# Patient Record
Sex: MALE | Race: WHITE | NOT HISPANIC OR LATINO | Employment: OTHER | URBAN - METROPOLITAN AREA
[De-identification: names, ages, dates, MRNs, and addresses within clinical notes are randomized per-mention and may not be internally consistent; named-entity substitution may affect disease eponyms.]

---

## 2018-11-29 ENCOUNTER — APPOINTMENT (EMERGENCY)
Dept: CT IMAGING | Facility: HOSPITAL | Age: 63
End: 2018-11-29
Payer: MEDICARE

## 2018-11-29 ENCOUNTER — HOSPITAL ENCOUNTER (OUTPATIENT)
Facility: HOSPITAL | Age: 63
Setting detail: OBSERVATION
Discharge: HOME | End: 2018-11-30
Attending: EMERGENCY MEDICINE | Admitting: INTERNAL MEDICINE
Payer: MEDICARE

## 2018-11-29 ENCOUNTER — APPOINTMENT (EMERGENCY)
Dept: RADIOLOGY | Facility: HOSPITAL | Age: 63
End: 2018-11-29
Payer: MEDICARE

## 2018-11-29 DIAGNOSIS — R11.15 NON-INTRACTABLE CYCLICAL VOMITING WITH NAUSEA: Chronic | ICD-10-CM

## 2018-11-29 DIAGNOSIS — R42 DIZZY: Chronic | ICD-10-CM

## 2018-11-29 DIAGNOSIS — R60.0 BILATERAL EDEMA OF LOWER EXTREMITY: Chronic | ICD-10-CM

## 2018-11-29 DIAGNOSIS — I48.20 CHRONIC ATRIAL FIBRILLATION (HCC): ICD-10-CM

## 2018-11-29 DIAGNOSIS — R42 VERTIGO: Primary | ICD-10-CM

## 2018-11-29 DIAGNOSIS — I10 ACCELERATED HYPERTENSION: Chronic | ICD-10-CM

## 2018-11-29 LAB
ALBUMIN SERPL BCP-MCNC: 4.2 G/DL (ref 3.5–5.7)
ALP SERPL-CCNC: 52 U/L (ref 55–165)
ALT SERPL W P-5'-P-CCNC: 12 U/L (ref 7–52)
ANION GAP SERPL CALCULATED.3IONS-SCNC: 8 MMOL/L (ref 4–13)
APTT PPP: 32 SECONDS (ref 26–38)
AST SERPL W P-5'-P-CCNC: 15 U/L (ref 13–39)
BASOPHILS # BLD AUTO: 0.1 THOUSANDS/ΜL (ref 0–0.1)
BASOPHILS NFR BLD AUTO: 1 % (ref 0–2)
BILIRUB SERPL-MCNC: 0.5 MG/DL (ref 0.2–1)
BNP SERPL-MCNC: 176 PG/ML (ref 1–100)
BUN SERPL-MCNC: 17 MG/DL (ref 7–25)
CALCIUM SERPL-MCNC: 9.6 MG/DL (ref 8.6–10.5)
CHLORIDE SERPL-SCNC: 101 MMOL/L (ref 98–107)
CO2 SERPL-SCNC: 26 MMOL/L (ref 21–31)
CREAT SERPL-MCNC: 0.93 MG/DL (ref 0.7–1.3)
EOSINOPHIL # BLD AUTO: 0.6 THOUSAND/ΜL (ref 0–0.61)
EOSINOPHIL NFR BLD AUTO: 6 % (ref 0–5)
ERYTHROCYTE [DISTWIDTH] IN BLOOD BY AUTOMATED COUNT: 13.4 % (ref 11.5–14.5)
GFR SERPL CREATININE-BSD FRML MDRD: 86 ML/MIN/1.73SQ M
GLUCOSE SERPL-MCNC: 117 MG/DL (ref 65–99)
HCT VFR BLD AUTO: 40.5 % (ref 36.5–49.3)
HGB BLD-MCNC: 13.9 G/DL (ref 14–18)
INR PPP: 1.17 (ref 0.9–1.5)
LYMPHOCYTES # BLD AUTO: 3.5 THOUSANDS/ΜL (ref 0.6–4.47)
LYMPHOCYTES NFR BLD AUTO: 35 % (ref 21–51)
MCH RBC QN AUTO: 32 PG (ref 26–34)
MCHC RBC AUTO-ENTMCNC: 34.3 G/DL (ref 31–37)
MCV RBC AUTO: 93 FL (ref 81–99)
MONOCYTES # BLD AUTO: 1.3 THOUSAND/ΜL (ref 0.17–1.22)
MONOCYTES NFR BLD AUTO: 13 % (ref 2–12)
NEUTROPHILS # BLD AUTO: 4.6 THOUSANDS/ΜL (ref 1.4–6.5)
NEUTS SEG NFR BLD AUTO: 46 % (ref 42–75)
NRBC BLD AUTO-RTO: 0 /100 WBCS
PLATELET # BLD AUTO: 225 THOUSANDS/UL (ref 149–390)
PMV BLD AUTO: 8.5 FL (ref 8.6–11.7)
POTASSIUM SERPL-SCNC: 4 MMOL/L (ref 3.5–5.5)
PROT SERPL-MCNC: 8.8 G/DL (ref 6.4–8.9)
PROTHROMBIN TIME: 13.6 SECONDS (ref 10.2–13)
RBC # BLD AUTO: 4.35 MILLION/UL (ref 4.3–5.9)
SODIUM SERPL-SCNC: 135 MMOL/L (ref 134–143)
TROPONIN I SERPL-MCNC: <0.03 NG/ML
WBC # BLD AUTO: 10 THOUSAND/UL (ref 4.8–10.8)

## 2018-11-29 PROCEDURE — 84484 ASSAY OF TROPONIN QUANT: CPT | Performed by: EMERGENCY MEDICINE

## 2018-11-29 PROCEDURE — 80053 COMPREHEN METABOLIC PANEL: CPT | Performed by: EMERGENCY MEDICINE

## 2018-11-29 PROCEDURE — 36415 COLL VENOUS BLD VENIPUNCTURE: CPT | Performed by: EMERGENCY MEDICINE

## 2018-11-29 PROCEDURE — 85025 COMPLETE CBC W/AUTO DIFF WBC: CPT | Performed by: EMERGENCY MEDICINE

## 2018-11-29 PROCEDURE — 96375 TX/PRO/DX INJ NEW DRUG ADDON: CPT

## 2018-11-29 PROCEDURE — 85610 PROTHROMBIN TIME: CPT | Performed by: EMERGENCY MEDICINE

## 2018-11-29 PROCEDURE — 85730 THROMBOPLASTIN TIME PARTIAL: CPT | Performed by: EMERGENCY MEDICINE

## 2018-11-29 PROCEDURE — 83880 ASSAY OF NATRIURETIC PEPTIDE: CPT | Performed by: EMERGENCY MEDICINE

## 2018-11-29 PROCEDURE — 93005 ELECTROCARDIOGRAM TRACING: CPT

## 2018-11-29 PROCEDURE — 70450 CT HEAD/BRAIN W/O DYE: CPT

## 2018-11-29 PROCEDURE — 96374 THER/PROPH/DIAG INJ IV PUSH: CPT

## 2018-11-29 PROCEDURE — 71046 X-RAY EXAM CHEST 2 VIEWS: CPT

## 2018-11-29 PROCEDURE — 99285 EMERGENCY DEPT VISIT HI MDM: CPT

## 2018-11-29 RX ORDER — METOCLOPRAMIDE HYDROCHLORIDE 5 MG/ML
10 INJECTION INTRAMUSCULAR; INTRAVENOUS ONCE
Status: COMPLETED | OUTPATIENT
Start: 2018-11-29 | End: 2018-11-29

## 2018-11-29 RX ORDER — ONDANSETRON 2 MG/ML
4 INJECTION INTRAMUSCULAR; INTRAVENOUS ONCE
Status: COMPLETED | OUTPATIENT
Start: 2018-11-29 | End: 2018-11-29

## 2018-11-29 RX ADMIN — ONDANSETRON 4 MG: 2 INJECTION INTRAMUSCULAR; INTRAVENOUS at 22:43

## 2018-11-29 RX ADMIN — METOCLOPRAMIDE 10 MG: 5 INJECTION, SOLUTION INTRAMUSCULAR; INTRAVENOUS at 22:52

## 2018-11-30 VITALS
BODY MASS INDEX: 40.43 KG/M2 | HEART RATE: 93 BPM | SYSTOLIC BLOOD PRESSURE: 140 MMHG | HEIGHT: 74 IN | RESPIRATION RATE: 18 BRPM | TEMPERATURE: 98 F | WEIGHT: 315 LBS | DIASTOLIC BLOOD PRESSURE: 88 MMHG | OXYGEN SATURATION: 100 %

## 2018-11-30 PROBLEM — R42 DIZZY: Status: ACTIVE | Noted: 2018-11-30

## 2018-11-30 PROBLEM — L03.90 CELLULITIS: Chronic | Status: ACTIVE | Noted: 2018-11-30

## 2018-11-30 PROBLEM — I10 HYPERTENSION: Chronic | Status: ACTIVE | Noted: 2018-11-30

## 2018-11-30 PROBLEM — E11.9 DIABETES MELLITUS (HCC): Chronic | Status: ACTIVE | Noted: 2018-11-30

## 2018-11-30 PROBLEM — E66.01 MORBID OBESITY WITH BMI OF 40.0-44.9, ADULT (HCC): Chronic | Status: ACTIVE | Noted: 2018-11-30

## 2018-11-30 PROBLEM — I48.20 CHRONIC ATRIAL FIBRILLATION (HCC): Status: ACTIVE | Noted: 2018-11-30

## 2018-11-30 PROBLEM — R11.10 VOMITING: Status: ACTIVE | Noted: 2018-11-30

## 2018-11-30 PROBLEM — R11.10 VOMITING: Chronic | Status: ACTIVE | Noted: 2018-11-30

## 2018-11-30 PROBLEM — H93.11 TINNITUS OF RIGHT EAR: Chronic | Status: ACTIVE | Noted: 2018-11-30

## 2018-11-30 PROBLEM — G62.9 PERIPHERAL NEUROPATHY: Status: ACTIVE | Noted: 2018-11-30

## 2018-11-30 PROBLEM — L03.90 CELLULITIS: Status: ACTIVE | Noted: 2018-11-30

## 2018-11-30 PROBLEM — R42 DIZZY: Chronic | Status: ACTIVE | Noted: 2018-11-30

## 2018-11-30 PROBLEM — R60.0 BILATERAL EDEMA OF LOWER EXTREMITY: Chronic | Status: ACTIVE | Noted: 2018-11-30

## 2018-11-30 PROBLEM — H93.11 TINNITUS OF RIGHT EAR: Status: ACTIVE | Noted: 2018-11-30

## 2018-11-30 PROBLEM — G89.29 CHRONIC PAIN: Chronic | Status: ACTIVE | Noted: 2018-11-30

## 2018-11-30 LAB
ALBUMIN SERPL BCP-MCNC: 4 G/DL (ref 3.5–5.7)
ALP SERPL-CCNC: 53 U/L (ref 55–165)
ALT SERPL W P-5'-P-CCNC: 14 U/L (ref 7–52)
ANION GAP SERPL CALCULATED.3IONS-SCNC: 9 MMOL/L (ref 4–13)
AST SERPL W P-5'-P-CCNC: 18 U/L (ref 13–39)
ATRIAL RATE: 78 BPM
BASOPHILS # BLD AUTO: 0 THOUSANDS/ΜL (ref 0–0.1)
BASOPHILS NFR BLD AUTO: 0 % (ref 0–2)
BILIRUB SERPL-MCNC: 0.6 MG/DL (ref 0.2–1)
BUN SERPL-MCNC: 19 MG/DL (ref 7–25)
CALCIUM SERPL-MCNC: 9.7 MG/DL (ref 8.6–10.5)
CHLORIDE SERPL-SCNC: 98 MMOL/L (ref 98–107)
CO2 SERPL-SCNC: 26 MMOL/L (ref 21–31)
CREAT SERPL-MCNC: 0.84 MG/DL (ref 0.7–1.3)
EOSINOPHIL # BLD AUTO: 0.1 THOUSAND/ΜL (ref 0–0.61)
EOSINOPHIL NFR BLD AUTO: 1 % (ref 0–5)
ERYTHROCYTE [DISTWIDTH] IN BLOOD BY AUTOMATED COUNT: 13.6 % (ref 11.5–14.5)
GFR SERPL CREATININE-BSD FRML MDRD: 92 ML/MIN/1.73SQ M
GLUCOSE P FAST SERPL-MCNC: 191 MG/DL (ref 65–99)
GLUCOSE SERPL-MCNC: 125 MG/DL (ref 65–140)
GLUCOSE SERPL-MCNC: 191 MG/DL (ref 65–99)
GLUCOSE SERPL-MCNC: 202 MG/DL (ref 65–140)
HCT VFR BLD AUTO: 41 % (ref 36.5–49.3)
HGB BLD-MCNC: 14.1 G/DL (ref 14–18)
LYMPHOCYTES # BLD AUTO: 1.5 THOUSANDS/ΜL (ref 0.6–4.47)
LYMPHOCYTES NFR BLD AUTO: 13 % (ref 21–51)
MAGNESIUM SERPL-MCNC: 1.9 MG/DL (ref 1.9–2.7)
MCH RBC QN AUTO: 32.5 PG (ref 26–34)
MCHC RBC AUTO-ENTMCNC: 34.4 G/DL (ref 31–37)
MCV RBC AUTO: 94 FL (ref 81–99)
MONOCYTES # BLD AUTO: 0.6 THOUSAND/ΜL (ref 0.17–1.22)
MONOCYTES NFR BLD AUTO: 5 % (ref 2–12)
NEUTROPHILS # BLD AUTO: 9.5 THOUSANDS/ΜL (ref 1.4–6.5)
NEUTS SEG NFR BLD AUTO: 82 % (ref 42–75)
NRBC BLD AUTO-RTO: 0 /100 WBCS
PLATELET # BLD AUTO: 193 THOUSANDS/UL (ref 149–390)
PMV BLD AUTO: 8.7 FL (ref 8.6–11.7)
POTASSIUM SERPL-SCNC: 3.9 MMOL/L (ref 3.5–5.5)
PROT SERPL-MCNC: 8.6 G/DL (ref 6.4–8.9)
QRS AXIS: -11 DEGREES
QRSD INTERVAL: 86 MS
QT INTERVAL: 458 MS
QTC INTERVAL: 494 MS
RBC # BLD AUTO: 4.34 MILLION/UL (ref 4.3–5.9)
SODIUM SERPL-SCNC: 133 MMOL/L (ref 134–143)
T WAVE AXIS: 11 DEGREES
VENTRICULAR RATE: 70 BPM
WBC # BLD AUTO: 11.7 THOUSAND/UL (ref 4.8–10.8)

## 2018-11-30 PROCEDURE — 80053 COMPREHEN METABOLIC PANEL: CPT | Performed by: NURSE PRACTITIONER

## 2018-11-30 PROCEDURE — 82948 REAGENT STRIP/BLOOD GLUCOSE: CPT

## 2018-11-30 PROCEDURE — 99203 OFFICE O/P NEW LOW 30 MIN: CPT | Performed by: INTERNAL MEDICINE

## 2018-11-30 PROCEDURE — G8987 SELF CARE CURRENT STATUS: HCPCS

## 2018-11-30 PROCEDURE — 99236 HOSP IP/OBS SAME DATE HI 85: CPT | Performed by: INTERNAL MEDICINE

## 2018-11-30 PROCEDURE — G8978 MOBILITY CURRENT STATUS: HCPCS

## 2018-11-30 PROCEDURE — 97116 GAIT TRAINING THERAPY: CPT

## 2018-11-30 PROCEDURE — G8979 MOBILITY GOAL STATUS: HCPCS

## 2018-11-30 PROCEDURE — 97167 OT EVAL HIGH COMPLEX 60 MIN: CPT

## 2018-11-30 PROCEDURE — G8988 SELF CARE GOAL STATUS: HCPCS

## 2018-11-30 PROCEDURE — 97163 PT EVAL HIGH COMPLEX 45 MIN: CPT

## 2018-11-30 PROCEDURE — G8989 SELF CARE D/C STATUS: HCPCS

## 2018-11-30 PROCEDURE — 83735 ASSAY OF MAGNESIUM: CPT | Performed by: NURSE PRACTITIONER

## 2018-11-30 PROCEDURE — 85025 COMPLETE CBC W/AUTO DIFF WBC: CPT | Performed by: NURSE PRACTITIONER

## 2018-11-30 PROCEDURE — 93010 ELECTROCARDIOGRAM REPORT: CPT | Performed by: INTERNAL MEDICINE

## 2018-11-30 RX ORDER — CEFAZOLIN SODIUM 2 G/50ML
2000 SOLUTION INTRAVENOUS EVERY 8 HOURS
Status: DISCONTINUED | OUTPATIENT
Start: 2018-11-30 | End: 2018-11-30 | Stop reason: HOSPADM

## 2018-11-30 RX ORDER — CLONIDINE HYDROCHLORIDE 0.1 MG/1
0.2 TABLET ORAL ONCE
Status: COMPLETED | OUTPATIENT
Start: 2018-11-30 | End: 2018-11-30

## 2018-11-30 RX ORDER — HYDRALAZINE HYDROCHLORIDE 20 MG/ML
10 INJECTION INTRAMUSCULAR; INTRAVENOUS EVERY 6 HOURS PRN
Status: DISCONTINUED | OUTPATIENT
Start: 2018-11-30 | End: 2018-11-30 | Stop reason: HOSPADM

## 2018-11-30 RX ORDER — LOVASTATIN 20 MG/1
20 TABLET ORAL
COMMUNITY
End: 2019-02-26 | Stop reason: ALTCHOICE

## 2018-11-30 RX ORDER — GLIPIZIDE 5 MG/1
5 TABLET ORAL DAILY
COMMUNITY
End: 2019-08-14 | Stop reason: SDUPTHER

## 2018-11-30 RX ORDER — NADOLOL 80 MG/1
80 TABLET ORAL DAILY
COMMUNITY
End: 2019-02-26 | Stop reason: ALTCHOICE

## 2018-11-30 RX ORDER — HEPARIN SODIUM 5000 [USP'U]/ML
5000 INJECTION, SOLUTION INTRAVENOUS; SUBCUTANEOUS EVERY 8 HOURS SCHEDULED
Status: DISCONTINUED | OUTPATIENT
Start: 2018-11-30 | End: 2018-11-30

## 2018-11-30 RX ORDER — FUROSEMIDE 40 MG/1
40 TABLET ORAL DAILY
Refills: 0
Start: 2018-11-30 | End: 2019-07-04

## 2018-11-30 RX ORDER — ACETAMINOPHEN 325 MG/1
650 TABLET ORAL EVERY 6 HOURS PRN
Status: DISCONTINUED | OUTPATIENT
Start: 2018-11-30 | End: 2018-11-30 | Stop reason: HOSPADM

## 2018-11-30 RX ORDER — ONDANSETRON 2 MG/ML
4 INJECTION INTRAMUSCULAR; INTRAVENOUS EVERY 6 HOURS PRN
Status: DISCONTINUED | OUTPATIENT
Start: 2018-11-30 | End: 2018-11-30 | Stop reason: HOSPADM

## 2018-11-30 RX ORDER — FUROSEMIDE 80 MG
80 TABLET ORAL DAILY
COMMUNITY
End: 2019-12-23 | Stop reason: SDUPTHER

## 2018-11-30 RX ADMIN — HYDRALAZINE HYDROCHLORIDE 10 MG: 20 INJECTION INTRAMUSCULAR; INTRAVENOUS at 04:55

## 2018-11-30 RX ADMIN — HEPARIN SODIUM 5000 UNITS: 5000 INJECTION INTRAVENOUS; SUBCUTANEOUS at 05:03

## 2018-11-30 RX ADMIN — APIXABAN 5 MG: 2.5 TABLET, FILM COATED ORAL at 08:16

## 2018-11-30 RX ADMIN — CEFAZOLIN SODIUM 2000 MG: 2 SOLUTION INTRAVENOUS at 13:52

## 2018-11-30 RX ADMIN — CEFAZOLIN SODIUM 2000 MG: 2 SOLUTION INTRAVENOUS at 06:39

## 2018-11-30 RX ADMIN — INSULIN LISPRO 1 UNITS: 100 INJECTION, SOLUTION INTRAVENOUS; SUBCUTANEOUS at 08:15

## 2018-11-30 RX ADMIN — CLONIDINE HYDROCHLORIDE 0.2 MG: 0.1 TABLET ORAL at 01:45

## 2018-11-30 NOTE — ASSESSMENT & PLAN NOTE
No results found for: HGBA1C    Recent Labs      11/30/18   0813  11/30/18   1159   POCGLU  202*  125       Blood Sugar Average: Last 72 hrs:  (P) 163 5  · Continue home meds

## 2018-11-30 NOTE — NURSING NOTE
Pt ready for discharge, NSL dc'd with abbocath intact  Thorough review of discharge instructions with pt and family   Encouraged to use walker and consolidate activities    Elevate legs for edema    Pt verbalizes understanding of instructions

## 2018-11-30 NOTE — ASSESSMENT & PLAN NOTE
· BP stable  · Continue home medications  · Cardiology input appreciated  · Follow up with PCP and cardio as outpatient

## 2018-11-30 NOTE — OCCUPATIONAL THERAPY NOTE
Occupational Therapy Evaluation      Fidencio Galan    11/30/2018    Patient Active Problem List   Diagnosis    Diabetes mellitus ([facility] )    Vomiting    Accelerated hypertension    Chronic pain    Morbid obesity with BMI of 40 0-44 9, adult ([facility] )    Dizzy    Bilateral edema of lower extremity    Cellulitis    Tinnitus of right ear       Past Medical History:   Diagnosis Date    Chronic pain     Diabetes mellitus ([facility] )     Hypertension        Past Surgical History:   Procedure Laterality Date    HERNIA REPAIR          11/30/18 7105   Note Type   Note type Eval only   Restrictions/Precautions   Weight Bearing Precautions Per Order No   Other Precautions Fall Risk   Pain Assessment   Pain Assessment No/denies pain   Pain Score No Pain   Home Living   Type of Home House   Home Layout Multi-level   Bathroom Shower/Tub Tub/shower unit   Bathroom Toilet Standard   Bathroom Equipment Other (Comment)  (none)   [address]   Additional Comments (first floor set up)   Prior Function   Level of [place] Independent with ADLs and functional mobility   Lives With Son   ADL Assistance Independent   IADLs Independent   Subjective   Subjective (" I feel good now    No pain no dizziness")   ADL   Eating Assistance 7  Independent   Grooming Assistance 7  Independent   UB Bathing Assistance 6  Modified Independent   LB Bathing Assistance 4  Minimal Assistance   UB Dressing Assistance 6  Modified independent   LB Dressing Assistance 4  Minimal [address]  4  Minimal Assistance   Bed Mobility   Rolling R 7  Independent   Rolling L 7  Independent   Supine to Sit 7  Independent   Sit to Supine 7  Independent   Transfers   Sit to Stand 6  Modified independent   Stand to Sit 6  Modified independent   Balance   Static Sitting Normal   Dynamic Sitting Normal   Static Standing Fair +   Dynamic Standing Fair   RUE Assessment   RUE Assessment WNL   LUE Assessment   LUE Assessment WNL   Cognition   Overall Cognitive Status WFL   Assessment   Assessment Pt is a 70 y o  male seen for OT evaluation s/p admit to [facility] on 11/29/2018 w/ Tinnitus of right ear  Comorbidities affecting pt's functional performance at time of assessment include: DM, obesity and bilateral LE pitting edema  Personal factors affecting pt at time of IE include:difficulty performing ADLS and difficulty performing IADLS   Prior to admission, pt was  Independent with ADL's and IADL's with first floor set up  Upon evaluation: Pt requires assistance with ADL's and mobility 2* the following deficits impacting occupational performance: decreased tolerance and impaired sensation of BLE's and significant increase in BLE edema  Pt would not  benefit from skilled OT tx while in hosp   Recommend PT for mobility needs  From OT standpoint, recommendation at time of d/c would be to return home when medically stable      Goals   Patient Goals "to get home and be able to walk without pain"   Barthel Index   Feeding 10   Bathing 0   Grooming Score 5   Dressing Score 5   Bladder Score 10   Bowels Score 10   Toilet Use Score 5   Transfers (Bed/Chair) Score 10   Mobility (Level Surface) Score 10   Stairs Score 0   Barthel Index Score 65   Marylen Batten, OT

## 2018-11-30 NOTE — ED NOTES
Patient states "feeling woozy, dizzy " Reviewed Catapres mechanism of action ans expected onset       Artemio Nicolas RN  11/30/18 0141

## 2018-11-30 NOTE — H&P
H&P- Jamal Mac 1955, 70 y o  male MRN: [id number]    Unit/Bed#: -02 Encounter: [id number]    Primary Care Provider: Lily Avendaño DO   Date and time admitted to hospital: 11/29/2018  9:51 PM        * Tinnitus of right ear   Assessment & Plan    · Patient recently had Lasix doubled to 80 mg daily  · He states the tinnitus is gone     Accelerated hypertension   Assessment & Plan    · Consult Cardiology  · Trend troponins  · Check a TSH  · P r n  Hydralazine  · Hypertension is chronic  · Patient is unsure of his medications     Dizzy   Assessment & Plan    · Patient states that the he did have right ear ringing  · And now has increased to dizziness with nausea and vomiting  · Consult Neurology  · Fall precautions     Cellulitis   Assessment & Plan    · Previous cellulitis of right lower extremity  · Positive for cellulitis of right lower extremity at this time  · Will start IV Ancef      Vomiting   Assessment & Plan    · Secondary to dizziness  · Order p r n  Zofran and Reglan     Morbid obesity with BMI of 40 0-44 9, adult ([facility])   Assessment & Plan    · This is a chronic condition  · Will offer supportive care     Chronic pain   Assessment & Plan    · This is a chronic condition     Diabetes mellitus ([facility] )   Assessment & Plan    No results found for: HGBA1C    No results for input(s): POCGLU in the last 72 hours  Blood Sugar Average: Last 72 hrs:    · Accu-Cheks Q a c   And HS with sliding scale insulin and check a hemoglobin A1c     Bilateral edema of lower extremity   Assessment & Plan    ·   · This is a chronic condition  · Patient states actually has worsened since increasing his Lasix           VTE Prophylaxis: Apixaban (Eliquis)  Code Status:  Full  POLST: POLST is not applicable to this patient  Discussion with family:  Appearance of right lower extremity cellulitis    Anticipated Length of Stay:  Patient will be admitted on an Observation basis with an anticipated length of stay of  < 2 midnights  Justification for Hospital Stay:  Cellulitis requiring IV antibiotics    Total Time for Visit, including Counseling / Coordination of Care: 70   Greater than 50% of this total time spent on direct patient counseling and coordination of care  Chief Complaint:   Dizziness    History of Present Illness:    Piero Dunbar is a 70 y o  male who presents with dizziness, unsteady on his feet, room was spinning  Patient states that the dizziness was causing him to be nauseated and actually causing vomiting  He also states that earlier in the day he had very severe right ear ringing  When I questioned him about his bilateral lower extremity edema, he tells me that he recently had his Lasix dose increased from 40 mg daily to 80 mg daily  This could be the cause of his tendinitis  He also states that he has actually been unable to walk for the last day or so secondary to the lower extremity edema, and with the ringing in his right ear he had dizziness and has been unable to walk  [facility] he states that the tendinitis is gone, there is no longer any dizziness, however his right lower extremity is reddened and appears to have a cellulitic look to it  At this point we will start him on IV antibiotics  In the emergency department he was given Zofran, Reglan, and clonidine  With an accelerated hypertension of 196/96 patient did receive hydralazine p r n  And clonidine p r n     Continue to monitor  Review of Systems:  Review of Systems   Constitutional: Positive for activity change  HENT: Negative  Eyes: Negative  Respiratory: Negative  Cardiovascular: Negative  Gastrointestinal: Negative  Endocrine: Negative  Genitourinary: Negative  Musculoskeletal: Positive for gait problem and joint swelling  Skin: Positive for rash  Allergic/Immunologic: Negative  Neurological: Positive for dizziness, weakness and light-headedness  Hematological: Negative  Psychiatric/Behavioral: Negative  Past Medical and Surgical History:   Past Medical History:   Diagnosis Date    Chronic pain     Diabetes mellitus ([address] )     Hypertension        Past Surgical History:   Procedure Laterality Date    HERNIA REPAIR         Meds/Allergies:  Prior to Admission medications    Not on File     I have reviewed home medications with patient personally  Allergies: No Known Allergies    Social History:  Marital Status:    Occupation:  Retired  Patient Pre-hospital Living Situation:  At home  Patient Pre-hospital Level of Mobility:  Full  Patient Pre-hospital Diet Restrictions:  Diabetic  Substance Use History:   History   Alcohol Use    Yes     Comment: social     History   Smoking Status    Never Smoker   Smokeless Tobacco    Never Used     History   Drug Use No       Family History:  I have reviewed the patients family history    Physical Exam:   Vitals:   Blood Pressure: (!) 178/93 (11/30/18 0427)  Pulse: 90 (11/30/18 0427)  Temperature: (!) 97 2 °F (36 2 °C) (11/30/18 0427)  Temp Source: Tympanic (11/30/18 0427)  Respirations: 18 (11/30/18 0427)  Height: 6' 2" (188 cm) (11/30/18 0427)  Weight - Scale: (!) 159 kg (351 lb 4 8 oz) (11/30/18 0427)  SpO2: 99 % (11/30/18 0427)    Physical Exam   Constitutional: He is oriented to person, place, and time  He appears well-developed and well-nourished  He is cooperative  HENT:   Head: Normocephalic and atraumatic  Nose: Nose normal    Mouth/Throat: Mucous membranes are normal    Eyes: Pupils are equal, round, and reactive to light  Conjunctivae and EOM are normal    Neck: Normal range of motion and full passive range of motion without pain  Neck supple  Cardiovascular: Normal rate, regular rhythm and normal heart sounds  Pulses:       Dorsalis pedis pulses are 1+ on the right side, and 1+ on the left side  Bilateral lower extremity +2 to 3 pitting edema    Patient does have chronic lower extremity enlargement of his legs  Pulmonary/Chest: Effort normal and breath sounds normal    Abdominal: Normal appearance and bowel sounds are normal    Musculoskeletal:        Right knee: He exhibits decreased range of motion and swelling  Left knee: He exhibits decreased range of motion and swelling  Right ankle: He exhibits decreased range of motion and swelling  Left ankle: He exhibits decreased range of motion and swelling  Tenderness  Neurological: He is alert and oriented to person, place, and time  Skin: Skin is warm and dry  Psychiatric: He has a normal mood and affect  His speech is normal and behavior is normal        Additional Data:   Lab Results: I have personally reviewed pertinent reports  Results from last 7 days  Lab Units 11/29/18  2210   WBC Thousand/uL 10 00   HEMOGLOBIN g/dL 13 9*   HEMATOCRIT % 40 5   PLATELETS Thousands/uL 225   NEUTROS PCT % 46   LYMPHS PCT % 35   MONOS PCT % 13*   EOS PCT % 6*       Results from last 7 days  Lab Units 11/29/18  2210   POTASSIUM mmol/L 4 0   CHLORIDE mmol/L 101   CO2 mmol/L 26   BUN mg/dL 17   CREATININE mg/dL 0 93   CALCIUM mg/dL 9 6   ALK PHOS U/L 52*   ALT U/L 12   AST U/L 15       Results from last 7 days  Lab Units 11/29/18  2210   INR  1 17               Imaging: I have personally reviewed pertinent reports  X-ray chest 2 views   Final Result by Jam Iraheta (11/29 2344)   Bibasilar patchy atelectasis is appreciated  Signed by Allie Baptiste MD      CT head without contrast   Final Result by Jam Iraheta (11/29 0861)   No current evidence of acute intracranial process            Signed by Allie Baptiste MD          EKG, Pathology, and Other Studies Reviewed on Admission:   · EKG:     [employer] / [facility] Records Reviewed: No     ** Please Note: This note has been constructed using a voice recognition system   **

## 2018-11-30 NOTE — PHYSICAL THERAPY NOTE
Physical Therapy Evaluation     Patient's Name: Kera Chance    Admitting Diagnosis  Vertigo [R42]  Vomiting [R11 10]  Accelerated hypertension [I10]  Dizzy [R42]  Non-intractable cyclical vomiting with nausea [G43  A0]    Problem List  Patient Active Problem List   Diagnosis    [facility] mellitus ([facility] )    Vomiting    Accelerated hypertension    Chronic pain    Morbid obesity with BMI of 40 0-44 9, adult ([facility] )    Dizzy    Bilateral edema of lower extremity    Cellulitis    Tinnitus of right ear       Past Medical History  Past Medical History:   Diagnosis Date    Chronic pain     Diabetes mellitus ([facility] )     Hypertension        Past Surgical History  Past Surgical History:   Procedure Laterality Date    HERNIA REPAIR        11/30/18 0841   Note Type   Note type Eval/Treat   Pain Assessment   Pain Assessment 0-10  (Simultaneous filing  User may not have seen previous data )   Pain Score No Pain   Home Living   Type of [address] [place] [address] Two level; Able to live on main level with bedroom/bathroom;Stairs to enter with rails  (2 MONA, typically no issue)   Bathroom Shower/Tub Tub/shower unit   Bathroom Toilet Standard   Bathroom Accessibility [place]  (used x 1 day, secondary to swelling L foot)   Additional Comments pt  stays on first floor, sleeps in recliner   Prior Function   Level of [place] Independent with ADLs and functional mobility   Lives With Son   Receives Help From Family   ADL Assistance Independent   IADLs Independent   Falls in the last 6 months 0   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[employer])   Restrictions/Precautions   [place] Bearing Precautions Per Order No   Other Precautions Fall Risk;Pain   General   Family/Caregiver Present No   Cognition   Overall Cognitive Status WFL   Arousal/Participation Alert   Orientation Level Oriented X4   Memory Within functional limits   Following Commands Follows one step commands with increased time or repetition   RUE Assessment   RUE Assessment (see OT assessment)   LUE Assessment   LUE Assessment (see OT assessment)   RLE Assessment   RLE Assessment X   Strength RLE   R Hip Flexion 3+/5   R Knee Extension 3+/5   R Ankle Dorsiflexion 3+/5   LLE Assessment   LLE Assessment X   Strength LLE   L Hip Flexion 3+/5   L Knee Extension 3+/5   L Ankle Dorsiflexion 3+/5   Coordination   Movements are Fluid and Coordinated 1   Sensation (B pedal due to diabetic neuropathy)   Bed Mobility   Supine to Sit 6  Modified independent   Additional items HOB elevated; Bedrails   Sit to Supine 6  Modified independent   Additional items Bedrails;HOB elevated   Transfers   Sit to Stand 4  Minimal assistance   Additional items Assist x 1;HOB elevated; Bedrails;Verbal cues   Stand to Sit 5  Supervision   Additional items Assist x 1;Bedrails;Verbal cues   Additional Comments Patient stood and urinated at toilet w/ close supervision of s w/o LOB   Ambulation/Elevation   Gait pattern Improper Weight shift;Decreased foot clearance;Decreased L stance; Short stride; Step to; Antalgic   Gait Assistance 5  Supervision   Additional items Assist x 1;Verbal cues; Tactile cues  (occasional TC's for safety, VC's for proper RW usage)   Assistive Device Rolling walker   Distance 120 feet   Stair Management Assistance Not tested   Balance   Static Sitting Normal   Dynamic Sitting Good   Static Standing Fair +   Dynamic Standing Fair   Ambulatory Fair   Endurance Deficit   Endurance Deficit Yes   Endurance Deficit Description increased fatigue, L foot pain upon return to bedside, resolved w/ rest x one minute at EOB  Activity Tolerance   Activity Tolerance Patient tolerated treatment well   Nurse Made Aware yes, CNA   Assessment   Prognosis Excellent   Problem List Decreased strength;Decreased endurance; Impaired balance;Decreased mobility; Decreased coordination; Impaired sensation;Obesity;Pain   Assessment Pt is 70 y o  male seen for PT evaluation s/p admit to [address] [facility] on 11/29/2018 w/ Tinnitus of right ear  PT consulted to assess pt's functional mobility and d/c needs  Order placed for PT eval and tx, w/ activity as tolerated order  Comorbidities affecting pt's physical performance at time of assessment include: weakness, dizziness, pain, BLE edema, cellulitis, DM w/ neuropathy, obesity, pain  PTA, pt was independent w/ all functional mobility w/ o AD and lives w/ son in one level house  Personal factors affecting pt at time of IE include: ambulating w/ assistive device, inability to navigate community distances, inability to navigate level surfaces w/o external assistance, unable to perform dynamic tasks in community, inability to perform IADLs and inability to perform ADLs  Please find objective findings from PT assessment regarding body systems outlined above with impairments and limitations including weakness, impaired balance, decreased endurance, impaired coordination, gait deviations, altered sensation, decreased safety awareness, fall risk and decreased skin integrity  The following objective measures performed on IE also reveal limitations: Barthel Index: 65/100  Pt's clinical presentation is currently unstable/unpredictable  Pt to benefit from continued PT tx to address deficits as defined above and maximize level of functional independent mobility and consistency  From PT/mobility standpoint, recommendation at time of d/c would be Home PT and RW pending progress in order to facilitate return to PLOF  Goals   Patient Goals feel stronger, get back to making funnel cakes   STG Expiration Date 12/07/18   Short Term Goal #1 1 )Patient will complete bed mobility independently for decrease need for caregiver assistance, decrease burden of care  2 ) Patient will complete transfers independently to decrease risk of falls, facilitate upright standing posture   3 ) BLE strength to greater than/equal to 4/5 gross musculature to increase ability to safely transfer, control descent to chair  4 ) Patient will exhibit increase static standing balance to Good for 3-5 minutes without LOB, Mod I to reduce risk of falls 5 ) Patient will exhibit increase dynamic ambulatory balance to Good+ for 350 feet, Mod I  to improve ability to mobilize to toilet, chair and decrease risk for additional medical complications  6 ) Patient will exhibit good self monitoring and ability to follow 2 step commands to increase complexity of tasks and resume ADL's without LOB  Treatment Day 1   Plan   Treatment/Interventions Functional transfer training;LE strengthening/ROM; Therapeutic exercise; Endurance training;Patient/family training;Equipment eval/education;Gait training;Spoke to nursing   PT Frequency 5x/wk   Recommendation   Recommendation Home PT   Equipment Recommended Walker   PT - OK to Discharge Yes  (if medically stable)   Additional Comments Upon conclusion, patient was resting comfortably w/ all need met, pleasant throguhout session  Barthel Index   Feeding 10   Bathing 0   Grooming Score 5   Dressing Score 5   Bladder Score 10   Bowels Score 10   Toilet Use Score 5   Transfers (Bed/Chair) Score 10   Mobility (Level Surface) Score 10   Stairs Score 0   Barthel Index Score 65     Additional skilled interventions: gait training x 15 minutes including a provided RW w/ adjustment, education on proper RW usage, utilization w/ directional changes, and safety awareness w/ AD in bathroom  Patient ambulated 120 feet w/ supervision and RW w/o LOB  I discussed providing a RW for home use, patient agreeable and case management aware, getting script        Mirlande Rinaldi, PT

## 2018-11-30 NOTE — PLAN OF CARE
Problem: DISCHARGE PLANNING - CARE MANAGEMENT  Goal: Discharge to post-acute care or home with appropriate resources  INTERVENTIONS:  - Conduct assessment to determine patient/family and health care team treatment goals, and need for post-acute services based on payer coverage, community resources, and patient preferences, and barriers to discharge  - Address psychosocial, clinical, and financial barriers to discharge as identified in assessment in conjunction with the patient/family and health care team  - Arrange appropriate level of post-acute services according to patient's   needs and preference and payer coverage in collaboration with the physician and health care team  - Communicate with and update the patient/family, physician, and health care team regarding progress on the discharge plan  - Arrange appropriate transportation to post-acute venues    D/c home  Outcome: Completed Date Met: 11/30/18

## 2018-11-30 NOTE — PLAN OF CARE
Problem: PHYSICAL THERAPY ADULT  Goal: Performs mobility at highest level of function for planned discharge setting  See evaluation for individualized goals  Treatment/Interventions: Functional transfer training, LE strengthening/ROM, Therapeutic exercise, Endurance training, Patient/family training, Equipment eval/education, Gait training, Spoke to nursing  Equipment Recommended: Kiley Florence, PT      See flowsheet documentation for full assessment, interventions and recommendations  Prognosis: Excellent  Problem List: Decreased strength, Decreased endurance, Impaired balance, Decreased mobility, Decreased coordination, Impaired sensation, Obesity, Pain  Assessment: Pt is 70 y o  male seen for PT evaluation s/p admit [facility] 11/29/2018 w/ Tinnitus of right ear  PT consulted to assess pt's functional mobility and d/c needs  Order placed for PT eval and tx, w/ activity as tolerated order  Comorbidities affecting pt's physical performance at time of assessment include: weakness, dizziness, pain, BLE edema, cellulitis, DM w/ neuropathy, obesity, pain  PTA, pt was independent w/ all functional mobility w/ o AD and lives w/ son in one level house  Personal factors affecting pt at time of IE include: ambulating w/ assistive device, inability to navigate community distances, inability to navigate level surfaces w/o external assistance, unable to perform dynamic tasks in community, inability to perform IADLs and inability to perform ADLs  Please find objective findings from PT assessment regarding body systems outlined above with impairments and limitations including weakness, impaired balance, decreased endurance, impaired coordination, gait deviations, altered sensation, decreased safety awareness, fall risk and decreased skin integrity  The following objective measures performed on IE also reveal limitations: Barthel Index: 65/100   Pt's clinical presentation is currently unstable/unpredictable  Pt to benefit from continued PT tx to address deficits as defined above and maximize level of functional independent mobility and consistency  From PT/mobility standpoint, recommendation at time of d/c would be Home PT and RW pending progress in order to facilitate return to PLOF  Recommendation: Home PT     PT - OK to Discharge: Yes (if medically stable)    See flowsheet documentation for full assessment     Harihs Manasa, PT

## 2018-11-30 NOTE — ED NOTES
Transfer via stretcher and cardiac/ SpO2/  NIBP monitoring to room 122-b  Ambulated with two assist to bed  Bedside report provided to Johnny D[place]  No questions at this time   PIVL Left hand intact     Christina Milner RN  11/30/18 1467

## 2018-11-30 NOTE — ASSESSMENT & PLAN NOTE
· Consult Cardiology  · Trend troponins  · Check a TSH  · P r n   Hydralazine  · Hypertension is chronic  · Patient is unsure of his medications

## 2018-11-30 NOTE — MALNUTRITION/BMI
This medical record reflects one or more clinical indicators suggestive of malnutrition and/or morbid obesity  Malnutrition Findings:   Malnutrition type: Chronic illness          BMI Findings:  BMI Classifications: Morbid Obesity 45-49 9     Body mass index is 45 1 kg/m²  See Nutrition note dated 11-30-18 for additional details  Completed nutrition assessment is viewable in the nutrition documentation

## 2018-11-30 NOTE — DISCHARGE SUMMARY
Discharge- Kera Chance 1955, 70 y o  male MRN: [id number]    Unit/Bed#: -02 Encounter: [id number]    Primary Care Provider: Pedro Braxton DO   Date and time admitted to hospital: 11/29/2018  9:51 PM        * Tinnitus of right ear   Assessment & Plan    · Likely due to recent increase in Lasix dose  · Recommended patient go back to 40 mg daily as opposed to 80 mg daily  · Follow up with PCP  · Symptoms have resolved     Chronic atrial fibrillation (HCC)   Assessment & Plan    · Rate controlled  · Continue home meds     Accelerated hypertension   Assessment & Plan    · BP stable  · Continue home medications  · Cardiology input appreciated  · Follow up with PCP and cardio as outpatient     Diabetes mellitus ([place] [address] )   Assessment & Plan    No results found for: HGBA1C    Recent Labs      11/30/18   0813  11/30/18   1159   POCGLU  202*  125       Blood Sugar Average: Last 72 hrs:  (P) 163 5  · Continue home meds       Discharging Physician / Practitioner: Gissell Rosario MD  PCP: Pedro Braxton DO  Admission Date:   Admission Orders     Ordered        11/30/18 0259  Place in Observation  Once             Discharge Date: 11/30/18    Resolved Problems  Date Reviewed: 11/30/2018    None          Consultations During Hospital Stay:  · Cardiology    Procedures Performed:   · None    Significant Findings / Test Results:   · Tinnitus right ear    Incidental Findings:   · None     Test Results Pending at Discharge (will require follow up): · None     Outpatient Tests Requested:  · None    Complications:  None    Reason for Admission:  Tinnitus    Hospital Course:     Kera Chance is a 70 y o  male patient who originally presented to the hospital on 11/29/2018 due to dizziness/vomiting  Patient was admitted with right ear tinnitus which has currently resolved    Patient recently increased his Lasix dose from 40 mg daily to 80 mg daily at the recommended of his PCP    Please see above list of diagnoses and related plan for additional information  Condition at Discharge: stable     Discharge Day Visit / Exam:     Subjective:  No complaints at this time    Vitals: Blood Pressure: 140/88 (11/30/18 0706)  Pulse: 93 (11/30/18 0706)  Temperature: 98 °F (36 7 °C) (11/30/18 0706)  Temp Source: Tympanic (11/30/18 0706)  Respirations: 18 (11/30/18 0706)  Height: 6' 2" (188 cm) (11/30/18 0427)  Weight - Scale: (!) 159 kg (351 lb 4 8 oz) (11/30/18 0427)  SpO2: 100 % (11/30/18 0706)     Exam:   Physical Exam   Constitutional: He appears well-developed  No distress  HENT:   Head: Normocephalic and atraumatic  Eyes: Conjunctivae and EOM are normal    Neck: Normal range of motion  Neck supple  Cardiovascular: Normal rate and regular rhythm  Pulmonary/Chest: Effort normal  No respiratory distress  Abdominal: Soft  He exhibits no distension  There is no tenderness  Musculoskeletal: Normal range of motion  He exhibits edema  Neurological: He is alert  No cranial nerve deficit  Skin: Skin is warm and dry  Chronic venous stasis changes of bilateral lower extremities between knee and ankle   Psychiatric: He has a normal mood and affect  Discharge instructions/Information to patient and family:   See after visit summary for information provided to patient and family  Provisions for Follow-Up Care:  See after visit summary for information related to follow-up care and any pertinent home health orders  Disposition:     Home    For Discharges to [facility] SNF:   · Not Applicable to this Patient - Not Applicable to this Patient    Planned Readmission: no     Discharge Statement:  I spent 35 minutes discharging the patient  This time was spent on the day of discharge  I had direct contact with the patient on the day of discharge   Greater than 50% of the total time was spent examining patient, answering all patient questions, arranging and discussing plan of care with patient as well as directly providing post-discharge instructions  Additional time then spent on discharge activities  Discharge Medications:  See after visit summary for reconciled discharge medications provided to patient and family        ** Please Note: This note has been constructed using a voice recognition system **

## 2018-11-30 NOTE — ASSESSMENT & PLAN NOTE
No results found for: HGBA1C    No results for input(s): POCGLU in the last 72 hours  Blood Sugar Average: Last 72 hrs:    · Accu-Cheks Q a c   And HS with sliding scale insulin and check a hemoglobin A1c

## 2018-11-30 NOTE — ASSESSMENT & PLAN NOTE
· Likely due to recent increase in Lasix dose  · Recommended patient go back to 40 mg daily as opposed to 80 mg daily  · Follow up with PCP  · Symptoms have resolved

## 2018-11-30 NOTE — ASSESSMENT & PLAN NOTE
Much better likely because his vertigo has resolved  He is unfortunately not sure of any of his medications apart from diuretic and Eliquis

## 2018-11-30 NOTE — ASSESSMENT & PLAN NOTE
· Patient states that the he did have right ear ringing  · And now has increased to dizziness with nausea and vomiting  · Consult Neurology  · Fall precautions

## 2018-11-30 NOTE — UTILIZATION REVIEW
Initial Clinical Review    Admission: Date/Time/Statement: 11/30/18 @ 0259 OBSERVATION    Orders Placed This Encounter   Procedures    Place in Observation     Standing Status:   Standing     Number of Occurrences:   1     Order Specific Question:   Admitting Physician     Answer:   Melba Jerry     Order Specific Question:   Level of Care     Answer:   Med Surg [16]     ED: Date/Time/Mode of Arrival:   ED Arrival Information     Expected Arrival Acuity Means of Arrival Escorted By Service Admission Type    - 11/29/2018 21:43 Urgent Walk-In Family Member General Medicine Urgent    Arrival Complaint    dizzyness, sob, heart racing        Chief Complaint:   Chief Complaint   Patient presents with    Dizziness     Diabetic,        History of Illness:      A 70YEAR-OLD MALE PATIENT WITH A HISTORY OF DIABETES PRESENTS TO THE EMERGENCY DEPARTMENT WITH DIZZINESS DESCRIBED AS A COMBINATION OF BEING UNSTEADY ON HIS FEET AS WELL AS THE ROOM SPINNING  PATIENT HAS HAD MULTIPLE EPISODES OF VOMITING BOTH AT HOME AND IN THE EMERGENCY DEPARTMENT  HE DENIES ANY PAIN  HE REPORTS NO CHANGE IN BOWEL OR BLADDER HABITS  HE REPORTS NO SHORTNESS OF BREATH DYSPNEA ON EXERTION OR ORTHOPNEA  SHE STATES THAT THE WITH DIZZINESS IS WORSE WITH MOVEMENT  HE REPORTS NO RECENT UPPER RESPIRATORY SYMPTOMS  HE HAS NOT HAD ANY PREVIOUS EXPERIENCES OF DIZZINESS  ED Vital Signs:   ED Triage Vitals [11/29/18 2152]   Temperature Pulse Respirations Blood Pressure SpO2   97 6 °F (36 4 °C) 80 18 (!) 190/112 98 %      Temp Source Heart Rate Source Patient Position - Orthostatic VS BP Location FiO2 (%)   Oral Monitor Standing - Orthostatic VS Left arm --      Pain Score       No Pain        Wt Readings from Last 1 Encounters:   11/30/18 (!) 159 kg (351 lb 4 8 oz)       Vital Signs (abnormal): /96    Abnormal Labs/Diagnostic Test Results:     BNP = 176    CT head: No current evidence of acute intracranial process      Chest x-ray: Bibasilar patchy atelectasis is appreciated  ED Treatment:   Medication Administration from 11/29/2018 2143 to 11/30/2018 0400       Date/Time Order Dose Route Action Action by Comments     11/29/2018 2243 ondansetron (ZOFRAN) injection 4 mg 4 mg Intravenous Given Dontrell Joyce RN      11/29/2018 2252 metoclopramide (REGLAN) injection 10 mg 10 mg Intravenous Given Dontrell Joyce RN      11/30/2018 0145 cloNIDine (CATAPRES) tablet 0 2 mg 0 2 mg Oral Given Dontrell Vincent RN           Past Medical/Surgical History: Active Ambulatory Problems     Diagnosis Date Noted    No Active Ambulatory Problems     Resolved Ambulatory Problems     Diagnosis Date Noted    No Resolved Ambulatory Problems     Past Medical History:   Diagnosis Date    Chronic pain     Diabetes mellitus ([facility] [address] )     Hypertension        Admitting Diagnosis: Vertigo [R42]  Vomiting [R11 10]  Accelerated hypertension [I10]  Dizzy [R42]  Non-intractable cyclical vomiting with nausea [G43  A0]    Age/Sex: 70 y o  male    Assessment/Plan:      Kevon Houston is a 70 y o  male who presents with dizziness, unsteady on his feet, room was spinning  Patient states that the dizziness was causing him to be nauseated and actually causing vomiting  He also states that earlier in the day he had very severe right ear ringing  When I questioned him about his bilateral lower extremity edema, he tells me that he recently had his Lasix dose increased from 40 mg daily to 80 mg daily  This could be the cause of his tendinitis  He also states that he has actually been unable to walk for the last day or so secondary to the lower extremity edema, and with the ringing in his right ear he had dizziness and has been unable to walk  While sitting in his bed in [facility] floor he states that the tendinitis is gone, there is no longer any dizziness, however his right lower extremity is reddened and appears to have a cellulitic look to it  At this point we will start him on IV antibiotics  In the emergency department he was given Zofran, Reglan, and clonidine  With an accelerated hypertension of 196/96 patient did receive hydralazine p r n  And clonidine p r n     Continue to monitor  Tinnitus of right ear   Assessment & Plan     · Patient recently had Lasix doubled to 80 mg daily  · He states the tinnitus is gone      Accelerated hypertension   Assessment & Plan     · Consult Cardiology  · Trend troponins  · Check a TSH  · P r n  Hydralazine  · Hypertension is chronic  · Patient is unsure of his medications      Dizzy   Assessment & Plan     · Patient states that the he did have right ear ringing  · And now has increased to dizziness with nausea and vomiting  · Consult Neurology  · Fall precautions      Cellulitis   Assessment & Plan     · Previous cellulitis of right lower extremity  · Positive for cellulitis of right lower extremity at this time  · Will start IV Ancef       Vomiting   Assessment & Plan     · Secondary to dizziness  · Order p r n  Zofran and Reglan      Morbid obesity with BMI of 40 0-44 9, adult ([facility])   Assessment & Plan     · This is a chronic condition  · Will offer supportive care      Chronic pain   Assessment & Plan     · This is a chronic condition      Diabetes mellitus ([facility] [address] )   Assessment & Plan     No results found for: HGBA1C     No results for input(s): POCGLU in the last 72 hours      Blood Sugar Average: Last 72 hrs:     · Accu-Cheks Q a c  And HS with sliding scale insulin and check a hemoglobin A1c      Bilateral edema of lower extremity   Assessment & Plan     ·   · This is a chronic condition  · Patient states actually has worsened since increasing his Lasix               Admission Orders: Cardiology and Neurology consults, up with assistance, telemetry monitoring, sequential compression device, PT/OT eval and treat, Cardiology consult, Neurology consult, HgbA1c and TSH, Troponin Q3H         Scheduled Meds: Current Facility-Administered Medications:  acetaminophen 650 mg Oral Q6H PRN   apixaban 5 mg Oral BID   cefazolin 2,000 mg Intravenous Q8H   hydrALAZINE 10 mg Intravenous Q6H PRN   insulin lispro 1-5 Units Subcutaneous TID AC   insulin lispro 1-5 Units Subcutaneous HS   ondansetron 4 mg Intravenous Q6H PRN

## 2018-11-30 NOTE — SOCIAL WORK
Chart reviewed by case management, assessment completed, pt is independent and lives with his son in a 2 story home, 2 steps outside and 12-14 steps inside, he states he stays on the first floor and sleeps his recliner, I offered to order a hospital and he declined, pt has a br on the 1st and 2nd flor,, pt has a rx plan at [address] [facility], pt was evaluated by pt dept and they recommended a walker, rx was obtained and the therapy department delivered a walker to the patient, pt denied any additional d/c needs, pt was made aware that he was here as an obs status and obs booklet was given, pt in agreement with the d/c and d/c plan home, family will transport, d/c plan was discussed at care coordintion rounds today

## 2018-11-30 NOTE — ASSESSMENT & PLAN NOTE
·   · This is a chronic condition  · Patient states actually has worsened since increasing his Lasix

## 2018-11-30 NOTE — ED PROVIDER NOTES
History  Chief Complaint   Patient presents with    Dizziness     Diabetic, GLU 8     A 70YEAR-OLD MALE PATIENT WITH A HISTORY OF DIABETES PRESENTS TO THE EMERGENCY DEPARTMENT WITH DIZZINESS DESCRIBED AS A COMBINATION OF BEING UNSTEADY ON HIS FEET AS WELL AS THE ROOM SPINNING  PATIENT HAS HAD MULTIPLE EPISODES OF VOMITING BOTH AT HOME AND IN THE EMERGENCY DEPARTMENT  HE DENIES ANY PAIN  HE REPORTS NO CHANGE IN BOWEL OR BLADDER HABITS  HE REPORTS NO SHORTNESS OF BREATH DYSPNEA ON EXERTION OR ORTHOPNEA  SHE STATES THAT THE WITH DIZZINESS IS WORSE WITH MOVEMENT  HE REPORTS NO RECENT UPPER RESPIRATORY SYMPTOMS  HE HAS NOT HAD ANY PREVIOUS EXPERIENCES OF DIZZINESS  None       Past Medical History:   Diagnosis Date    Chronic pain     Diabetes mellitus ([facility] [address] )     Hypertension        Past Surgical History:   Procedure Laterality Date    HERNIA REPAIR         Family History   Problem Relation Age of Onset    Heart failure Mother     Heart failure Father      I have reviewed and agree with the history as documented  Social History   Substance Use Topics    Smoking status: Never Smoker    Smokeless tobacco: Never Used    Alcohol use Yes      Comment: social        Review of Systems   Constitutional: Negative for chills and fever  HENT: Negative for ear pain, rhinorrhea and sore throat  Eyes: Negative for pain, redness and visual disturbance  Respiratory: Negative for cough and shortness of breath  Cardiovascular: Negative for chest pain and leg swelling  Gastrointestinal: Negative for abdominal pain, diarrhea, nausea and vomiting  Genitourinary: Negative for dysuria, flank pain, frequency and urgency  Musculoskeletal: Negative for back pain, myalgias and neck pain  Skin: Negative for rash  Neurological: Negative for dizziness, weakness, light-headedness and headaches  Hematological: Negative      Psychiatric/Behavioral: Negative for agitation, confusion and suicidal ideas  The patient is not nervous/anxious  All other systems reviewed and are negative  Physical Exam  Physical Exam   Constitutional: He is oriented to person, place, and time  MORBIDLY OBESE   HENT:   Nose: Nose normal    Mouth/Throat: Oropharynx is clear and moist  No oropharyngeal exudate  Eyes: Pupils are equal, round, and reactive to light  Conjunctivae and EOM are normal  No scleral icterus  Neck: Normal range of motion  Neck supple  No JVD present  No tracheal deviation present  Cardiovascular: Normal rate, regular rhythm and normal heart sounds  No murmur heard  Pulmonary/Chest: Effort normal and breath sounds normal  No respiratory distress  He has no wheezes  He has no rales  Abdominal: Soft  Bowel sounds are normal  There is no tenderness  There is no guarding  Musculoskeletal: Normal range of motion  He exhibits edema (THE LOWER EXTREMITIES ARE GROSSLY EDEMATOUS WITH PITTING EDEMA AND DISTORTION OF THE ANATOMY SECONDARY TO THE EDEMA )  He exhibits no tenderness  Neurological: He is alert and oriented to person, place, and time  No cranial nerve deficit or sensory deficit  He exhibits normal muscle tone  5/5 motor, nl sens THE NEURO EXAM WAS A NONFOCAL EXAM   AND THE GAIT TESTING AT THE PATIENT WAS UNABLE TO WALK BECAUSE OF DIZZINESS  Skin: Skin is warm and dry  Psychiatric: He has a normal mood and affect  His behavior is normal    Nursing note and vitals reviewed        Vital Signs  ED Triage Vitals [11/29/18 2152]   Temperature Pulse Respirations Blood Pressure SpO2   97 6 °F (36 4 °C) 80 18 (!) 190/112 98 %      Temp Source Heart Rate Source Patient Position - Orthostatic VS BP Location FiO2 (%)   Oral Monitor Standing - Orthostatic VS Left arm --      Pain Score       No Pain           Vitals:    11/30/18 0015 11/30/18 0100 11/30/18 0130 11/30/18 0145   BP: (!) 181/92 (!) 188/94 (!) 196/96 (!) 196/96   Pulse: 72 72 63    Patient Position - Orthostatic VS: Lying Lying Lying        Visual Acuity  Visual Acuity      Most Recent Value   L Pupil Size (mm)  3   R Pupil Size (mm)  3          ED Medications  Medications   ondansetron (ZOFRAN) injection 4 mg (4 mg Intravenous Given 11/29/18 2243)   metoclopramide (REGLAN) injection 10 mg (10 mg Intravenous Given 11/29/18 2252)   cloNIDine (CATAPRES) tablet 0 2 mg (0 2 mg Oral Given 11/30/18 0145)       Diagnostic Studies  Results Reviewed     Procedure Component Value Units Date/Time    B-Type Natriuretic Peptide [facility] ONLY) [[id number]]  (Abnormal) Collected:  11/29/18 2211    Lab Status:  Final result Specimen:  Blood from Arm, Left Updated:  11/29/18 2244      (H) pg/mL     Comprehensive metabolic panel [[id number]]  (Abnormal) Collected:  11/29/18 2210    Lab Status:  Final result Specimen:  Blood from Arm, Left Updated:  11/29/18 2240     Sodium 135 mmol/L      Potassium 4 0 mmol/L      Chloride 101 mmol/L      CO2 26 mmol/L      ANION GAP 8 mmol/L      BUN 17 mg/dL      Creatinine 0 93 mg/dL      Glucose 117 (H) mg/dL      Calcium 9 6 mg/dL      AST 15 U/L      ALT 12 U/L      Alkaline Phosphatase 52 (L) U/L      Total Protein 8 8 g/dL      Albumin 4 2 g/dL      Total Bilirubin 0 50 mg/dL      eGFR 86 ml/min/1 73sq m     Narrative:         National Kidney Disease Education Program recommendations are as follows:  GFR calculation is accurate only with a steady state creatinine  Chronic Kidney disease less than 60 ml/min/1 73 sq  meters  Kidney failure less than 15 ml/min/1 73 sq  meters      Troponin I [[id number]]  (Normal) Collected:  11/29/18 2210    Lab Status:  Final result Specimen:  Blood from Arm, Left Updated:  11/29/18 2240     Troponin I <0 03 ng/mL     Protime-INR [[id number]]  (Abnormal) Collected:  11/29/18 2210    Lab Status:  Final result Specimen:  Blood from Arm, Left Updated:  11/29/18 2231     Protime 13 6 (H) seconds      INR 1 17    APTT [[id number]]  (Normal) Collected:  11/29/18 2210    Lab Status:  Final result Specimen:  Blood from Arm, Left Updated:  11/29/18 2231     PTT 32 seconds     CBC and differential [[id number]]  (Abnormal) Collected:  11/29/18 2210    Lab Status:  Final result Specimen:  Blood from Arm, Left Updated:  11/29/18 2218     WBC 10 00 Thousand/uL      RBC 4 35 Million/uL      Hemoglobin 13 9 (L) g/dL      Hematocrit 40 5 %      MCV 93 fL      MCH 32 0 pg      MCHC 34 3 g/dL      RDW 13 4 %      MPV 8 5 (L) fL      Platelets 892 Thousands/uL      nRBC 0 /100 WBCs      Neutrophils Relative 46 %      Lymphocytes Relative 35 %      Monocytes Relative 13 (H) %      Eosinophils Relative 6 (H) %      Basophils Relative 1 %      Neutrophils Absolute 4 60 Thousands/µL      Lymphocytes Absolute 3 50 Thousands/µL      Monocytes Absolute 1 30 (H) Thousand/µL      Eosinophils Absolute 0 60 Thousand/µL      Basophils Absolute 0 10 Thousands/µL                  X-ray chest 2 views   Final Result by Comfort Liu (11/29 2345)   Bibasilar patchy atelectasis is appreciated  Signed by Mouna Juan MD      CT head without contrast   Final Result by Comfort Liu (11/29 0115)   No current evidence of acute intracranial process            Signed by Mouna Juan MD                 Procedures  Procedures       Phone Contacts  ED Phone Contact    ED Course                               [facility]  CritCare Time    Disposition  Final diagnoses:   None     ED Disposition     None      Follow-up Information    None         Patient's Medications    No medications on file     No discharge procedures on file      ED Provider  Electronically Signed by           Aliyah Grimaldo MD  11/30/18 0033

## 2018-11-30 NOTE — CONSULTS
Consult- Concepción Gallardo 1955, 70 y o  male MRN: [id number]    Unit/Bed#: -02 Encounter: [id number]    Primary Care Provider: Fiordaliza Lara DO   Date and time admitted to hospital: 11/29/2018  9:51 PM      Inpatient consult to Cardiology  Consult performed by: Ray Davila ordered by: Adriana Alcantar          Chronic atrial fibrillation ([facility] [address] )   Assessment & Plan    On Eliquis  He describes his heart rate being on the low side but stable  Accelerated hypertension   Assessment & Plan    Much better likely because his vertigo has resolved  He is unfortunately not sure of any of his medications apart from diuretic and Eliquis  * Tinnitus of right ear   Assessment & Plan    Resolved  Other summary comments: All is improved since his vertigo has resolved  Other problems are essentially chronic  Okay to resume his usual care and usual meds and see Dr Lisa Sanchez in follow-up  HPI: Concepción Gallardo is a 70y o  year old male who presented with 1 day of vertigo  Here his blood pressure was quite high and I am asked to comment further  Vertigo has resolved  He was quite uncomfortable during this spell  No recent chest pain or chest pressure  He describes having chronic atrial fibrillation but a strong heart  There is chronic leg edema and redness  EKG:   Atrial fibrillation with a slow to moderate ventricular response  No other changes  No recent echo in the epic chart  Review of Systems: a 10 point review of systems was conducted and is negative except for as mentioned in the HPI or as below  Some difficulty with walking          Historical Information   Past Medical History:   Diagnosis Date    Chronic pain     Diabetes mellitus ([facility] [address] )     Hypertension      Past Surgical History:   Procedure Laterality Date    HERNIA REPAIR       History   Alcohol Use    Yes     Comment: social     History   Drug Use No     History   Smoking Status    Never Smoker   Smokeless Tobacco    Never Used       Family History:   No longer relevant  Meds/Allergies   all current active meds have been reviewed  No prescriptions prior to admission  No Known Allergies    Objective   Vitals: Blood pressure 140/88, pulse 93, temperature 98 °F (36 7 °C), temperature source Tympanic, resp  rate 18, height 6' 2" (1 88 m), weight (!) 159 kg (351 lb 4 8 oz), SpO2 100 %  , Body mass index is 45 1 kg/m² , Orthostatic Blood Pressures      Most Recent Value   Blood Pressure  140/88 filed at 11/30/2018 0706   Patient Position - Orthostatic VS  Lying filed at 11/30/2018 3014          Systolic (34WKU), SOB:029 , Min:140 , HEN:798     Diastolic (70RVW), FQE:68, Min:84, Max:112              Physical Exam:    General:  Normal appearance in no distress  Eyes:  Anicteric  Oral mucosa:  Moist   Neck:  No JVD  Carotid upstrokes are brisk without bruits  No masses  Chest:  Clear to auscultation and percussion  Cardiac:  Normal PMI  Normal S1 and S2  No murmur gallop or rub  Irregularly irregular  Abdomen:  Soft and nontender  No palpable organomegaly or aortic enlargement  Extremities:  2+ leg edema with venous stasis changes  He states that it is no different than usual   Musculoskeletal:  Symmetric  Vascular:  Femoral pulses are brisk without bruits  Popliteal  pulses are intact bilaterally  Pedal pulses are intact  Neuro:  Grossly symmetric  Psych:  Alert and oriented x3          Lab Results:     Troponins:   Results from last 7 days  Lab Units 11/29/18  2210   TROPONIN I ng/mL <0 03     BNP:   Results from last 6 Months  Lab Units 11/29/18  2211   BNP pg/mL 176*       CBC :   Results from last 7 days  Lab Units 11/30/18  0525 11/29/18  2210   WBC Thousand/uL 11 70* 10 00   HEMOGLOBIN g/dL 14 1 13 9*   HEMATOCRIT % 41 0 40 5   MCV fL 94 93   PLATELETS Thousands/uL 193 225     TSH:     CMP:   Results from last 7 days  Lab Units 11/30/18  0525 11/29/18  2210   POTASSIUM mmol/L 3 9 4 0   CHLORIDE mmol/L 98 101   CO2 mmol/L 26 26   BUN mg/dL 19 17   CREATININE mg/dL 0 84 0 93   AST U/L 18 15   ALT U/L 14 12   EGFR ml/min/1 73sq m 92 86     Lipid Profile:     Coags:   Results from last 7 days  Lab Units 11/29/18  2210   INR  1 17

## 2018-11-30 NOTE — ASSESSMENT & PLAN NOTE
· Previous cellulitis of right lower extremity  · Positive for cellulitis of right lower extremity at this time  · Will start IV Ancef

## 2018-12-03 ENCOUNTER — TRANSCRIBE ORDERS (OUTPATIENT)
Dept: ADMINISTRATIVE | Facility: HOSPITAL | Age: 63
End: 2018-12-03

## 2018-12-03 DIAGNOSIS — R42 VERTIGO: Primary | ICD-10-CM

## 2018-12-05 ENCOUNTER — HOSPITAL ENCOUNTER (OUTPATIENT)
Dept: MRI IMAGING | Facility: HOSPITAL | Age: 63
Discharge: HOME | End: 2018-12-05
Payer: MEDICARE

## 2018-12-05 DIAGNOSIS — R42 VERTIGO: ICD-10-CM

## 2018-12-05 PROCEDURE — A9585 GADOBUTROL INJECTION: HCPCS | Performed by: DENTIST

## 2018-12-05 PROCEDURE — 70553 MRI BRAIN STEM W/O & W/DYE: CPT

## 2018-12-05 RX ADMIN — GADOBUTROL 15 ML: 604.72 INJECTION INTRAVENOUS at 18:42

## 2018-12-24 ENCOUNTER — TRANSCRIBE ORDERS (OUTPATIENT)
Dept: ADMINISTRATIVE | Facility: HOSPITAL | Age: 63
End: 2018-12-24

## 2018-12-24 DIAGNOSIS — I67.9 CEREBROVASCULAR DISEASE, UNSPECIFIED: ICD-10-CM

## 2018-12-24 DIAGNOSIS — I48.91 ATRIAL FIBRILLATION, UNSPECIFIED TYPE (HCC): Primary | ICD-10-CM

## 2018-12-31 ENCOUNTER — HOSPITAL ENCOUNTER (OUTPATIENT)
Dept: MRI IMAGING | Facility: HOSPITAL | Age: 63
Discharge: HOME | End: 2018-12-31
Attending: PSYCHIATRY & NEUROLOGY
Payer: MEDICARE

## 2018-12-31 DIAGNOSIS — I67.9 CEREBROVASCULAR DISEASE, UNSPECIFIED: ICD-10-CM

## 2018-12-31 DIAGNOSIS — I48.91 ATRIAL FIBRILLATION, UNSPECIFIED TYPE (HCC): ICD-10-CM

## 2018-12-31 PROCEDURE — 70544 MR ANGIOGRAPHY HEAD W/O DYE: CPT

## 2018-12-31 PROCEDURE — 70547 MR ANGIOGRAPHY NECK W/O DYE: CPT

## 2019-01-11 ENCOUNTER — HOSPITAL ENCOUNTER (OUTPATIENT)
Dept: NON INVASIVE DIAGNOSTICS | Facility: CLINIC | Age: 64
Discharge: HOME | End: 2019-01-11
Payer: MEDICARE

## 2019-01-11 DIAGNOSIS — I48.91 ATRIAL FIBRILLATION, UNSPECIFIED TYPE (HCC): ICD-10-CM

## 2019-01-11 DIAGNOSIS — I67.9 CEREBROVASCULAR DISEASE, UNSPECIFIED: ICD-10-CM

## 2019-01-11 PROCEDURE — 93306 TTE W/DOPPLER COMPLETE: CPT | Performed by: INTERNAL MEDICINE

## 2019-01-11 PROCEDURE — 93306 TTE W/DOPPLER COMPLETE: CPT

## 2019-01-14 ENCOUNTER — HOSPITAL ENCOUNTER (OUTPATIENT)
Dept: NON INVASIVE DIAGNOSTICS | Facility: CLINIC | Age: 64
End: 2019-01-14
Payer: MEDICARE

## 2019-01-14 ENCOUNTER — HOSPITAL ENCOUNTER (OUTPATIENT)
Dept: NON INVASIVE DIAGNOSTICS | Facility: CLINIC | Age: 64
Discharge: HOME | End: 2019-01-14
Payer: MEDICARE

## 2019-01-14 ENCOUNTER — EVALUATION (OUTPATIENT)
Dept: PHYSICAL THERAPY | Facility: CLINIC | Age: 64
End: 2019-01-14
Payer: MEDICARE

## 2019-01-14 ENCOUNTER — TRANSCRIBE ORDERS (OUTPATIENT)
Dept: SLEEP CENTER | Facility: HOSPITAL | Age: 64
End: 2019-01-14

## 2019-01-14 DIAGNOSIS — I48.91 ATRIAL FIBRILLATION, UNSPECIFIED TYPE (HCC): ICD-10-CM

## 2019-01-14 DIAGNOSIS — G47.33 OSA (OBSTRUCTIVE SLEEP APNEA): Primary | ICD-10-CM

## 2019-01-14 DIAGNOSIS — R26.9 ABNORMALITY OF GAIT: Primary | ICD-10-CM

## 2019-01-14 DIAGNOSIS — I67.9 CEREBROVASCULAR DISEASE, UNSPECIFIED: ICD-10-CM

## 2019-01-14 PROCEDURE — 97110 THERAPEUTIC EXERCISES: CPT | Performed by: PHYSICAL THERAPIST

## 2019-01-14 PROCEDURE — 93880 EXTRACRANIAL BILAT STUDY: CPT | Performed by: SURGERY

## 2019-01-14 PROCEDURE — 97163 PT EVAL HIGH COMPLEX 45 MIN: CPT | Performed by: PHYSICAL THERAPIST

## 2019-01-14 PROCEDURE — 93880 EXTRACRANIAL BILAT STUDY: CPT

## 2019-01-14 NOTE — LETTER
2019    Jarad Polanco, [address] [facility]    Patient: Fabrice Woods   YOB: 1955   Date of Visit: 2019     Encounter Diagnosis     ICD-10-CM    1  Abnormality of gait R26 9        Dear Dr Justo Beatty:    Please review the attached Plan of Care from [facility] recent visit  Please verify that you agree therapy should continue by signing the attached document and sending it back to our office  If you have any questions or concerns, please don't hesitate to call  Sincerely,    Ronny Rodriguez      Referring Provider:      I certify that I have read the below Plan of Care and certify the need for these services furnished under this plan of treatment while under my care  Jarad Polanco, [address] [facility],[address] [id number]  [place]          PT Evaluation     Today's date: 2019  Patient name: Fabrice Woods  : 1955  MRN: [id number]  Referring provider: Odette Quiroga MD  Dx:   Encounter Diagnosis     ICD-10-CM    1  Abnormality of gait R26 9        Assessment  Assessment details: Patient is a 70y o  year old male presenting to OPPT s/p diagnosis of suspected CVA, A-fib, LE edema, and unsteady gait  Patient demonstrates decreased strength, endurance, balance, LE edema, and functional independence  Patient is unable to return to work at this time and requires assistance for ADLs and IADLs  He will benefit from skilled PT interventions to maximize return to PLOF and independence as able  Thank you for this referral and the ability to participate in the care of this patient      Impairments: abnormal coordination, abnormal gait, abnormal or restricted ROM, activity intolerance, impaired balance, impaired physical strength, lacks appropriate home exercise program and pain with function  Other impairment: Bilateral LE lymphedema left > right  Functional limitations: Dependence on ADBarriers to therapy: LE edema  LE weakness right > left  Unable to drive  Understanding of Dx/Px/POC: good   Prognosis: good    Goals  In 4 weeks, patient will:  1  Demonstrate ability to perform 30 minutes of activity without requiring seated rest break  2   Demonstrate ability to maintain upright balance unsupported by UEs while reaching above shoulder height without resistance to promote stability with ADLs  3  Demonstrate ability to lift up to 10 lbs from  knees to waist unsupported by UEs to promote stability with ADLs  4   Demonstrate increased strength of bilateral LEs to allow for improved transfer quality and stability    In 4-10 weeks, patient will:  1  Demonstrate reduced fall risk based on outcome measures  2  Demonstrate consistent carryover with HEP  3  Return to community ambulation with least restrictive AD for at least 300 feet      Plan  Patient would benefit from: skilled physical therapy  Other planned modality interventions: Modalities prn for symptom management  Planned therapy interventions: manual therapy, neuromuscular re-education, therapeutic exercise and home exercise program  Other planned therapy interventions: Possible CDT to include MLD and compression wrapping based on progress with conservative treatment of LE edema  Frequency: 2x week  Duration in weeks: 4  Treatment plan discussed with: patient        Subjective Evaluation    History of Present Illness  Date of onset: 11/29/2018  Mechanism of injury: Patient was admitted overnight to [address] [place] [address] 11/29-11/30  He initially went to the ED with dizziness and ringing in his ears  He had a CT scan that was negative for bleed  He followed up with Dr Griselda Vargas 12/31/18  At this time, in the physician's opinion, there was a small stroke that affected his right LE  Patient reports he has had an MRI with contrast, MRA, and a carotid artery study  He follows up with Dr Griselda Vargas again in one week    Subjectively, patient reports he can move his right LE, but the coordination and forward movement "just isn't there"  Reports hardest functional tasks are: walking without an assistive device, climbing stairs, has not returned for driving, and IADLs      PMHx:  Afib  Right LE cellulitis  Bilateral LE edema (left > right)  CHF    Quality of life: good    Pain  Location: Bilateral LEs  Quality: tight  Aggravating factors: lifting, stair climbing, walking and standing  Progression: worsening    Social Support  Steps to enter house: yes  3  Stairs in house: yes   18  Lives in: multiple-level home  Lives with: adult children    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)  Hand dominance: right    Treatments  Current treatment: physical therapy  Patient Goals  Patient goals for therapy: increased strength, independence with ADLs/IADLs, return to sport/leisure activities, increased motion, improved balance, decreased pain and decreased edema          Objective     Tenderness     Additional Tenderness Details  TTP bilateral LEs, left ankle    Neurological Testing     Sensation     Lumbar   Left   Intact: proprioception  Diminished: light touch    Right   Intact: proprioception  Diminished: light touch    Active Range of Motion     Additional Active Range of Motion Details  Knee flexion and ankle ROM limited bilaterally by soft tissue approximation  Flexibility limited throughout bilateral LEs  Increased abdominal girth with palpable LE edema, pitting    Strength/Myotome Testing     Left Hip   Planes of Motion   Flexion: 3  Extension: 3  Abduction: 3  Adduction: 3    Right Hip   Planes of Motion   Flexion: 3-  Extension: 3-  Abduction: 3  Adduction: 3    Left Knee   Flexion: 4  Extension: 4    Right Knee   Flexion: 4-  Extension: 4-    Left Ankle/Foot   Dorsiflexion: 3  Plantar flexion: 3    Right Ankle/Foot   Dorsiflexion: 4-  Plantar flexion: 4-    Additional Strength Details  Painful MMT left ankle    Functional Assessment     Comments  Tinetti Balance and Gait Assessment    Sitting Balance 1= Steady, safe    Rises From Chair  1= Able to Rise, requires use of arms to help    Attempts to Rise  1= Able to Rise, requires more than one attempt    Standing Balance (First 5 seconds)  1= Steady, but uses walker or other support    Nudged  0= Begins to fall    Eyes Closed  1= Steady    Turning 360 degrees  0= Discontinuous steps  1= Steady    Sitting Down (Getting Seated)  1= Uses arms or not a smooth transition    Initiation of Gait  1= No hesitancy    Step Length and Height  1= Step through left    Foot Clearance  1= Left foot clears the floor    Step Symmetry  0= Right and left step length are not equal    Step Continuity  1= Steps appear continuous    Path  1= Mild/moderate deviation OR uses a walking aid    Trunk  2= No sway, no flexion of knees or back use of arms or walking aid    Walking Time  1= Heels almost touching while walking    Total Score: 15/28        Flowsheet Rows      Most Recent Value   PT/OT G-Codes   Current Score  56   Projected Score  70          Precautions: Falls, LE edema  Re-eval Date: 2/13/19    Date 1/14       Visit Count 1       FOTO        Pain In See IE       Pain Out See IE             Daily Treatment Diary     Manual                             Exercise Diary         Aerobic Nustep  L1  12 mins       SLR x 4 Supine       HR/TR Stand       Mini Squats        Step ups Fwd/Lat                               HS stretch                Rhom EO        Rhom EC                Dynavision Foam                 Obstacle Course                Gait training SPC                           Modalities          prn

## 2019-01-14 NOTE — PROGRESS NOTES
PT Evaluation     Today's date: 2019  Patient name: Joey Foster  : 1955  MRN: [id number]  Referring provider: Beto Jaffe MD  Dx:   Encounter Diagnosis     ICD-10-CM    1  Abnormality of gait R26 9        Assessment  Assessment details: Patient is a 70y o  year old male presenting to OPPT s/p diagnosis of suspected CVA, A-fib, LE edema, and unsteady gait  Patient demonstrates decreased strength, endurance, balance, LE edema, and functional independence  Patient is unable to return to work at this time and requires assistance for ADLs and IADLs  He will benefit from skilled PT interventions to maximize return to PLOF and independence as able  Thank you for this referral and the ability to participate in the care of this patient  Impairments: abnormal coordination, abnormal gait, abnormal or restricted ROM, activity intolerance, impaired balance, impaired physical strength, lacks appropriate home exercise program and pain with function  Other impairment: Bilateral LE lymphedema left > right  Functional limitations: Dependence on ADBarriers to therapy: LE edema  LE weakness right > left  Unable to drive  Understanding of Dx/Px/POC: good   Prognosis: good    Goals  In 4 weeks, patient will:  1  Demonstrate ability to perform 30 minutes of activity without requiring seated rest break  2   Demonstrate ability to maintain upright balance unsupported by UEs while reaching above shoulder height without resistance to promote stability with ADLs  3  Demonstrate ability to lift up to 10 lbs from  knees to waist unsupported by UEs to promote stability with ADLs  4   Demonstrate increased strength of bilateral LEs to allow for improved transfer quality and stability    In 4-10 weeks, patient will:  1  Demonstrate reduced fall risk based on outcome measures  2  Demonstrate consistent carryover with HEP  3    Return to community ambulation with least restrictive AD for at least 300 feet      Plan  Patient would benefit from: skilled physical therapy  Other planned modality interventions: Modalities prn for symptom management  Planned therapy interventions: manual therapy, neuromuscular re-education, therapeutic exercise and home exercise program  Other planned therapy interventions: Possible CDT to include MLD and compression wrapping based on progress with conservative treatment of LE edema  Frequency: 2x week  Duration in weeks: 4  Treatment plan discussed with: patient        Subjective Evaluation    History of Present Illness  Date of onset: 11/29/2018  Mechanism of injury: Patient was admitted overnight to [address] [place] [address] 11/29-11/30  He initially went to the ED with dizziness and ringing in his ears  He had a CT scan that was negative for bleed  He followed up with Dr Griselda Vargas 12/31/18  At this time, in the physician's opinion, there was a small stroke that affected his right LE  Patient reports he has had an MRI with contrast, MRA, and a carotid artery study  He follows up with Dr Griselda Vargas again in one week  Subjectively, patient reports he can move his right LE, but the coordination and forward movement "just isn't there"  Reports hardest functional tasks are: walking without an assistive device, climbing stairs, has not returned for driving, and IADLs      PMHx:  Afib  Right LE cellulitis  Bilateral LE edema (left > right)  CHF    Quality of life: good    Pain  Location: Bilateral LEs  Quality: tight  Aggravating factors: lifting, stair climbing, walking and standing  Progression: worsening    Social Support  Steps to enter house: yes  3  Stairs in house: yes   18  Lives in: multiple-level home  Lives with: adult children    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)  Hand dominance: right    Treatments  Current treatment: physical therapy  Patient Goals  Patient goals for therapy: increased strength, independence with ADLs/IADLs, return to sport/leisure activities, increased motion, improved balance, decreased pain and decreased edema          Objective     Tenderness     Additional Tenderness Details  TTP bilateral LEs, left ankle    Neurological Testing     Sensation     Lumbar   Left   Intact: proprioception  Diminished: light touch    Right   Intact: proprioception  Diminished: light touch    Active Range of Motion     Additional Active Range of Motion Details  Knee flexion and ankle ROM limited bilaterally by soft tissue approximation  Flexibility limited throughout bilateral LEs  Increased abdominal girth with palpable LE edema, pitting    Strength/Myotome Testing     Left Hip   Planes of Motion   Flexion: 3  Extension: 3  Abduction: 3  Adduction: 3    Right Hip   Planes of Motion   Flexion: 3-  Extension: 3-  Abduction: 3  Adduction: 3    Left Knee   Flexion: 4  Extension: 4    Right Knee   Flexion: 4-  Extension: 4-    Left Ankle/Foot   Dorsiflexion: 3  Plantar flexion: 3    Right Ankle/Foot   Dorsiflexion: 4-  Plantar flexion: 4-    Additional Strength Details  Painful MMT left ankle    Functional Assessment     Comments  Tinetti Balance and Gait Assessment    Sitting Balance   1= Steady, safe    Rises From Chair  1= Able to Rise, requires use of arms to help    Attempts to Rise  1= Able to Rise, requires more than one attempt    Standing Balance (First 5 seconds)  1= Steady, but uses walker or other support    Nudged  0= Begins to fall    Eyes Closed  1= Steady    Turning 360 degrees  0= Discontinuous steps  1= Steady    Sitting Down (Getting Seated)  1= Uses arms or not a smooth transition    Initiation of Gait  1= No hesitancy    Step Length and Height  1= Step through left    Foot Clearance  1= Left foot clears the floor    Step Symmetry  0= Right and left step length are not equal    Step Continuity  1= Steps appear continuous    Path  1= Mild/moderate deviation OR uses a walking aid    Trunk  2= No sway, no flexion of knees or back use of arms or walking aid    Walking Time  1= Heels almost touching while walking    Total Score: 15/28        Flowsheet Rows      Most Recent Value   PT/OT G-Codes   Current Score  56   Projected Score  70          Precautions: Falls, LE edema  Re-eval Date: 2/13/19    Date 1/14       Visit Count 1       FOTO        Pain In See IE       Pain Out See IE             Daily Treatment Diary     Manual                             Exercise Diary         Aerobic Nustep  L1  12 mins       SLR x 4 Supine       HR/TR Stand       Mini Squats        Step ups Fwd/Lat                               HS stretch                Rhom EO        Rhom EC                Dynavision Foam                 Obstacle Course                Gait training SPC                           Modalities          prn

## 2019-01-15 ENCOUNTER — OFFICE VISIT (OUTPATIENT)
Dept: PHYSICAL THERAPY | Facility: CLINIC | Age: 64
End: 2019-01-15
Payer: MEDICARE

## 2019-01-15 DIAGNOSIS — R26.9 ABNORMALITY OF GAIT: Primary | ICD-10-CM

## 2019-01-15 PROCEDURE — 97110 THERAPEUTIC EXERCISES: CPT

## 2019-01-15 PROCEDURE — 97112 NEUROMUSCULAR REEDUCATION: CPT

## 2019-01-15 NOTE — PROGRESS NOTES
Daily Note     Today's date: 1/15/2019  Patient name: Isabel Dennis  : 1955  MRN: [id number]  Referring provider: Damian Childress MD  Dx:   Encounter Diagnosis     ICD-10-CM    1  Abnormality of gait R26 9                   Precautions: Falls, LE edema  Re-eval Date: 19    Date 1/14 1/15      Visit Count 1 2      FOTO        Pain In See IE       Pain Out See IE         Subjective: I put my compression pumps on my BL LE's 2x day  My legs feel so heavy  Wearing my compression   I have been doing a lot of things around the house this morning been up on my feet > 30 min at a time      Objective: See treatment diary below      Assessment: Tolerated treatment fairly well  Pt fatigues quickly with weakness BL LE R>L  Pt provided max cues for proper form/technique and avoidance of substitution  Demonstrates BL LE edema L>R with caution of heel raise with pt co's of increase min discomfort in arch of foot Patient would benefit from continued PT      Plan: Continue per plan of care       Manual             Exercise Diary         Aerobic Nustep  L1  12 mins Nustep  L1  12 mins      SLR x 4 Supine Flex BL  2x10      HR/TR Stand Firm  2x10   2 HHA      Mini Squats  2x10  2 HHA  firm      Step ups Fwd/Lat Fwd 4"  R x12  2 HHA  L x10  1 HHA                              HS stretch  1x 1 min  Strap/ball              Rhom EO  Firm/foam  1x 60 sec  CS      Rhom EC  Firm  1x60 sec  CS   Cues postural correction              Dynavision Foam                 Obstacle Course                Gait training SPC                           Modalities          prn

## 2019-01-16 ENCOUNTER — HOSPITAL ENCOUNTER (OUTPATIENT)
Dept: SLEEP CENTER | Facility: HOSPITAL | Age: 64
Discharge: HOME | End: 2019-01-16
Payer: MEDICARE

## 2019-01-16 DIAGNOSIS — G47.33 OSA (OBSTRUCTIVE SLEEP APNEA): ICD-10-CM

## 2019-01-16 PROCEDURE — 95810 POLYSOM 6/> YRS 4/> PARAM: CPT

## 2019-01-17 NOTE — PROGRESS NOTES
Sleep Study Documentation    Pre-Sleep Study       Sleep testing procedure explained to patient:YES    Patient napped prior to study:NO    Caffeine:Dayshift worker after 12PM   Caffeine use:NO    Alcohol:Dayshift workers after 5PM: Alcohol use:NO    Typical day for patient:YES       Study Documentation  Diagnostic   Snore: Moderate  Supplemental O2: no    O2 flow rate (L/min) range N/A  O2 flow rate (L/min) final N/A  Minimum SaO2 84%  Baseline SaO2 98%        Mode of Therapy:N/A    EKG abnormalities: yes:  EPOCH example and comments: Chronic A -Fib Throughout    EEG abnormalities: no    Study Terminated:no    Patient classification: retired       Post-Sleep Study    Medication used at bedtime or during sleep study:NO    Patient reports time it took to fall asleep:greater than 60 minutes    Patient reports waking up during study:3 or more times  Patient reports returning to sleep in 10 to 30 minutes  Patient reports sleeping 2 to 4 hours without dreaming  Patient reports sleep during study:typical    Patient rated sleepiness: Somewhat sleepy or tired    PAP treatment:no

## 2019-01-22 ENCOUNTER — OFFICE VISIT (OUTPATIENT)
Dept: PHYSICAL THERAPY | Facility: CLINIC | Age: 64
End: 2019-01-22
Payer: MEDICARE

## 2019-01-22 DIAGNOSIS — R26.9 ABNORMALITY OF GAIT: Primary | ICD-10-CM

## 2019-01-22 PROCEDURE — 97110 THERAPEUTIC EXERCISES: CPT | Performed by: PHYSICAL MEDICINE & REHABILITATION

## 2019-01-22 PROCEDURE — 97112 NEUROMUSCULAR REEDUCATION: CPT | Performed by: PHYSICAL MEDICINE & REHABILITATION

## 2019-01-22 NOTE — PROGRESS NOTES
Daily Note     Today's date: 2019  Patient name: Kevon Houston  : 1955  MRN: [id number]  Referring provider: Josefina Martinez MD  Dx:   Encounter Diagnosis     ICD-10-CM    1  Abnormality of gait R26 9                  Precautions: Falls, LE edema  Re-eval Date: 19    Date 1/14 1/15 1/22     Visit Count 1 2 3     FOTO        Pain In See IE  8/10     Pain Out See IE  8/10       Subjective: Pt notes con't pain in toes of L foot "as usual"  No changes in pain/sx otherwise  Pt's goals for PT are to "get rid of the walker" and "strengthen the R leg"  Objective: See treatment diary below      Assessment: Tolerated treatment well  Demonstrates cont B LE edema, limiting gait, activity, and there-ex with reduced mobility  Continues with gross B LE weakness as well  Able to progress reps with some exercises without incident  Continues to ambulate with RW with increased WB on B UEs and forward flexion  Pt to benefit from cont'd progressive LE strengthening 2* persistent strength deficits and difficulty with mobility  Patient demonstrated fatigue post treatment      Plan: Continue per plan of care  Progress treatment as tolerated          Manual             Exercise Diary         Aerobic Nustep  L1  12 mins Nustep  L1  12 mins Nustep  L1  12 mins     SLR x 4 Supine Flex BL  2x10 Flex BL  2x10     HR/TR Stand Firm  2x10   2 HHA Firm  25xea    2 HHA     Mini Squats  2x10  2 HHA  firm 2x10  2 HHA  firm     Step ups Fwd/Lat Fwd 4"  R x12  2 HHA  L x10  1 HHA Fwd 4"  R x12  2 HHA  L x10  1 HHA                             HS stretch  1x 1 min  Strap/ball 4x30" ea strap/ball              Rhom EO  Firm/foam  1x 60 sec  CS foam  1x 60 sec EO  CS    Firm EO with head turns  30"ea CS        Rhom EC  Firm  1x60 sec  CS   Cues postural correction Firm  1x60 sec  CS   Cues postural correction             Dynavision Foam                 Obstacle Course                Gait training SPC                           Modalities prn

## 2019-01-24 ENCOUNTER — TRANSCRIBE ORDERS (OUTPATIENT)
Dept: CARDIOLOGY CLINIC | Facility: CLINIC | Age: 64
End: 2019-01-24

## 2019-01-24 ENCOUNTER — OFFICE VISIT (OUTPATIENT)
Dept: PHYSICAL THERAPY | Facility: CLINIC | Age: 64
End: 2019-01-24
Payer: MEDICARE

## 2019-01-24 DIAGNOSIS — I10 ESSENTIAL (PRIMARY) HYPERTENSION: ICD-10-CM

## 2019-01-24 DIAGNOSIS — R26.9 ABNORMALITY OF GAIT: Primary | ICD-10-CM

## 2019-01-24 DIAGNOSIS — I48.91 ATRIAL FIBRILLATION (HCC): Primary | ICD-10-CM

## 2019-01-24 PROCEDURE — 97110 THERAPEUTIC EXERCISES: CPT | Performed by: PHYSICAL MEDICINE & REHABILITATION

## 2019-01-24 PROCEDURE — 97112 NEUROMUSCULAR REEDUCATION: CPT | Performed by: PHYSICAL MEDICINE & REHABILITATION

## 2019-01-24 NOTE — PROGRESS NOTES
Daily Note     Today's date: 2019  Patient name: Sanjay Ponce  : 1955  MRN: [id number]  Referring provider: Malka Dennis MD  Dx:   Encounter Diagnosis     ICD-10-CM    1  Abnormality of gait R26 9                 Precautions: Falls, LE edema  Re-eval Date: 19    Date 1/14 1/15 1/22 1/24    Visit Count 1 2 3 4    FOTO        Pain In See IE  8/10 8/10    Pain Out See IE  8/10 8/10      Subjective: Pt notes cont pain in toes of L foot  No significant changes from last tx  Notes improvements in strength, activity tolerance, and balance since [facility]  Objective: See treatment diary below      Assessment: Tolerated treatment well  Progressed program to tolerance with progressive challenges and increased reps or time to tolerance  Demonstrates improving endurance with ability to complete program with minimal rest  Continues with LE weakness but is making progress with program  Continues to be limited by B LE edema  Relies heavily on RW with ambulation with increased forward flexion and WB on UEs  Increased risk for falls when not using AD  Poor stance time L foot 2* pain in toes; unchanged  No change in sx end of tx  Patient demonstrated fatigue post treatment and would benefit from continued PT      Plan: Continue per plan of care  Progress treatment as tolerated            Manual             Exercise Diary         Aerobic Nustep  L1  12 mins Nustep  L1  12 mins Nustep  L1  12 mins Nustep  L3  15 mins    SLR x 4 Supine Flex BL  2x10 Flex BL  2x10 Flex BL  2x10    HR/TR Stand Firm  2x10   2 HHA Firm  25xea    2 HHA Firm  30xea    2 HHA    Mini Squats  2x10  2 HHA  firm 2x10  2 HHA  firm 2x10  2 HHA  firm    Step ups Fwd/Lat Fwd 4"  R x12  2 HHA  L x10  1 HHA Fwd 4"  R x12  2 HHA  L x10  1 HHA Fwd 4"  R x12  2 HHA  L x10  1 HHA                            HS stretch  1x 1 min  Strap/ball 4x30" ea strap/ball  4x30" ea strap/ball             Rhom EO  Firm/foam  1x 60 sec  CS foam  1x 60 sec EO  CS    Firm EO with head turns  30"ea CS    foam    EO with alt UE flex/ext 30" x 2     Close S   P bars     Rhom EC  Firm  1x60 sec  CS   Cues postural correction Firm  1x60 sec  CS   Cues postural correction Foam  EC 60sec x 2 static  CS             Dynavision Foam                 Obstacle Course                Gait training SPC                           Modalities          prn

## 2019-01-29 ENCOUNTER — APPOINTMENT (OUTPATIENT)
Dept: PHYSICAL THERAPY | Facility: CLINIC | Age: 64
End: 2019-01-29
Payer: MEDICARE

## 2019-01-31 ENCOUNTER — OFFICE VISIT (OUTPATIENT)
Dept: PHYSICAL THERAPY | Facility: CLINIC | Age: 64
End: 2019-01-31
Payer: MEDICARE

## 2019-01-31 DIAGNOSIS — R26.9 ABNORMALITY OF GAIT: Primary | ICD-10-CM

## 2019-01-31 PROCEDURE — 97112 NEUROMUSCULAR REEDUCATION: CPT | Performed by: PHYSICAL MEDICINE & REHABILITATION

## 2019-01-31 PROCEDURE — 97110 THERAPEUTIC EXERCISES: CPT | Performed by: PHYSICAL MEDICINE & REHABILITATION

## 2019-01-31 NOTE — PROGRESS NOTES
Daily Note     Today's date: 2019  Patient name: Hayde Arnold  : 1955  MRN: [id number]  Referring provider: Ronit Tillman MD  Dx:   Encounter Diagnosis     ICD-10-CM    1  Abnormality of gait R26 9                 Precautions: Falls, LE edema  Re-eval Date: 19    Date 1/14 1/15 1/22 1/24 1/31   Visit Count 1 2 3 4 5   FOTO        Pain In See IE  8/10 8/10 8/10   Pain Out See IE  8/10 8/10 8/10     Subjective: pt notes no new sx/complaints  Notes cont pain in L foot as chief complaint; no changes  Has an appointment with foot doctor upcoming  Feels he is getting stronger and balance is gradually improving  Objective: See treatment diary below      Assessment: Tolerated treatment fair  Progressing well with exercises, tolerating increased repetitions without incident  Continues with gross LE weakness and quick mm fatigue with activities; improving from previous sessions; reduced rest periods required  LLE is limited by cont L foot pain with weight bearing  Increased compensatory WB on UEs with L stance with RW and with step ups in parallel bar  Improving balance noted with reduced postural sway with NBOS on foam  Gait is mainly limited by L foot pain at this time; continues to require UE support for L stance phase as noted  No complaints end of tx  Patient demonstrated fatigue post treatment and would benefit from continued PT      Plan: Continue per plan of care  Progress treatment as tolerated            Manual             Exercise Diary         Aerobic Nustep  L1  12 mins Nustep  L1  12 mins Nustep  L1  12 mins Nustep  L3  15 mins Nustep  L3  15 mins   SLR x 4 Supine Flex BL  2x10 Flex BL  2x10 Flex BL  2x10 Flex BL  Abd BL  3x10 ea 0#   HR/TR Stand Firm  2x10   2 HHA Firm  25xea    2 HHA Firm  30xea    2 HHA Firm  30xea    2 HHA   Mini Squats  2x10  2 HHA  firm 2x10  2 HHA  firm 2x10  2 HHA  firm 2x10  2 HHA  firm   Step ups Fwd/Lat Fwd 4"  R x12  2 HHA  L x10  1 HHA Fwd 4"  R x12  2 HHA  L x10  1 HHA Fwd 4"  R x12  2 HHA  L x10  1 HHA Fwd 4"  R 2x10 2 HHA  L 2x10 1 HHA                           HS stretch  1x 1 min  Strap/ball 4x30" ea strap/ball  4x30" ea strap/ball  4x30" ea strap/ball            Rhom EO  Firm/foam  1x 60 sec  CS foam  1x 60 sec EO  CS    Firm EO with head turns  30"ea CS    foam    EO with alt UE flex/ext 30" x 2     Close S   P bars  Tandem floor EO alt  Static   30" x 2 ea            Rhom EC  Firm  1x60 sec  CS   Cues postural correction Firm  1x60 sec  CS   Cues postural correction Foam  EC 60sec x 2 static  CS  Foam 0 HHA  1' x 2   Static            Dynavision Foam                 Obstacle Course                Gait training SPC                           Modalities          prn

## 2019-02-05 ENCOUNTER — OFFICE VISIT (OUTPATIENT)
Dept: PHYSICAL THERAPY | Facility: CLINIC | Age: 64
End: 2019-02-05
Payer: MEDICARE

## 2019-02-05 DIAGNOSIS — R26.9 ABNORMALITY OF GAIT: Primary | ICD-10-CM

## 2019-02-05 PROCEDURE — 97110 THERAPEUTIC EXERCISES: CPT | Performed by: PHYSICAL MEDICINE & REHABILITATION

## 2019-02-05 PROCEDURE — 97112 NEUROMUSCULAR REEDUCATION: CPT | Performed by: PHYSICAL MEDICINE & REHABILITATION

## 2019-02-05 NOTE — PROGRESS NOTES
Daily Note     Today's date: 2019  Patient name: Kera Chance  : 1955  MRN: [id number]  Referring provider: Joseph Rodriguez MD  Dx:   Encounter Diagnosis     ICD-10-CM    1  Abnormality of gait R26 9                 Precautions: Falls, LE edema  Re-eval Date: 19    Date        Visit Count 6       FOTO NV       Pain In 8/10       Pain Out 8/10          Subjective: Pt notes cont elevated pain in L foot  Notes cont swelling in B LEs  Notes he has been compliant with compression dressings and pneumatic pumps at home; also continues to take his diuretics  Notes cont intermittent n/t foot with WB/standing extended periods  Denies any color change of foot/toes or loss of sensation  No new sx/complaints  Has f/u with foot doctor upcoming  Pt notes cont'd improvements with therapy to date  Notes cont 1* limitations with L foot pain with WB on foot and cont need for RW  Objective: See treatment diary below      Assessment: Tolerated treatment fair  Limited WB this date 2* pt's cont pain L foot  Pt cont with antalgic gait with poor tolerance for WB on LLE 2* pain  Continues with poor L stance time and increased compensatory WB on UEs with L stance  Pt demonstrates improving postural control and stability with balance challenges; able to progress to tandem on foam; no LOB with romberg on foam with EC this date  Pt also demonstrates improvements in LE strength with improved quad mm control and reduced fatigue notd with SLRs  No complaints end of session  Encouraged pt to f/u with MD regarding cont'd L foot pain  Patient demonstrated fatigue post treatment and would benefit from continued PT      Plan: Continue per plan of care  Progress treatment as tolerated            Manual             Exercise Diary  2/       Aerobic Nustep  L3  15 mins       SLR x 4 Flex BL  Abd BL  3x10 ea 0#       HR/TR Firm  30xea    2 HHA       Mini Squats 2x10  2 HHA  firm       Step ups Held 2* foot pain HS stretch                Rhom EO Tandem foam EO alt  Static   30" x 2 ea                Rhom EC Foam 0 HHA  1' x 2   Static               Dynavision                Obstacle Course                Gait training                            Modalities          deferred

## 2019-02-07 ENCOUNTER — OFFICE VISIT (OUTPATIENT)
Dept: PHYSICAL THERAPY | Facility: CLINIC | Age: 64
End: 2019-02-07
Payer: MEDICARE

## 2019-02-07 DIAGNOSIS — R26.9 ABNORMALITY OF GAIT: Primary | ICD-10-CM

## 2019-02-07 PROCEDURE — 97112 NEUROMUSCULAR REEDUCATION: CPT | Performed by: PHYSICAL MEDICINE & REHABILITATION

## 2019-02-07 PROCEDURE — 97110 THERAPEUTIC EXERCISES: CPT | Performed by: PHYSICAL MEDICINE & REHABILITATION

## 2019-02-07 NOTE — PROGRESS NOTES
Daily Note     Today's date: 2019  Patient name: He Keith  : 1955  MRN: [id number]  Referring provider: Yamel Esparza MD  Dx:   Encounter Diagnosis     ICD-10-CM    1  Abnormality of gait R26 9                 Precautions: Falls, LE edema  Re-eval Date: 19    Date       Visit Count 6 7      FOTO NV       Pain In 8/10 7-8/10      Pain Out 8/10  7-8/10        Subjective: Pt notes cont pain in L foot however notes it is "a little better than last time"  No new sx/complaints  Cont to feel overall strength, balance, and activity tolerance is improving  Objective: See treatment diary below      Assessment: Tolerated treatment well  Progressed exercises to tolerance without incident  WB exercise/activity/walking remain limited by L foot pain  PT frequently encouraged pt to stop/rest as needed if increased foot pain noted; denied any elevated pain  Improving R LE strength noted with program; less rest periods required with R LE there ex; pt is able to raise R LE higher with SLRs with less fatigue noted  Balance continues to improve with reduced postural sway with progressive challenges  Gait continues to be limited by L foot pain as noted prior with WB  No complaints end of tx  Patient demonstrated fatigue post treatment and would benefit from continued PT      Plan: Continue per plan of care  Progress treatment as tolerated            Manual             Exercise Diary        Aerobic Nustep  L3  15 mins Nustep  L5  15 mins      SLR x 4 Flex BL  Abd BL  3x10 ea 0# Flex BL  Abd BL  3x10 ea 0#      HR/TR Firm  30xea    2 HHA Firm  30xea    2 HHA      Mini Squats 2x10  2 HHA  firm 3x10  2 HHA  firm      Step ups Held 2* foot pain  deferred      Mini lunges  10x ea   Cues       LAQ  3# 2x10 ea               HS stretch                Rhom EO Tandem foam EO alt  Static   30" x 2 ea  Tandem foam EO alt  Static   1' x 1 ea               Rhom EC Foam 0 HHA  1' x 2   Static Foam 0 HHA  1' x 2 Static              Dynavision                Obstacle Course                Gait training                            Modalities          deferred deferred

## 2019-02-12 ENCOUNTER — APPOINTMENT (OUTPATIENT)
Dept: PHYSICAL THERAPY | Facility: CLINIC | Age: 64
End: 2019-02-12
Payer: MEDICARE

## 2019-02-14 ENCOUNTER — OFFICE VISIT (OUTPATIENT)
Dept: PHYSICAL THERAPY | Facility: CLINIC | Age: 64
End: 2019-02-14
Payer: MEDICARE

## 2019-02-14 DIAGNOSIS — R26.9 ABNORMALITY OF GAIT: Primary | ICD-10-CM

## 2019-02-14 PROCEDURE — 97110 THERAPEUTIC EXERCISES: CPT

## 2019-02-14 NOTE — PROGRESS NOTES
Daily Note     Today's date: 2019  Patient name: Angelia Bran  : 1955  MRN: [id number]  Referring provider: Yasmine Carpenter MD  Dx:   Encounter Diagnosis     ICD-10-CM    1  Abnormality of gait R26 9                   Precautions: Falls, LE edema  Re-eval Date: 19    Date      Visit Count 6 7 8     FOTO NV       Pain In 8/10 7-8/10 8/10     Pain Out 8/10  7-8/10 8/10       Subjective: L foot / ankle is really hurting today feels more swelling , no xray yet  I may have to get that taken care, talk with PCP      Objective: See treatment diary below      Assessment: Tolerated treatment fair   Increase pain L ankle/foot with WB exercises  Pt demonstrates BL LE lymphedema with pt utilizing compression pump daily, on meds for dieresis, and uses tubigrip for compression  Unable to progress at this time toward PT goals secondary to elevated pain L ankle      Patient would benefit with f/u with MD re: elevated pain/swelling        Plan: HOLD PT per PT GM recommendation w/ pt to f/u with MD     Manual             Exercise Diary        Aerobic Nustep  L3  15 mins Nustep  L5  15 mins Nustep  L5  15 mins     SLR x 4 Flex BL  Abd BL  3x10 ea 0# Flex BL  Abd BL  3x10 ea 0# Flex BL  Abd BL  3x10 ea 0#  standing     HR/TR Firm  30xea    2 HHA Firm  30xea    2 HHA Firm  30xea    2 HHA     Mini Squats 2x10  2 HHA  firm 3x10  2 HHA  firm 1x10  2 HHA  Firm  pain     Step ups Held 2* foot pain  deferred      Mini lunges  10x ea   Cues       LAQ  3# 2x10 ea  3# 3x10 ea      Marches   3# 3x10  Seated BL     HS stretch        Hip ABD/ADD   Blue/ADD  3x10     Rhom EO Tandem foam EO alt  Static   30" x 2 ea  Tandem foam EO alt  Static   1' x 1 ea  Held             Rhom EC Foam 0 HHA  1' x 2   Static Foam 0 HHA  1' x 2   Static Held             Dynavision                Obstacle Course                Gait training                                    Modalities          deferred deferred

## 2019-02-14 NOTE — LETTER
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 in Physical Therapy for the past month  He has been making gradual gains in improving general LE strength and cardiopulmonary endurance  He however has remained severely limited with progress with standing and balance exercises due to continued complaints of left foot pain  He also continues with elevated swelling in B legs/feet  Due to lack of ability to progress and continued elevated pain, he will be placed on hold from therapy at this time in order to return to you for follow up  He has been given an extensive home exercise program  Please let you know if you have any questions/concerns  Thank you       Gena Islas PT, DPT

## 2019-02-19 ENCOUNTER — APPOINTMENT (OUTPATIENT)
Dept: PHYSICAL THERAPY | Facility: CLINIC | Age: 64
End: 2019-02-19
Payer: MEDICARE

## 2019-02-21 ENCOUNTER — APPOINTMENT (OUTPATIENT)
Dept: PHYSICAL THERAPY | Facility: CLINIC | Age: 64
End: 2019-02-21
Payer: MEDICARE

## 2019-02-26 ENCOUNTER — CONSULT (OUTPATIENT)
Dept: CARDIOLOGY CLINIC | Facility: CLINIC | Age: 64
End: 2019-02-26
Payer: MEDICARE

## 2019-02-26 ENCOUNTER — APPOINTMENT (OUTPATIENT)
Dept: PHYSICAL THERAPY | Facility: CLINIC | Age: 64
End: 2019-02-26
Payer: MEDICARE

## 2019-02-26 VITALS
SYSTOLIC BLOOD PRESSURE: 156 MMHG | WEIGHT: 315 LBS | BODY MASS INDEX: 40.43 KG/M2 | HEART RATE: 92 BPM | DIASTOLIC BLOOD PRESSURE: 86 MMHG | HEIGHT: 74 IN

## 2019-02-26 DIAGNOSIS — G47.33 OSA (OBSTRUCTIVE SLEEP APNEA): Primary | ICD-10-CM

## 2019-02-26 DIAGNOSIS — R60.0 BILATERAL EDEMA OF LOWER EXTREMITY: Chronic | ICD-10-CM

## 2019-02-26 DIAGNOSIS — I10 ESSENTIAL HYPERTENSION: ICD-10-CM

## 2019-02-26 DIAGNOSIS — I48.20 CHRONIC ATRIAL FIBRILLATION (HCC): ICD-10-CM

## 2019-02-26 DIAGNOSIS — I48.91 ATRIAL FIBRILLATION (HCC): ICD-10-CM

## 2019-02-26 DIAGNOSIS — I10 ESSENTIAL (PRIMARY) HYPERTENSION: ICD-10-CM

## 2019-02-26 PROCEDURE — 99214 OFFICE O/P EST MOD 30 MIN: CPT | Performed by: INTERNAL MEDICINE

## 2019-02-26 RX ORDER — ATORVASTATIN CALCIUM 40 MG/1
40 TABLET, FILM COATED ORAL DAILY
COMMUNITY
End: 2019-10-02 | Stop reason: DRUGHIGH

## 2019-02-26 RX ORDER — METOPROLOL SUCCINATE 50 MG/1
25 TABLET, EXTENDED RELEASE ORAL DAILY
COMMUNITY
End: 2022-08-09 | Stop reason: DRUGHIGH

## 2019-02-26 RX ORDER — LISINOPRIL 10 MG/1
10 TABLET ORAL DAILY
Qty: 90 TABLET | Refills: 3 | Status: SHIPPED | OUTPATIENT
Start: 2019-02-26 | End: 2020-01-28 | Stop reason: SDUPTHER

## 2019-02-26 NOTE — ASSESSMENT & PLAN NOTE
· Uncontrolled - will start lisinopril 10mg daily in light of his DM   · Check BMP in 1-2 weeks   · Advised pt to continue to monitor BP at home a few times a week and to follow a low salt diet

## 2019-02-26 NOTE — ASSESSMENT & PLAN NOTE
· Chronic, likely secondary to venous insufficiency   · Echo with normal EF last month   · On lasix 80mg in the morning and 40mg in the evening

## 2019-02-26 NOTE — PROGRESS NOTES
[facility]     Raymon Castellanos / 75 y o  male / : 1955 / MRN: [id number]  Date of Visit: 19    ASSESSMENT/PLAN    Chronic atrial fibrillation (HCC)  · Rate controlled on metoprolol   · AC on Eliquis  · Asymptomatic     Essential hypertension  · Uncontrolled - will start lisinopril 10mg daily in light of his DM   · Check BMP in 1-2 weeks   · Advised pt to continue to monitor BP at home a few times a week and to follow a low salt diet     DIAMOND (obstructive sleep apnea)  · Does not wear CPAP    Bilateral edema of lower extremity  · Chronic, likely secondary to venous insufficiency   · Echo with normal EF last month   · On lasix 80mg in the morning and 40mg in the evening     Follow up in 1 month     SUBJECTIVE:  HPI: Raymon Castellanos is a 75y o  year-old male who presents for follow-up regarding afib (on Eliquis) and HTN  He also has a history of DM-2 (on metformin) and chronic lower extremity edema  Today he has no complaints and reports that he needs clearance to continue with physical therapy  He reports that his BP is consistently 150-160s/80s at home, at [facility] and at therapy  He has chronic lower extremity edema and recently had a normal echo  He keeps his legs wrapped during the day and elevates his feet when at home  He denies recent chest pain, palpitations, dyspnea, orthopnea, near syncope and syncope  Other cardiac testing:    Echo 19: EF 60-65%  Mild LVH  Mild MAC with mild MR  Mild TR    Social history: Never smoker      No Known Allergies      Current Outpatient Medications:     apixaban (ELIQUIS) 5 mg, Take 1 tablet (5 mg total) by mouth 2 (two) times a day, Disp: , Rfl: 0    atorvastatin (LIPITOR) 40 mg tablet, Take 40 mg by mouth daily, Disp: , Rfl:     furosemide (LASIX) 40 mg tablet, Take 1 tablet (40 mg total) by mouth daily, Disp: , Rfl: 0    furosemide (LASIX) 80 mg tablet, Take 40 mg by mouth daily, Disp: , Rfl:     glipiZIDE (GLUCOTROL) 5 mg tablet, Take 5 mg by mouth daily, Disp: , Rfl:     metFORMIN (GLUCOPHAGE) 1000 MG tablet, Take 1,000 mg by mouth 2 (two) times a day with meals, Disp: , Rfl:     metoprolol succinate (TOPROL-XL) 50 mg 24 hr tablet, Take 25 mg by mouth daily, Disp: , Rfl:     lisinopril (ZESTRIL) 10 mg tablet, Take 1 tablet (10 mg total) by mouth daily, Disp: 90 tablet, Rfl: 3    Past Medical History:   Diagnosis Date    Chronic a-fib (HCC)     Chronic pain     Diabetes mellitus ([facility] [address] )     Hyperlipidemia     Hypertension        Family History   Problem Relation Age of Onset    Heart failure Mother     Heart failure Father        Past Surgical History:   Procedure Laterality Date    HERNIA REPAIR         Social History     Socioeconomic History    Marital status:      Spouse name: Not on file    Number of children: Not on file    Years of education: Not on file    Highest education level: Not on file   Occupational History    Not on file   Social Needs    Financial resource strain: Not on file    Food insecurity:     Worry: Not on file     Inability: Not on file    Transportation needs:     Medical: Not on file     Non-medical: Not on file   Tobacco Use    Smoking status: Never Smoker    Smokeless tobacco: Never Used   Substance and Sexual Activity    Alcohol use: Yes     Comment: social    Drug use: No    Sexual activity: Not Currently   Lifestyle    Physical activity:     Days per week: Not on file     Minutes per session: Not on file    Stress: Not on file   Relationships    Social connections:     Talks on phone: Not on file     Gets together: Not on file     Attends Yarsani service: Not on file     Active member of club or organization: Not on file     Attends meetings of clubs or organizations: Not on file     Relationship status: Not on file    Intimate partner violence:     Fear of current or ex partner: Not on file     Emotionally abused: Not on file     Physically abused: Not on file Forced sexual activity: Not on file   Other Topics Concern    Not on file   Social History Narrative    Not on file       Review of Systems   Constitution: Negative  HENT: Negative  Eyes: Negative  Cardiovascular: Positive for leg swelling  Negative for chest pain, claudication, dyspnea on exertion, irregular heartbeat, near-syncope, orthopnea, palpitations, paroxysmal nocturnal dyspnea and syncope  Respiratory: Negative for cough, shortness of breath and wheezing  Endocrine: Negative  Skin: Negative  Musculoskeletal: Negative  Gastrointestinal: Negative  Genitourinary: Negative  Neurological: Positive for paresthesias (in both feet)  Negative for dizziness and light-headedness  Psychiatric/Behavioral: Negative  OBJECTIVE:  Vitals: /86   Pulse 92   Ht 6' 2" (1 88 m)   Wt (!) 160 kg (352 lb)   BMI 45 19 kg/m²     Physical exam:   Physical Exam   Constitutional: He is oriented to person, place, and time  He appears well-developed and well-nourished  No distress  HENT:   Head: Normocephalic and atraumatic  Eyes: EOM are normal  No scleral icterus  Neck: Normal range of motion  Neck supple  No JVD present  Cardiovascular: Normal rate, S1 normal, S2 normal and intact distal pulses  An irregularly irregular rhythm present  No murmur heard  Pulmonary/Chest: Effort normal and breath sounds normal  He has no wheezes  He has no rales  Abdominal: Soft  Bowel sounds are normal    Musculoskeletal: He exhibits edema (firm 3+ pretibial edema, chronic venous stasis changes )  Neurological: He is alert and oriented to person, place, and time  Skin: Skin is warm and dry  Psychiatric: He has a normal mood and affect   His behavior is normal        Lab Results:   No results found for: HGBA1C  No results found for: CHOL  No results found for: HDL  No results found for: LDLCALC  No results found for: TRIG  No results found for: CHOLHDL

## 2019-02-26 NOTE — PATIENT INSTRUCTIONS
Get lab drawn in 1-2 weeks   Start lisinopril 10mg daily   Continue to monitor your blood pressure a few times a week

## 2019-03-05 ENCOUNTER — TRANSCRIBE ORDERS (OUTPATIENT)
Dept: RADIOLOGY | Facility: CLINIC | Age: 64
End: 2019-03-05

## 2019-03-05 ENCOUNTER — APPOINTMENT (OUTPATIENT)
Dept: RADIOLOGY | Facility: CLINIC | Age: 64
End: 2019-03-05
Payer: MEDICARE

## 2019-03-05 ENCOUNTER — APPOINTMENT (OUTPATIENT)
Dept: LAB | Facility: CLINIC | Age: 64
End: 2019-03-05
Payer: MEDICARE

## 2019-03-05 DIAGNOSIS — I10 ESSENTIAL (PRIMARY) HYPERTENSION: ICD-10-CM

## 2019-03-05 DIAGNOSIS — M25.572 PAIN AND SWELLING OF ANKLE, LEFT: ICD-10-CM

## 2019-03-05 DIAGNOSIS — M25.572 PAIN AND SWELLING OF ANKLE, LEFT: Primary | ICD-10-CM

## 2019-03-05 DIAGNOSIS — M25.472 PAIN AND SWELLING OF ANKLE, LEFT: ICD-10-CM

## 2019-03-05 DIAGNOSIS — M25.472 PAIN AND SWELLING OF ANKLE, LEFT: Primary | ICD-10-CM

## 2019-03-05 LAB
ANION GAP SERPL CALCULATED.3IONS-SCNC: 7 MMOL/L (ref 4–13)
BUN SERPL-MCNC: 13 MG/DL (ref 5–25)
CALCIUM SERPL-MCNC: 9.2 MG/DL (ref 8.3–10.1)
CHLORIDE SERPL-SCNC: 103 MMOL/L (ref 100–108)
CO2 SERPL-SCNC: 28 MMOL/L (ref 21–32)
CREAT SERPL-MCNC: 0.74 MG/DL (ref 0.6–1.3)
GFR SERPL CREATININE-BSD FRML MDRD: 96 ML/MIN/1.73SQ M
GLUCOSE SERPL-MCNC: 131 MG/DL (ref 65–140)
POTASSIUM SERPL-SCNC: 4.1 MMOL/L (ref 3.5–5.3)
SODIUM SERPL-SCNC: 138 MMOL/L (ref 136–145)

## 2019-03-05 PROCEDURE — 80048 BASIC METABOLIC PNL TOTAL CA: CPT

## 2019-03-05 PROCEDURE — 73610 X-RAY EXAM OF ANKLE: CPT

## 2019-03-05 PROCEDURE — 36415 COLL VENOUS BLD VENIPUNCTURE: CPT

## 2019-03-06 ENCOUNTER — EVALUATION (OUTPATIENT)
Dept: PHYSICAL THERAPY | Facility: CLINIC | Age: 64
End: 2019-03-06
Payer: MEDICARE

## 2019-03-06 DIAGNOSIS — I89.0 LYMPHEDEMA: Primary | ICD-10-CM

## 2019-03-06 PROCEDURE — 97535 SELF CARE MNGMENT TRAINING: CPT | Performed by: PHYSICAL THERAPIST

## 2019-03-06 PROCEDURE — 97163 PT EVAL HIGH COMPLEX 45 MIN: CPT | Performed by: PHYSICAL THERAPIST

## 2019-03-06 NOTE — PROGRESS NOTES
PT Evaluation     Today's date: 3/6/2019  Patient name: Bety Amador  : 1955  MRN: [id number]  Referring provider: Chester Riggs DPM  Dx:   Encounter Diagnosis     ICD-10-CM    1  Lymphedema I89 0        Start Time: 1100  Stop Time: 1200  Total time in clinic (min): 60 minutes    Assessment  Assessment details: Patient presents to OPPT with s/s consistent with right/left/bilateral LE lymphedema  PT interventions to include CDT (complete decongestive therapy) including MLD (manual lymphatic drainage) and compression using short stretch bandages as able  PT to further provide extensive patient education on self bandaging, self MLD techniques, and skin care  Patient will transition patient to long term maintenance with compression plan for both morning and evening wear once optimal decongestive and volume reduction of limb has been achieved  Thank you for this referral and the opportunity to participate in the care of this patient    Impairments: abnormal gait, abnormal muscle tone, abnormal or restricted ROM, activity intolerance, impaired balance, impaired physical strength, lacks appropriate home exercise program, pain with function and weight-bearing intolerance  Other impairment: WBing intolerance left LE    Symptom irritability: moderateUnderstanding of Dx/Px/POC: good   Prognosis: good  Prognosis details: Limited functional activity tolerance, limited standing tolerance, unable to drive    Goals  STGs to be achieved in 2 - 4 weeks  Demonstrate at least 75% compliance with self MLD  Demonstrate at least 75% compliance with self compression  Demonstrate at least 75% compliance with self skin and nail inspection  Free from s/s of infection  Demonstrate understanding of importance of compliance with POC    LTGs to be achieved in 4 - 6 weeks  Demonstrate at least 100% compliance with self MLD  Demonstrate at least 100% compliance with self compression  Demonstrate at least 100% compliance with self skin and nail inspection  Free from s/s of infection  Demonstrate understanding of day/night compression wearing schedule  Obtain alternative compression to ensure independence with self management      Plan  Patient would benefit from: skilled physical therapy  Other planned modality interventions: Modalities prn for symptom management  Planned therapy interventions: manual therapy, orthotic fitting/training, neuromuscular re-education, therapeutic exercise and home exercise program  Other planned therapy interventions: MLD for lymphedema management  Frequency: 3x per week x 2-3 weeks; 2x per week for 2 -3 weeks  Duration in weeks: 6  Treatment plan discussed with: patient        Subjective Evaluation    History of Present Illness  Mechanism of injury: Patient reports he has been having significant swelling since his stroke  He's been "scanned all over head to toe"  He notes that his legs are so swollen that it is affecting his function  Patient was told he has cellulitis  Referred to OPPT for further intervention  Recurrent probem    Quality of life: fair    Pain  Current pain ratin  At best pain ratin  At worst pain ratin  Location: Bilateral ankles left > right  Quality: Gnawing pain  Relieving factors: support, rest and medications  Aggravating factors: walking, standing and stair climbing  Progression: worsening    Social Support  Steps to enter house: yes  3  Stairs in house: no   Lives with: adult children    Employment status: working  Hand dominance: right      Diagnostic Tests  X-ray: normal  Treatments  Treatments tried: Had PT for CVA    Patient Goals  Patient goals for therapy: decreased edema, decreased pain, improved balance, increased motion, increased strength, independence with ADLs/IADLs, return to sport/leisure activities and return to work          Objective  Lymphedema Evaluation    Medical Considerations:  NEG Cardiac  NEG Pulmonary  NEG Kidney  NEG Liver  POS Current Fever/Infection-current antibiotics  NEG Current/Recent Wounds  Neg Current/Recent Treatments/Interventions/Port Access/PICC Line  NEG Current/Recent/History of DVT or Clotting issues    ROM Considerations:  Limited ROM related to knee flexion with soft tissue approximation    Strength Considerations:  Hip flexion 3/5, hip ABd 3/5, knee extn 4/5, knee flexion 4-/5, DF and PF 4/5  "Right leg still feels sluggish"    Gait Considerations:  Ambulates with RW, household and short community distances    Skin Considerations/Scars:    Patient presents with skin inspection as follows:  Hemosiderin staining/skin discoloration - POS  Finger/Toe Nails - POS  Open Wounds - NEG  S/S infection - POS, recent  Firm/Sponge/Hard - POS  [place] - NEG  Weeping - NEG    Girth:  LE girth measurements (cm)      Right           Left   Forefoot             31  33               Metatarsals              35 5  37   Malleolus              38  40  +8 cm                           45            40  +16 cm                          50  52  +24 cm                                   55 5  52 5  +32                                          53 5                  59                    +40    49 5  48  Knee joint line              53  51 5    Malleoli to Fib head length - 41 cm      Precautions: Falls, recent CVA  Re-eval Date: 4/5/19    Date 3/6/19       Visit Count 1       FOTO See IE       Pain In See IE       Pain Out See IE             Daily Treatment Diary     Manual                           Manual Therapy:  MLD (Manual lymphatic drainage) to bilateral LEs with position modified for patient tolerance  Compression placed to bilateral LEs foot to knee using short stretch bandage and white foam roll  Skin intact prior to placement with consideration of gait stability  Gait WFL with good foot clearance and no concerns for increased fall risk related to compression placement        Exercise Diary Modalities

## 2019-03-06 NOTE — LETTER
2019    Mika Medina DPM  [address] [address]    Patient: Sid Ochoa   YOB: 1955   Date of Visit: 3/6/2019     Encounter Diagnosis     ICD-10-CM    1  Lymphedema I89 0        Dear Dr Charisma Lutz:    Please review the attached Plan of Care from [facility] recent visit  Please verify that you agree therapy should continue by signing the attached document and sending it back to our office  If you have any questions or concerns, please don't hesitate to call  Sincerely,    Gloria Ybarra      Referring Provider:      I certify that I have read the below Plan of Care and certify the need for these services furnished under this plan of treatment while under my care  Mika Medina DPM  [address]: [phone]          PT Evaluation     Today's date: 3/6/2019  Patient name: Sid Ochoa  : 1955  MRN: [id number]  Referring provider: Reece Corral DPM  Dx:   Encounter Diagnosis     ICD-10-CM    1  Lymphedema I89 0        Start Time: 1100  Stop Time: 1200  Total time in clinic (min): 60 minutes    Assessment  Assessment details: Patient presents to OPPT with s/s consistent with right/left/bilateral LE lymphedema  PT interventions to include CDT (complete decongestive therapy) including MLD (manual lymphatic drainage) and compression using short stretch bandages as able  PT to further provide extensive patient education on self bandaging, self MLD techniques, and skin care  Patient will transition patient to long term maintenance with compression plan for both morning and evening wear once optimal decongestive and volume reduction of limb has been achieved  Thank you for this referral and the opportunity to participate in the care of this patient    Impairments: abnormal gait, abnormal muscle tone, abnormal or restricted ROM, activity intolerance, impaired balance, impaired physical strength, lacks appropriate home exercise program, pain with function and weight-bearing intolerance  Other impairment: WBing intolerance left LE    Symptom irritability: moderateUnderstanding of Dx/Px/POC: good   Prognosis: good  Prognosis details: Limited functional activity tolerance, limited standing tolerance, unable to drive    Goals  STGs to be achieved in 2 - 4 weeks  Demonstrate at least 75% compliance with self MLD  Demonstrate at least 75% compliance with self compression  Demonstrate at least 75% compliance with self skin and nail inspection  Free from s/s of infection  Demonstrate understanding of importance of compliance with POC    LTGs to be achieved in 4 - 6 weeks  Demonstrate at least 100% compliance with self MLD  Demonstrate at least 100% compliance with self compression  Demonstrate at least 100% compliance with self skin and nail inspection  Free from s/s of infection  Demonstrate understanding of day/night compression wearing schedule  Obtain alternative compression to ensure independence with self management      Plan  Patient would benefit from: skilled physical therapy  Other planned modality interventions: Modalities prn for symptom management  Planned therapy interventions: manual therapy, orthotic fitting/training, neuromuscular re-education, therapeutic exercise and home exercise program  Other planned therapy interventions: MLD for lymphedema management  Frequency: 3x per week x 2-3 weeks; 2x per week for 2 -3 weeks  Duration in weeks: 6  Treatment plan discussed with: patient        Subjective Evaluation    History of Present Illness  Mechanism of injury: Patient reports he has been having significant swelling since his stroke  He's been "scanned all over head to toe"  He notes that his legs are so swollen that it is affecting his function  Patient was told he has cellulitis  Referred to OPPT for further intervention            Recurrent probem    Quality of life: fair    Pain  Current pain ratin  At best pain ratin  At worst pain ratin  Location: Bilateral ankles left > right  Quality: Gnawing pain  Relieving factors: support, rest and medications  Aggravating factors: walking, standing and stair climbing  Progression: worsening    Social Support  Steps to enter house: yes  3  Stairs in house: no   Lives with: adult children    Employment status: working  Hand dominance: right      Diagnostic Tests  X-ray: normal  Treatments  Treatments tried: Had PT for CVA    Patient Goals  Patient goals for therapy: decreased edema, decreased pain, improved balance, increased motion, increased strength, independence with ADLs/IADLs, return to sport/leisure activities and return to work          Objective  Lymphedema Evaluation    Medical Considerations:  NEG Cardiac  NEG Pulmonary  NEG Kidney  NEG Liver  POS Current Fever/Infection-current antibiotics  NEG Current/Recent Wounds  Neg Current/Recent Treatments/Interventions/Port Access/PICC Line  NEG Current/Recent/History of DVT or Clotting issues    ROM Considerations:  Limited ROM related to knee flexion with soft tissue approximation    Strength Considerations:  Hip flexion 3/5, hip ABd 3/5, knee extn 4/5, knee flexion 4-/5, DF and PF 4/5  "Right leg still feels sluggish"    Gait Considerations:  Ambulates with RW, household and short community distances    Skin Considerations/Scars:    Patient presents with skin inspection as follows:  Hemosiderin staining/skin discoloration - POS  Finger/Toe Nails - POS  Open Wounds - NEG  S/S infection - POS, recent  Firm/Sponge/Hard - POS  [place] - NEG  Weeping - NEG    Girth:  LE girth measurements (cm)      Right           Left   Forefoot             31  33               Metatarsals              35 5  37   Malleolus              38  40  +8 cm                           45            40  +16 cm                          50  52  +24 cm 55 5  52 5  +32                                          53 5                  59                    +40    49 5  48  Knee joint line              53  51 5    Malleoli to Fib head length - 41 cm      Precautions: Falls, recent CVA  Re-eval Date: 4/5/19    Date 3/6/19       Visit Count 1       FOTO See IE       Pain In See IE       Pain Out See IE             Daily Treatment Diary     Manual                           Manual Therapy:  MLD (Manual lymphatic drainage) to bilateral LEs with position modified for patient tolerance  Compression placed to bilateral LEs foot to knee using short stretch bandage and white foam roll  Skin intact prior to placement with consideration of gait stability  Gait WFL with good foot clearance and no concerns for increased fall risk related to compression placement        Exercise Diary                                                     Modalities

## 2019-03-08 ENCOUNTER — OFFICE VISIT (OUTPATIENT)
Dept: PHYSICAL THERAPY | Facility: CLINIC | Age: 64
End: 2019-03-08
Payer: MEDICARE

## 2019-03-08 DIAGNOSIS — I89.0 LYMPHEDEMA: Primary | ICD-10-CM

## 2019-03-08 DIAGNOSIS — R26.9 ABNORMALITY OF GAIT: ICD-10-CM

## 2019-03-08 PROCEDURE — 97140 MANUAL THERAPY 1/> REGIONS: CPT | Performed by: PHYSICAL THERAPIST

## 2019-03-08 NOTE — PROGRESS NOTES
Daily Note     Today's date: 3/8/2019  Patient name: Aundria Severin  : 1955  MRN: [id number]  Referring provider: Lazarus Gaviria DPM  Dx:   Encounter Diagnosis     ICD-10-CM    1  Lymphedema I89 0    2  Abnormality of gait R26 9      recautions: Falls, recent CVA  Re-eval Date: 19    Date 3/6/19 3/8      Visit Count 1 2      FOTO See IE       Pain In See IE 0      Pain Out See IE 0        Subjective: Patient presents with improved edema from last session  Kept wraps in place until this morning and LE feels better  Objective: See treatment diary below      Assessment: Tolerated treatment well  Patient demonstrated fatigue post treatment and would benefit from continued PT  Patient with improved LE texture  Plan: Continue per plan of care  Daily Treatment Diary     Manual           55 mins                Manual Therapy:  MLD (Manual lymphatic drainage) to bilateral LEs with position modified for patient tolerance  Compression placed to bilateral LEs foot to knee using short stretch bandage and white foam roll  Skin intact prior to placement with consideration of gait stability  Gait WFL with good foot clearance and no concerns for increased fall risk related to compression placement        Exercise Diary                                                     Modalities

## 2019-03-11 ENCOUNTER — OFFICE VISIT (OUTPATIENT)
Dept: PHYSICAL THERAPY | Facility: CLINIC | Age: 64
End: 2019-03-11
Payer: MEDICARE

## 2019-03-11 DIAGNOSIS — R26.9 ABNORMALITY OF GAIT: ICD-10-CM

## 2019-03-11 DIAGNOSIS — I89.0 LYMPHEDEMA: Primary | ICD-10-CM

## 2019-03-11 PROCEDURE — 97140 MANUAL THERAPY 1/> REGIONS: CPT

## 2019-03-11 NOTE — PROGRESS NOTES
Daily Note     Today's date: 3/11/2019  Patient name: William Ross  : 1955  MRN: [id number]  Referring provider: Jose Greenberg DPM  Dx:   Encounter Diagnosis     ICD-10-CM    1  Lymphedema I89 0    2  Abnormality of gait R26 9                   Precautions: Falls, recent CVA  Re-eval Date: 19    Date 3/6/19 3/8 3/11     Visit Count 1 2 3     FOTO See IE       Pain In See IE       Pain Out See IE           Subjective: If I didn't have the pain in my left ankle I really think I would be able to walk better  Doing with well compression  Go into compression pumps 2x/day for hour ea      Objective: See treatment diary below      Assessment: Tolerated treatment well  Denied any increase pain/sx's with MLD  Good tolerance with compression wrapping BL LE  Patient would benefit from continued PT to reduce bl LE Lymphedema,  Consider Wheels L medial/lateral malleolus upcoming      Plan: Continue per plan of care  Manual           55 mins 60 min               Manual Therapy:  MLD (Manual lymphatic drainage) to bilateral LEs with position modified for patient tolerance  Compression placed to bilateral LEs foot to knee using short stretch bandage and white foam roll  Skin intact prior to placement with consideration of gait stability  Gait WFL with good foot clearance and no concerns for increased fall risk related to compression placement        Exercise Diary                                                     Modalities

## 2019-03-14 ENCOUNTER — OFFICE VISIT (OUTPATIENT)
Dept: PHYSICAL THERAPY | Facility: CLINIC | Age: 64
End: 2019-03-14
Payer: MEDICARE

## 2019-03-14 DIAGNOSIS — I89.0 LYMPHEDEMA: Primary | ICD-10-CM

## 2019-03-14 PROCEDURE — 97140 MANUAL THERAPY 1/> REGIONS: CPT

## 2019-03-14 NOTE — PROGRESS NOTES
Daily Note     Today's date: 3/14/2019  Patient name: Allie Monte  : 1955  MRN: [id number]  Referring provider: Marion Alvarez DPM  Dx:   Encounter Diagnosis     ICD-10-CM    1  Lymphedema I89 0                   Precautions: Falls, recent CVA  Re-eval Date: 19    Date 3/6/19 3/8 3/11 3/14    Visit Count 1 2 3 4    FOTO See IE       Pain In See IE       Pain Out See IE         Subjective: I still have so much pain in my left foot/ankle  Still dont know if it is gout or what it is  Cant WB into it so much pain and keeps me from doing my exercises/walking  Will f/u with PCP to see what they want me to do but something has to change  I feel the wraps last PT session really helped reduce BL LE edema      Objective: See treatment diary below      Assessment: Tolerated treatment well  Denied any increase pain with MLD L ankle  Pt progressing well with reduce Bl LE volume/firmness  Pt compliant with compression with use of 2 layer tubigrip  Pt review HEP  Patient would benefit from continued PT      Plan: Continue per plan of care  Manual           55 mins 60 min 60 min              Manual Therapy:  MLD (Manual lymphatic drainage) to bilateral LEs with position modified for patient tolerance  Compression placed to bilateral LEs foot to knee using short stretch bandage and white foam roll  Skin intact prior to placement with consideration of gait stability  Gait WFL with good foot clearance and no concerns for increased fall risk related to compression placement        Exercise Diary                                                     Modalities

## 2019-03-18 ENCOUNTER — OFFICE VISIT (OUTPATIENT)
Dept: PHYSICAL THERAPY | Facility: CLINIC | Age: 64
End: 2019-03-18
Payer: MEDICARE

## 2019-03-18 DIAGNOSIS — I89.0 LYMPHEDEMA: Primary | ICD-10-CM

## 2019-03-18 PROCEDURE — 97140 MANUAL THERAPY 1/> REGIONS: CPT

## 2019-03-18 NOTE — PROGRESS NOTES
Daily Note     Today's date: 3/18/2019  Patient name: Allie Monte  : 1955  MRN: [id number]  Referring provider: Marion Alvarez DPM  Dx:   Encounter Diagnosis     ICD-10-CM    1  Lymphedema I89 0                   Precautions: Falls, recent CVA  Re-eval Date: 19    Date 3/6/19 3/8 3/11 3/14 3/18   Visit Count 1 2 3 4 5   FOTO See IE       Pain In See IE    3-4 L ankle pain   Pain Out See IE         Subjective: Overall less pain since being on meds  I think the last dose with on Saturday  Tolerated my compression wrapping last PT session      Objective: See treatment diary below      Assessment: Tolerated treatment well with pt demonstrating reduce pain with ambulation with pt utilize RW  Pt demonstrates overall reduce volumes with trial of wheel 1/2" grey from R lat malleoli  Good tolerance indicated with MLD/BL compression wrapping  Patient would benefit from continued PT      Plan: Continue per plan of care  Manual           55 mins 60 min 60 min 60 min             Manual Therapy:  MLD (Manual lymphatic drainage) to bilateral LEs with position modified for patient tolerance  Compression placed to bilateral LEs foot to knee using short stretch bandage and white foam roll  Trial 1/2" wheel R lateral malleolus  Skin intact prior to placement with consideration of gait stability  Gait WFL with good foot clearance and no concerns for increased fall risk related to compression placement        Exercise Diary                                                     Modalities

## 2019-03-19 ENCOUNTER — OFFICE VISIT (OUTPATIENT)
Dept: PHYSICAL THERAPY | Facility: CLINIC | Age: 64
End: 2019-03-19
Payer: MEDICARE

## 2019-03-19 DIAGNOSIS — I89.0 LYMPHEDEMA: Primary | ICD-10-CM

## 2019-03-19 DIAGNOSIS — R26.9 ABNORMALITY OF GAIT: ICD-10-CM

## 2019-03-19 PROCEDURE — 97140 MANUAL THERAPY 1/> REGIONS: CPT | Performed by: PHYSICAL THERAPIST

## 2019-03-19 NOTE — PROGRESS NOTES
Daily Note     Today's date: 3/19/2019  Patient name: Antolin Rees  : 1955  MRN: [id number]  Referring provider: Laisha Dupree DPM  Dx:   Encounter Diagnosis     ICD-10-CM    1  Lymphedema I89 0    2  Abnormality of gait R26 9      Precautions: Falls, recent CVA  Re-eval Date: 19    Date 3/19       Visit Count 6       FOTO        Pain In 3       Pain Out 2         Subjective: I'm feeling a lot better  Ankle still gets stiff and sore  Objective: See treatment diary below      Assessment: Tolerated treatment well  Patient demonstrated fatigue post treatment and would benefit from continued PT   Patient progressing steadily with volume reduction, look at DME for this week  Rep to be contacted  Plan: Continue per plan of care  Manual          55 mins                 Manual Therapy:  MLD (Manual lymphatic drainage) to bilateral LEs with position modified for patient tolerance  Compression placed to bilateral LEs foot to knee using short stretch bandage and white foam roll  Trial 1/2" wheel R lateral malleolus  Skin intact prior to placement with consideration of gait stability  Gait WFL with good foot clearance and no concerns for increased fall risk related to compression placement        Exercise Diary                                                     Modalities             LE girth measurements (cm)      Right           Left   Forefoot             30  31                         Metatarsals              32 5  33  Malleolus              32 5  34  +8 cm                           33 5      34  +16 cm                          44  43  +24 cm                                   53  51  +32                                          47 5                 51                Malleoli to Fib head length

## 2019-03-21 ENCOUNTER — OFFICE VISIT (OUTPATIENT)
Dept: PHYSICAL THERAPY | Facility: CLINIC | Age: 64
End: 2019-03-21
Payer: MEDICARE

## 2019-03-21 DIAGNOSIS — I89.0 LYMPHEDEMA: Primary | ICD-10-CM

## 2019-03-21 DIAGNOSIS — R26.9 ABNORMALITY OF GAIT: ICD-10-CM

## 2019-03-21 PROCEDURE — 97140 MANUAL THERAPY 1/> REGIONS: CPT

## 2019-03-21 NOTE — PROGRESS NOTES
Daily Note     Today's date: 3/21/2019  Patient name: Rosa Maria Alonso  : 1955  MRN: [id number]  Referring provider: Yoko Landry DPM  Dx:   Encounter Diagnosis     ICD-10-CM    1  Lymphedema I89 0    2  Abnormality of gait R26 9                 Precautions: Falls, recent CVA  Re-eval Date: 19    Date 3/19 3/21      Visit Count 6 7      FOTO        Pain In 3 5      Pain Out 2 3        Subjective: Rainy weather is making my arthritis agitated I should be getting my circaids early next week      Objective: See treatment diary below      Assessment: Tolerated treatment well  Noted increase edema L > R LE today  Pt indicated reduced L ankle pain end rx session  conts to ambulate with antalgic gait with RW   Patient would benefit from continued PT, trial using circaids BL LE upcoming      Plan: Continue per plan of care  Manual          55 mins 60 min                Manual Therapy:  MLD (Manual lymphatic drainage) to bilateral LEs with position modified for patient tolerance  Compression placed to bilateral LEs foot to knee using short stretch bandage and white foam roll  Trial 1/2" wheel R lateral malleolus  Skin intact prior to placement with consideration of gait stability  Gait WFL with good foot clearance and no concerns for increased fall risk related to compression placement        Exercise Diary                                                     Modalities

## 2019-03-26 ENCOUNTER — OFFICE VISIT (OUTPATIENT)
Dept: PHYSICAL THERAPY | Facility: CLINIC | Age: 64
End: 2019-03-26
Payer: MEDICARE

## 2019-03-26 DIAGNOSIS — R26.9 ABNORMALITY OF GAIT: ICD-10-CM

## 2019-03-26 DIAGNOSIS — I89.0 LYMPHEDEMA: Primary | ICD-10-CM

## 2019-03-26 PROCEDURE — 97110 THERAPEUTIC EXERCISES: CPT | Performed by: PHYSICAL THERAPIST

## 2019-03-26 PROCEDURE — 97140 MANUAL THERAPY 1/> REGIONS: CPT | Performed by: PHYSICAL THERAPIST

## 2019-03-26 NOTE — PROGRESS NOTES
Daily Note     Today's date: 3/26/2019  Patient name: Bety Amadro  : 1955  MRN: [id number]  Referring provider: Chester Riggs DPM  Dx:   Encounter Diagnosis     ICD-10-CM    1  Lymphedema I89 0    2  Abnormality of gait R26 9      Precautions: Falls, recent CVA  Re-eval Date: 19    Date 3/19 3/21 3/26     Visit Count 6 7 8     FOTO   FOTO NV     Pain In 3 5 3     Pain Out 2 3 2       Subjective: I'm doing better, ankle still sore  Objective: See treatment diary below      Assessment: Tolerated treatment well  Patient demonstrated fatigue post treatment and would benefit from continued PT      Plan: Continue per plan of care  Manual          55 mins 60 min 45 mins               Manual Therapy:  MLD (Manual lymphatic drainage) to bilateral LEs with position modified for patient tolerance  Compression placed to bilateral LEs foot to knee using velcro alternative compression wraps with good fit  Gait WFL with good foot clearance and no concerns for increased fall risk related to compression placement        Exercise Diary         Aerobic   15 mins on SRB with pendulum pedals                                         Modalities

## 2019-03-28 ENCOUNTER — OFFICE VISIT (OUTPATIENT)
Dept: PHYSICAL THERAPY | Facility: CLINIC | Age: 64
End: 2019-03-28
Payer: MEDICARE

## 2019-03-28 DIAGNOSIS — I89.0 LYMPHEDEMA: Primary | ICD-10-CM

## 2019-03-28 PROCEDURE — 97140 MANUAL THERAPY 1/> REGIONS: CPT

## 2019-03-28 PROCEDURE — 97110 THERAPEUTIC EXERCISES: CPT

## 2019-03-28 NOTE — PROGRESS NOTES
Daily Note     Today's date: 3/28/2019  Patient name: Lake Moreau  : 1955  MRN: [id number]  Referring provider: Isabelle Servin DPM  Dx:   Encounter Diagnosis     ICD-10-CM    1  Lymphedema I89 0                   Precautions: Falls, recent CVA  Re-eval Date: 19    Date 3/19 3/21 3/26 3/28    Visit Count 6 7 8 9    FOTO   FOTO NV completed    Pain In 3 5 3 3    Pain Out 2 3 2       Subjective: Overall decrease pain L foot I want to try walking without my walker  Maybe try a cane  Happy with the reduce swelling/easy use of circaids BL LE    Objective: See treatment diary below      Assessment: Tolerated treatment well  Noted overall reduce volume BL LE  L>R LE  Pt compliant with daily exercise/compression pump and compression use BL LE to reduce volume bl Lymphedema  Patient would benefit from continued PT in 1-2 weeks as trial of self care      Plan: Continue per plan of care  Manual          55 mins 60 min 45 mins 45 maria teresa              Manual Therapy:  MLD (Manual lymphatic drainage) to bilateral LEs with position modified for patient tolerance  Compression placed to bilateral LEs foot to knee using velcro alternative compression wraps with good fit  Gait WFL with good foot clearance and no concerns for increased fall risk related to compression placement        Exercise Diary         Aerobic   15 mins on SRB with pendulum pedals Nustep  L3 15 min  No UE            Ambulation    NWQC  40 feet   cues                        Modalities

## 2019-04-02 ENCOUNTER — OFFICE VISIT (OUTPATIENT)
Dept: CARDIOLOGY CLINIC | Facility: CLINIC | Age: 64
End: 2019-04-02
Payer: MEDICARE

## 2019-04-02 VITALS
DIASTOLIC BLOOD PRESSURE: 72 MMHG | HEART RATE: 84 BPM | SYSTOLIC BLOOD PRESSURE: 112 MMHG | HEIGHT: 74 IN | BODY MASS INDEX: 45.19 KG/M2

## 2019-04-02 DIAGNOSIS — I10 ESSENTIAL HYPERTENSION: ICD-10-CM

## 2019-04-02 DIAGNOSIS — R60.0 BILATERAL EDEMA OF LOWER EXTREMITY: Chronic | ICD-10-CM

## 2019-04-02 DIAGNOSIS — I48.20 CHRONIC ATRIAL FIBRILLATION (HCC): Primary | ICD-10-CM

## 2019-04-02 PROCEDURE — 99213 OFFICE O/P EST LOW 20 MIN: CPT | Performed by: INTERNAL MEDICINE

## 2019-04-02 PROCEDURE — 1124F ACP DISCUSS-NO DSCNMKR DOCD: CPT | Performed by: INTERNAL MEDICINE

## 2019-04-11 ENCOUNTER — TRANSCRIBE ORDERS (OUTPATIENT)
Dept: ADMINISTRATIVE | Facility: HOSPITAL | Age: 64
End: 2019-04-11

## 2019-04-11 ENCOUNTER — APPOINTMENT (OUTPATIENT)
Dept: LAB | Facility: HOSPITAL | Age: 64
End: 2019-04-11
Payer: MEDICARE

## 2019-04-11 DIAGNOSIS — E11.8 TYPE 2 DIABETES MELLITUS WITH COMPLICATION, UNSPECIFIED WHETHER LONG TERM INSULIN USE: ICD-10-CM

## 2019-04-11 DIAGNOSIS — M10.9 GOUT, UNSPECIFIED CAUSE, UNSPECIFIED CHRONICITY, UNSPECIFIED SITE: ICD-10-CM

## 2019-04-11 DIAGNOSIS — M10.9 GOUT, UNSPECIFIED CAUSE, UNSPECIFIED CHRONICITY, UNSPECIFIED SITE: Primary | ICD-10-CM

## 2019-04-11 LAB
ANION GAP SERPL CALCULATED.3IONS-SCNC: 9 MMOL/L (ref 4–13)
BUN SERPL-MCNC: 16 MG/DL (ref 7–25)
CALCIUM SERPL-MCNC: 9.4 MG/DL (ref 8.6–10.5)
CHLORIDE SERPL-SCNC: 99 MMOL/L (ref 98–107)
CO2 SERPL-SCNC: 29 MMOL/L (ref 21–31)
CREAT SERPL-MCNC: 0.78 MG/DL (ref 0.7–1.3)
EST. AVERAGE GLUCOSE BLD GHB EST-MCNC: 134 MG/DL
GFR SERPL CREATININE-BSD FRML MDRD: 94 ML/MIN/1.73SQ M
GLUCOSE P FAST SERPL-MCNC: 127 MG/DL (ref 65–99)
HBA1C MFR BLD: 6.3 % (ref 4.2–6.3)
POTASSIUM SERPL-SCNC: 3.7 MMOL/L (ref 3.5–5.5)
SODIUM SERPL-SCNC: 137 MMOL/L (ref 134–143)
URATE SERPL-MCNC: 10.4 MG/DL (ref 2.3–7.6)

## 2019-04-11 PROCEDURE — 83036 HEMOGLOBIN GLYCOSYLATED A1C: CPT

## 2019-04-11 PROCEDURE — 84550 ASSAY OF BLOOD/URIC ACID: CPT

## 2019-04-11 PROCEDURE — 80048 BASIC METABOLIC PNL TOTAL CA: CPT

## 2019-04-11 PROCEDURE — 36415 COLL VENOUS BLD VENIPUNCTURE: CPT

## 2019-04-17 ENCOUNTER — OFFICE VISIT (OUTPATIENT)
Dept: PHYSICAL THERAPY | Facility: CLINIC | Age: 64
End: 2019-04-17
Payer: MEDICARE

## 2019-04-17 DIAGNOSIS — I89.0 LYMPHEDEMA: Primary | ICD-10-CM

## 2019-04-17 PROCEDURE — 97164 PT RE-EVAL EST PLAN CARE: CPT | Performed by: PHYSICAL THERAPIST

## 2019-04-17 PROCEDURE — 97140 MANUAL THERAPY 1/> REGIONS: CPT | Performed by: PHYSICAL THERAPIST

## 2019-04-17 PROCEDURE — 97110 THERAPEUTIC EXERCISES: CPT | Performed by: PHYSICAL THERAPIST

## 2019-07-04 ENCOUNTER — HOSPITAL ENCOUNTER (INPATIENT)
Facility: HOSPITAL | Age: 64
LOS: 2 days | Discharge: HOME | DRG: 872 | End: 2019-07-06
Admitting: HOSPITALIST
Payer: MEDICARE

## 2019-07-04 ENCOUNTER — APPOINTMENT (INPATIENT)
Dept: CT IMAGING | Facility: HOSPITAL | Age: 64
DRG: 872 | End: 2019-07-04
Payer: MEDICARE

## 2019-07-04 DIAGNOSIS — E87.1 HYPONATREMIA: ICD-10-CM

## 2019-07-04 DIAGNOSIS — A41.9 SEPSIS DUE TO CELLULITIS (HCC): Chronic | ICD-10-CM

## 2019-07-04 DIAGNOSIS — D64.9 ANEMIA: ICD-10-CM

## 2019-07-04 DIAGNOSIS — I10 HYPERTENSION: ICD-10-CM

## 2019-07-04 DIAGNOSIS — E11.9 NON-INSULIN DEPENDENT TYPE 2 DIABETES MELLITUS (HCC): ICD-10-CM

## 2019-07-04 DIAGNOSIS — A41.9 SEPSIS (HCC): Primary | ICD-10-CM

## 2019-07-04 DIAGNOSIS — L03.115 CELLULITIS OF RIGHT LOWER EXTREMITY: ICD-10-CM

## 2019-07-04 DIAGNOSIS — L03.90 SEPSIS DUE TO CELLULITIS (HCC): Chronic | ICD-10-CM

## 2019-07-04 DIAGNOSIS — Z79.01 ON CONTINUOUS ORAL ANTICOAGULATION: ICD-10-CM

## 2019-07-04 PROBLEM — G62.9 PERIPHERAL NEUROPATHY: Chronic | Status: ACTIVE | Noted: 2018-11-30

## 2019-07-04 PROBLEM — E78.2 MIXED HYPERLIPIDEMIA: Chronic | Status: ACTIVE | Noted: 2019-07-04

## 2019-07-04 PROBLEM — E11.621 TYPE 2 DIABETES MELLITUS WITH FOOT ULCER, WITHOUT LONG-TERM CURRENT USE OF INSULIN (HCC): Chronic | Status: ACTIVE | Noted: 2018-11-30

## 2019-07-04 PROBLEM — E78.5 HYPERLIPIDEMIA: Chronic | Status: ACTIVE | Noted: 2019-07-04

## 2019-07-04 PROBLEM — I48.20 CHRONIC ATRIAL FIBRILLATION (HCC): Chronic | Status: ACTIVE | Noted: 2018-11-30

## 2019-07-04 PROBLEM — L97.509 TYPE 2 DIABETES MELLITUS WITH FOOT ULCER, WITHOUT LONG-TERM CURRENT USE OF INSULIN (HCC): Chronic | Status: ACTIVE | Noted: 2018-11-30

## 2019-07-04 LAB
ALBUMIN SERPL BCP-MCNC: 3.8 G/DL (ref 3.5–5.7)
ALP SERPL-CCNC: 78 U/L (ref 55–165)
ALT SERPL W P-5'-P-CCNC: 18 U/L (ref 7–52)
ANION GAP SERPL CALCULATED.3IONS-SCNC: 9 MMOL/L (ref 4–13)
APTT PPP: 40 SECONDS (ref 23–37)
AST SERPL W P-5'-P-CCNC: 19 U/L (ref 13–39)
BACTERIA UR QL AUTO: ABNORMAL /HPF
BILIRUB SERPL-MCNC: 1.8 MG/DL (ref 0.2–1)
BILIRUB UR QL STRIP: NEGATIVE
BUN SERPL-MCNC: 14 MG/DL (ref 7–25)
CALCIUM SERPL-MCNC: 9.4 MG/DL (ref 8.6–10.5)
CHLORIDE SERPL-SCNC: 96 MMOL/L (ref 98–107)
CLARITY UR: CLEAR
CO2 SERPL-SCNC: 25 MMOL/L (ref 21–31)
COLOR UR: YELLOW
CREAT SERPL-MCNC: 0.87 MG/DL (ref 0.7–1.3)
ERYTHROCYTE [DISTWIDTH] IN BLOOD BY AUTOMATED COUNT: 13.4 % (ref 11.5–14.5)
GFR SERPL CREATININE-BSD FRML MDRD: 90 ML/MIN/1.73SQ M
GLUCOSE SERPL-MCNC: 113 MG/DL (ref 65–99)
GLUCOSE SERPL-MCNC: 120 MG/DL (ref 65–140)
GLUCOSE SERPL-MCNC: 122 MG/DL (ref 65–140)
GLUCOSE UR STRIP-MCNC: NEGATIVE MG/DL
HCT VFR BLD AUTO: 38.7 % (ref 42–47)
HGB BLD-MCNC: 13.6 G/DL (ref 14–18)
HGB UR QL STRIP.AUTO: ABNORMAL
INR PPP: 1.76 (ref 0.9–1.5)
KETONES UR STRIP-MCNC: NEGATIVE MG/DL
LACTATE SERPL-SCNC: 1.3 MMOL/L (ref 0.5–2)
LACTATE SERPL-SCNC: 2.1 MMOL/L (ref 0.5–2)
LEUKOCYTE ESTERASE UR QL STRIP: NEGATIVE
LYMPHOCYTES # BLD AUTO: 0.7 THOUSAND/UL (ref 0.6–4.47)
LYMPHOCYTES # BLD AUTO: 3 % (ref 20–51)
MCH RBC QN AUTO: 34.4 PG (ref 26–34)
MCHC RBC AUTO-ENTMCNC: 35.2 G/DL (ref 31–37)
MCV RBC AUTO: 98 FL (ref 81–99)
MONOCYTES # BLD AUTO: 1.62 THOUSAND/UL (ref 0–1.22)
MONOCYTES NFR BLD AUTO: 7 % (ref 4–12)
MUCOUS THREADS UR QL AUTO: ABNORMAL
NEUTS BAND NFR BLD MANUAL: 4 % (ref 0–8)
NEUTS SEG # BLD: 20.88 THOUSAND/UL (ref 1.81–6.82)
NEUTS SEG NFR BLD AUTO: 86 % (ref 42–75)
NITRITE UR QL STRIP: NEGATIVE
NON-SQ EPI CELLS URNS QL MICRO: ABNORMAL /HPF
PH UR STRIP.AUTO: 6 [PH]
PLATELET # BLD AUTO: 167 THOUSANDS/UL (ref 149–390)
PLATELET BLD QL SMEAR: ADEQUATE
PMV BLD AUTO: 8.3 FL (ref 8.6–11.7)
POTASSIUM SERPL-SCNC: 3.7 MMOL/L (ref 3.5–5.5)
PROT SERPL-MCNC: 8.4 G/DL (ref 6.4–8.9)
PROT UR STRIP-MCNC: ABNORMAL MG/DL
PROTHROMBIN TIME: 20.5 SECONDS (ref 10.2–13)
RBC # BLD AUTO: 3.97 MILLION/UL (ref 4.3–5.9)
RBC #/AREA URNS AUTO: ABNORMAL /HPF
RBC MORPH BLD: NORMAL
SODIUM SERPL-SCNC: 130 MMOL/L (ref 134–143)
SP GR UR STRIP.AUTO: 1.02 (ref 1–1.03)
TOTAL CELLS COUNTED SPEC: 100
UROBILINOGEN UR QL STRIP.AUTO: 1 E.U./DL
WBC # BLD AUTO: 23.2 THOUSAND/UL (ref 4.8–10.8)
WBC #/AREA URNS AUTO: ABNORMAL /HPF

## 2019-07-04 PROCEDURE — 96375 TX/PRO/DX INJ NEW DRUG ADDON: CPT

## 2019-07-04 PROCEDURE — 73706 CT ANGIO LWR EXTR W/O&W/DYE: CPT

## 2019-07-04 PROCEDURE — 96365 THER/PROPH/DIAG IV INF INIT: CPT

## 2019-07-04 PROCEDURE — 85007 BL SMEAR W/DIFF WBC COUNT: CPT | Performed by: PHYSICIAN ASSISTANT

## 2019-07-04 PROCEDURE — 82948 REAGENT STRIP/BLOOD GLUCOSE: CPT

## 2019-07-04 PROCEDURE — 87040 BLOOD CULTURE FOR BACTERIA: CPT | Performed by: PHYSICIAN ASSISTANT

## 2019-07-04 PROCEDURE — 36415 COLL VENOUS BLD VENIPUNCTURE: CPT | Performed by: PHYSICIAN ASSISTANT

## 2019-07-04 PROCEDURE — 83036 HEMOGLOBIN GLYCOSYLATED A1C: CPT | Performed by: NURSE PRACTITIONER

## 2019-07-04 PROCEDURE — 84145 PROCALCITONIN (PCT): CPT | Performed by: NURSE PRACTITIONER

## 2019-07-04 PROCEDURE — 85610 PROTHROMBIN TIME: CPT | Performed by: PHYSICIAN ASSISTANT

## 2019-07-04 PROCEDURE — 85730 THROMBOPLASTIN TIME PARTIAL: CPT | Performed by: PHYSICIAN ASSISTANT

## 2019-07-04 PROCEDURE — 85027 COMPLETE CBC AUTOMATED: CPT | Performed by: PHYSICIAN ASSISTANT

## 2019-07-04 PROCEDURE — 1124F ACP DISCUSS-NO DSCNMKR DOCD: CPT | Performed by: INTERNAL MEDICINE

## 2019-07-04 PROCEDURE — 83605 ASSAY OF LACTIC ACID: CPT | Performed by: PHYSICIAN ASSISTANT

## 2019-07-04 PROCEDURE — 80053 COMPREHEN METABOLIC PANEL: CPT | Performed by: PHYSICIAN ASSISTANT

## 2019-07-04 PROCEDURE — 99285 EMERGENCY DEPT VISIT HI MDM: CPT

## 2019-07-04 PROCEDURE — 99223 1ST HOSP IP/OBS HIGH 75: CPT | Performed by: NURSE PRACTITIONER

## 2019-07-04 PROCEDURE — 83605 ASSAY OF LACTIC ACID: CPT | Performed by: NURSE PRACTITIONER

## 2019-07-04 PROCEDURE — 81001 URINALYSIS AUTO W/SCOPE: CPT | Performed by: PHYSICIAN ASSISTANT

## 2019-07-04 RX ORDER — HYDRALAZINE HYDROCHLORIDE 20 MG/ML
10 INJECTION INTRAMUSCULAR; INTRAVENOUS EVERY 6 HOURS PRN
Status: DISCONTINUED | OUTPATIENT
Start: 2019-07-04 | End: 2019-07-06 | Stop reason: HOSPADM

## 2019-07-04 RX ORDER — FUROSEMIDE 80 MG
80 TABLET ORAL DAILY
Status: DISCONTINUED | OUTPATIENT
Start: 2019-07-05 | End: 2019-07-04

## 2019-07-04 RX ORDER — ONDANSETRON 2 MG/ML
4 INJECTION INTRAMUSCULAR; INTRAVENOUS EVERY 6 HOURS PRN
Status: DISCONTINUED | OUTPATIENT
Start: 2019-07-04 | End: 2019-07-06 | Stop reason: HOSPADM

## 2019-07-04 RX ORDER — ACETAMINOPHEN 500 MG
500 TABLET ORAL EVERY 6 HOURS PRN
COMMUNITY

## 2019-07-04 RX ORDER — LABETALOL 20 MG/4 ML (5 MG/ML) INTRAVENOUS SYRINGE
10 EVERY 6 HOURS PRN
Status: DISCONTINUED | OUTPATIENT
Start: 2019-07-04 | End: 2019-07-06 | Stop reason: HOSPADM

## 2019-07-04 RX ORDER — ACETAMINOPHEN 325 MG/1
650 TABLET ORAL EVERY 6 HOURS PRN
Status: DISCONTINUED | OUTPATIENT
Start: 2019-07-04 | End: 2019-07-06 | Stop reason: HOSPADM

## 2019-07-04 RX ORDER — FUROSEMIDE 10 MG/ML
80 INJECTION INTRAMUSCULAR; INTRAVENOUS DAILY
Status: DISCONTINUED | OUTPATIENT
Start: 2019-07-05 | End: 2019-07-06 | Stop reason: HOSPADM

## 2019-07-04 RX ORDER — METOPROLOL SUCCINATE 25 MG/1
25 TABLET, EXTENDED RELEASE ORAL DAILY
Status: DISCONTINUED | OUTPATIENT
Start: 2019-07-05 | End: 2019-07-06 | Stop reason: HOSPADM

## 2019-07-04 RX ORDER — LISINOPRIL 10 MG/1
10 TABLET ORAL DAILY
Status: DISCONTINUED | OUTPATIENT
Start: 2019-07-05 | End: 2019-07-06 | Stop reason: HOSPADM

## 2019-07-04 RX ORDER — ATORVASTATIN CALCIUM 40 MG/1
40 TABLET, FILM COATED ORAL DAILY
Status: DISCONTINUED | OUTPATIENT
Start: 2019-07-05 | End: 2019-07-06 | Stop reason: HOSPADM

## 2019-07-04 RX ORDER — ACETAMINOPHEN 325 MG/1
650 TABLET ORAL ONCE
Status: COMPLETED | OUTPATIENT
Start: 2019-07-04 | End: 2019-07-04

## 2019-07-04 RX ORDER — SODIUM CHLORIDE 9 MG/ML
100 INJECTION, SOLUTION INTRAVENOUS CONTINUOUS
Status: DISCONTINUED | OUTPATIENT
Start: 2019-07-04 | End: 2019-07-05

## 2019-07-04 RX ORDER — VANCOMYCIN HYDROCHLORIDE 1 G/200ML
1000 INJECTION, SOLUTION INTRAVENOUS EVERY 12 HOURS
Status: DISCONTINUED | OUTPATIENT
Start: 2019-07-04 | End: 2019-07-04 | Stop reason: DRUGHIGH

## 2019-07-04 RX ADMIN — VANCOMYCIN HYDROCHLORIDE 1500 MG: 1 INJECTION, POWDER, LYOPHILIZED, FOR SOLUTION INTRAVENOUS at 18:40

## 2019-07-04 RX ADMIN — MORPHINE SULFATE 2 MG: 2 INJECTION, SOLUTION INTRAMUSCULAR; INTRAVENOUS at 19:25

## 2019-07-04 RX ADMIN — PIPERACILLIN SODIUM AND TAZOBACTAM SODIUM 3.38 G: 3; .375 INJECTION, POWDER, LYOPHILIZED, FOR SOLUTION INTRAVENOUS at 23:21

## 2019-07-04 RX ADMIN — SODIUM CHLORIDE 1000 ML: 0.9 INJECTION, SOLUTION INTRAVENOUS at 18:25

## 2019-07-04 RX ADMIN — SODIUM CHLORIDE 100 ML/HR: 9 INJECTION, SOLUTION INTRAVENOUS at 23:21

## 2019-07-04 RX ADMIN — ACETAMINOPHEN 650 MG: 325 TABLET ORAL at 19:23

## 2019-07-04 RX ADMIN — IOHEXOL 85 ML: 350 INJECTION, SOLUTION INTRAVENOUS at 22:18

## 2019-07-04 NOTE — SEPSIS NOTE
Sepsis Note   Kayla Sanchez 75 y o  male MRN: [id number]  Unit/Bed#: ED 05 Encounter: [id number]      qSOFA     Row Name 07/04/19 1930 07/04/19 1915 07/04/19 1900 07/04/19 1845 07/04/19 1750    Altered mental status GCS < 15              Respiratory Rate > / =22  1  0  0  0  0    Systolic BP < / =467  0    0  0  0    Q Sofa Score  1  0  0  0  0        Initial Sepsis Screening     Row Name 07/04/19 1924                Is the patient's history suggestive of a new or worsening infection?         Suspected source of infection  soft tissue  -KW        Are two or more of the following signs & symptoms of infection both present and new to the patient? (!) Yes (Proceed)  -KW        Indicate SIRS criteria  Hyperthemia > 38 3C (100 9F); Tachycardia > 90 bpm;Leukocytosis (WBC > 09078 IJL)  -KW        If the answer is yes to both questions, suspicion of sepsis is present          If severe sepsis is present AND tissue hypoperfusion perists in the hour after fluid resuscitation or lactate > 4, the patient meets criteria for SEPTIC SHOCK          Are any of the following organ dysfunction criteria present within 6 hours of suspected infection and SIRS criteria that are NOT considered to be chronic conditions?         Organ dysfunction          Date of presentation of severe sepsis          Time of presentation of severe sepsis          Tissue hypoperfusion persists in the hour after crystalloid fluid administration, evidenced, by either:          Was hypotension present within one hour of the conclusion of crystalloid fluid administration?           Date of presentation of septic shock          Time of presentation of septic shock            User Key  (r) = Recorded By, (t) = Taken By, (c) = Cosigned By    Initials Name Provider Type    Chitra Childress PA-C Physician Assistant

## 2019-07-05 ENCOUNTER — APPOINTMENT (INPATIENT)
Dept: RADIOLOGY | Facility: HOSPITAL | Age: 64
DRG: 872 | End: 2019-07-05
Payer: MEDICARE

## 2019-07-05 LAB
ALBUMIN SERPL BCP-MCNC: 3.1 G/DL (ref 3.5–5.7)
ALP SERPL-CCNC: 65 U/L (ref 55–165)
ALT SERPL W P-5'-P-CCNC: 18 U/L (ref 7–52)
ANION GAP SERPL CALCULATED.3IONS-SCNC: 8 MMOL/L (ref 4–13)
AST SERPL W P-5'-P-CCNC: 17 U/L (ref 13–39)
BASOPHILS # BLD AUTO: 0 THOUSANDS/ΜL (ref 0–0.1)
BASOPHILS NFR BLD AUTO: 0 % (ref 0–2)
BILIRUB SERPL-MCNC: 1.4 MG/DL (ref 0.2–1)
BUN SERPL-MCNC: 16 MG/DL (ref 7–25)
CALCIUM SERPL-MCNC: 8.4 MG/DL (ref 8.6–10.5)
CHLORIDE SERPL-SCNC: 101 MMOL/L (ref 98–107)
CO2 SERPL-SCNC: 22 MMOL/L (ref 21–31)
CREAT SERPL-MCNC: 0.8 MG/DL (ref 0.7–1.3)
EOSINOPHIL # BLD AUTO: 0 THOUSAND/ΜL (ref 0–0.61)
EOSINOPHIL NFR BLD AUTO: 0 % (ref 0–5)
ERYTHROCYTE [DISTWIDTH] IN BLOOD BY AUTOMATED COUNT: 13.6 % (ref 11.5–14.5)
EST. AVERAGE GLUCOSE BLD GHB EST-MCNC: 146 MG/DL
GFR SERPL CREATININE-BSD FRML MDRD: 93 ML/MIN/1.73SQ M
GLUCOSE SERPL-MCNC: 129 MG/DL (ref 65–140)
GLUCOSE SERPL-MCNC: 131 MG/DL (ref 65–140)
GLUCOSE SERPL-MCNC: 132 MG/DL (ref 65–140)
GLUCOSE SERPL-MCNC: 149 MG/DL (ref 65–99)
GLUCOSE SERPL-MCNC: 155 MG/DL (ref 65–140)
HBA1C MFR BLD: 6.7 % (ref 4.2–6.3)
HCT VFR BLD AUTO: 32 % (ref 42–47)
HGB BLD-MCNC: 11.5 G/DL (ref 14–18)
INR PPP: 1.57 (ref 0.9–1.5)
LACTATE SERPL-SCNC: 1.1 MMOL/L (ref 0.5–2)
LACTATE SERPL-SCNC: 1.2 MMOL/L (ref 0.5–2)
LYMPHOCYTES # BLD AUTO: 1.1 THOUSANDS/ΜL (ref 0.6–4.47)
LYMPHOCYTES NFR BLD AUTO: 6 % (ref 21–51)
MCH RBC QN AUTO: 34.6 PG (ref 26–34)
MCHC RBC AUTO-ENTMCNC: 36 G/DL (ref 31–37)
MCV RBC AUTO: 96 FL (ref 81–99)
MONOCYTES # BLD AUTO: 1.2 THOUSAND/ΜL (ref 0.17–1.22)
MONOCYTES NFR BLD AUTO: 7 % (ref 2–12)
NEUTROPHILS # BLD AUTO: 15.5 THOUSANDS/ΜL (ref 1.4–6.5)
NEUTS SEG NFR BLD AUTO: 87 % (ref 42–75)
PLATELET # BLD AUTO: 142 THOUSANDS/UL (ref 149–390)
PMV BLD AUTO: 8.3 FL (ref 8.6–11.7)
POTASSIUM SERPL-SCNC: 3.1 MMOL/L (ref 3.5–5.5)
PROCALCITONIN SERPL-MCNC: 2.34 NG/ML
PROCALCITONIN SERPL-MCNC: 2.4 NG/ML
PROT SERPL-MCNC: 6.8 G/DL (ref 6.4–8.9)
PROTHROMBIN TIME: 18.3 SECONDS (ref 10.2–13)
RBC # BLD AUTO: 3.33 MILLION/UL (ref 4.3–5.9)
SODIUM SERPL-SCNC: 131 MMOL/L (ref 134–143)
WBC # BLD AUTO: 17.8 THOUSAND/UL (ref 4.8–10.8)

## 2019-07-05 PROCEDURE — 99232 SBSQ HOSP IP/OBS MODERATE 35: CPT | Performed by: INTERNAL MEDICINE

## 2019-07-05 PROCEDURE — 85025 COMPLETE CBC W/AUTO DIFF WBC: CPT | Performed by: NURSE PRACTITIONER

## 2019-07-05 PROCEDURE — 82948 REAGENT STRIP/BLOOD GLUCOSE: CPT

## 2019-07-05 PROCEDURE — 94760 N-INVAS EAR/PLS OXIMETRY 1: CPT

## 2019-07-05 PROCEDURE — 80053 COMPREHEN METABOLIC PANEL: CPT | Performed by: NURSE PRACTITIONER

## 2019-07-05 PROCEDURE — 84145 PROCALCITONIN (PCT): CPT | Performed by: NURSE PRACTITIONER

## 2019-07-05 PROCEDURE — 73630 X-RAY EXAM OF FOOT: CPT

## 2019-07-05 PROCEDURE — 83605 ASSAY OF LACTIC ACID: CPT | Performed by: NURSE PRACTITIONER

## 2019-07-05 PROCEDURE — 85610 PROTHROMBIN TIME: CPT | Performed by: NURSE PRACTITIONER

## 2019-07-05 RX ORDER — KETOROLAC TROMETHAMINE 30 MG/ML
15 INJECTION, SOLUTION INTRAMUSCULAR; INTRAVENOUS ONCE
Status: COMPLETED | OUTPATIENT
Start: 2019-07-05 | End: 2019-07-06

## 2019-07-05 RX ORDER — GINSENG 100 MG
1 CAPSULE ORAL 2 TIMES DAILY
Status: DISCONTINUED | OUTPATIENT
Start: 2019-07-05 | End: 2019-07-05

## 2019-07-05 RX ORDER — POTASSIUM CHLORIDE 20 MEQ/1
40 TABLET, EXTENDED RELEASE ORAL ONCE
Status: COMPLETED | OUTPATIENT
Start: 2019-07-05 | End: 2019-07-05

## 2019-07-05 RX ORDER — KETOROLAC TROMETHAMINE 30 MG/ML
15 INJECTION, SOLUTION INTRAMUSCULAR; INTRAVENOUS ONCE
Status: COMPLETED | OUTPATIENT
Start: 2019-07-05 | End: 2019-07-05

## 2019-07-05 RX ORDER — GINSENG 100 MG
1 CAPSULE ORAL DAILY
Status: DISCONTINUED | OUTPATIENT
Start: 2019-07-05 | End: 2019-07-06 | Stop reason: HOSPADM

## 2019-07-05 RX ORDER — AMMONIUM LACTATE 12 G/100G
LOTION TOPICAL 2 TIMES DAILY PRN
Status: DISCONTINUED | OUTPATIENT
Start: 2019-07-05 | End: 2019-07-06 | Stop reason: HOSPADM

## 2019-07-05 RX ORDER — POTASSIUM CHLORIDE 20 MEQ/1
20 TABLET, EXTENDED RELEASE ORAL 2 TIMES DAILY
Status: DISCONTINUED | OUTPATIENT
Start: 2019-07-05 | End: 2019-07-06 | Stop reason: HOSPADM

## 2019-07-05 RX ORDER — CEFAZOLIN SODIUM 2 G/50ML
2000 SOLUTION INTRAVENOUS EVERY 8 HOURS
Status: DISCONTINUED | OUTPATIENT
Start: 2019-07-05 | End: 2019-07-06 | Stop reason: HOSPADM

## 2019-07-05 RX ADMIN — FUROSEMIDE 80 MG: 10 INJECTION, SOLUTION INTRAMUSCULAR; INTRAVENOUS at 08:03

## 2019-07-05 RX ADMIN — APIXABAN 5 MG: 5 TABLET, FILM COATED ORAL at 17:28

## 2019-07-05 RX ADMIN — METOPROLOL SUCCINATE 25 MG: 25 TABLET, EXTENDED RELEASE ORAL at 08:03

## 2019-07-05 RX ADMIN — INSULIN LISPRO 1 UNITS: 100 INJECTION, SOLUTION INTRAVENOUS; SUBCUTANEOUS at 22:05

## 2019-07-05 RX ADMIN — KETOROLAC TROMETHAMINE 15 MG: 30 INJECTION, SOLUTION INTRAMUSCULAR; INTRAVENOUS at 02:00

## 2019-07-05 RX ADMIN — PIPERACILLIN SODIUM AND TAZOBACTAM SODIUM 3.38 G: 3; .375 INJECTION, POWDER, LYOPHILIZED, FOR SOLUTION INTRAVENOUS at 10:58

## 2019-07-05 RX ADMIN — APIXABAN 5 MG: 5 TABLET, FILM COATED ORAL at 08:03

## 2019-07-05 RX ADMIN — LISINOPRIL 10 MG: 10 TABLET ORAL at 08:03

## 2019-07-05 RX ADMIN — CEFAZOLIN SODIUM 2000 MG: 2 SOLUTION INTRAVENOUS at 12:00

## 2019-07-05 RX ADMIN — ACETAMINOPHEN 650 MG: 325 TABLET ORAL at 16:28

## 2019-07-05 RX ADMIN — ATORVASTATIN CALCIUM 40 MG: 40 TABLET, FILM COATED ORAL at 08:03

## 2019-07-05 RX ADMIN — PIPERACILLIN SODIUM AND TAZOBACTAM SODIUM 3.38 G: 3; .375 INJECTION, POWDER, LYOPHILIZED, FOR SOLUTION INTRAVENOUS at 05:57

## 2019-07-05 RX ADMIN — POTASSIUM CHLORIDE 20 MEQ: 1500 TABLET, EXTENDED RELEASE ORAL at 16:28

## 2019-07-05 RX ADMIN — CEFAZOLIN SODIUM 2000 MG: 2 SOLUTION INTRAVENOUS at 18:31

## 2019-07-05 RX ADMIN — VANCOMYCIN HYDROCHLORIDE 2000 MG: 1 INJECTION, POWDER, LYOPHILIZED, FOR SOLUTION INTRAVENOUS at 07:57

## 2019-07-05 RX ADMIN — POTASSIUM CHLORIDE 40 MEQ: 1500 TABLET, EXTENDED RELEASE ORAL at 10:57

## 2019-07-05 NOTE — CONSULTS
Consultation - [id number] [facility] 75 y o  male MRN: [id number]  Unit/Bed#: -02 Encounter: [id number]    Assessment/Plan     Assessment:  Cellulitis right lower extremity with possible abscess right dorsal foot    Plan:  Awaiting CT results  Will order x-ray of the right foot to evaluate for possible abscess  Bacitracin adhesive bandage for right plantar sulcus fissure    History of Present Illness   HPI:  Mamadou Bonilla is a 75 y o  male who presents with cellulitis of the right lower extremity at the dorsum of the foot as well as the calf extending to the thigh  The patient relates that the foot became intensely hot and swollen yesterday and that this was a relatively new finding versus the redness and swelling into his legs  He relates that he has lymphedema in the his lower extremities are often swollen and he is not able to elevate always but he does wear circ aid compression wraps bilateral lower extremities  Consults    Review of Systems   Skin: Positive for color change and wound ( 1 x 0 2x 0 1cm fissure right plantar hallux at the sulcus)         Historical Information   Past Medical History:   Diagnosis Date    Bilateral edema of lower extremity     Chronic pain     Lymphedema     DIAMOND (obstructive sleep apnea)     Stroke [facility])      Past Surgical History:   Procedure Laterality Date    HERNIA REPAIR       Social History   Social History     Substance and Sexual Activity   Alcohol Use Not Currently    Comment: social     Social History     Substance and Sexual Activity   Drug Use No     Social History     Tobacco Use   Smoking Status Never Smoker   Smokeless Tobacco Never Used     Family History:   Family History   Problem Relation Age of Onset    Heart failure Mother     Heart failure Father        Meds/Allergies   current meds:   Current Facility-Administered Medications   Medication Dose Route Frequency    acetaminophen (TYLENOL) tablet 650 mg  650 mg Oral Q6H PRN    ammonium lactate (LAC-HYDRIN) 12 % lotion   Topical BID PRN    apixaban (ELIQUIS) tablet 5 mg  5 mg Oral BID    atorvastatin (LIPITOR) tablet 40 mg  40 mg Oral Daily    ceFAZolin (ANCEF) IVPB (premix) 2,000 mg  2,000 mg Intravenous Q8H    furosemide (LASIX) injection 80 mg  80 mg Intravenous Daily    hydrALAZINE (APRESOLINE) injection 10 mg  10 mg Intravenous Q6H PRN    insulin lispro (HumaLOG) 100 units/mL subcutaneous injection 1-5 Units  1-5 Units Subcutaneous HS    insulin lispro (HumaLOG) 100 units/mL subcutaneous injection 1-6 Units  1-6 Units Subcutaneous TID AC    Labetalol HCl (NORMODYNE) injection 10 mg  10 mg Intravenous Q6H PRN    lisinopril (ZESTRIL) tablet 10 mg  10 mg Oral Daily    metoprolol succinate (TOPROL-XL) 24 hr tablet 25 mg  25 mg Oral Daily    morphine injection 2 mg  2 mg Intravenous Q3H PRN    ondansetron (ZOFRAN) injection 4 mg  4 mg Intravenous Q6H PRN    potassium chloride (K-DUR,KLOR-CON) CR tablet 20 mEq  20 mEq Oral BID    sodium chloride 0 9 % bolus 1,000 mL  1,000 mL Intravenous Once     No Known Allergies    Objective   Vitals: Blood pressure 122/62, pulse (!) 106, temperature (!) 101 7 °F (38 7 °C), temperature source Tympanic, resp  rate 20, height 6' 2" (1 88 m), weight (!) 166 kg (365 lb 15 4 oz), SpO2 100 %  Wounds:            Physical Exam   Constitutional: He appears well-developed and well-nourished  No distress  Cardiovascular:   Pulses:       Posterior tibial pulses are 1+ on the right side  Feet:   Right Foot:   Skin Integrity: Positive for skin breakdown (Partial thickness fissure right plantar hallux sulcus), erythema (Dorsal foot approximately 10 x 5 cm area) and warmth (Right lower extremity particularly dorsal foot)  Skin: Capillary refill takes less than 2 seconds  There is erythema (Intense at dorsal foot, posterior proximal calf)     Right plantar hallux fissure is partial thickness and there is no erythema periwound, no active drainage    Integument to the plantar foot is somewhat dry    Dorsal foot with erythema,calor and somewhat boggy feel with pain to palpation in the same area           Lab Results: I have personally reviewed pertinent reports  Imaging: I have personally reviewed pertinent films in PACS  EKG, Pathology, and Other Studies: I have personally reviewed pertinent reports  Code Status: Level 1 - Full Code  Advance Directive and Living Will:      Power of :    POLST:      Counseling / Coordination of Care  Total floor / unit time spent today 20 minutes  Greater than 50% of total time was spent with the patient and / or family counseling and / or coordination of care  A description of the counseling / coordination of care:  Discussed with the patient that he presents as if there may be an abscess onto the dorsal aspect of the right foot  We discussed that this may require incision and drainage and that elevation of his legs would be helpful in reducing the swelling and improving his condition  He understands that we are awaiting additional studies

## 2019-07-05 NOTE — PLAN OF CARE
Problem: DISCHARGE PLANNING - CARE MANAGEMENT  Goal: Discharge to post-acute care or home with appropriate resources  Description  INTERVENTIONS:  - Conduct assessment to determine patient/family and health care team treatment goals, and need for post-acute services based on payer coverage, community resources, and patient preferences, and barriers to discharge  - Address psychosocial, clinical, and financial barriers to discharge as identified in assessment in conjunction with the patient/family and health care team  - Arrange appropriate level of post-acute services according to patient's   needs and preference and payer coverage in collaboration with the physician and health care team  - Communicate with and update the patient/family, physician, and health care team regarding progress on the discharge plan  - Arrange appropriate transportation to post-acute venues  Discharge home with son who will transport     Outcome: Progressing

## 2019-07-05 NOTE — ASSESSMENT & PLAN NOTE
Lab Results   Component Value Date    HGBA1C 6 3 04/11/2019       Recent Labs     07/04/19  1805   POCGLU 122       Blood Sugar Average: Last 72 hrs:  (P) 122     · We will hold patient's metformin and glipizide  · Place him on sliding scale insulin with Accu-Cheks Q a c   And HS

## 2019-07-05 NOTE — PLAN OF CARE
Problem: Potential for Falls  Goal: Patient will remain free of falls  Description  INTERVENTIONS:  - Assess patient frequently for physical needs  -  Identify cognitive and physical deficits and behaviors that affect risk of falls    -  Buford fall precautions as indicated by assessment   - Educate patient/family on patient safety including physical limitations  - Instruct patient to call for assistance with activity based on assessment  - Modify environment to reduce risk of injury  - Consider OT/PT consult to assist with strengthening/mobility  Outcome: Progressing     Problem: PAIN - ADULT  Goal: Verbalizes/displays adequate comfort level or baseline comfort level  Description  Interventions:  - Encourage patient to monitor pain and request assistance  - Assess pain using appropriate pain scale  - Administer analgesics based on type and severity of pain and evaluate response  - Implement non-pharmacological measures as appropriate and evaluate response  - Consider cultural and social influences on pain and pain management  - Notify physician/advanced practitioner if interventions unsuccessful or patient reports new pain  Outcome: Progressing     Problem: INFECTION - ADULT  Goal: Absence or prevention of progression during hospitalization  Description  INTERVENTIONS:  - Assess and monitor for signs and symptoms of infection  - Monitor lab/diagnostic results  - Monitor all insertion sites, i e  indwelling lines, tubes, and drains  - Monitor endotracheal (as able) and nasal secretions for changes in amount and color  - Buford appropriate cooling/warming therapies per order  - Administer medications as ordered  - Instruct and encourage patient and family to use good hand hygiene technique  - Identify and instruct in appropriate isolation precautions for identified infection/condition  Outcome: Progressing  Goal: Absence of fever/infection during neutropenic period  Description  INTERVENTIONS:  - Monitor WBC  - Implement neutropenic guidelines  Outcome: Progressing     Problem: DISCHARGE PLANNING  Goal: Discharge to home or other facility with appropriate resources  Description  INTERVENTIONS:  - Identify barriers to discharge w/patient and caregiver  - Arrange for needed discharge resources and transportation as appropriate  - Identify discharge learning needs (meds, wound care, etc )  - Arrange for interpretive services to assist at discharge as needed  - Refer to Case Management Department for coordinating discharge planning if the patient needs post-hospital services based on physician/advanced practitioner order or complex needs related to functional status, cognitive ability, or social support system  Outcome: Progressing     Problem: SKIN/TISSUE INTEGRITY - ADULT  Goal: Skin integrity remains intact  Description  INTERVENTIONS  - Identify patients at risk for skin breakdown  - Assess and monitor skin integrity  - Assess and monitor nutrition and hydration status  - Monitor labs (i e  albumin)  - Assess for incontinence   - Turn and reposition patient  - Assist with mobility/ambulation  - Relieve pressure over bony prominences  - Avoid friction and shearing  - Provide appropriate hygiene as needed including keeping skin clean and dry  - Evaluate need for skin moisturizer/barrier cream  - Collaborate with interdisciplinary team (i e  Nutrition, Rehabilitation, etc )   - Patient/family teaching  Outcome: Progressing  Goal: Incision(s), wounds(s) or drain site(s) healing without S/S of infection  Description  INTERVENTIONS  - Assess and document risk factors for skin impairment   - Assess and document dressing, incision, wound bed, drain sites and surrounding tissue  - Initiate Nutrition services consult and/or wound management as needed  Outcome: Progressing     Problem: DISCHARGE PLANNING - CARE MANAGEMENT  Goal: Discharge to post-acute care or home with appropriate resources  Description  INTERVENTIONS:  - Conduct assessment to determine patient/family and health care team treatment goals, and need for post-acute services based on payer coverage, community resources, and patient preferences, and barriers to discharge  - Address psychosocial, clinical, and financial barriers to discharge as identified in assessment in conjunction with the patient/family and health care team  - Arrange appropriate level of post-acute services according to patient's   needs and preference and payer coverage in collaboration with the physician and health care team  - Communicate with and update the patient/family, physician, and health care team regarding progress on the discharge plan  - Arrange appropriate transportation to post-acute venues  Discharge home with son who will transport     Outcome: Progressing

## 2019-07-05 NOTE — ASSESSMENT & PLAN NOTE
· Tachycardic, fever, hypertensive, hyponatremia, lactic acidosis, leukocytosis, cellulitis  · Continue IV fluids  · Continue vancomycin and Zosyn  · Q 2 hours lactic acid  · Check a procalcitonin  · Check CTA of leg to rule out necrotizing fasciitis

## 2019-07-05 NOTE — OCCUPATIONAL THERAPY NOTE
OT/PT SCREEN:   OT/PT orders received/noted  EMR reviewed  Pt up ad darío/I with self toileting and self care  Active OT/PT do not appear to be indicated at this time  Pt in agreement with same/feels no need for therapies  D/C formal OT/PT trev Naqvi, OT       * Cellulitis of right lower extremity  Assessment & Plan  · Patient is has cellulitis of this right lower extremity in the past  · Patient does have a small wound under his right foot great toe  · Redness and edema extend from tip of the toes into groin  · Redness has increased throughout the day  · Continue Vanco and Zosyn  · Checking CT of leg to rule out necrotizing fasciitis  · Check procalcitonin     Sepsis due to cellulitis [facility])  Assessment & Plan  · Tachycardic, fever, hypertensive, hyponatremia, lactic acidosis, leukocytosis, cellulitis  · Continue IV fluids  · Continue vancomycin and Zosyn  · Q 2 hours lactic acid  · Check a procalcitonin  · Check CTA of leg to rule out necrotizing fasciitis     Bilateral edema of lower extremity  Assessment & Plan  · Patient does have chronic lymphedema, usually uses Parag stockings  · Continue with Lasix     Essential hypertension  Assessment & Plan  · Continue lisinopril, metoprolol  · P r n   Orders for hydralazine for systolic blood pressure greater than 160 and labetalol for systolic blood pressure greater than 180     Type 2 diabetes mellitus with foot ulcer, without long-term current use of insulin (Katerina Mart 43, OT

## 2019-07-05 NOTE — ASSESSMENT & PLAN NOTE
· Redness and edema extend from tip of the toes into groin, small wound on right foot below toes  · Will continue Vanco and Zosyn  · Follow-up CT leg results  · Will trend procalcitonin  · Follow up cultures

## 2019-07-05 NOTE — ASSESSMENT & PLAN NOTE
· Continue lisinopril, metoprolol  · P r n   Orders for hydralazine for systolic blood pressure greater than 160 and labetalol for systolic blood pressure greater than 180

## 2019-07-05 NOTE — RESPIRATORY THERAPY NOTE
Pt assessed as per protocol  BBS clear, Hr 110, rr 22, spo2 95% on RA  He reports no previous pulmonary history or pulmonary distress  Respiratory protocol not initiated at this time

## 2019-07-05 NOTE — H&P
H&P- Mika Rodriguez 1955, 75 y o  male MRN: [id number]    Unit/Bed#: -02 Encounter: [id number]    Primary Care Provider: Yeni Crespo DO   Date and time admitted to hospital: 7/4/2019  5:47 PM        * Cellulitis of right lower extremity  Assessment & Plan  · Patient is has cellulitis of this right lower extremity in the past  · Patient does have a small wound under his right foot great toe  · Redness and edema extend from tip of the toes into groin  · Redness has increased throughout the day  · Continue Vanco and Zosyn  · Checking CT of leg to rule out necrotizing fasciitis  · Check procalcitonin    Sepsis due to cellulitis [facility])  Assessment & Plan  · Tachycardic, fever, hypertensive, hyponatremia, lactic acidosis, leukocytosis, cellulitis  · Continue IV fluids  · Continue vancomycin and Zosyn  · Q 2 hours lactic acid  · Check a procalcitonin  · Check CTA of leg to rule out necrotizing fasciitis    Bilateral edema of lower extremity  Assessment & Plan  · Patient does have chronic lymphedema, usually uses Parag stockings  · Continue with Lasix    Essential hypertension  Assessment & Plan  · Continue lisinopril, metoprolol  · P r n  Orders for hydralazine for systolic blood pressure greater than 160 and labetalol for systolic blood pressure greater than 180    Type 2 diabetes mellitus with foot ulcer, without long-term current use of insulin [facility])  Assessment & Plan  Lab Results   Component Value Date    HGBA1C 6 3 04/11/2019       Recent Labs     07/04/19  1805   POCGLU 122       Blood Sugar Average: Last 72 hrs:  (P) 122     · We will hold patient's metformin and glipizide  · Place him on sliding scale insulin with Accu-Cheks Q a c   And HS      Morbid obesity with BMI of 40 0-44 9, adult (HCC)  Assessment & Plan  · Admission BMI is 46 96  · Offer supportive care    Chronic atrial fibrillation (HCC)  Assessment & Plan  · Continue with Eliquis and metoprolol    Peripheral neuropathy  Assessment & Plan  · Secondary to diabetes      DIAMOND (obstructive sleep apnea)  Assessment & Plan  · Patient refuses to wear CPAP    Mixed hyperlipidemia  Assessment & Plan  · Continue statin    VTE Prophylaxis: Apixaban (Eliquis)  Code Status:  Full code  POLST: POLST is not applicable to this patient  Discussion with family:  Patient, son and daughter    Anticipated Length of Stay:  Patient will be admitted on an Inpatient basis with an anticipated length of stay of  > 2 midnights  Justification for Hospital Stay:  IV antibiotics IV fluids    Total Time for Visit, including Counseling / Coordination of Care: 70   Greater than 50% of this total time spent on direct patient counseling and coordination of care  Chief Complaint:   Right lower extremity worsening edema    History of Present Illness:    Angelica Messer is a 75 y o  male who presents with right lower extremity worsening edema  Patient does have chronic bilateral lower extremity lymphedema, he does wear Parag stockings on a normal basis  The patient noticed that his right lower extremity was more edematous than his left lower extremity and this was a new finding  He was also outside at a picnic earlier in the day and had gotten some sun on both of his legs as he was not wearing his Parag stockings  The patient does have a wound on the base of his right great toe on the bottom of his foot it does appear to be a crack from dry skin  It does not appear to be open  The patient edema/redness warmth continues from the tip of his toes and did extend initially to his right knee upon assessment in the emergency department by the emergency room provider  Approximately 30 minutes after arriving in the ED the patient's family members noticed that the redness was moving further up onto his thigh and was now documented as being read up into his mid thigh area      During my assessment in the emergency department at 7:40 p m , the patient's redness was extending up the inner aspect of his thigh into his groin  I had the ED provider come verify that this was a new finding which was confirmed  And a CT a of the lower extremity was ordered  Review of Systems:  Review of Systems   Constitutional: Negative  HENT: Negative  Eyes: Negative  Respiratory: Negative  Cardiovascular: Positive for leg swelling  Gastrointestinal: Negative  Endocrine: Negative  Genitourinary: Negative  Musculoskeletal: Negative  Skin: Negative  Allergic/Immunologic: Negative  Neurological: Negative  Hematological: Negative  Psychiatric/Behavioral: Negative  Past Medical and Surgical History:   Past Medical History:   Diagnosis Date    Bilateral edema of lower extremity     Chronic pain     Lymphedema     DIAMOND (obstructive sleep apnea)     Stroke [facility])        Past Surgical History:   Procedure Laterality Date    HERNIA REPAIR         Meds/Allergies:  Prior to Admission medications    Medication Sig Start Date End Date Taking?  Authorizing Provider   acetaminophen (TYLENOL) 500 mg tablet Take 500 mg by mouth every 6 (six) hours as needed for mild pain   Yes Historical Provider, MD   apixaban (ELIQUIS) 5 mg Take 1 tablet (5 mg total) by mouth 2 (two) times a day 11/30/18  Yes Joanie Leary MD   atorvastatin (LIPITOR) 40 mg tablet Take 40 mg by mouth daily   Yes Historical Provider, MD   furosemide (LASIX) 80 mg tablet Take 80 mg by mouth daily    Yes Historical Provider, MD   glipiZIDE (GLUCOTROL) 5 mg tablet Take 5 mg by mouth daily   Yes Historical Provider, MD   lisinopril (ZESTRIL) 10 mg tablet Take 1 tablet (10 mg total) by mouth daily 2/26/19  Yes Sergei Ramirez PA-C   metFORMIN (GLUCOPHAGE) 1000 MG tablet Take 1,000 mg by mouth 2 (two) times a day with meals   Yes Historical Provider, MD   metoprolol succinate (TOPROL-XL) 50 mg 24 hr tablet Take 25 mg by mouth daily   Yes Historical Provider, MD   furosemide (LASIX) 40 mg tablet Take 1 tablet (40 mg total) by mouth daily  Patient not taking: Reported on 7/4/2019 11/30/18 7/4/19  Tania Vasquez MD     I have reviewed home medications with patient personally  Allergies: No Known Allergies    Social History:  Marital Status:    Occupation:  Retired  Patient Pre-hospital Living Situation:  At home  Patient Pre-hospital Level of Mobility:  Full mobility  Patient Pre-hospital Diet Restrictions:  Diabetic  Substance Use History:     Social History     Substance and Sexual Activity   Alcohol Use Not Currently    Comment: social     Social History     Tobacco Use   Smoking Status Never Smoker   Smokeless Tobacco Never Used     Social History     Substance and Sexual Activity   Drug Use No       Family History:  I have reviewed the patients family history    Physical Exam:   Vitals:   Blood Pressure: 140/63 (07/04/19 2045)  Pulse: (!) 120 (07/04/19 2045)  Temperature: (!) 102 °F (38 9 °C) (07/04/19 2045)  Temp Source: Tympanic (07/04/19 2045)  Respirations: 20 (07/04/19 2045)  Height: 6' 2" (188 cm) (07/04/19 2045)  Weight - Scale: (!) 166 kg (365 lb 11 9 oz) (07/04/19 2045)  SpO2: 95 % (07/04/19 2045)    Physical Exam   Constitutional: He is oriented to person, place, and time  He appears well-developed and well-nourished  He is cooperative  HENT:   Head: Normocephalic and atraumatic  Nose: Nose normal    Mouth/Throat: Oropharynx is clear and moist and mucous membranes are normal    Eyes: Conjunctivae and EOM are normal    Neck: Normal range of motion and full passive range of motion without pain  Neck supple  Cardiovascular: Normal heart sounds  An irregularly irregular rhythm present  Tachycardia present  Pulses:       Radial pulses are 2+ on the right side, and 2+ on the left side  Dorsalis pedis pulses are 1+ on the right side, and 1+ on the left side  Left lower extremity with +3 pitting edema    Right lower extremity with +4 pitting edema, erythema, warmth, tenderness extending from toes to groin  Pulmonary/Chest: Effort normal and breath sounds normal    Abdominal: Soft  Normal appearance and bowel sounds are normal    Musculoskeletal:   Positive edema, erythema, warmth, tenderness from toes to right groin   Neurological: He is alert and oriented to person, place, and time  Skin: Skin is warm  Psychiatric: He has a normal mood and affect  His speech is normal and behavior is normal        Additional Data:   Lab Results: I have personally reviewed pertinent reports  Results from last 7 days   Lab Units 07/04/19  1827   WBC Thousand/uL 23 20*   HEMOGLOBIN g/dL 13 6*   HEMATOCRIT % 38 7*   PLATELETS Thousands/uL 167   LYMPHO PCT % 3*   MONO PCT % 7     Results from last 7 days   Lab Units 07/04/19  1827   POTASSIUM mmol/L 3 7   CHLORIDE mmol/L 96*   CO2 mmol/L 25   BUN mg/dL 14   CREATININE mg/dL 0 87   CALCIUM mg/dL 9 4   ALK PHOS U/L 78   ALT U/L 18   AST U/L 19     Results from last 7 days   Lab Units 07/04/19  1827   INR  1 76*     Results from last 7 days   Lab Units 07/04/19  1805   POC GLUCOSE mg/dl 122           Imaging: I have personally reviewed pertinent reports  CTA lower extremity right w wo contrast    (Results Pending)       EKG, Pathology, and Other Studies Reviewed on Admission:   · EKG:  Not done in the emergency department    [facility] Records Reviewed: Yes     ** Please Note: This note has been constructed using a voice recognition system   **

## 2019-07-05 NOTE — CONSULTS
Vancomycin has been discontinued  Thank you for this consult; pharmacy will sign off now      Talha Robertson, Pharmacist

## 2019-07-05 NOTE — SEPSIS NOTE
Sepsis Note   Ernie Parker 75 y o  male MRN: [id number]  Unit/Bed#: -02 Encounter: [id number]      qSOFA     Row Name 07/04/19 2045 07/04/19 2000 07/04/19 1945 07/04/19 1930 07/04/19 1915    Altered mental status GCS < 15              Respiratory Rate > / =22  0  0  0  1  0    Systolic BP < / =451  0  0    0      Q Sofa Score  0  0  0  1  0    Row Name 07/04/19 1900 07/04/19 1845 07/04/19 1750          Altered mental status GCS < 15            Respiratory Rate > / =22  0  0  0      Systolic BP < / =394  0  0  0      Q Sofa Score  0  0  0          Initial Sepsis Screening     Row Name 07/04/19 1924                Is the patient's history suggestive of a new or worsening infection?         Suspected source of infection  soft tissue  -KW        Are two or more of the following signs & symptoms of infection both present and new to the patient? (!) Yes (Proceed)  -KW        Indicate SIRS criteria  Hyperthemia > 38 3C (100 9F); Tachycardia > 90 bpm;Leukocytosis (WBC > 03430 IJL)  -KW        If the answer is yes to both questions, suspicion of sepsis is present          If severe sepsis is present AND tissue hypoperfusion perists in the hour after fluid resuscitation or lactate > 4, the patient meets criteria for SEPTIC SHOCK          Are any of the following organ dysfunction criteria present within 6 hours of suspected infection and SIRS criteria that are NOT considered to be chronic conditions?         Organ dysfunction          Date of presentation of severe sepsis          Time of presentation of severe sepsis          Tissue hypoperfusion persists in the hour after crystalloid fluid administration, evidenced, by either:          Was hypotension present within one hour of the conclusion of crystalloid fluid administration?           Date of presentation of septic shock          Time of presentation of septic shock            User Key  (r) = Recorded By, (t) = Taken By, (c) = Cosigned By Initials Name Provider Type    Eliecer Negro PA-C Physician Assistant               Default Flowsheet Data (last 720 hours)      Sepsis Reassess     Row Name 07/04/19 4347                   Repeat Volume Status and Tissue Perfusion Assessment Performed    Repeat Volume Status and Tissue Perfusion Assessment Performed  Yes  -KM           Volume Status and Tissue Perfusion Post Fluid Resuscitation * Must Document All *    Vital Signs Reviewed (HR, RR, BP, T)  Yes  -KM        Shock Index Reviewed  Yes  -KM        Arterial Oxygen Saturation Reviewed (POx, SaO2 or SpO2)  Yes (comment %) 95%  -KM        Cardio  (!) Irregular rhythm; Tachycardia;Normal S1/S2  -KM        Pulmonary  Normal effort;Clear to auscultation  -KM        Capillary Refill  Brisk  -KM        Peripheral Pulses  Radial;Dorsalis Pedis  -KM        Peripheral Pulse  +2  -KM        Dorsalis Pedis  +1  -KM        Skin  Warm;Other/Abnormal (Comment) Right lower extremity with redness, warmth, edema from the toes to the groin   -KM        Urine output assessed  Adequate  -KM           *OR*   Intensive Monitoring- Must Document One of the Following Four *:    Vital Signs Reviewed          * Central Venous Pressure (CVP or RAP)          * Central Venous Oxygen (SVO2, ScvO2 or Oxygen saturation via central catheter)          * Bedside Cardiovascular US in IVC diameter and % collapse          * Passive Leg Raise OR Crystalloid Challenge            User Key  (r) = Recorded By, (t) = Taken By, (c) = Cosigned By    Initials Name Provider Type    [address], [address] Nurse Practitioner

## 2019-07-05 NOTE — CONSULTS
Vancomycin IV Pharmacy-to-Dose Consultation    Kranthi Tamze is a 75 y o  male who is currently receiving Vancomycin IV with management by the Pharmacy Consult service  Assessment/Plan:  The patient was reviewed  Renal function is stable and no signs or symptoms of nephrotoxicity and/or infusion reactions were documented in the chart  Based on todays assessment, continue current vancomycin (day # 2) dosing of 2000 mg IV q12, with a plan for trough to be drawn at 0630 on 7/6/19  We will continue to follow the patients culture results and clinical progress daily      Alexander Pham, Pharmacist

## 2019-07-05 NOTE — ASSESSMENT & PLAN NOTE
· Continue vancomycin and Zosyn  · Lactic acid improved  · Check a procalcitonin  · Check CTA of leg to rule out underlying abscess or subcutaneous injury

## 2019-07-05 NOTE — ASSESSMENT & PLAN NOTE
· Patient is has cellulitis of this right lower extremity in the past  · Patient does have a small wound under his right foot great toe  · Redness and edema extend from tip of the toes into groin  · Redness has increased throughout the day  · Continue Vanco and Zosyn  · Checking CT of leg to rule out necrotizing fasciitis  · Check procalcitonin

## 2019-07-05 NOTE — CASE MANAGEMENT
Evaluated the pt at the bedside  Pt was admitted to the hospital with cellulitis of the rt leg  Pt reports he ahs ahd a history of cellulitis on and off for the last 6 years  Pt lives with his son is a 2 story home  Pt has 9 MONA in the front and 3 MONA in the back of the home  Pt has a bathroom on the first floor  Pt states he is independent and works in his own business running a [employer]   Pt PCP is Dr Angeline Lentz  Pt does have a walker, cane, lymphedema boots, can e at home  Pt states he walks independently  Pt states he has had the redness for 3 days  Pt gets his medications at [place]  Pt son will transport him home upon discharge  Patient  received discharge checklist   Content reviewed  Patient/caregiver encouraged to participate in discharge plan of care prior to discharge home  Patient/caregiver received discharge checklist   Content reviewed  Patient/caregiver encouraged to participate in discharge plan of care prior to discharge home

## 2019-07-05 NOTE — SEPSIS NOTE
Sepsis Note   Gladys Recio 75 y o  male MRN: [id number]  Unit/Bed#: -02 Encounter: [id number]      qSOFA     Row Name 07/04/19 2322 07/04/19 2045 07/04/19 2000 07/04/19 1945 07/04/19 1930    Altered mental status GCS < 15              Respiratory Rate > / =22  0  0  0  0  1    Systolic BP < / =671  0  0  0    0    Q Sofa Score  0  0  0  0  1    Row Name 07/04/19 1915 07/04/19 1900 07/04/19 1845 07/04/19 1750       Altered mental status GCS < 15             Respiratory Rate > / =22  0  0  0  0     Systolic BP < / =654    0  0  0     Q Sofa Score  0  0  0  0         Initial Sepsis Screening     Row Name 07/04/19 1924                Is the patient's history suggestive of a new or worsening infection?         Suspected source of infection  soft tissue  -KW        Are two or more of the following signs & symptoms of infection both present and new to the patient? (!) Yes (Proceed)  -KW        Indicate SIRS criteria  Hyperthemia > 38 3C (100 9F); Tachycardia > 90 bpm;Leukocytosis (WBC > 37956 IJL)  -KW        If the answer is yes to both questions, suspicion of sepsis is present          If severe sepsis is present AND tissue hypoperfusion perists in the hour after fluid resuscitation or lactate > 4, the patient meets criteria for SEPTIC SHOCK          Are any of the following organ dysfunction criteria present within 6 hours of suspected infection and SIRS criteria that are NOT considered to be chronic conditions?         Organ dysfunction          Date of presentation of severe sepsis          Time of presentation of severe sepsis          Tissue hypoperfusion persists in the hour after crystalloid fluid administration, evidenced, by either:          Was hypotension present within one hour of the conclusion of crystalloid fluid administration?           Date of presentation of septic shock          Time of presentation of septic shock            User Key  (r) = Recorded By, (t) = Taken By, (c) = Cosigned By    [address] Name Provider Type    DILEEP Duvall PA-C Physician Assistant               Default Flowsheet Data (last 720 hours)      Sepsis Reassess     Row Name 07/05/19 0146 07/04/19 6739                Repeat Volume Status and Tissue Perfusion Assessment Performed    Repeat Volume Status and Tissue Perfusion Assessment Performed    Yes  -KM          Volume Status and Tissue Perfusion Post Fluid Resuscitation * Must Document All *    Vital Signs Reviewed (HR, RR, BP, T)  Yes  -AI  Yes  -KM       Shock Index Reviewed    Yes  -KM       Arterial Oxygen Saturation Reviewed (POx, SaO2 or SpO2)    Yes (comment %) 95%  -KM       Cardio    (!) Irregular rhythm; Tachycardia;Normal S1/S2  -KM       Pulmonary    Normal effort;Clear to auscultation  -KM       Capillary Refill    Brisk  -KM       Peripheral Pulses    Radial;Dorsalis Pedis  -KM       Peripheral Pulse    +2  -KM       Dorsalis Pedis    +1  -KM       Skin    Warm;Other/Abnormal (Comment) Right lower extremity with redness, warmth, edema from the toes to the groin   -KM       Urine output assessed    Adequate  -KM          *OR*   Intensive Monitoring- Must Document One of the Following Four *:    Vital Signs Reviewed           * Central Venous Pressure (CVP or RAP)           * Central Venous Oxygen (SVO2, ScvO2 or Oxygen saturation via central catheter)           * Bedside Cardiovascular US in IVC diameter and % collapse           * Passive Leg Raise OR Crystalloid Challenge             User Key  (r) = Recorded By, (t) = Taken By, (c) = Cosigned By    Initials Name Provider Type    GUANACO Riley PA-C Physician Assistant    [address], [address] Nurse Practitioner

## 2019-07-05 NOTE — UTILIZATION REVIEW
Initial Clinical Review    Admission: Date/Time/Statement: 7/4/19 @ 1932 INPATIENT    Orders Placed This Encounter   Procedures    Inpatient Admission (expected length of stay for this patient Order details is greater than two midnights)     Standing Status:   Standing     Number of Occurrences:   1     Order Specific Question:   Admitting Physician     Answer:   Eron Austin [[id number]]     Order Specific Question:   Level of Care     Answer:   Med Surg [16]     Order Specific Question:   Estimated length of stay     Answer:   More than 2 Midnights     Order Specific Question:   Certification     Answer:   I certify that inpatient services are medically necessary for this patient for a duration of greater than two midnights  See H&P and MD Progress Notes for additional information about the patient's course of treatment  ED Arrival Information     Expected Arrival Acuity Means of Arrival Escorted By Service Admission Type    - 7/4/2019 17:22 Emergent [facility] General Medicine Emergency    Arrival Complaint    -        Chief Complaint   Patient presents with    Fever - 9 weeks to 74 years    Leg Swelling     right leg     Assessment/Plan: 76 y/o male presents to ED from home with RLE redness, swelling, fevers for 3 days  Tmax 102  Reports development of open area at base of R great toe from cracked skin  Hx DM  On exam, extensive erythema of RLE extending to mid thigh, extensive erythema of R foot extending to knee, tender to palpation    Positive edema, erythema, warmth, tenderness from toes to right groin   Admitted as inpatient  Cellulitis of right lower extremity  Assessment & Plan  · Patient is has cellulitis of this right lower extremity in the past  · Patient does have a small wound under his right foot great toe  · Redness and edema extend from tip of the toes into groin  · Redness has increased throughout the day  · Continue Vanco and Zosyn  · Checking CT of leg to rule out necrotizing fasciitis  · Check procalcitonin  Sepsis due to cellulitis [facility])  Assessment & Plan  · Tachycardic, fever, hypertensive, hyponatremia, lactic acidosis, leukocytosis, cellulitis  · Continue IV fluids  · Continue vancomycin and Zosyn  · Q 2 hours lactic acid  · Check a procalcitonin  · Check CTA of leg to rule out necrotizing fasciitis  Bilateral edema of lower extremity  Assessment & Plan  · Patient does have chronic lymphedema, usually uses Parag stockings  · Continue with Lasix  Essential hypertension  Assessment & Plan  · Continue lisinopril, metoprolol  · P r n   Orders for hydralazine for systolic blood pressure greater than 160 and labetalol for systolic blood pressure greater than 180    7/5 Podiatry consult:  Assessment:  Cellulitis right lower extremity with possible abscess right dorsal foot   Plan:  Awaiting CT results  Will order x-ray of the right foot to evaluate for possible abscess  Bacitracin adhesive bandage for right plantar sulcus fissure    ED Triage Vitals   Temperature Pulse Respirations Blood Pressure SpO2   07/04/19 1750 07/04/19 1750 07/04/19 1750 07/04/19 1750 07/04/19 1750   (!) 103 4 °F (39 7 °C) (!) 130 16 (!) 187/93 98 %      Temp Source Heart Rate Source Patient Position - Orthostatic VS BP Location FiO2 (%)   07/04/19 1750 07/04/19 1750 07/04/19 1845 07/04/19 1845 --   Tympanic Monitor Sitting Left arm       Pain Score       07/04/19 1750       8        Wt Readings from Last 1 Encounters:   07/05/19 (!) 166 kg (365 lb 15 4 oz)     Additional Vital Signs:   07/05/19 1533  101 1 °F (38 4 °C)Abnormal   101  20  128/80  98 %  None (Room air)   07/05/19 1317  100 7 °F (38 2 °C)Abnormal              07/05/19 1116  101 7 °F (38 7 °C)Abnormal   106Abnormal   20  122/62  100 %  None (Room air)   07/05/19 0705  100 3 °F (37 9 °C)  103  20  140/78  100 %  None (Room air)   07/05/19 0319  101 °F (38 3 °C)Abnormal   124Abnormal   20  148/88  96 %  None (Room air)   07/05/19 3225   110Abnormal   21    95 %  None (Room air)   07/05/19 0150  102 2 °F (39 °C)Abnormal              07/04/19 2322  101 °F (38 3 °C)Abnormal   112Abnormal   20  170/83  98 %  None (Room air)   07/04/19 2045  102 °F (38 9 °C)Abnormal   120Abnormal   20  140/63  95 %  None (Room air)   07/04/19 2000    157Abnormal   20  139/81  98 %     07/04/19 1948  103 9 °F (39 9 °C)Abnormal              07/04/19 1945    146Abnormal   20    99 %     07/04/19 1930    145Abnormal   31Abnormal   163/69  98 %     07/04/19 1915    138Abnormal   20    99 %     07/04/19 1900    133Abnormal   17  168/81  99 %  None (Room air)   07/04/19 1845    122Abnormal   16  190/93Abnormal   99 %  None (Room air)       Pertinent Labs/Diagnostic Test Results:   Results from last 7 days   Lab Units 07/05/19  0557 07/04/19  1827   WBC Thousand/uL 17 80* 23 20*   HEMOGLOBIN g/dL 11 5* 13 6*   HEMATOCRIT % 32 0* 38 7*   PLATELETS Thousands/uL 142* 167   NEUTROS ABS Thousands/µL 15 50*  --    TOTAL NEUT ABS Thousand/uL  --  20 88*   BANDS PCT %  --  4         Results from last 7 days   Lab Units 07/05/19  0558 07/04/19  1827   SODIUM mmol/L 131* 130*   POTASSIUM mmol/L 3 1* 3 7   CHLORIDE mmol/L 101 96*   CO2 mmol/L 22 25   ANION GAP mmol/L 8 9   BUN mg/dL 16 14   CREATININE mg/dL 0 80 0 87   EGFR ml/min/1 73sq m 93 90   CALCIUM mg/dL 8 4* 9 4     Results from last 7 days   Lab Units 07/05/19  0558 07/04/19  1827   AST U/L 17 19   ALT U/L 18 18   ALK PHOS U/L 65 78   TOTAL PROTEIN g/dL 6 8 8 4   ALBUMIN g/dL 3 1* 3 8   TOTAL BILIRUBIN mg/dL 1 40* 1 80*     Results from last 7 days   Lab Units 07/05/19  1600 07/05/19  1116 07/05/19  0626 07/04/19  2302 07/04/19  1805   POC GLUCOSE mg/dl 129 132 131 120 122     Results from last 7 days   Lab Units 07/05/19  0558 07/04/19  1827   GLUCOSE RANDOM mg/dL 149* 113*         Results from last 7 days   Lab Units 07/04/19  2255   HEMOGLOBIN A1C % 6 7*   EAG mg/dl 146     Results from last 7 days   Lab Units 07/05/19  0841 07/04/19  1827   PROTIME seconds 18 3* 20 5*   INR  1 57* 1 76*   PTT seconds  --  40*     Results from last 7 days   Lab Units 07/05/19  0558 07/04/19  2255   PROCALCITONIN ng/ml 2 40* 2 34*     Results from last 7 days   Lab Units 07/05/19  0841 07/05/19  0557 07/04/19  2255 07/04/19  1827   LACTIC ACID mmol/L 1 2 1 1 1 3 2 1*     Results from last 7 days   Lab Units 07/04/19  1831   CLARITY UA  Clear   COLOR UA  Yellow   SPEC GRAV UA  1 025   PH UA  6 0   GLUCOSE UA mg/dl Negative   KETONES UA mg/dl Negative   BLOOD UA  Trace-Intact*   PROTEIN UA mg/dl 1+*   NITRITE UA  Negative   BILIRUBIN UA  Negative   UROBILINOGEN UA E U /dl 1 0   LEUKOCYTES UA  Negative   WBC UA /hpf 4-10*   RBC UA /hpf 0-1*   BACTERIA UA /hpf None Seen   EPITHELIAL CELLS WET PREP /hpf Occasional   MUCUS THREADS  Occasional*     Xray R foot:  Diffuse soft tissue swelling   No soft tissue gas demonstrated  CTA RLE:  No acute fracture or dislocation is seen   Mild degenerative changes of the right hip, knee, and foot no suspicious appearing osseous lesion is seen     No discrete large vessel occlusion     Mild atrophy of the muscles of the leg   Extensive subcutaneous fluid is seen in the right leg diffusely and extending into the ankle and foot, could represent edema and/or cellulitis     Shotty right inguinal lymph nodes, probably reactive     No discrete abscess is seen   No thickening of the deep fascia or subcutaneous gas is identified although this does not exclude necrotizing fasciitis   Correlation with the patient's symptoms, laboratory values, and physical exam recommended       ED Treatment:   Medication Administration from 07/04/2019 1722 to 07/04/2019 2027       Date/Time Order Dose Route Action Action by Comments     07/04/2019 1825 sodium chloride 0 9 % bolus 1,000 mL 1,000 mL Intravenous New Bag Jessie Patel RN      07/04/2019 1840 vancomycin (VANCOCIN) 1,500 mg in sodium chloride 0 9 % 250 mL IVPB 1,500 mg Intravenous New Justin Hutchins RN      07/04/2019 1923 acetaminophen (TYLENOL) tablet 650 mg 650 mg Oral Given Gordon Turcios RN      07/04/2019 1925 morphine injection 2 mg 2 mg Intravenous Given Gordon Turcios RN         Past Medical History:   Diagnosis Date    Bilateral edema of lower extremity     Chronic pain     Lymphedema     DIAMOND (obstructive sleep apnea)     Stroke [facility])      Present on Admission:   Bilateral edema of lower extremity   Type 2 diabetes mellitus with foot ulcer, without long-term current use of insulin (HCC)   Chronic atrial fibrillation (HCC)   Peripheral neuropathy   DIAMOND (obstructive sleep apnea)   Essential hypertension   Cellulitis of right lower extremity   Sepsis due to cellulitis (HCC)   Mixed hyperlipidemia      Admitting Diagnosis: Cellulitis [L03 90]  Hyponatremia [E87 1]  Anemia [D64 9]  Hypertension [I10]  Non-insulin dependent type 2 diabetes mellitus ([facility] [address] ) [E11 9]  Cellulitis of right lower extremity [L03 115]  Sepsis ([facility] [address] ) [A41 9]  On continuous oral anticoagulation [Z79 01]  Age/Sex: 75 y o  male  Admission Orders:    Current Facility-Administered Medications:  acetaminophen 650 mg Oral Q6H PRN   ammonium lactate  Topical BID PRN   apixaban 5 mg Oral BID   atorvastatin 40 mg Oral Daily   bacitracin 1 application Topical Daily   cefazolin 2,000 mg Intravenous Q8H   furosemide 80 mg Intravenous Daily   hydrALAZINE 10 mg Intravenous Q6H PRN   insulin lispro 1-5 Units Subcutaneous HS   insulin lispro 1-6 Units Subcutaneous TID AC   Labetalol HCl 10 mg Intravenous Q6H PRN   lisinopril 10 mg Oral Daily   metoprolol succinate 25 mg Oral Daily   morphine injection 2 mg Intravenous Q3H PRN   ondansetron 4 mg Intravenous Q6H PRN   potassium chloride 20 mEq Oral BID   sodium chloride 1,000 mL Intravenous Once   sodium chloride 0 9 % infusion   Rate: 100 mL/hr Dose: 100 mL/hr  Freq: Continuous Route: IV  Indications of Use: IV Hydration  Last Dose: Stopped (07/05/19 1206)  Start: 07/04/19 2245 End: 07/05/19 1110    IP CONSULT TO CASE MANAGEMENT  IP CONSULT TO PHARMACY  IP CONSULT TO PODIATRY   Wound care  VS  Elevate extremity  Tele    Network Utilization Review Department  Phone: [phone]; Fax [phone]  [email or web address]  ATTENTION: Please call with any questions or concerns to [phone]  and carefully listen to the prompts so that you are directed to the right person  Send all requests for admission clinical reviews, approved or denied determinations and any other requests to fax [phone]   All voicemails are confidential

## 2019-07-05 NOTE — PROGRESS NOTES
Vancomycin Assessment    Eunice Wade is a 75 y o  male who is currently receiving vancomycin 1500mg iv once then 1000 mg iv q 12 hours for skin-soft tissue infection     Relevant clinical data and objective history reviewed:  Creatinine   Date Value Ref Range Status   07/04/2019 0 87 0 70 - 1 30 mg/dL Final     Comment:     Standardized to IDMS reference method   04/11/2019 0 78 0 70 - 1 30 mg/dL Final     Comment:     Standardized to IDMS reference method   03/05/2019 0 74 0 60 - 1 30 mg/dL Final     Comment:     Standardized to IDMS reference method     /63 (BP Location: Left arm)   Pulse (!) 120   Temp (!) 102 °F (38 9 °C) (Tympanic)   Resp 20   Ht 6' 2" (1 88 m)   Wt (!) 166 kg (365 lb 15 4 oz)   SpO2 95%   BMI 46 99 kg/m²   No intake/output data recorded  Lab Results   Component Value Date/Time    BUN 14 07/04/2019 06:27 PM    WBC 23 20 (H) 07/04/2019 06:27 PM    HGB 13 6 (L) 07/04/2019 06:27 PM    HCT 38 7 (L) 07/04/2019 06:27 PM    MCV 98 07/04/2019 06:27 PM     07/04/2019 06:27 PM     Temp Readings from Last 3 Encounters:   07/04/19 (!) 102 °F (38 9 °C) (Tympanic)   11/30/18 98 °F (36 7 °C) (Tympanic)     Vancomycin Days of Therapy: 1    Assessment/Plan  The patient is currently on vancomycin utilizing scheduled dosing based on adjusted body weight (due to obesity)  Baseline risks associated with therapy include: concomitant nephrotoxic medications and dehydration  The patient is currently receiving 1500mg iv once then 1000 mg iv q 12 hours and after clinical evaluation will be changed to 2000 mg iv q 12 hours  Pharmacy will also follow closely for s/sx of nephrotoxicity, infusion reactions and appropriateness of therapy  BMP and CBC will be ordered per protocol  Plan for trough as patient approaches steady state, prior to the 4th  dose at approximately 6:30 on 7/06/2019  Due to infection severity, will target a trough of 15-20 (appropriate for most indications)     Pharmacy will continue to follow the patients culture results and clinical progress daily      Thuan Villarreal, Pharmacist

## 2019-07-05 NOTE — PROGRESS NOTES
Progress Note - Enoc Kevin 1955, 75 y o  male MRN: [id number]    Unit/Bed#: -02 Encounter: [id number]    Primary Care Provider: Saw Block DO   Date and time admitted to hospital: 2019  5:47 PM        * Cellulitis of right lower extremity  Assessment & Plan  · Redness and edema extend from tip of the toes into groin, small wound on right foot below toes  · Will continue Vanco and Zosyn  · Follow-up CT leg results  · Will trend procalcitonin  · Follow up cultures    Sepsis due to cellulitis [facility])  Assessment & Plan  · Continue vancomycin and Zosyn  · Lactic acid improved  · Check a procalcitonin  · Check CTA of leg to rule out underlying abscess or subcutaneous injury    Chronic atrial fibrillation (HCC)  Assessment & Plan  · Continue with Eliquis and metoprolol    Morbid obesity with BMI of 40 0-44 9, adult (HCC)  Assessment & Plan  · Admission BMI is 46 96  · Offer supportive care    Type 2 diabetes mellitus with foot ulcer, without long-term current use of insulin ([facility] [address] )  Assessment & Plan  · We will hold patient's metformin and glipizide  · Place him on sliding scale insulin with Accu-Cheks Q a c  And HS        VTE Pharmacologic Prophylaxis: Pharmacologic: Apixaban (Eliquis)    Patient Centered Rounds: I have performed bedside rounds with nursing staff today  Discussions with Specialists or Other Care Team Provider: yes  Education and Discussions with Family / Patient: yes    Current Length of Stay: 1 day(s)    Current Patient Status: Inpatient   Certification Statement: The patient will continue to require additional inpatient hospital stay due to Cellulitis    Discharge Plan:  Pending hospital course    Code Status: Level 1 - Full Code    Subjective:   No overnight events noted  Patient continues to spike fevers  Tolerating diet    Pain controlled    Objective:     Vitals:   Temp (24hrs), Av 9 °F (38 8 °C), Min:100 3 °F (37 9 °C), Max:103 9 °F (39 9 °C)    Temp:  [100 3 °F (37 9 °C)-103 9 °F (39 9 °C)] 101 7 °F (38 7 °C)  HR:  [103-157] 106  Resp:  [16-31] 20  BP: (122-190)/(62-93) 122/62  SpO2:  [95 %-100 %] 100 %  Body mass index is 46 99 kg/m²  Input and Output Summary (last 24 hours):     No intake or output data in the 24 hours ending 07/05/19 1124    Physical Exam:     Physical Exam   Constitutional: He appears well-developed  No distress  Obese   HENT:   Head: Normocephalic and atraumatic  Eyes: Conjunctivae and EOM are normal    Neck: Normal range of motion  Neck supple  Cardiovascular: Normal rate and regular rhythm  Pulmonary/Chest: Effort normal  No respiratory distress  Abdominal: Soft  He exhibits no distension  There is no tenderness  Musculoskeletal: Normal range of motion  Bilateral lower extremity lymphedema  Redness/erythema/warmth of right lower extremity   Neurological: He is alert  No cranial nerve deficit  Skin: Skin is warm and dry  Psychiatric: He has a normal mood and affect  His behavior is normal        Additional Data:     Labs:    Results from last 7 days   Lab Units 07/05/19  0557   WBC Thousand/uL 17 80*   HEMOGLOBIN g/dL 11 5*   HEMATOCRIT % 32 0*   PLATELETS Thousands/uL 142*   NEUTROS PCT % 87*   LYMPHS PCT % 6*   MONOS PCT % 7   EOS PCT % 0     Results from last 7 days   Lab Units 07/05/19  0558   POTASSIUM mmol/L 3 1*   CHLORIDE mmol/L 101   CO2 mmol/L 22   BUN mg/dL 16   CREATININE mg/dL 0 80   CALCIUM mg/dL 8 4*   ALK PHOS U/L 65   ALT U/L 18   AST U/L 17     Results from last 7 days   Lab Units 07/05/19  0841   INR  1 57*     Results from last 7 days   Lab Units 07/05/19  0626 07/04/19  2302 07/04/19  1805   POC GLUCOSE mg/dl 131 120 122     Results from last 7 days   Lab Units 07/04/19  2255   HEMOGLOBIN A1C % 6 7*       * I Have Reviewed All Lab Data Listed Above  * Additional Pertinent Lab Tests Reviewed:  All Labs For Current Hospital Admission  Reviewed    Imaging:  Imaging Reports Reviewed Today Include:  No new imaging    Recent Cultures (last 7 days):           Last 24 Hours Medication List:     Current Facility-Administered Medications:  acetaminophen 650 mg Oral Q6H PRN Rachel A Biancakes, CRNP   apixaban 5 mg Oral BID Rachel A Biancakes, CRNP   atorvastatin 40 mg Oral Daily Rachel A Biancakes, CRNP   cefazolin 2,000 mg Intravenous Q8H Alexys London MD   furosemide 80 mg Intravenous Daily Rachel A Biancakes, CRNP   hydrALAZINE 10 mg Intravenous Q6H PRN Rachel A Biancakes, CRNP   insulin lispro 1-5 Units Subcutaneous HS Rachel A Meckes, CRNP   insulin lispro 1-6 Units Subcutaneous TID AC Rachel A Biancakes, CRNP   Labetalol HCl 10 mg Intravenous Q6H PRN Rachel A Meckes, CRNP   lisinopril 10 mg Oral Daily Rachel A Meckes, CRNP   metoprolol succinate 25 mg Oral Daily Rachel A Biancakes, CRNP   morphine injection 2 mg Intravenous Q3H PRN Alanna Rangel PA-C   ondansetron 4 mg Intravenous Q6H PRN Rachel A Gregorio, CRNP   potassium chloride 20 mEq Oral BID Alexys London MD   sodium chloride 1,000 mL Intravenous Once JILLIAN Silva        Today, Patient Was Seen By: Alexys London MD    ** Please Note: Dictation voice to text software may have been used in the creation of this document   **

## 2019-07-05 NOTE — PLAN OF CARE
Problem: Potential for Falls  Goal: Patient will remain free of falls  Description  INTERVENTIONS:  - Assess patient frequently for physical needs  -  Identify cognitive and physical deficits and behaviors that affect risk of falls    -  [place] fall precautions as indicated by assessment   - Educate patient/family on patient safety including physical limitations  - Instruct patient to call for assistance with activity based on assessment  - Modify environment to reduce risk of injury  - Consider OT/PT consult to assist with strengthening/mobility  Outcome: Progressing     Problem: PAIN - ADULT  Goal: Verbalizes/displays adequate comfort level or baseline comfort level  Description  Interventions:  - Encourage patient to monitor pain and request assistance  - Assess pain using appropriate pain scale  - Administer analgesics based on type and severity of pain and evaluate response  - Implement non-pharmacological measures as appropriate and evaluate response  - Consider cultural and social influences on pain and pain management  - Notify physician/advanced practitioner if interventions unsuccessful or patient reports new pain  Outcome: Progressing     Problem: INFECTION - ADULT  Goal: Absence or prevention of progression during hospitalization  Description  INTERVENTIONS:  - Assess and monitor for signs and symptoms of infection  - Monitor lab/diagnostic results  - Monitor all insertion sites, i e  indwelling lines, tubes, and drains  - Monitor endotracheal (as able) and nasal secretions for changes in amount and color  - [place] appropriate cooling/warming therapies per order  - Administer medications as ordered  - Instruct and encourage patient and family to use good hand hygiene technique  - Identify and instruct in appropriate isolation precautions for identified infection/condition  Outcome: Progressing  Goal: Absence of fever/infection during neutropenic period  Description  INTERVENTIONS:  - Monitor WBC  - Implement neutropenic guidelines  Outcome: Progressing     Problem: DISCHARGE PLANNING  Goal: Discharge to home or other facility with appropriate resources  Description  INTERVENTIONS:  - Identify barriers to discharge w/patient and caregiver  - Arrange for needed discharge resources and transportation as appropriate  - Identify discharge learning needs (meds, wound care, etc )  - Arrange for interpretive services to assist at discharge as needed  - Refer to Case Management Department for coordinating discharge planning if the patient needs post-hospital services based on physician/advanced practitioner order or complex needs related to functional status, cognitive ability, or social support system  Outcome: Progressing     Problem: SKIN/TISSUE INTEGRITY - ADULT  Goal: Skin integrity remains intact  Description  INTERVENTIONS  - Identify patients at risk for skin breakdown  - Assess and monitor skin integrity  - Assess and monitor nutrition and hydration status  - Monitor labs (i e  albumin)  - Assess for incontinence   - Turn and reposition patient  - Assist with mobility/ambulation  - Relieve pressure over bony prominences  - Avoid friction and shearing  - Provide appropriate hygiene as needed including keeping skin clean and dry  - Evaluate need for skin moisturizer/barrier cream  - Collaborate with interdisciplinary team (i e  Nutrition, Rehabilitation, etc )   - Patient/family teaching  Outcome: Progressing  Goal: Incision(s), wounds(s) or drain site(s) healing without S/S of infection  Description  INTERVENTIONS  - Assess and document risk factors for skin impairment   - Assess and document dressing, incision, wound bed, drain sites and surrounding tissue  - Initiate Nutrition services consult and/or wound management as needed  Outcome: Progressing

## 2019-07-05 NOTE — ASSESSMENT & PLAN NOTE
· We will hold patient's metformin and glipizide  · Place him on sliding scale insulin with Accu-Daija butterfield   And HS

## 2019-07-06 LAB
ANION GAP SERPL CALCULATED.3IONS-SCNC: 8 MMOL/L (ref 4–13)
BASOPHILS # BLD AUTO: 0.1 THOUSANDS/ΜL (ref 0–0.1)
BASOPHILS NFR BLD AUTO: 0 % (ref 0–2)
BUN SERPL-MCNC: 15 MG/DL (ref 7–25)
CALCIUM SERPL-MCNC: 8.7 MG/DL (ref 8.6–10.5)
CHLORIDE SERPL-SCNC: 101 MMOL/L (ref 98–107)
CO2 SERPL-SCNC: 24 MMOL/L (ref 21–31)
CREAT SERPL-MCNC: 0.79 MG/DL (ref 0.7–1.3)
EOSINOPHIL # BLD AUTO: 0.2 THOUSAND/ΜL (ref 0–0.61)
EOSINOPHIL NFR BLD AUTO: 1 % (ref 0–5)
ERYTHROCYTE [DISTWIDTH] IN BLOOD BY AUTOMATED COUNT: 13.3 % (ref 11.5–14.5)
GFR SERPL CREATININE-BSD FRML MDRD: 94 ML/MIN/1.73SQ M
GLUCOSE SERPL-MCNC: 100 MG/DL (ref 65–140)
GLUCOSE SERPL-MCNC: 104 MG/DL (ref 65–99)
HCT VFR BLD AUTO: 34.2 % (ref 42–47)
HGB BLD-MCNC: 11.8 G/DL (ref 14–18)
LYMPHOCYTES # BLD AUTO: 1.6 THOUSANDS/ΜL (ref 0.6–4.47)
LYMPHOCYTES NFR BLD AUTO: 11 % (ref 21–51)
MCH RBC QN AUTO: 33.8 PG (ref 26–34)
MCHC RBC AUTO-ENTMCNC: 34.6 G/DL (ref 31–37)
MCV RBC AUTO: 98 FL (ref 81–99)
MONOCYTES # BLD AUTO: 1.4 THOUSAND/ΜL (ref 0.17–1.22)
MONOCYTES NFR BLD AUTO: 9 % (ref 2–12)
NEUTROPHILS # BLD AUTO: 11.5 THOUSANDS/ΜL (ref 1.4–6.5)
NEUTS SEG NFR BLD AUTO: 78 % (ref 42–75)
PLATELET # BLD AUTO: 170 THOUSANDS/UL (ref 149–390)
PMV BLD AUTO: 8.7 FL (ref 8.6–11.7)
POTASSIUM SERPL-SCNC: 3.2 MMOL/L (ref 3.5–5.5)
PROCALCITONIN SERPL-MCNC: 1.61 NG/ML
RBC # BLD AUTO: 3.5 MILLION/UL (ref 4.3–5.9)
SODIUM SERPL-SCNC: 133 MMOL/L (ref 134–143)
WBC # BLD AUTO: 14.7 THOUSAND/UL (ref 4.8–10.8)

## 2019-07-06 PROCEDURE — 99239 HOSP IP/OBS DSCHRG MGMT >30: CPT | Performed by: NURSE PRACTITIONER

## 2019-07-06 PROCEDURE — 82948 REAGENT STRIP/BLOOD GLUCOSE: CPT

## 2019-07-06 PROCEDURE — 84145 PROCALCITONIN (PCT): CPT | Performed by: INTERNAL MEDICINE

## 2019-07-06 PROCEDURE — 80048 BASIC METABOLIC PNL TOTAL CA: CPT | Performed by: INTERNAL MEDICINE

## 2019-07-06 PROCEDURE — 85025 COMPLETE CBC W/AUTO DIFF WBC: CPT | Performed by: INTERNAL MEDICINE

## 2019-07-06 RX ORDER — AMMONIUM LACTATE 12 G/100G
LOTION TOPICAL 2 TIMES DAILY PRN
Qty: 400 G | Refills: 0 | Status: SHIPPED | OUTPATIENT
Start: 2019-07-06 | End: 2020-12-18 | Stop reason: SDUPTHER

## 2019-07-06 RX ORDER — CEPHALEXIN 500 MG/1
500 CAPSULE ORAL EVERY 8 HOURS SCHEDULED
Qty: 30 CAPSULE | Refills: 0 | Status: SHIPPED | OUTPATIENT
Start: 2019-07-06 | End: 2019-07-16

## 2019-07-06 RX ADMIN — APIXABAN 5 MG: 5 TABLET, FILM COATED ORAL at 08:20

## 2019-07-06 RX ADMIN — METOPROLOL SUCCINATE 25 MG: 25 TABLET, EXTENDED RELEASE ORAL at 08:20

## 2019-07-06 RX ADMIN — LISINOPRIL 10 MG: 10 TABLET ORAL at 08:20

## 2019-07-06 RX ADMIN — FUROSEMIDE 80 MG: 10 INJECTION, SOLUTION INTRAMUSCULAR; INTRAVENOUS at 08:20

## 2019-07-06 RX ADMIN — ATORVASTATIN CALCIUM 40 MG: 40 TABLET, FILM COATED ORAL at 08:20

## 2019-07-06 RX ADMIN — Medication: at 00:57

## 2019-07-06 RX ADMIN — BACITRACIN 1 LARGE APPLICATION: 500 OINTMENT TOPICAL at 08:20

## 2019-07-06 RX ADMIN — KETOROLAC TROMETHAMINE 15 MG: 30 INJECTION, SOLUTION INTRAMUSCULAR; INTRAVENOUS at 00:29

## 2019-07-06 RX ADMIN — CEFAZOLIN SODIUM 2000 MG: 2 SOLUTION INTRAVENOUS at 03:46

## 2019-07-06 RX ADMIN — POTASSIUM CHLORIDE 20 MEQ: 1500 TABLET, EXTENDED RELEASE ORAL at 08:20

## 2019-07-06 NOTE — PLAN OF CARE
Problem: Potential for Falls  Goal: Patient will remain free of falls  Description  INTERVENTIONS:  - Assess patient frequently for physical needs  -  Identify cognitive and physical deficits and behaviors that affect risk of falls    -  [place] fall precautions as indicated by assessment   - Educate patient/family on patient safety including physical limitations  - Instruct patient to call for assistance with activity based on assessment  - Modify environment to reduce risk of injury  - Consider OT/PT consult to assist with strengthening/mobility  Outcome: Adequate for Discharge     Problem: PAIN - ADULT  Goal: Verbalizes/displays adequate comfort level or baseline comfort level  Description  Interventions:  - Encourage patient to monitor pain and request assistance  - Assess pain using appropriate pain scale  - Administer analgesics based on type and severity of pain and evaluate response  - Implement non-pharmacological measures as appropriate and evaluate response  - Consider cultural and social influences on pain and pain management  - Notify physician/advanced practitioner if interventions unsuccessful or patient reports new pain  Outcome: Adequate for Discharge     Problem: INFECTION - ADULT  Goal: Absence or prevention of progression during hospitalization  Description  INTERVENTIONS:  - Assess and monitor for signs and symptoms of infection  - Monitor lab/diagnostic results  - Monitor all insertion sites, i e  indwelling lines, tubes, and drains  - Monitor endotracheal (as able) and nasal secretions for changes in amount and color  - [place] appropriate cooling/warming therapies per order  - Administer medications as ordered  - Instruct and encourage patient and family to use good hand hygiene technique  - Identify and instruct in appropriate isolation precautions for identified infection/condition  Outcome: Adequate for Discharge  Goal: Absence of fever/infection during neutropenic period  Description  INTERVENTIONS:  - Monitor WBC  - Implement neutropenic guidelines  Outcome: Adequate for Discharge     Problem: DISCHARGE PLANNING  Goal: Discharge to home or other facility with appropriate resources  Description  INTERVENTIONS:  - Identify barriers to discharge w/patient and caregiver  - Arrange for needed discharge resources and transportation as appropriate  - Identify discharge learning needs (meds, wound care, etc )  - Arrange for interpretive services to assist at discharge as needed  - Refer to Case Management Department for coordinating discharge planning if the patient needs post-hospital services based on physician/advanced practitioner order or complex needs related to functional status, cognitive ability, or social support system  Outcome: Adequate for Discharge     Problem: SKIN/TISSUE INTEGRITY - ADULT  Goal: Skin integrity remains intact  Description  INTERVENTIONS  - Identify patients at risk for skin breakdown  - Assess and monitor skin integrity  - Assess and monitor nutrition and hydration status  - Monitor labs (i e  albumin)  - Assess for incontinence   - Turn and reposition patient  - Assist with mobility/ambulation  - Relieve pressure over bony prominences  - Avoid friction and shearing  - Provide appropriate hygiene as needed including keeping skin clean and dry  - Evaluate need for skin moisturizer/barrier cream  - Collaborate with interdisciplinary team (i e  Nutrition, Rehabilitation, etc )   - Patient/family teaching  Outcome: Adequate for Discharge  Goal: Incision(s), wounds(s) or drain site(s) healing without S/S of infection  Description  INTERVENTIONS  - Assess and document risk factors for skin impairment   - Assess and document dressing, incision, wound bed, drain sites and surrounding tissue  - Initiate Nutrition services consult and/or wound management as needed  Outcome: Adequate for Discharge     Problem: DISCHARGE PLANNING - CARE MANAGEMENT  Goal: Discharge to post-acute care or home with appropriate resources  Description  INTERVENTIONS:  - Conduct assessment to determine patient/family and health care team treatment goals, and need for post-acute services based on payer coverage, community resources, and patient preferences, and barriers to discharge  - Address psychosocial, clinical, and financial barriers to discharge as identified in assessment in conjunction with the patient/family and health care team  - Arrange appropriate level of post-acute services according to patient's   needs and preference and payer coverage in collaboration with the physician and health care team  - Communicate with and update the patient/family, physician, and health care team regarding progress on the discharge plan  - Arrange appropriate transportation to post-acute venues  Discharge home with son who will transport  Outcome: Adequate for Discharge     Problem: Nutrition/Hydration-ADULT  Goal: Nutrient/Hydration intake appropriate for improving, restoring or maintaining nutritional needs  Description  Monitor and assess patient's nutrition/hydration status for malnutrition (ex- brittle hair, bruises, dry skin, pale skin and conjunctiva, muscle wasting, smooth red tongue, and disorientation)  Collaborate with interdisciplinary team and initiate plan and interventions as ordered  Monitor patient's weight and dietary intake as ordered or per policy  Utilize nutrition screening tool and intervene per policy  Determine patient's food preferences and provide high-protein, high-caloric foods as appropriate       INTERVENTIONS:  - Monitor oral intake, urinary output, labs, and treatment plans  - Assess nutrition and hydration status and recommend course of action  - Evaluate amount of meals eaten  - Assist patient with eating if necessary   - Allow adequate time for meals  - Recommend/ encourage appropriate diets, oral nutritional supplements, and vitamin/mineral supplements  - Order, calculate, and assess calorie counts as needed  - Recommend, monitor, and adjust tube feedings and TPN/PPN based on assessed needs  - Assess need for intravenous fluids  - Provide specific nutrition/hydration education as appropriate  - Include patient/family/caregiver in decisions related to nutrition  Outcome: Adequate for Discharge

## 2019-07-06 NOTE — DISCHARGE SUMMARY
Discharge- Mariam Burner 1955, 75 y o  male MRN: [id number]    Unit/Bed#: -02 Encounter: [id number]    Primary Care Provider: Honorio Latif DO   Date and time admitted to hospital: 7/4/2019  5:47 PM        * Cellulitis of right lower extremity  Assessment & Plan  · Redness and edema extend from tip of the toes into groin, small wound on right foot below toes - improved  · Will continue Vanco and Zosyn  · Follow-up CT leg results: Soft tissue edema extending from the knee, distally; Limited evaluation of the vascular structures  · Will trend procalcitonin - improving  · Follow up cultures - negative to date    Sepsis due to cellulitis [facility])  Assessment & Plan  · Continue vancomycin and Zosyn  · Lactic acid improved  · Check a procalcitonin  · Check CTA of leg to rule out underlying abscess or subcutaneous injury    Bilateral edema of lower extremity  Assessment & Plan  · Patient does have chronic lymphedema, usually uses Parag stockings  · Continue with Lasix    Essential hypertension  Assessment & Plan  · Continue lisinopril, metoprolol  · P r n  Orders for hydralazine for systolic blood pressure greater than 160 and labetalol for systolic blood pressure greater than 180    Type 2 diabetes mellitus with foot ulcer, without long-term current use of insulin (HCC)  Assessment & Plan  · We will hold patient's metformin and glipizide  · Place him on sliding scale insulin with Accu-Cheks Q a c   And HS      Morbid obesity with BMI of 40 0-44 9, adult (HCC)  Assessment & Plan  · Admission BMI is 46 96  · Offer supportive care    Chronic atrial fibrillation (HCC)  Assessment & Plan  · Continue with Eliquis and metoprolol    Peripheral neuropathy  Assessment & Plan  · Secondary to diabetes      DIAMOND (obstructive sleep apnea)  Assessment & Plan  · Patient refuses to wear CPAP    Mixed hyperlipidemia  Assessment & Plan  · Continue statin        Discharging Physician / Practitioner: JILLIAN Blount  PCP: Honorio Latif DO  Admission Date:   Admission Orders (From admission, onward)    Ordered        07/04/19 1932  Inpatient Admission (expected length of stay for this patient Order details is greater than two midnights)  Once             Discharge Date: 07/06/19    Resolved Problems  Date Reviewed: 7/6/2019    None          Consultations During Hospital Stay:  · Podiatry    Procedures Performed:   · CTA lower extremity right: FINDINGS/IMPRESSION: No acute fracture or dislocation is seen  Mild degenerative changes of the right hip, knee, and foot no suspicious appearing osseous lesion is seen  No discrete large vessel occlusion  Mild atrophy of the muscles of the leg  Extensive subcutaneous fluid is seen in the right leg diffusely and extending into the ankle and foot, could represent edema and/or cellulitis  Shotty right inguinal lymph nodes, probably reactive  No discrete abscess is seen  No thickening of the deep fascia or subcutaneous gas is identified although this does not exclude necrotizing fasciitis  Correlation with the patient's symptoms, laboratory values, and physical exam recommended  · XR right foot: no acute osseous abnormality  Significant Findings / Test Results:   · Cellulitis of right lower extremity    Incidental Findings:   · Right plantar sulcus fissure    Test Results Pending at Discharge (will require follow up):   · none     Outpatient Tests Requested:  · none    Complications:     none    Reason for Admission: Right lower extremity worsening edema    Hospital Course:     Lori Mcgregor is a 75 y o  male patient who originally presented to the hospital on 7/4/2019 due to right lower extremity worsening edema      Patient does have chronic bilateral lower extremity lymphedema, he does wear Parag stockings on a normal basis      The patient noticed that his right lower extremity was more edematous than his left lower extremity and this was a new finding    He was also outside at a picnic earlier in the day and had gotten some sun on both of his legs as he was not wearing his Parag stockings      The patient does have a wound on the base of his right great toe on the bottom of his foot it does appear to be a crack from dry skin  It does not appear to be open  The patient edema/redness warmth continues from the tip of his toes and did extend initially to his right knee upon assessment in the emergency department by the emergency room provider      Approximately 30 minutes after arriving in the ED the patient's family members noticed that the redness was moving further up onto his thigh and was now documented as being read up into his mid thigh area      During my assessment in the emergency department at 7:40 p m , the patient's redness was extending up the inner aspect of his thigh into his groin  I had the ED provider come verify that this was a new finding which was confirmed  And a CT a of the lower extremity was ordered  CT scan showed: No acute fracture or dislocation is seen  Mild degenerative changes of the right hip, knee, and foot no suspicious appearing osseous lesion is seen  No discrete large vessel occlusion  Mild atrophy of the muscles of the leg  Extensive subcutaneous fluid is seen in the right leg diffusely and extending into the ankle and foot, could represent edema and/or cellulitis  Shotty right inguinal lymph nodes, probably reactive  No discrete abscess is seen  No thickening of the deep fascia or subcutaneous gas is identified although this does not exclude necrotizing fasciitis  Correlation with the patient's symptoms, laboratory values, and physical exam recommended  Xray of the foot also showed no acute osseous abnormality  Patient was given bacitracin for the right plantar sulcus fissure, Lac-Hydrin for his lower extremity, and he received Zosyn, vancomycin, and finally Ancef IV    The redness and induration decreased significantly, and the patient was able to move his ankle well enough to be able to ambulate  Patient states the tightness and pain has dissipated and he wishes to be discharged home  Please see above list of diagnoses and related plan for additional information  Condition at Discharge: fair     Discharge Day Visit / Exam:     Subjective:  Patient is sitting at the side of the bed with his son and daughter-in-law waiting in the room  The CT scan and x-ray reports were discussed with the family and the patient, all questions were answered to the family and patient's satisfaction  Vitals: Blood Pressure: 135/88 (07/06/19 0709)  Pulse: 94 (07/06/19 0709)  Temperature: 97 6 °F (36 4 °C) (07/06/19 0709)  Temp Source: Tympanic (07/06/19 0709)  Respirations: 20 (07/06/19 0709)  Height: 6' 2" (188 cm) (07/05/19 1514)  Weight - Scale: (!) 163 kg (359 lb 14 4 oz) (07/06/19 0600)  SpO2: 100 % (07/06/19 0709)  Exam:   Physical Exam   Constitutional: He is oriented to person, place, and time  Vital signs are normal  He appears well-developed and well-nourished  He is cooperative  HENT:   Head: Normocephalic and atraumatic  Nose: Nose normal    Mouth/Throat: Oropharynx is clear and moist and mucous membranes are normal    Eyes: Conjunctivae and EOM are normal    Neck: Normal range of motion and full passive range of motion without pain  Neck supple  Cardiovascular: Normal rate, regular rhythm, normal heart sounds and normal pulses  Patient does have chronic bilateral lower extremity lymphedema, redness has decreased significantly on the right lower extremity  Foot and ankle remain edematous, however patient states he is able to ambulate without pain  Pulmonary/Chest: Effort normal and breath sounds normal    Abdominal: Soft  Normal appearance and bowel sounds are normal    Musculoskeletal:        Right ankle: He exhibits swelling  Neurological: He is alert and oriented to person, place, and time  Skin: Skin is warm     Psychiatric: He has a normal mood and affect  His speech is normal and behavior is normal        Discussion with Family:  Continued medications as an outpatient along with wound care and lotions  Discharge instructions/Information to patient and family:   See after visit summary for information provided to patient and family  Provisions for Follow-Up Care:  See after visit summary for information related to follow-up care and any pertinent home health orders  Disposition:     Home    For Discharges to [facility]:   · Not Applicable to this Patient - Not Applicable to this Patient    Planned Readmission:    No     Discharge Statement:  I spent 45 minutes discharging the patient  This time was spent on the day of discharge  I had direct contact with the patient on the day of discharge  Greater than 50% of the total time was spent examining patient, answering all patient questions, arranging and discussing plan of care with patient as well as directly providing post-discharge instructions  Additional time then spent on discharge activities  Discharge Medications:  See after visit summary for reconciled discharge medications provided to patient and family        ** Please Note: This note has been constructed using a voice recognition system **

## 2019-07-06 NOTE — ASSESSMENT & PLAN NOTE
· Redness and edema extend from tip of the toes into groin, small wound on right foot below toes - improved  · Will continue Vanco and Zosyn  · Follow-up CT leg results: Soft tissue edema extending from the knee, distally; Limited evaluation of the vascular structures  · Will trend procalcitonin - improving  · Follow up cultures - negative to date

## 2019-07-06 NOTE — DISCHARGE INSTRUCTIONS
Potassium Content of Foods List   WHAT YOU NEED TO KNOW:   Potassium is a mineral that is found in most foods  Potassium helps to balance fluids and minerals in your body  It also helps your body maintain a normal blood pressure  Potassium helps your muscles contract and your nerves function normally  You may need to increase or decrease potassium if you have certain health conditions  DISCHARGE INSTRUCTIONS:   Why you may need to change the amount of potassium you eat:   · You may need more potassium  if you have hypokalemia (low potassium levels) or high blood pressure  You may also need more potassium if you are taking diuretics  Diuretics and certain medicines cause your body to lose potassium  · You may need less potassium  in your diet if you have hyperkalemia (high potassium levels) or kidney disease  Potassium content of fruit:  The amount of potassium in milligrams (mg) contained in each fruit or serving of fruit is listed beside the item    · High-potassium foods (more than 200 mg per serving):      ¨ 1 medium banana (425)    ¨ ½ of a papaya (390)    ¨ ½ cup of prune juice (370)    ¨ ¼ cup of raisins (270)    ¨ 1 medium jossie (325) or kiwi (240)    ¨ 1 small orange (240) or ½ cup of orange juice (235)    ¨ ½ cup of cubed cantaloupe (215) or diced honeydew melon (200)    ¨ 1 medium pear (200)    · Medium-potassium foods (50 to 200 mg per serving):      ¨ 1 medium peach (185)    ¨ 1 small apple or ½ cup of apple juice (150)    ¨ ½ cup of peaches canned in juice (120)    ¨ ½ cup of canned pineapple (100)    ¨ ½ cup of fresh, sliced strawberries (314)    ¨ ½ cup of watermelon (85)    · Low-potassium foods (less than 50 mg per serving):      ¨ ½ cup of cranberries (45) or cranberry juice cocktail (20)    ¨ ½ cup of nectar of papaya, jossie, or pear (35)  Potassium content of vegetables:   · High-potassium foods (more than 200 mg per serving):      ¨ 1 medium baked potato, with skin (925)    ¨ 1 baked medium sweet potato, with skin (450)    ¨ ½ cup of tomato or vegetable juice (275), or 1 medium raw tomato (290)    ¨ ½ cup of mushrooms (280)    ¨ ½ cup of fresh brussels sprouts (250)    ¨ ½ cup of cooked zucchini (220) or winter squash (250)    ¨ ¼ of a medium avocado (245)    ¨ ½ cup of broccoli (230)    · Medium-potassium foods (50 to 200 mg per serving):      ¨ ½ cup of corn (195)    ¨ ½ cup of fresh or cooked carrots (180)    ¨ ½ cup of fresh cauliflower (150)    ¨ ½ cup of asparagus (155)    ¨ ½ cup of canned peas (90)     ¨ 1 cup of lettuce, all types (100)    ¨ ½ cup of fresh green beans (90)    ¨ ½ cup of frozen green beans (85)    ¨ ½ cup of cucumber (80)  Potassium content of protein foods:   · High-potassium foods (more than 200 mg per serving):      ¨ ½ cup of cooked correia beans (400) or lentils (365)    ¨ 1 cup of soy milk (300)    ¨ 3 ounces of baked or broiled salmon (319)    ¨ 3 ounces of roasted turkey, dark meat (250)    ¨ ¼ cup of sunflower seeds (241)    ¨ 3 ounces of cooked lean beef (224)    ¨ 2 tablespoons of smooth peanut butter (210)    · Medium-potassium foods (50 to 200 mg per serving):      ¨ 1 ounce of salted peanuts, almonds, or cashews (200)    ¨ 1 large egg (60 mg)  Potassium content of dairy foods:   · High-potassium foods (more than 200 mg per serving):      ¨ 6 ounces of yogurt (260 to 435)    ¨ 1 cup of nonfat, low-fat, or whole milk (350 to 380)    · Medium-potassium foods (50 to 200 mg per serving):      ¨ ½ cup of ricotta cheese (154)    ¨ ½ cup of vanilla ice cream (131)    ¨ ½ cup of low-fat (2%) cottage cheese (110)    · Low-potassium foods (less than 50 mg per serving):      ¨ 1 ounce of cheese (20 to 30)    Potassium content of grains:   · 1 slice of white bread (30)    · ½ cup of white or brown rice (50)    · ½ cup of spaghetti or macaroni (30)    · 1 flour or corn tortilla (50)    · 1 four-inch waffle (50)  Potassium content of other foods:   · 1 tablespoon of molasses (295)    · 1½ ounces of chocolate (165)    · Some salt substitutes may contain a high amount of potassium  Check the food label to find the amount of potassium it contains  © 2017 [employer] Information is for End User's use only and may not be sold, redistributed or otherwise used for commercial purposes  All illustrations and images included in CareNotes® are the copyrighted property of [employer]  or [employer]  The above information is an  only  It is not intended as medical advice for individual conditions or treatments  Talk to your doctor, nurse or pharmacist before following any medical regimen to see if it is safe and effective for you

## 2019-07-06 NOTE — DISCHARGE INSTR - AVS FIRST PAGE
CTA lower extremity right w wo contrast   Status: Final result   PACS Images      Show images for CTA lower extremity right w wo contrast   Study Result     CT ANGIOGRAM OF THE RIGHT LOWER EXTREMITIES WITH IV CONTRAST     INDICATION: Extreme redness and swelling of lower leg for several days       COMPARISON: None      TECHNIQUE:  CT angiogram examination of the abdomen, pelvis, and lower extremities was performed according to standard protocol with intravenous contrast  100 ml of Omnipaque 350 was injected  3D reconstructions were performed an independent workstation,   and are supplied for review  This examination, like all CT scans performed in the [facility], was performed utilizing techniques to minimize radiation dose exposure, including the use of iterative reconstruction and automated exposure   control      FINDINGS:     VASCULAR STRUCTURES: The vascular structures cannot be evaluated due to timing of the bolus  Both the arterial and venous enhancement are in the equilibrium phase  Therefore, the arterial anatomy is not evaluated        OTHER FINDINGS:  LOWER EXTREMITY:     Right inguinal lymph nodes likely reactive      Soft tissue edema is noted from the calf distally with extensive subcutaneous emphysema at the level of the ankle and foot    No soft tissue emphysema is present to suggest necrotizing fasciitis      The muscles in the lower extremity are partially fatty replaced suggesting atrophy      OSSEOUS STRUCTURES: No acute fracture or destructive osseous lesion      IMPRESSION:     Soft tissue edema extending from the knee, distally     Limited evaluation of the vascular structures     XR foot 3+ vw right   Status: Final result   PACS Images      Show images for XR foot 3+ vw right   Study Result     RIGHT FOOT     INDICATION:   Evaluate for abscess right dorsal foot      COMPARISON:  None     VIEWS:  XR FOOT 3+ VW RIGHT         FINDINGS:     There is no acute fracture or dislocation      Degenerative changes of the 1st MTP joint  Calcaneal spur(s) noted      No lytic or blastic lesions seen      Diffuse soft tissue swelling  No soft tissue gas demonstrated      IMPRESSION:     No acute osseous abnormality

## 2019-07-06 NOTE — PLAN OF CARE
Problem: Potential for Falls  Goal: Patient will remain free of falls  Description  INTERVENTIONS:  - Assess patient frequently for physical needs  -  Identify cognitive and physical deficits and behaviors that affect risk of falls    -  Holcomb fall precautions as indicated by assessment   - Educate patient/family on patient safety including physical limitations  - Instruct patient to call for assistance with activity based on assessment  - Modify environment to reduce risk of injury  - Consider OT/PT consult to assist with strengthening/mobility  Outcome: Progressing     Problem: PAIN - ADULT  Goal: Verbalizes/displays adequate comfort level or baseline comfort level  Description  Interventions:  - Encourage patient to monitor pain and request assistance  - Assess pain using appropriate pain scale  - Administer analgesics based on type and severity of pain and evaluate response  - Implement non-pharmacological measures as appropriate and evaluate response  - Consider cultural and social influences on pain and pain management  - Notify physician/advanced practitioner if interventions unsuccessful or patient reports new pain  Outcome: Progressing     Problem: INFECTION - ADULT  Goal: Absence or prevention of progression during hospitalization  Description  INTERVENTIONS:  - Assess and monitor for signs and symptoms of infection  - Monitor lab/diagnostic results  - Monitor all insertion sites, i e  indwelling lines, tubes, and drains  - Monitor endotracheal (as able) and nasal secretions for changes in amount and color  - Holcomb appropriate cooling/warming therapies per order  - Administer medications as ordered  - Instruct and encourage patient and family to use good hand hygiene technique  - Identify and instruct in appropriate isolation precautions for identified infection/condition  Outcome: Progressing  Goal: Absence of fever/infection during neutropenic period  Description  INTERVENTIONS:  - Monitor WBC  - Implement neutropenic guidelines  Outcome: Progressing     Problem: DISCHARGE PLANNING  Goal: Discharge to home or other facility with appropriate resources  Description  INTERVENTIONS:  - Identify barriers to discharge w/patient and caregiver  - Arrange for needed discharge resources and transportation as appropriate  - Identify discharge learning needs (meds, wound care, etc )  - Arrange for interpretive services to assist at discharge as needed  - Refer to Case Management Department for coordinating discharge planning if the patient needs post-hospital services based on physician/advanced practitioner order or complex needs related to functional status, cognitive ability, or social support system  Outcome: Progressing     Problem: SKIN/TISSUE INTEGRITY - ADULT  Goal: Skin integrity remains intact  Description  INTERVENTIONS  - Identify patients at risk for skin breakdown  - Assess and monitor skin integrity  - Assess and monitor nutrition and hydration status  - Monitor labs (i e  albumin)  - Assess for incontinence   - Turn and reposition patient  - Assist with mobility/ambulation  - Relieve pressure over bony prominences  - Avoid friction and shearing  - Provide appropriate hygiene as needed including keeping skin clean and dry  - Evaluate need for skin moisturizer/barrier cream  - Collaborate with interdisciplinary team (i e  Nutrition, Rehabilitation, etc )   - Patient/family teaching  Outcome: Progressing  Goal: Incision(s), wounds(s) or drain site(s) healing without S/S of infection  Description  INTERVENTIONS  - Assess and document risk factors for skin impairment   - Assess and document dressing, incision, wound bed, drain sites and surrounding tissue  - Initiate Nutrition services consult and/or wound management as needed  Outcome: Progressing     Problem: DISCHARGE PLANNING - CARE MANAGEMENT  Goal: Discharge to post-acute care or home with appropriate resources  Description  INTERVENTIONS:  - Conduct assessment to determine patient/family and health care team treatment goals, and need for post-acute services based on payer coverage, community resources, and patient preferences, and barriers to discharge  - Address psychosocial, clinical, and financial barriers to discharge as identified in assessment in conjunction with the patient/family and health care team  - Arrange appropriate level of post-acute services according to patient's   needs and preference and payer coverage in collaboration with the physician and health care team  - Communicate with and update the patient/family, physician, and health care team regarding progress on the discharge plan  - Arrange appropriate transportation to post-acute venues  Discharge home with son who will transport  Outcome: Progressing     Problem: Nutrition/Hydration-ADULT  Goal: Nutrient/Hydration intake appropriate for improving, restoring or maintaining nutritional needs  Description  Monitor and assess patient's nutrition/hydration status for malnutrition (ex- brittle hair, bruises, dry skin, pale skin and conjunctiva, muscle wasting, smooth red tongue, and disorientation)  Collaborate with interdisciplinary team and initiate plan and interventions as ordered  Monitor patient's weight and dietary intake as ordered or per policy  Utilize nutrition screening tool and intervene per policy  Determine patient's food preferences and provide high-protein, high-caloric foods as appropriate       INTERVENTIONS:  - Monitor oral intake, urinary output, labs, and treatment plans  - Assess nutrition and hydration status and recommend course of action  - Evaluate amount of meals eaten  - Assist patient with eating if necessary   - Allow adequate time for meals  - Recommend/ encourage appropriate diets, oral nutritional supplements, and vitamin/mineral supplements  - Order, calculate, and assess calorie counts as needed  - Recommend, monitor, and adjust tube feedings and TPN/PPN based on assessed needs  - Assess need for intravenous fluids  - Provide specific nutrition/hydration education as appropriate  - Include patient/family/caregiver in decisions related to nutrition  Outcome: Progressing

## 2019-07-06 NOTE — SOCIAL WORK
Pt for discharge home today per [facility]  Pt has no identified discharge needs at this time  His son will transport him home

## 2019-07-06 NOTE — NURSING NOTE
Pt discharged to home  IV removed with catheter tip intact  Pressure and dressing applied until bleeding stopped  Dressing CDI  Discharge instructions discussed  Pt verbalized understanding of instructions  Belongings sent with patient  VSS  Pt waiting on family for transportation  Late entry: 7/6/19 @ 1000 family arrived to transport patient  Family was visibly upset and voiced frustrations with transition of care/care that was received during this hospital visit  I tried to diffuse the situation but was unable to give answers that would be satisfying to the family  Offered to get the nursing supervisor to speak with the family but the family refused, stating that the issues were not with nursing staff  Discussed the discharge instructions again and gave the family information on foods high in potassium  Hospitilist notified of family's concerns

## 2019-07-07 VITALS
DIASTOLIC BLOOD PRESSURE: 88 MMHG | WEIGHT: 315 LBS | BODY MASS INDEX: 40.43 KG/M2 | RESPIRATION RATE: 20 BRPM | OXYGEN SATURATION: 100 % | TEMPERATURE: 97.6 F | HEIGHT: 74 IN | SYSTOLIC BLOOD PRESSURE: 135 MMHG | HEART RATE: 94 BPM

## 2019-07-09 LAB
BACTERIA BLD CULT: NORMAL
BACTERIA BLD CULT: NORMAL

## 2019-07-22 ENCOUNTER — PATIENT OUTREACH (OUTPATIENT)
Dept: FAMILY MEDICINE CLINIC | Facility: CLINIC | Age: 64
End: 2019-07-22

## 2019-07-22 NOTE — PROGRESS NOTES
Outpatient Care Management Note:    Pt is part of a BPCI episode (, sepsis) with a closure date of 10/3/19  Chart review done  Contacted pt to introduce self and Outpatient Care Management, follow up on health, and outreach for any possible needed services  Pt reports that he is doing extremely well at this time  He reported that his legs are so much better and that his swelling is better  He reported that he has everything in place that he needs and denies having any questions regarding his health  He is declining future calls from [facility] because he feels he doesn't need them  Did provide my contact info and encouraged pt to contact me if he changes his mind, or if any issues, questions, or concerns arise  He was thankful and agreeable  Will monitor via chart reviews for ED or admission alerts only and close case on episode end date

## 2019-08-14 DIAGNOSIS — L97.509 TYPE 2 DIABETES MELLITUS WITH FOOT ULCER, WITHOUT LONG-TERM CURRENT USE OF INSULIN (HCC): Primary | Chronic | ICD-10-CM

## 2019-08-14 DIAGNOSIS — E11.621 TYPE 2 DIABETES MELLITUS WITH FOOT ULCER, WITHOUT LONG-TERM CURRENT USE OF INSULIN (HCC): Primary | Chronic | ICD-10-CM

## 2019-08-14 RX ORDER — GLIPIZIDE 5 MG/1
5 TABLET ORAL DAILY
Qty: 90 TABLET | Refills: 3 | Status: SHIPPED | OUTPATIENT
Start: 2019-08-14 | End: 2020-08-11 | Stop reason: SDUPTHER

## 2019-08-14 RX ORDER — GLIPIZIDE 5 MG/1
5 TABLET ORAL DAILY
Refills: 0 | Status: CANCELLED | OUTPATIENT
Start: 2019-08-14

## 2019-08-21 DIAGNOSIS — E11.621 TYPE 2 DIABETES MELLITUS WITH FOOT ULCER, WITHOUT LONG-TERM CURRENT USE OF INSULIN (HCC): Primary | Chronic | ICD-10-CM

## 2019-08-21 DIAGNOSIS — L97.509 TYPE 2 DIABETES MELLITUS WITH FOOT ULCER, WITHOUT LONG-TERM CURRENT USE OF INSULIN (HCC): Primary | Chronic | ICD-10-CM

## 2019-09-30 ENCOUNTER — OFFICE VISIT (OUTPATIENT)
Dept: FAMILY MEDICINE CLINIC | Facility: CLINIC | Age: 64
End: 2019-09-30
Payer: MEDICARE

## 2019-09-30 VITALS
WEIGHT: 315 LBS | BODY MASS INDEX: 40.43 KG/M2 | SYSTOLIC BLOOD PRESSURE: 140 MMHG | HEART RATE: 84 BPM | DIASTOLIC BLOOD PRESSURE: 86 MMHG | HEIGHT: 74 IN | OXYGEN SATURATION: 100 %

## 2019-09-30 DIAGNOSIS — M1A.00X0 IDIOPATHIC CHRONIC GOUT WITHOUT TOPHUS, UNSPECIFIED SITE: ICD-10-CM

## 2019-09-30 DIAGNOSIS — I10 ESSENTIAL HYPERTENSION: Chronic | ICD-10-CM

## 2019-09-30 DIAGNOSIS — G47.33 OSA (OBSTRUCTIVE SLEEP APNEA): Chronic | ICD-10-CM

## 2019-09-30 DIAGNOSIS — Z53.20 ANTENATAL SCREENING FOR HEPATITIS B VIRUS DECLINED: ICD-10-CM

## 2019-09-30 DIAGNOSIS — Z23 IMMUNIZATION DUE: Primary | ICD-10-CM

## 2019-09-30 DIAGNOSIS — I48.20 CHRONIC ATRIAL FIBRILLATION (HCC): Chronic | ICD-10-CM

## 2019-09-30 DIAGNOSIS — E11.621 TYPE 2 DIABETES MELLITUS WITH FOOT ULCER, WITHOUT LONG-TERM CURRENT USE OF INSULIN (HCC): Chronic | ICD-10-CM

## 2019-09-30 DIAGNOSIS — E11.42 DIABETIC POLYNEUROPATHY ASSOCIATED WITH TYPE 2 DIABETES MELLITUS (HCC): ICD-10-CM

## 2019-09-30 DIAGNOSIS — E78.2 MIXED HYPERLIPIDEMIA: Chronic | ICD-10-CM

## 2019-09-30 DIAGNOSIS — Z11.59 ENCOUNTER FOR HEPATITIS C SCREENING TEST FOR LOW RISK PATIENT: ICD-10-CM

## 2019-09-30 DIAGNOSIS — L97.509 TYPE 2 DIABETES MELLITUS WITH FOOT ULCER, WITHOUT LONG-TERM CURRENT USE OF INSULIN (HCC): Chronic | ICD-10-CM

## 2019-09-30 DIAGNOSIS — Z79.899 ENCOUNTER FOR LONG-TERM (CURRENT) USE OF OTHER MEDICATIONS: ICD-10-CM

## 2019-09-30 PROBLEM — Z86.73 H/O: CVA (CEREBROVASCULAR ACCIDENT): Status: ACTIVE | Noted: 2019-09-30

## 2019-09-30 PROBLEM — M10.9 GOUT, UNSPECIFIED: Status: ACTIVE | Noted: 2019-09-30

## 2019-09-30 LAB
CREAT UR-MCNC: <13 MG/DL
MICROALBUMIN UR-MCNC: 11.3 MG/L (ref 0–20)
MICROALBUMIN/CREAT 24H UR: >87 MG/G CREATININE (ref 0–30)

## 2019-09-30 PROCEDURE — 90662 IIV NO PRSV INCREASED AG IM: CPT | Performed by: FAMILY MEDICINE

## 2019-09-30 PROCEDURE — 82570 ASSAY OF URINE CREATININE: CPT | Performed by: FAMILY MEDICINE

## 2019-09-30 PROCEDURE — 82043 UR ALBUMIN QUANTITATIVE: CPT | Performed by: FAMILY MEDICINE

## 2019-09-30 PROCEDURE — G0008 ADMIN INFLUENZA VIRUS VAC: HCPCS | Performed by: FAMILY MEDICINE

## 2019-09-30 PROCEDURE — 90670 PCV13 VACCINE IM: CPT | Performed by: FAMILY MEDICINE

## 2019-09-30 PROCEDURE — 99214 OFFICE O/P EST MOD 30 MIN: CPT | Performed by: FAMILY MEDICINE

## 2019-09-30 PROCEDURE — G0009 ADMIN PNEUMOCOCCAL VACCINE: HCPCS | Performed by: FAMILY MEDICINE

## 2019-09-30 RX ORDER — VALSARTAN 80 MG/1
80 TABLET ORAL DAILY
COMMUNITY
End: 2020-09-16 | Stop reason: CLARIF

## 2019-09-30 RX ORDER — ALLOPURINOL 100 MG/1
100 TABLET ORAL DAILY
COMMUNITY
End: 2019-10-02 | Stop reason: DRUGHIGH

## 2019-09-30 NOTE — PROGRESS NOTES
Assessment/Plan:    Diabetic polyneuropathy associated with type 2 diabetes mellitus ([facility] [address] )    Lab Results   Component Value Date    HGBA1C 6 7 (H) 07/04/2019    Patient has diabetes mellitus which has been well controlled  Patient tells me he has checked his blood sugars and they seem to be well controlled  He does have neuropathy on exam   We discussed diabetic foot care  I also recommend he see Ophthalmology for dilated eye exam   He is overdue  His podiatrist apparently recommended diabetic shoes for the patient  I agree  He is at risk because of his neuropathy  Fasting blood work was ordered  I will make recommendations to the patient when I see the results  DIAMOND (obstructive sleep apnea)  Patient has obstructive sleep apnea  He has been noncompliant with treatment with CPAP  I feel this is significantly contributed to his lower extremity edema and I discussed this with the patient  I advised the patient that he should wear his CPAP nightly but he tells me he simply cannot    Chronic atrial fibrillation [facility])  Patient has chronic atrial fibrillation  Rate is controlled  He will continue anticoagulation with Eliquis  Gout, unspecified  Patient's gout is currently stable  He is on low-dose allopurinol  His allopurinol dose will likely need to be increased  A repeat uric acid level was ordered  Mixed hyperlipidemia  A fasting lipid panel was ordered  Patient will continue atorvastatin  His goal LDL cholesterol is less than 70  Essential hypertension  Blood pressure is less than optimally controlled on his current medications  For now, I have elected to follow his blood pressure  I instructed the patient to follow a low-sodium diet he absolutely must lose weight  If he can lose weight, I think his blood pressure will come down  If not, we are going to have to titrate up his medication         Diagnoses and all orders for this visit:    Immunization due  -     influenza vaccine, [id number], high-dose, PF 0 5 mL (FLUZONE HIGH-DOSE)  -     PNEUMOCOCCAL CONJUGATE VACCINE 13-VALENT GREATER THAN 6 MONTHS     screening for hepatitis B virus declined    Encounter for hepatitis C screening test for low risk patient  -     Hepatitis C antibody; Future    Type 2 diabetes mellitus with foot ulcer, without long-term current use of insulin (HCC)  -     Microalbumin / creatinine urine ratio    Diabetic polyneuropathy associated with type 2 diabetes mellitus ([facility] [address] )  -     Comprehensive metabolic panel; Future  -     Hemoglobin A1C; Future    Idiopathic chronic gout without tophus, unspecified site  -     Uric acid; Future    Mixed hyperlipidemia  -     Lipid panel; Future    Encounter for long-term (current) use of other medications  -     CBC and differential; Future    DIAMOND (obstructive sleep apnea)    Chronic atrial fibrillation (HCC)    Essential hypertension    Other orders  -     allopurinol (ZYLOPRIM) 100 mg tablet; Take 100 mg by mouth daily  -     valsartan (DIOVAN) 80 mg tablet; Take 80 mg by mouth daily          Subjective:      Patient ID: Enoc Kevin is a 75 y o  male  This patient is a 57-year-old white male presents to the office today for his routine checkup  The patient is doing well  He was hospitalized in July for cellulitis of the leg again  He continues to experience massive lower extremity edema  He tells me he got a prescription for diabetic shoes from Podiatry  He has been checking his blood sugars and he tells me they are excellent  He reports compliance with his medication  However, he has known obstructive sleep apnea  He does not wear his CPAP mask  He tells me he is intolerant of the mask        The following portions of the patient's history were reviewed and updated as appropriate: allergies, current medications, past family history, past medical history, past social history, past surgical history and problem list     Review of Systems   Constitutional: Negative for activity change, appetite change, diaphoresis, fatigue and unexpected weight change  HENT: Positive for postnasal drip  Reports scratchy throat   Respiratory: Negative for cough, choking, shortness of breath and wheezing  Cardiovascular: Positive for leg swelling  Negative for chest pain and palpitations  Gastrointestinal: Negative for abdominal distention, abdominal pain, blood in stool, constipation, diarrhea, nausea and vomiting  Endocrine: Negative for polydipsia, polyphagia and polyuria  Musculoskeletal: Negative for gait problem  Psychiatric/Behavioral:        He reports he no longer has patience         Objective:      /86   Pulse 84   Ht 6' 2" (1 88 m)   Wt (!) 162 kg (356 lb 9 6 oz)   SpO2 100%   BMI 45 78 kg/m²          Physical Exam   Constitutional:   The patient is a morbidly obese 53-year-old white male who appears his stated age  He was in no apparent distress   HENT:   Head: Normocephalic and atraumatic  Right Ear: External ear normal    Left Ear: External ear normal    Mouth/Throat: Oropharynx is clear and moist  No oropharyngeal exudate  Tympanic membranes are clear   Eyes: Pupils are equal, round, and reactive to light  Conjunctivae are normal  Right eye exhibits no discharge  Left eye exhibits no discharge  No scleral icterus  Neck: Neck supple  No tracheal deviation present  No thyromegaly present  Cardiovascular: Normal rate and normal heart sounds  Exam reveals no gallop and no friction rub  Pulses are weak pulses  No murmur heard  Pulses:       Dorsalis pedis pulses are 0 on the right side, and 0 on the left side  Posterior tibial pulses are 0 on the right side, and 0 on the left side  The heart is irregularly irregular with a well-controlled ventricular response  Pulmonary/Chest: Effort normal and breath sounds normal  No stridor  No respiratory distress  He has no wheezes  He has no rales     Feet:   Right Foot:   Skin Integrity: Negative for ulcer, skin breakdown, erythema, warmth, callus or dry skin  Left Foot:   Skin Integrity: Negative for ulcer, skin breakdown, erythema, warmth, callus or dry skin  Lymphadenopathy:     He has no cervical adenopathy  Skin: Skin is warm and dry  No rash noted  No erythema  Psychiatric: He has a normal mood and affect  His behavior is normal  Judgment and thought content normal    Vitals reviewed  extremities: There is severe pitting edema noted to the knees bilaterally  No cyanosis or clubbing  Patient's shoes and socks removed  Right Foot/Ankle   Right Foot Inspection  Skin Exam: skin normal and skin intact no dry skin, no warmth, no callus, no erythema, no maceration, no abnormal color, no pre-ulcer, no ulcer and no callus                          Toe Exam: swellingno tenderness, erythema and  no right toe deformity  Sensory   Vibration: diminished  Proprioception: intact   Monofilament testing: diminished  Vascular  Capillary refills: < 3 seconds  The right DP pulse is 0  The right PT pulse is 0  Right Toe  - Comprehensive Exam  Ecchymosis: none  Arch: pes planus  Hammertoes: absent  Claw Toes: absent  Swelling: dorsum, fifth metatarsal base, great toe interphalangeal joint, great toe metatarsophalangeal joint, lesser metatarsophalangeal joints, metatarsals, navicular, neuroma and plantar fascia   Tenderness: none         Left Foot/Ankle  Left Foot Inspection  Skin Exam: skin normal and skin intactno dry skin, no warmth, no erythema, no maceration, normal color, no pre-ulcer, no ulcer and no callus                         Toe Exam: swellingno tenderness, no erythema and no left toe deformity                   Sensory   Vibration: diminished  Proprioception: intact  Monofilament: diminished  Vascular  Capillary refills: < 3 seconds  The left DP pulse is 0  The left PT pulse is 0     Left Toe  - Comprehensive Exam  Ecchymosis: none  Arch: pes planus  Hammertoes: absent  Claw toes: absent  Swelling: dorsum, fifth metatarsal base, great toe interphalangeal joint, great toe metatarsophalangeal joint, lesser metatarsophalangeal joints, metatarsals, navicular, neuroma and plantar fascia   Tenderness: none       Assign Risk Category:  Deformity present;  Loss of protective sensation; Weak pulses       Risk: 2

## 2019-10-01 ENCOUNTER — APPOINTMENT (OUTPATIENT)
Dept: LAB | Facility: HOSPITAL | Age: 64
End: 2019-10-01
Payer: MEDICARE

## 2019-10-01 DIAGNOSIS — Z11.59 ENCOUNTER FOR HEPATITIS C SCREENING TEST FOR LOW RISK PATIENT: ICD-10-CM

## 2019-10-01 DIAGNOSIS — E78.2 MIXED HYPERLIPIDEMIA: Chronic | ICD-10-CM

## 2019-10-01 DIAGNOSIS — M1A.00X0 IDIOPATHIC CHRONIC GOUT WITHOUT TOPHUS, UNSPECIFIED SITE: ICD-10-CM

## 2019-10-01 DIAGNOSIS — E11.42 DIABETIC POLYNEUROPATHY ASSOCIATED WITH TYPE 2 DIABETES MELLITUS (HCC): ICD-10-CM

## 2019-10-01 DIAGNOSIS — Z79.899 ENCOUNTER FOR LONG-TERM (CURRENT) USE OF OTHER MEDICATIONS: ICD-10-CM

## 2019-10-01 LAB
ALBUMIN SERPL BCP-MCNC: 4 G/DL (ref 3.5–5.7)
ALP SERPL-CCNC: 71 U/L (ref 55–165)
ALT SERPL W P-5'-P-CCNC: 16 U/L (ref 7–52)
ANION GAP SERPL CALCULATED.3IONS-SCNC: 7 MMOL/L (ref 4–13)
AST SERPL W P-5'-P-CCNC: 14 U/L (ref 13–39)
BASOPHILS # BLD AUTO: 0 THOUSANDS/ΜL (ref 0–0.1)
BASOPHILS NFR BLD AUTO: 1 % (ref 0–2)
BILIRUB SERPL-MCNC: 0.7 MG/DL (ref 0.2–1)
BUN SERPL-MCNC: 19 MG/DL (ref 7–25)
CALCIUM SERPL-MCNC: 9.8 MG/DL (ref 8.6–10.5)
CHLORIDE SERPL-SCNC: 103 MMOL/L (ref 98–107)
CHOLEST SERPL-MCNC: 158 MG/DL (ref 0–200)
CO2 SERPL-SCNC: 28 MMOL/L (ref 21–31)
CREAT SERPL-MCNC: 0.67 MG/DL (ref 0.7–1.3)
EOSINOPHIL # BLD AUTO: 0.3 THOUSAND/ΜL (ref 0–0.61)
EOSINOPHIL NFR BLD AUTO: 4 % (ref 0–5)
ERYTHROCYTE [DISTWIDTH] IN BLOOD BY AUTOMATED COUNT: 14 % (ref 11.5–14.5)
EST. AVERAGE GLUCOSE BLD GHB EST-MCNC: 154 MG/DL
GFR SERPL CREATININE-BSD FRML MDRD: 100 ML/MIN/1.73SQ M
GLUCOSE P FAST SERPL-MCNC: 180 MG/DL (ref 65–99)
HBA1C MFR BLD: 7 % (ref 4.2–6.3)
HCT VFR BLD AUTO: 39.1 % (ref 42–47)
HCV AB SER QL: NORMAL
HDLC SERPL-MCNC: 37 MG/DL (ref 40–60)
HGB BLD-MCNC: 13.5 G/DL (ref 14–18)
LDLC SERPL CALC-MCNC: 86 MG/DL (ref 0–100)
LYMPHOCYTES # BLD AUTO: 1.5 THOUSANDS/ΜL (ref 0.6–4.47)
LYMPHOCYTES NFR BLD AUTO: 17 % (ref 21–51)
MCH RBC QN AUTO: 33.3 PG (ref 26–34)
MCHC RBC AUTO-ENTMCNC: 34.5 G/DL (ref 31–37)
MCV RBC AUTO: 97 FL (ref 81–99)
MONOCYTES # BLD AUTO: 0.8 THOUSAND/ΜL (ref 0.17–1.22)
MONOCYTES NFR BLD AUTO: 9 % (ref 2–12)
NEUTROPHILS # BLD AUTO: 5.9 THOUSANDS/ΜL (ref 1.4–6.5)
NEUTS SEG NFR BLD AUTO: 69 % (ref 42–75)
NONHDLC SERPL-MCNC: 121 MG/DL
PLATELET # BLD AUTO: 177 THOUSANDS/UL (ref 149–390)
PMV BLD AUTO: 8.7 FL (ref 8.6–11.7)
POTASSIUM SERPL-SCNC: 4.6 MMOL/L (ref 3.5–5.5)
PROT SERPL-MCNC: 8.4 G/DL (ref 6.4–8.9)
RBC # BLD AUTO: 4.05 MILLION/UL (ref 4.3–5.9)
SODIUM SERPL-SCNC: 138 MMOL/L (ref 134–143)
TRIGL SERPL-MCNC: 173 MG/DL (ref 44–166)
URATE SERPL-MCNC: 8.8 MG/DL (ref 2.3–7.6)
WBC # BLD AUTO: 8.5 THOUSAND/UL (ref 4.8–10.8)

## 2019-10-01 PROCEDURE — 83036 HEMOGLOBIN GLYCOSYLATED A1C: CPT

## 2019-10-01 PROCEDURE — 80053 COMPREHEN METABOLIC PANEL: CPT

## 2019-10-01 PROCEDURE — 80061 LIPID PANEL: CPT

## 2019-10-01 PROCEDURE — 86803 HEPATITIS C AB TEST: CPT

## 2019-10-01 PROCEDURE — 36415 COLL VENOUS BLD VENIPUNCTURE: CPT

## 2019-10-01 PROCEDURE — 85025 COMPLETE CBC W/AUTO DIFF WBC: CPT

## 2019-10-01 PROCEDURE — 84550 ASSAY OF BLOOD/URIC ACID: CPT

## 2019-10-01 NOTE — ASSESSMENT & PLAN NOTE
Patient has obstructive sleep apnea  He has been noncompliant with treatment with CPAP  I feel this is significantly contributed to his lower extremity edema and I discussed this with the patient    I advised the patient that he should wear his CPAP nightly but he tells me he simply cannot

## 2019-10-01 NOTE — ASSESSMENT & PLAN NOTE
Patient's gout is currently stable  He is on low-dose allopurinol  His allopurinol dose will likely need to be increased  A repeat uric acid level was ordered

## 2019-10-01 NOTE — ASSESSMENT & PLAN NOTE
Patient has chronic atrial fibrillation  Rate is controlled  He will continue anticoagulation with Eliquis

## 2019-10-01 NOTE — ASSESSMENT & PLAN NOTE
Lab Results   Component Value Date    HGBA1C 6 7 (H) 07/04/2019    Patient has diabetes mellitus which has been well controlled  Patient tells me he has checked his blood sugars and they seem to be well controlled  He does have neuropathy on exam   We discussed diabetic foot care  I also recommend he see Ophthalmology for dilated eye exam   He is overdue  His podiatrist apparently recommended diabetic shoes for the patient  I agree  He is at risk because of his neuropathy  Fasting blood work was ordered  I will make recommendations to the patient when I see the results

## 2019-10-01 NOTE — ASSESSMENT & PLAN NOTE
A fasting lipid panel was ordered  Patient will continue atorvastatin  His goal LDL cholesterol is less than 70

## 2019-10-01 NOTE — ASSESSMENT & PLAN NOTE
Blood pressure is less than optimally controlled on his current medications  For now, I have elected to follow his blood pressure  I instructed the patient to follow a low-sodium diet he absolutely must lose weight  If he can lose weight, I think his blood pressure will come down  If not, we are going to have to titrate up his medication

## 2019-10-02 DIAGNOSIS — M1A.00X0 IDIOPATHIC CHRONIC GOUT WITHOUT TOPHUS, UNSPECIFIED SITE: ICD-10-CM

## 2019-10-02 DIAGNOSIS — L97.509 TYPE 2 DIABETES MELLITUS WITH FOOT ULCER, WITHOUT LONG-TERM CURRENT USE OF INSULIN (HCC): Primary | Chronic | ICD-10-CM

## 2019-10-02 DIAGNOSIS — E11.621 TYPE 2 DIABETES MELLITUS WITH FOOT ULCER, WITHOUT LONG-TERM CURRENT USE OF INSULIN (HCC): Primary | Chronic | ICD-10-CM

## 2019-10-02 DIAGNOSIS — E78.2 MIXED HYPERLIPIDEMIA: Chronic | ICD-10-CM

## 2019-10-02 RX ORDER — ATORVASTATIN CALCIUM 80 MG/1
80 TABLET, FILM COATED ORAL DAILY
Qty: 30 TABLET | Refills: 5 | Status: SHIPPED | OUTPATIENT
Start: 2019-10-02 | End: 2020-03-30 | Stop reason: DRUGHIGH

## 2019-10-02 RX ORDER — ALLOPURINOL 300 MG/1
300 TABLET ORAL DAILY
Qty: 30 TABLET | Refills: 5 | Status: SHIPPED | OUTPATIENT
Start: 2019-10-02 | End: 2020-04-14 | Stop reason: SDUPTHER

## 2019-10-03 ENCOUNTER — PATIENT OUTREACH (OUTPATIENT)
Dept: FAMILY MEDICINE CLINIC | Facility: CLINIC | Age: 64
End: 2019-10-03

## 2019-10-03 NOTE — PROGRESS NOTES
Outpatient Care Management Note:    Pt is part of a BPCI episode (, sepsis) with a closure date of today  Chart review done  Pt did not consent to calls during BPCI episode  There were no ED visits or inpatient stays noted throughout BPCI episode  Will close case  Message sent to [facility] regarding closure

## 2019-12-23 DIAGNOSIS — I48.20 CHRONIC ATRIAL FIBRILLATION (HCC): Chronic | ICD-10-CM

## 2019-12-23 DIAGNOSIS — I48.20 CHRONIC ATRIAL FIBRILLATION (HCC): Primary | Chronic | ICD-10-CM

## 2019-12-23 RX ORDER — FUROSEMIDE 80 MG
80 TABLET ORAL DAILY
Qty: 90 TABLET | Refills: 3 | Status: SHIPPED | OUTPATIENT
Start: 2019-12-23 | End: 2021-01-05

## 2019-12-23 RX ORDER — FUROSEMIDE 80 MG
80 TABLET ORAL DAILY
Qty: 90 TABLET | Refills: 3 | Status: SHIPPED | OUTPATIENT
Start: 2019-12-23 | End: 2019-12-23 | Stop reason: SDUPTHER

## 2020-01-28 DIAGNOSIS — I10 ESSENTIAL (PRIMARY) HYPERTENSION: ICD-10-CM

## 2020-01-28 RX ORDER — LISINOPRIL 10 MG/1
10 TABLET ORAL DAILY
Qty: 90 TABLET | Refills: 3 | Status: SHIPPED | OUTPATIENT
Start: 2020-01-28 | End: 2020-03-05 | Stop reason: SDUPTHER

## 2020-02-21 ENCOUNTER — TELEPHONE (OUTPATIENT)
Dept: FAMILY MEDICINE CLINIC | Facility: CLINIC | Age: 65
End: 2020-02-21

## 2020-02-21 NOTE — TELEPHONE ENCOUNTER
There is no generic  Maybe switch to pradaxa or xarelto  They are made by different companies  Also very expensive but they have patient assistance programs  Worse case, I can put him on coumadin, but he would need frequent blood testing

## 2020-02-21 NOTE — TELEPHONE ENCOUNTER
Pt is having no luck talking to Jude Chang trying to get back on their Eliquis program     He changed his insurance to [facility]     Asking for a generic brand for Eliquis

## 2020-02-24 DIAGNOSIS — I48.20 CHRONIC ATRIAL FIBRILLATION (HCC): ICD-10-CM

## 2020-03-05 DIAGNOSIS — I10 ESSENTIAL (PRIMARY) HYPERTENSION: ICD-10-CM

## 2020-03-05 PROCEDURE — 4010F ACE/ARB THERAPY RXD/TAKEN: CPT | Performed by: FAMILY MEDICINE

## 2020-03-05 RX ORDER — LISINOPRIL 10 MG/1
10 TABLET ORAL DAILY
Qty: 90 TABLET | Refills: 3 | Status: SHIPPED | OUTPATIENT
Start: 2020-03-05 | End: 2020-11-12 | Stop reason: DRUGHIGH

## 2020-03-30 ENCOUNTER — OFFICE VISIT (OUTPATIENT)
Dept: FAMILY MEDICINE CLINIC | Facility: CLINIC | Age: 65
End: 2020-03-30
Payer: COMMERCIAL

## 2020-03-30 VITALS
HEIGHT: 74 IN | DIASTOLIC BLOOD PRESSURE: 84 MMHG | OXYGEN SATURATION: 98 % | SYSTOLIC BLOOD PRESSURE: 142 MMHG | HEART RATE: 92 BPM | BODY MASS INDEX: 40.43 KG/M2 | WEIGHT: 315 LBS | TEMPERATURE: 97.9 F

## 2020-03-30 DIAGNOSIS — Z12.11 COLON CANCER SCREENING: ICD-10-CM

## 2020-03-30 DIAGNOSIS — M1A.00X0 IDIOPATHIC CHRONIC GOUT WITHOUT TOPHUS, UNSPECIFIED SITE: ICD-10-CM

## 2020-03-30 DIAGNOSIS — L97.509 TYPE 2 DIABETES MELLITUS WITH FOOT ULCER, WITHOUT LONG-TERM CURRENT USE OF INSULIN (HCC): Primary | ICD-10-CM

## 2020-03-30 DIAGNOSIS — E78.2 MIXED HYPERLIPIDEMIA: Chronic | ICD-10-CM

## 2020-03-30 DIAGNOSIS — Z00.00 ENCOUNTER FOR MEDICARE ANNUAL WELLNESS EXAM: ICD-10-CM

## 2020-03-30 DIAGNOSIS — E11.621 TYPE 2 DIABETES MELLITUS WITH FOOT ULCER, WITHOUT LONG-TERM CURRENT USE OF INSULIN (HCC): Primary | ICD-10-CM

## 2020-03-30 DIAGNOSIS — Z01.00 DIABETIC EYE EXAM (HCC): ICD-10-CM

## 2020-03-30 DIAGNOSIS — I10 ESSENTIAL HYPERTENSION: Chronic | ICD-10-CM

## 2020-03-30 DIAGNOSIS — Z12.5 PROSTATE CANCER SCREENING: ICD-10-CM

## 2020-03-30 DIAGNOSIS — E11.9 DIABETIC EYE EXAM (HCC): ICD-10-CM

## 2020-03-30 PROCEDURE — 99214 OFFICE O/P EST MOD 30 MIN: CPT | Performed by: FAMILY MEDICINE

## 2020-03-30 PROCEDURE — 1125F AMNT PAIN NOTED PAIN PRSNT: CPT | Performed by: FAMILY MEDICINE

## 2020-03-30 PROCEDURE — 1160F RVW MEDS BY RX/DR IN RCRD: CPT | Performed by: FAMILY MEDICINE

## 2020-03-30 PROCEDURE — 1170F FXNL STATUS ASSESSED: CPT | Performed by: FAMILY MEDICINE

## 2020-03-30 PROCEDURE — 3079F DIAST BP 80-89 MM HG: CPT | Performed by: FAMILY MEDICINE

## 2020-03-30 PROCEDURE — 1036F TOBACCO NON-USER: CPT | Performed by: FAMILY MEDICINE

## 2020-03-30 PROCEDURE — 4040F PNEUMOC VAC/ADMIN/RCVD: CPT | Performed by: FAMILY MEDICINE

## 2020-03-30 PROCEDURE — G0438 PPPS, INITIAL VISIT: HCPCS | Performed by: FAMILY MEDICINE

## 2020-03-30 PROCEDURE — 3008F BODY MASS INDEX DOCD: CPT | Performed by: FAMILY MEDICINE

## 2020-03-30 PROCEDURE — 3077F SYST BP >= 140 MM HG: CPT | Performed by: FAMILY MEDICINE

## 2020-03-30 RX ORDER — ATORVASTATIN CALCIUM 40 MG/1
1 TABLET, FILM COATED ORAL DAILY
COMMUNITY
Start: 2020-01-27 | End: 2020-08-20

## 2020-03-30 NOTE — PROGRESS NOTES
Assessment and Plan:     Problem List Items Addressed This Visit     None      Visit Diagnoses     Diabetic eye exam [facility])    -  Primary    Relevant Orders    Ambulatory referral to Ophthalmology           Preventive health issues were discussed with patient, and age appropriate screening tests were ordered as noted in patient's After Visit Summary  Personalized health advice and appropriate referrals for health education or preventive services given if needed, as noted in patient's After Visit Summary       History of Present Illness:     Patient presents for Medicare Annual Wellness visit    Patient Care Team:  Taisha Finn DO as PCP - General (Family Medicine)     Problem List:     Patient Active Problem List   Diagnosis    Type 2 diabetes mellitus with foot ulcer, without long-term current use of insulin ([address] )    Vomiting    Chronic pain    Morbid obesity with BMI of 40 0-44 9, adult ([address] )    Dizzy    Bilateral edema of lower extremity    Cellulitis of right lower extremity    Tinnitus of right ear    Chronic atrial fibrillation    Peripheral neuropathy    DIAMOND (obstructive sleep apnea)    Essential hypertension    Sepsis due to cellulitis ([address] )    Mixed hyperlipidemia    Gout, unspecified    H/O: CVA (cerebrovascular accident)    Diabetic polyneuropathy associated with type 2 diabetes mellitus ([address] )      Past Medical and Surgical History:     Past Medical History:   Diagnosis Date    Bilateral cellulitis of lower leg     Bilateral edema of lower extremity     Chronic pain     Diabetes mellitus ([address] )     Gout     last assessed: 4/10/2019    Hemiplegia and hemiparesis following cerebral infarction affecting right dominant side ([address] )     last assessed: 1/10/2019    Hypertension     Lymphedema     DIAMOND (obstructive sleep apnea)     Stroke ([address] )     Venous insufficiency (chronic) (peripheral)     last assessed: 7/5/2018     Past Surgical History:   Procedure Laterality Date    HERNIA REPAIR Family History:     Family History   Problem Relation Age of Onset    Heart failure Mother     Heart failure Father       Social History:     E-Cigarette/Vaping    E-Cigarette Use Never User      E-Cigarette/Vaping Substances    Nicotine No     THC No     CBD No     Flavoring No     Other No     Unknown No      Social History     Socioeconomic History    Marital status:      Spouse name: None    Number of children: None    Years of education: None    Highest education level: None   Occupational History    None   Social Needs    Financial resource strain: None    Food insecurity:     Worry: None     Inability: None    Transportation needs:     Medical: None     Non-medical: None   Tobacco Use    Smoking status: Never Smoker    Smokeless tobacco: Never Used   Substance and Sexual Activity    Alcohol use: Not Currently     Frequency: 2-4 times a month     Drinks per session: 1 or 2    Drug use: No    Sexual activity: Not Currently   Lifestyle    Physical activity:     Days per week: None     Minutes per session: None    Stress: None   Relationships    Social connections:     Talks on phone: None     Gets together: None     Attends Jew service: None     Active member of club or organization: None     Attends meetings of clubs or organizations: None     Relationship status: None    Intimate partner violence:     Fear of current or ex partner: None     Emotionally abused: None     Physically abused: None     Forced sexual activity: None   Other Topics Concern    None   Social History Narrative    None      Medications and Allergies:     Current Outpatient Medications   Medication Sig Dispense Refill    acetaminophen (TYLENOL) 500 mg tablet Take 500 mg by mouth every 6 (six) hours as needed for mild pain      allopurinol (ZYLOPRIM) 300 mg tablet Take 1 tablet (300 mg total) by mouth daily 30 tablet 5    ammonium lactate (LAC-HYDRIN) 12 % lotion Apply topically 2 (two) times a day as needed for dry skin 400 g 0    apixaban (ELIQUIS) 5 mg Take 1 tablet (5 mg total) by mouth 2 (two) times a day 60 tablet 5    atorvastatin (LIPITOR) 40 mg tablet Take 1 tablet by mouth daily      furosemide (LASIX) 80 mg tablet Take 1 tablet (80 mg total) by mouth daily 90 tablet 3    glipiZIDE (GLUCOTROL) 5 mg tablet Take 1 tablet (5 mg total) by mouth daily 90 tablet 3    lisinopril (ZESTRIL) 10 mg tablet Take 1 tablet (10 mg total) by mouth daily 90 tablet 3    metFORMIN (GLUCOPHAGE) 1000 MG tablet Take 1 tablet (1,000 mg total) by mouth 2 (two) times a day with meals 180 tablet 3    metoprolol succinate (TOPROL-XL) 50 mg 24 hr tablet Take 25 mg by mouth daily      valsartan (DIOVAN) 80 mg tablet Take 80 mg by mouth daily       No current facility-administered medications for this visit  No Known Allergies   Immunizations:     Immunization History   Administered Date(s) Administered    Influenza, high dose seasonal 0 5 mL 09/30/2019    Pneumococcal Conjugate 13-Valent 09/30/2019      Health Maintenance:         Topic Date Due    CRC Screening: Colonoscopy  09/30/2020 (Originally 1955)    Hepatitis C Screening  Completed         Topic Date Due    DTaP,Tdap,and Td Vaccines (1 - Tdap) 02/16/1966      Medicare Health Risk Assessment:     /98 (BP Location: Left arm, Patient Position: Sitting, Cuff Size: Large)   Pulse 92   Temp 97 9 °F (36 6 °C) (Tympanic)   Ht 6' 2" (1 88 m)   Wt (!) 160 kg (352 lb 4 8 oz)   SpO2 98%   BMI 45 23 kg/m²      Beti An is here for his Initial Wellness visit  Health Risk Assessment:   Patient rates overall health as good  Patient feels that their physical health rating is slightly better  Eyesight was rated as same  Hearing was rated as same  Patient feels that their emotional and mental health rating is same  Pain experienced in the last 7 days has been none  Patient states that he has experienced no weight loss or gain in last 6 months  Depression Screening:   PHQ-2 Score: 0      Fall Risk Screening: In the past year, patient has experienced: no history of falling in past year      Home Safety:  Patient does not have trouble with stairs inside or outside of their home  Patient has working smoke alarms and has working carbon monoxide detector  Home safety hazards include: none  Nutrition:   Current diet is Regular  Medications:   Patient is not currently taking any over-the-counter supplements  Patient is able to manage medications  Activities of Daily Living (ADLs)/Instrumental Activities of Daily Living (IADLs):   Walk and transfer into and out of bed and chair?: Yes  Dress and groom yourself?: Yes    Bathe or shower yourself?: Yes    Feed yourself?  Yes  Do your laundry/housekeeping?: Yes  Manage your money, pay your bills and track your expenses?: Yes  Make your own meals?: Yes    Do your own shopping?: Yes    Previous Hospitalizations:   Any hospitalizations or ED visits within the last 12 months?: No      Advance Care Planning:   Living will: No    Durable POA for healthcare: No    Advanced directive: No      Cognitive Screening:   Provider or family/friend/caregiver concerned regarding cognition?: No    PREVENTIVE SCREENINGS      Cardiovascular Screening:    General: Screening Not Indicated and History Lipid Disorder      Diabetes Screening:     General: Screening Not Indicated and History Diabetes      Colorectal Cancer Screening:     General: Screening Current    Due for: Cologuard      Prostate Cancer Screening:      Due for: PSA      Osteoporosis Screening:    General: Screening Not Indicated      Abdominal Aortic Aneurysm (AAA) Screening:    Risk factors include: age between 73-69 yo        General: Screening Not Indicated      Lung Cancer Screening:     General: Screening Not Indicated      Hepatitis C Screening:    General: Screening Current      Veryl Kipper, DO

## 2020-03-30 NOTE — ASSESSMENT & PLAN NOTE
Patient has gout which is currently stable  He denies any recent attacks  I did order a uric acid level on him  He reports compliance with his allopurinol

## 2020-03-30 NOTE — PATIENT INSTRUCTIONS

## 2020-03-30 NOTE — ASSESSMENT & PLAN NOTE
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

## 2020-03-30 NOTE — ASSESSMENT & PLAN NOTE
Blood pressure is borderline  When the medical assistant checked his blood pressure, she got 142/98  I recheck his blood pressure and got 84 diastolic  I note that his blood pressures have been borderline    I am going to recommend we increase lisinopril to 20 mg daily

## 2020-03-30 NOTE — ASSESSMENT & PLAN NOTE
Patient has chronic atrial fibrillation  Rate is controlled on metoprolol  He is anticoagulated on Eliquis    I recommended no change with his current regiment

## 2020-03-31 ENCOUNTER — APPOINTMENT (OUTPATIENT)
Dept: LAB | Facility: CLINIC | Age: 65
End: 2020-03-31
Payer: COMMERCIAL

## 2020-03-31 DIAGNOSIS — M1A.00X0 IDIOPATHIC CHRONIC GOUT WITHOUT TOPHUS, UNSPECIFIED SITE: ICD-10-CM

## 2020-03-31 DIAGNOSIS — E78.2 MIXED HYPERLIPIDEMIA: Chronic | ICD-10-CM

## 2020-03-31 DIAGNOSIS — E11.621 TYPE 2 DIABETES MELLITUS WITH FOOT ULCER, WITHOUT LONG-TERM CURRENT USE OF INSULIN (HCC): ICD-10-CM

## 2020-03-31 DIAGNOSIS — L97.509 TYPE 2 DIABETES MELLITUS WITH FOOT ULCER, WITHOUT LONG-TERM CURRENT USE OF INSULIN (HCC): ICD-10-CM

## 2020-03-31 DIAGNOSIS — Z12.5 PROSTATE CANCER SCREENING: ICD-10-CM

## 2020-03-31 LAB
ALBUMIN SERPL BCP-MCNC: 3.4 G/DL (ref 3.5–5)
ALP SERPL-CCNC: 89 U/L (ref 46–116)
ALT SERPL W P-5'-P-CCNC: 20 U/L (ref 12–78)
ANION GAP SERPL CALCULATED.3IONS-SCNC: 5 MMOL/L (ref 4–13)
AST SERPL W P-5'-P-CCNC: 16 U/L (ref 5–45)
BILIRUB SERPL-MCNC: 0.98 MG/DL (ref 0.2–1)
BUN SERPL-MCNC: 13 MG/DL (ref 5–25)
CALCIUM SERPL-MCNC: 9.1 MG/DL (ref 8.3–10.1)
CHLORIDE SERPL-SCNC: 104 MMOL/L (ref 100–108)
CO2 SERPL-SCNC: 29 MMOL/L (ref 21–32)
CREAT SERPL-MCNC: 0.77 MG/DL (ref 0.6–1.3)
GFR SERPL CREATININE-BSD FRML MDRD: 94 ML/MIN/1.73SQ M
GLUCOSE P FAST SERPL-MCNC: 143 MG/DL (ref 65–99)
LDLC SERPL DIRECT ASSAY-MCNC: 83 MG/DL (ref 0–100)
POTASSIUM SERPL-SCNC: 4.1 MMOL/L (ref 3.5–5.3)
PROT SERPL-MCNC: 8.3 G/DL (ref 6.4–8.2)
PSA SERPL-MCNC: 1 NG/ML (ref 0–4)
SODIUM SERPL-SCNC: 138 MMOL/L (ref 136–145)
URATE SERPL-MCNC: 5.7 MG/DL (ref 4.2–8)

## 2020-03-31 PROCEDURE — 84550 ASSAY OF BLOOD/URIC ACID: CPT

## 2020-03-31 PROCEDURE — 80053 COMPREHEN METABOLIC PANEL: CPT

## 2020-03-31 PROCEDURE — G0103 PSA SCREENING: HCPCS

## 2020-03-31 PROCEDURE — 36415 COLL VENOUS BLD VENIPUNCTURE: CPT

## 2020-03-31 PROCEDURE — 83721 ASSAY OF BLOOD LIPOPROTEIN: CPT

## 2020-04-14 DIAGNOSIS — M1A.00X0 IDIOPATHIC CHRONIC GOUT WITHOUT TOPHUS, UNSPECIFIED SITE: ICD-10-CM

## 2020-04-14 RX ORDER — ALLOPURINOL 300 MG/1
300 TABLET ORAL DAILY
Qty: 30 TABLET | Refills: 5 | Status: SHIPPED | OUTPATIENT
Start: 2020-04-14 | End: 2020-10-22 | Stop reason: SDUPTHER

## 2020-07-07 ENCOUNTER — TELEPHONE (OUTPATIENT)
Dept: FAMILY MEDICINE CLINIC | Facility: CLINIC | Age: 65
End: 2020-07-07

## 2020-07-09 ENCOUNTER — APPOINTMENT (OUTPATIENT)
Dept: LAB | Facility: CLINIC | Age: 65
End: 2020-07-09
Payer: COMMERCIAL

## 2020-07-09 DIAGNOSIS — E11.621 TYPE 2 DIABETES MELLITUS WITH FOOT ULCER, WITHOUT LONG-TERM CURRENT USE OF INSULIN (HCC): ICD-10-CM

## 2020-07-09 DIAGNOSIS — L97.509 TYPE 2 DIABETES MELLITUS WITH FOOT ULCER, WITHOUT LONG-TERM CURRENT USE OF INSULIN (HCC): ICD-10-CM

## 2020-07-09 LAB
EST. AVERAGE GLUCOSE BLD GHB EST-MCNC: 143 MG/DL
HBA1C MFR BLD: 6.6 %

## 2020-07-09 PROCEDURE — 3044F HG A1C LEVEL LT 7.0%: CPT | Performed by: FAMILY MEDICINE

## 2020-07-09 PROCEDURE — 36415 COLL VENOUS BLD VENIPUNCTURE: CPT

## 2020-07-09 PROCEDURE — 83036 HEMOGLOBIN GLYCOSYLATED A1C: CPT

## 2020-07-27 ENCOUNTER — TELEPHONE (OUTPATIENT)
Dept: FAMILY MEDICINE CLINIC | Facility: CLINIC | Age: 65
End: 2020-07-27

## 2020-07-27 DIAGNOSIS — R19.5 POSITIVE COLORECTAL CANCER SCREENING USING COLOGUARD TEST: Primary | ICD-10-CM

## 2020-07-27 NOTE — TELEPHONE ENCOUNTER
I CALLED DR LINDQUIST AS REQUESTED BY DR Delaney Conklin  SPOKE TO NATALIA SHE REQUESTED ALL INFORMATION FAXED

## 2020-08-11 DIAGNOSIS — L97.509 TYPE 2 DIABETES MELLITUS WITH FOOT ULCER, WITHOUT LONG-TERM CURRENT USE OF INSULIN (HCC): Chronic | ICD-10-CM

## 2020-08-11 DIAGNOSIS — E11.621 TYPE 2 DIABETES MELLITUS WITH FOOT ULCER, WITHOUT LONG-TERM CURRENT USE OF INSULIN (HCC): Chronic | ICD-10-CM

## 2020-08-11 RX ORDER — GLIPIZIDE 5 MG/1
5 TABLET ORAL DAILY
Qty: 90 TABLET | Refills: 3 | Status: SHIPPED | OUTPATIENT
Start: 2020-08-11 | End: 2021-02-09 | Stop reason: SDUPTHER

## 2020-08-20 DIAGNOSIS — E78.2 MIXED HYPERLIPIDEMIA: Primary | Chronic | ICD-10-CM

## 2020-08-20 RX ORDER — ATORVASTATIN CALCIUM 40 MG/1
TABLET, FILM COATED ORAL
Qty: 90 TABLET | Refills: 1 | Status: SHIPPED | OUTPATIENT
Start: 2020-08-20 | End: 2021-03-18 | Stop reason: SDUPTHER

## 2020-09-08 ENCOUNTER — ANESTHESIA EVENT (OUTPATIENT)
Dept: GASTROENTEROLOGY | Facility: HOSPITAL | Age: 65
End: 2020-09-08

## 2020-09-09 ENCOUNTER — ANESTHESIA (OUTPATIENT)
Dept: GASTROENTEROLOGY | Facility: HOSPITAL | Age: 65
End: 2020-09-09

## 2020-09-09 ENCOUNTER — HOSPITAL ENCOUNTER (OUTPATIENT)
Dept: GASTROENTEROLOGY | Facility: HOSPITAL | Age: 65
Setting detail: OUTPATIENT SURGERY
Discharge: HOME | End: 2020-09-09
Attending: INTERNAL MEDICINE
Payer: COMMERCIAL

## 2020-09-09 VITALS
SYSTOLIC BLOOD PRESSURE: 170 MMHG | TEMPERATURE: 96.4 F | WEIGHT: 315 LBS | OXYGEN SATURATION: 100 % | HEIGHT: 74 IN | DIASTOLIC BLOOD PRESSURE: 84 MMHG | RESPIRATION RATE: 16 BRPM | BODY MASS INDEX: 40.43 KG/M2 | HEART RATE: 70 BPM

## 2020-09-09 VITALS — HEART RATE: 76 BPM

## 2020-09-09 DIAGNOSIS — R19.5 OTHER FECAL ABNORMALITIES: ICD-10-CM

## 2020-09-09 DIAGNOSIS — D64.9 ANEMIA, UNSPECIFIED: ICD-10-CM

## 2020-09-09 LAB
GLUCOSE SERPL-MCNC: 107 MG/DL (ref 65–140)
GLUCOSE SERPL-MCNC: 121 MG/DL (ref 65–140)

## 2020-09-09 PROCEDURE — 82948 REAGENT STRIP/BLOOD GLUCOSE: CPT

## 2020-09-09 PROCEDURE — 88305 TISSUE EXAM BY PATHOLOGIST: CPT | Performed by: PATHOLOGY

## 2020-09-09 RX ORDER — SODIUM CHLORIDE, SODIUM LACTATE, POTASSIUM CHLORIDE, CALCIUM CHLORIDE 600; 310; 30; 20 MG/100ML; MG/100ML; MG/100ML; MG/100ML
125 INJECTION, SOLUTION INTRAVENOUS CONTINUOUS
Status: DISCONTINUED | OUTPATIENT
Start: 2020-09-09 | End: 2020-09-13 | Stop reason: HOSPADM

## 2020-09-09 RX ORDER — PROPOFOL 10 MG/ML
INJECTION, EMULSION INTRAVENOUS AS NEEDED
Status: DISCONTINUED | OUTPATIENT
Start: 2020-09-09 | End: 2020-09-09

## 2020-09-09 RX ORDER — LIDOCAINE HYDROCHLORIDE 20 MG/ML
INJECTION, SOLUTION EPIDURAL; INFILTRATION; INTRACAUDAL; PERINEURAL AS NEEDED
Status: DISCONTINUED | OUTPATIENT
Start: 2020-09-09 | End: 2020-09-09

## 2020-09-09 RX ADMIN — PROPOFOL 100 MG: 10 INJECTION, EMULSION INTRAVENOUS at 08:15

## 2020-09-09 RX ADMIN — PROPOFOL 50 MG: 10 INJECTION, EMULSION INTRAVENOUS at 07:53

## 2020-09-09 RX ADMIN — PROPOFOL 30 MG: 10 INJECTION, EMULSION INTRAVENOUS at 08:02

## 2020-09-09 RX ADMIN — PROPOFOL 50 MG: 10 INJECTION, EMULSION INTRAVENOUS at 07:59

## 2020-09-09 RX ADMIN — PROPOFOL 150 MG: 10 INJECTION, EMULSION INTRAVENOUS at 07:52

## 2020-09-09 RX ADMIN — PROPOFOL 40 MG: 10 INJECTION, EMULSION INTRAVENOUS at 08:09

## 2020-09-09 RX ADMIN — PROPOFOL 50 MG: 10 INJECTION, EMULSION INTRAVENOUS at 08:36

## 2020-09-09 RX ADMIN — PROPOFOL 100 MG: 10 INJECTION, EMULSION INTRAVENOUS at 08:25

## 2020-09-09 RX ADMIN — SODIUM CHLORIDE, SODIUM LACTATE, POTASSIUM CHLORIDE, AND CALCIUM CHLORIDE 125 ML/HR: .6; .31; .03; .02 INJECTION, SOLUTION INTRAVENOUS at 06:55

## 2020-09-09 RX ADMIN — PROPOFOL 30 MG: 10 INJECTION, EMULSION INTRAVENOUS at 08:05

## 2020-09-09 RX ADMIN — LIDOCAINE HYDROCHLORIDE 100 MG: 20 INJECTION, SOLUTION EPIDURAL; INFILTRATION; INTRACAUDAL; PERINEURAL at 07:53

## 2020-09-09 RX ADMIN — PROPOFOL 50 MG: 10 INJECTION, EMULSION INTRAVENOUS at 07:56

## 2020-09-09 NOTE — DISCHARGE INSTRUCTIONS
Colonoscopy   WHAT YOU NEED TO KNOW:   A colonoscopy is a procedure to examine the inside of your colon (intestine) with a scope  Polyps or tissue growths may have been removed during your colonoscopy  It is normal to feel bloated and to have some abdominal discomfort  You should be passing gas  If you have hemorrhoids or you had polyps removed, you may have a small amount of bleeding  DISCHARGE INSTRUCTIONS:   Seek care immediately if:   · You have a large amount of bright red blood in your bowel movements  · Your abdomen is hard and firm and you have severe pain  · You have sudden trouble breathing  Contact your healthcare provider if:   · You develop a rash or hives  · You have a fever within 24 hours of your procedure  · You have not had a bowel movement for 3 days after your procedure  · You have questions or concerns about your condition or care  Activity:   · Do not lift, strain, or run  for 3 days after your procedure  · Rest after your procedure  You have been given medicine to relax you  Do not  drive or make important decisions until the day after your procedure  Return to your normal activity as directed  · Relieve gas and discomfort from bloating  by lying on your right side with a heating pad on your abdomen  You may need to take short walks to help the gas move out  Eat small meals until bloating is relieved  If you had polyps removed: For 7 days after your procedure:  · Do not  take aspirin  · Do not  go on long car rides  Help prevent constipation:   · Eat a variety of healthy foods  Healthy foods include fruit, vegetables, whole-grain breads, low-fat dairy products, beans, lean meat, and fish  Ask if you need to be on a special diet  Your healthcare provider may recommend that you eat high-fiber foods such as cooked beans  Fiber helps you have regular bowel movements  · Drink liquids as directed    Adults should drink between 9 and 13 eight-ounce cups of liquid every day  Ask what amount is best for you  For most people, good liquids to drink are water, juice, and milk  · Exercise as directed  Talk to your healthcare provider about the best exercise plan for you  Exercise can help prevent constipation, decrease your blood pressure and improve your health  Follow up with your healthcare provider as directed:  Write down your questions so you remember to ask them during your visits  © 2017 [address] [employer] Information is for End User's use only and may not be sold, redistributed or otherwise used for commercial purposes  All illustrations and images included in CareNotes® are the copyrighted property of [employer]  or [employer] Hicks  The above information is an  only  It is not intended as medical advice for individual conditions or treatments  Talk to your doctor, nurse or pharmacist before following any medical regimen to see if it is safe and effective for you  Colorectal Polyps   WHAT YOU NEED TO KNOW:   Colorectal polyps are small growths of tissue in the lining of the colon and rectum  Most polyps are hyperplastic polyps and are usually benign (noncancerous)  Certain types of polyps, called adenomatous polyps, may turn into cancer  DISCHARGE INSTRUCTIONS:   Follow up with your healthcare provider or gastroenterologist as directed: You may need to return for more tests, such as another colonoscopy  Write down your questions so you remember to ask them during your visits  Reduce your risk for colorectal polyps:   · Eat a variety of healthy foods:  Healthy foods include fruit, vegetables, whole-grain breads, low-fat dairy products, beans, lean meat, and fish  Ask if you need to be on a special diet  · Maintain a healthy weight:  Ask your healthcare provider if you need to lose weight and how much you need to lose   Ask for help with a weight loss program     · Exercise:  Begin to exercise slowly and do more as you get stronger  Talk with your healthcare provider before you start an exercise program      · Limit alcohol:  Your risk for polyps increases the more you drink  · Do not smoke: If you smoke, it is never too late to quit  Ask for information about how to stop  For support and more information:   · [facility] (Levine, Susan. \[facility].\"") [address] , [place] [address]  Phone: 2- 188 [phone]  Web Address: www digestive  niddk nih gov  Contact your healthcare provider or gastroenterologist if:   · You have a fever  · You have chills, a cough, or feel weak and achy  · You have abdominal pain that does not go away or gets worse after you take medicine  · Your abdomen is swollen  · You are losing weight without trying  · You have questions or concerns about your condition or care  Seek care immediately or call 911 if:   · You have sudden shortness of breath  · You have a fast heart rate, fast breathing, or are too dizzy to stand up  · You have severe abdominal pain  · You see blood in your bowel movement  © 2017 [address] [facility] Information is for End User's use only and may not be sold, redistributed or otherwise used for commercial purposes  All illustrations and images included in CareNotes® are the copyrighted property of [employer] M [employer]  or Eric Hicks  The above information is an  only  It is not intended as medical advice for individual conditions or treatments  Talk to your doctor, nurse or pharmacist before following any medical regimen to see if it is safe and effective for you

## 2020-09-09 NOTE — ANESTHESIA POSTPROCEDURE EVALUATION
Post-Op Assessment Note    CV Status:  Stable  Pain Score: 0    Pain management: adequate     Mental Status:  Alert and awake   Hydration Status:  Euvolemic   PONV Controlled:  Controlled   Airway Patency:  Patent      Post Op Vitals Reviewed: Yes      Staff: CRNA         No complications documented      BP  134/78   Temp     Pulse  78   Resp   16   SpO2   99

## 2020-09-09 NOTE — ANESTHESIA POSTPROCEDURE EVALUATION
Post-Op Assessment Note    CV Status:  Stable  Pain Score: 0    Pain management: adequate     Mental Status:  Alert and awake   Hydration Status:  Euvolemic   PONV Controlled:  Controlled   Airway Patency:  Patent      Post Op Vitals Reviewed: Yes      Staff: CRNA         No complications documented      BP     Temp     Pulse     Resp      SpO2

## 2020-09-09 NOTE — INTERVAL H&P NOTE
H&P reviewed  After examining the patient I find no changes in the patients condition since the H&P had been written      Vitals:    09/09/20 0633   BP: (!) 197/88   Pulse: 83   Resp: 18   Temp: (!) 97 4 °F (36 3 °C)   SpO2: 100%

## 2020-09-09 NOTE — ANESTHESIA PREPROCEDURE EVALUATION
Procedure:  COLONOSCOPY    Relevant Problems   CARDIO   (+) Chronic atrial fibrillation (HCC)   (+) Essential hypertension   (+) Mixed hyperlipidemia      ENDO   (+) Type 2 diabetes mellitus with foot ulcer, without long-term current use of insulin (HCC)      MUSCULOSKELETAL   (+) Gout, unspecified      NEURO/PSYCH   (+) Chronic pain   (+) Diabetic polyneuropathy associated with type 2 diabetes mellitus (HCC)   (+) H/O: CVA (cerebrovascular accident)      PULMONARY   (+) DIAMOND (obstructive sleep apnea)        Physical Exam    Airway    Mallampati score: III  TM Distance: >3 FB  Neck ROM: full     Dental       Cardiovascular      Pulmonary      Other Findings  Multiple missing teeth,none cracked or loose      Anesthesia Plan  ASA Score- 3     Anesthesia Type- IV sedation with anesthesia with ASA Monitors  Additional Monitors:   Airway Plan:           Plan Factors-Exercise tolerance (METS): >4 METS  Chart reviewed  Patient summary reviewed  Patient is not a current smoker  Obstructive sleep apnea risk education given perioperatively  Induction- intravenous  Postoperative Plan-     Informed Consent- Anesthetic plan and risks discussed with patient  I personally reviewed this patient with the CRNA  Discussed and agreed on the Anesthesia Plan with the LEONILA Jauregui

## 2020-09-16 ENCOUNTER — OFFICE VISIT (OUTPATIENT)
Dept: FAMILY MEDICINE CLINIC | Facility: CLINIC | Age: 65
End: 2020-09-16
Payer: COMMERCIAL

## 2020-09-16 VITALS
TEMPERATURE: 98.4 F | HEART RATE: 78 BPM | BODY MASS INDEX: 40.43 KG/M2 | OXYGEN SATURATION: 97 % | HEIGHT: 74 IN | WEIGHT: 315 LBS | SYSTOLIC BLOOD PRESSURE: 130 MMHG | DIASTOLIC BLOOD PRESSURE: 86 MMHG

## 2020-09-16 DIAGNOSIS — Z79.899 ENCOUNTER FOR LONG-TERM (CURRENT) USE OF MEDICATIONS: ICD-10-CM

## 2020-09-16 DIAGNOSIS — E78.2 MIXED HYPERLIPIDEMIA: Chronic | ICD-10-CM

## 2020-09-16 DIAGNOSIS — I48.20 CHRONIC ATRIAL FIBRILLATION (HCC): Chronic | ICD-10-CM

## 2020-09-16 DIAGNOSIS — L57.0 ACTINIC KERATOSES: ICD-10-CM

## 2020-09-16 DIAGNOSIS — Z23 IMMUNIZATION DUE: Primary | ICD-10-CM

## 2020-09-16 DIAGNOSIS — R60.0 BILATERAL EDEMA OF LOWER EXTREMITY: Chronic | ICD-10-CM

## 2020-09-16 DIAGNOSIS — I10 ESSENTIAL HYPERTENSION: Chronic | ICD-10-CM

## 2020-09-16 DIAGNOSIS — L97.509 TYPE 2 DIABETES MELLITUS WITH FOOT ULCER, WITHOUT LONG-TERM CURRENT USE OF INSULIN (HCC): Chronic | ICD-10-CM

## 2020-09-16 DIAGNOSIS — E11.621 TYPE 2 DIABETES MELLITUS WITH FOOT ULCER, WITHOUT LONG-TERM CURRENT USE OF INSULIN (HCC): Chronic | ICD-10-CM

## 2020-09-16 PROCEDURE — 82570 ASSAY OF URINE CREATININE: CPT | Performed by: FAMILY MEDICINE

## 2020-09-16 PROCEDURE — 82043 UR ALBUMIN QUANTITATIVE: CPT | Performed by: FAMILY MEDICINE

## 2020-09-16 PROCEDURE — 99214 OFFICE O/P EST MOD 30 MIN: CPT | Performed by: FAMILY MEDICINE

## 2020-09-16 PROCEDURE — G0008 ADMIN INFLUENZA VIRUS VAC: HCPCS | Performed by: FAMILY MEDICINE

## 2020-09-16 PROCEDURE — 90662 IIV NO PRSV INCREASED AG IM: CPT | Performed by: FAMILY MEDICINE

## 2020-09-16 NOTE — PATIENT INSTRUCTIONS
Foot Care for People with Diabetes   AMBULATORY CARE:   What you need to know about foot care:   · Foot care helps protect your feet and prevent foot ulcers or sores  Long-term high blood sugar levels can damage the blood vessels and nerves in your legs and feet  This damage makes it hard to feel pressure, pain, temperature, and touch  You may not be able to feel a cut or sore, or shoes that are too tight  Foot care is needed to prevent serious problems, such as an infection or amputation  · Diabetes may cause your toes to become crooked or curved under  These changes may affect the way you walk and can lead to increased pressure on your foot  The pressure can decrease blood flow to your feet  Lack of blood flow increases your risk for a foot ulcer  Do not ignore small problems, such as dry skin or small wounds  These can become life-threatening over time without proper care  Contact your healthcare provider if:   · Your feet become numb, weak, or hard to move  · You have pus draining from a sore on your foot  · You have a wound on your foot that gets bigger, deeper, or does not heal      · You see blisters, cuts, scratches, calluses, or sores on your foot  · You have a fever, and your feet become red, warm, and swollen  · Your toenails become thick, curled, or yellow  · You find it hard to check your feet because your vision is poor  · You have questions or concerns about your condition or care  How to care for your feet:   · Check your feet each day  Look at your whole foot, including the bottom, and between and under your toes  Check for wounds, corns, and calluses  Use a mirror to see the bottom of your feet  The skin on your feet may be shiny, tight, or darker than normal  Your feet may also be cold and pale  Feel your feet by running your hands along the tops, bottoms, sides, and between your toes   Redness, swelling, and warmth are signs of blood flow problems that can lead to a foot ulcer  Do not try to remove corns or calluses yourself  · Wash your feet each day with soap and warm water  Do not use hot water, because this can injure your foot  Dry your feet gently with a towel after you wash them  Dry between and under your toes  · Apply lotion or a moisturizer on your dry feet  Ask your healthcare provider what lotions are best to use  Do not put lotion or moisturizer between your toes  · Cut your toenails correctly  File or cut your toenails straight across  Use a soft brush to clean around your toenails  If your toenails are very thick, you may need to have a healthcare provider or specialist cut them  · Protect your feet  Do not walk barefoot or wear your shoes without socks  Check your shoes for rocks or other objects that can hurt your feet  Wear cotton socks to help keep your feet dry  Wear socks without toe seams, or wear them with the seams inside out  Change your socks each day  Do not wear socks that are dirty or damp  · Wear shoes that fit well  Wear shoes that do not rub against any area of your feet  Your shoes should be ½ to ¾ inch (1 to 2 centimeters) longer than your feet  Your shoes should also have extra space around the widest part of your feet  Walking or athletic shoes with laces or straps that adjust are best  Ask your healthcare provider for help to choose shoes that fit you best  Ask him if you need to wear an insert, orthotic, or bandage on your feet  · Go to your follow-up visits  Your healthcare provider will do a foot exam at least once a year  You may need a foot exam more often if you have nerve damage, foot deformities, or ulcers  He will check for nerve damage and how well you can feel your feet  He will check your shoes to see if they fit well  Follow up with your healthcare provider or foot specialist as directed: You will need to have your feet checked at least once a year   You may need a foot exam more often if you have nerve damage, foot deformities, or ulcers  Write down your questions so you remember to ask them during your visits  © 2017 [address] [employer] Information is for End User's use only and may not be sold, redistributed or otherwise used for commercial purposes  All illustrations and images included in CareNotes® are the copyrighted property of [employer]  or Eric Hicks  The above information is an  only  It is not intended as medical advice for individual conditions or treatments  Talk to your doctor, nurse or pharmacist before following any medical regimen to see if it is safe and effective for you

## 2020-09-16 NOTE — PROGRESS NOTES
Assessment/Plan:    Type 2 diabetes mellitus with foot ulcer, without long-term current use of insulin (HCC)    Lab Results   Component Value Date    HGBA1C 6 6 (H) 07/09/2020    Patient has type 2 diabetes mellitus which has been well controlled  I ordered fasting blood work for the patient to have mid to end of October  I encouraged the patient to continue to try to lose weight  I also encouraged him to try to walk for exercise  We discussed routine diabetic foot care  I have also recommended a yearly dilated eye exam   I am going to continue him on his current medications  Chronic atrial fibrillation [facility])  Patient has chronic atrial fibrillation  Rate is well controlled  Continue anticoagulation with Eliquis  Essential hypertension  Patient has hypertension  His blood pressure is well controlled  I recommended no change with his current medications  Actinic keratoses  The diagnosis was explained to the patient  I asked the patient to schedule himself an appointment to see a dermatologist for cryo surgery  I had done this for the patient in the past but I explained to him I no longer have liquid nitrogen  I can no longer offer this service to him  I again advised the patient to wear a wide brimmed hat while out in the sun and also to use sunscreen  Mixed hyperlipidemia  Continue atorvastatin 40 mg daily  I ordered a fasting lipid panel to be done for his next visit  His goal LDL cholesterol is less than 70  Bilateral edema of lower extremity  Patient will continue furosemide  I encouraged him to elevate his legs  He has a sequential venous compression device which I asked him to use to try to get the edema out of his legs  When his legs get swollen, he has a tendency to get cellulitis  Since he has been less active in office feet, this has not been an issue for a while  He will continue to monitor himself and call me if he has any problems         Diagnoses and all orders for this visit:    Immunization due  -     influenza vaccine, high-dose, PF 0 7 mL (FLUZONE HIGH-DOSE)    Type 2 diabetes mellitus with foot ulcer, without long-term current use of insulin (HCC)  -     Hemoglobin A1C; Future  -     Comprehensive metabolic panel; Future  -     Microalbumin / creatinine urine ratio    Mixed hyperlipidemia  -     Lipid panel; Future    Encounter for long-term (current) use of medications  -     CBC and differential; Future    Chronic atrial fibrillation (HCC)    Essential hypertension    Actinic keratoses    Bilateral edema of lower extremity          Subjective:      Patient ID: Bety Amador is a 77 y o  male  This is a 69-year-old white male who presents to the office today for his routine checkup  The patient is doing well  Since I last saw him, he lost 8 lb  He attributes this to the diet he was on prior to his colonoscopy  He reports compliance with his medication  He tells me because of the pandemic, he is much less active than normal   He has not been able to attend any of the fairs that he normally goes to in the summers  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  He is currently selling fruits from a stand  The following portions of the patient's history were reviewed and updated as appropriate: allergies, current medications, past family history, past medical history, past social history, past surgical history and problem list     Review of Systems   Constitutional: Negative for activity change and unexpected weight change  Respiratory: Negative for cough, shortness of breath and wheezing  Cardiovascular: Positive for leg swelling  Negative for chest pain and palpitations  Gastrointestinal: Negative for abdominal distention, abdominal pain, blood in stool, constipation, diarrhea, nausea and vomiting  Endocrine: Negative for polydipsia, polyphagia and polyuria     Genitourinary:        Denies nocturia and weak urinary stream         Objective:      /86 (BP Location: Left arm, Patient Position: Sitting, Cuff Size: Large)   Pulse 78   Temp 98 4 °F (36 9 °C) (Tympanic)   Ht 6' 2" (1 88 m)   Wt (!) 156 kg (344 lb)   SpO2 97%   BMI 44 17 kg/m²          Physical Exam  Vitals signs reviewed  Constitutional:       Comments: This is an obese 24-year-old white male who appears his stated age  He is pleasant, cooperative, and in no distress  HENT:      Head: Normocephalic and atraumatic  Right Ear: Tympanic membrane, ear canal and external ear normal       Left Ear: Tympanic membrane, ear canal and external ear normal       Mouth/Throat:      Mouth: Mucous membranes are moist       Pharynx: Oropharynx is clear  No oropharyngeal exudate or posterior oropharyngeal erythema  Eyes:      General: No scleral icterus  Right eye: No discharge  Left eye: No discharge  Conjunctiva/sclera: Conjunctivae normal       Pupils: Pupils are equal, round, and reactive to light  Neck:      Musculoskeletal: Neck supple  Comments: No thyromegaly was noted  Cardiovascular:      Rate and Rhythm: Normal rate  Rhythm irregular  Heart sounds: No murmur  No friction rub  No gallop  Comments: Heart is irregularly irregular with a well-controlled ventricular response  Pulmonary:      Effort: Pulmonary effort is normal  No respiratory distress  Breath sounds: Normal breath sounds  No stridor  No wheezing, rhonchi or rales  Abdominal:      General: Bowel sounds are normal  There is no distension  Palpations: Abdomen is soft  There is no mass  Tenderness: There is no abdominal tenderness  There is no guarding  Musculoskeletal:      Right lower leg: Edema present  Left lower leg: Edema present  Lymphadenopathy:      Cervical: No cervical adenopathy  Skin:     Comments: There were a few actinic keratoses on the dorsal aspect of his hands and forearms  He was also noted to be quite tan     Psychiatric:         Mood and Affect: Mood normal          Behavior: Behavior normal          Thought Content: Thought content normal          Judgment: Judgment normal        extremities:  Without cyanosis or clubbing  There is pitting edema noted to the knees although it is primarily the ankles and feet

## 2020-09-17 LAB
CREAT UR-MCNC: 85.7 MG/DL
MICROALBUMIN UR-MCNC: 18 MG/L (ref 0–20)
MICROALBUMIN/CREAT 24H UR: 21 MG/G CREATININE (ref 0–30)

## 2020-09-17 PROCEDURE — 3061F NEG MICROALBUMINURIA REV: CPT | Performed by: INTERNAL MEDICINE

## 2020-09-17 NOTE — ASSESSMENT & PLAN NOTE
Continue atorvastatin 40 mg daily  I ordered a fasting lipid panel to be done for his next visit  His goal LDL cholesterol is less than 70

## 2020-09-17 NOTE — ASSESSMENT & PLAN NOTE
Patient has chronic atrial fibrillation  Rate is well controlled  Continue anticoagulation with Eliquis

## 2020-09-17 NOTE — ASSESSMENT & PLAN NOTE
The diagnosis was explained to the patient  I asked the patient to schedule himself an appointment to see a dermatologist for cryo surgery  I had done this for the patient in the past but I explained to him I no longer have liquid nitrogen  I can no longer offer this service to him  I again advised the patient to wear a wide brimmed hat while out in the sun and also to use sunscreen

## 2020-09-17 NOTE — ASSESSMENT & PLAN NOTE
Lab Results   Component Value Date    HGBA1C 6 6 (H) 07/09/2020    Patient has type 2 diabetes mellitus which has been well controlled  I ordered fasting blood work for the patient to have mid to end of October  I encouraged the patient to continue to try to lose weight  I also encouraged him to try to walk for exercise  We discussed routine diabetic foot care  I have also recommended a yearly dilated eye exam   I am going to continue him on his current medications

## 2020-09-17 NOTE — ASSESSMENT & PLAN NOTE
Patient will continue furosemide  I encouraged him to elevate his legs  He has a sequential venous compression device which I asked him to use to try to get the edema out of his legs  When his legs get swollen, he has a tendency to get cellulitis  Since he has been less active in office feet, this has not been an issue for a while  He will continue to monitor himself and call me if he has any problems

## 2020-09-17 NOTE — ASSESSMENT & PLAN NOTE
Patient has hypertension  His blood pressure is well controlled  I recommended no change with his current medications

## 2020-09-22 DIAGNOSIS — I48.20 CHRONIC ATRIAL FIBRILLATION (HCC): ICD-10-CM

## 2020-10-01 ENCOUNTER — OFFICE VISIT (OUTPATIENT)
Dept: CARDIOLOGY CLINIC | Facility: CLINIC | Age: 65
End: 2020-10-01
Payer: COMMERCIAL

## 2020-10-01 VITALS
HEIGHT: 74 IN | HEART RATE: 76 BPM | DIASTOLIC BLOOD PRESSURE: 80 MMHG | SYSTOLIC BLOOD PRESSURE: 140 MMHG | BODY MASS INDEX: 40.43 KG/M2 | WEIGHT: 315 LBS

## 2020-10-01 DIAGNOSIS — E78.2 MIXED HYPERLIPIDEMIA: Chronic | ICD-10-CM

## 2020-10-01 DIAGNOSIS — I48.20 CHRONIC ATRIAL FIBRILLATION (HCC): Primary | ICD-10-CM

## 2020-10-01 DIAGNOSIS — I25.10 CORONARY ARTERY DISEASE INVOLVING NATIVE CORONARY ARTERY OF NATIVE HEART WITHOUT ANGINA PECTORIS: ICD-10-CM

## 2020-10-01 DIAGNOSIS — R60.0 BILATERAL EDEMA OF LOWER EXTREMITY: Chronic | ICD-10-CM

## 2020-10-01 PROCEDURE — 1160F RVW MEDS BY RX/DR IN RCRD: CPT | Performed by: INTERNAL MEDICINE

## 2020-10-01 PROCEDURE — 3079F DIAST BP 80-89 MM HG: CPT | Performed by: INTERNAL MEDICINE

## 2020-10-01 PROCEDURE — 99214 OFFICE O/P EST MOD 30 MIN: CPT | Performed by: INTERNAL MEDICINE

## 2020-10-01 PROCEDURE — 1036F TOBACCO NON-USER: CPT | Performed by: INTERNAL MEDICINE

## 2020-10-01 PROCEDURE — 93000 ELECTROCARDIOGRAM COMPLETE: CPT | Performed by: INTERNAL MEDICINE

## 2020-10-14 DIAGNOSIS — E11.621 TYPE 2 DIABETES MELLITUS WITH FOOT ULCER, WITHOUT LONG-TERM CURRENT USE OF INSULIN (HCC): Chronic | ICD-10-CM

## 2020-10-14 DIAGNOSIS — L97.509 TYPE 2 DIABETES MELLITUS WITH FOOT ULCER, WITHOUT LONG-TERM CURRENT USE OF INSULIN (HCC): Chronic | ICD-10-CM

## 2020-10-20 ENCOUNTER — LAB (OUTPATIENT)
Dept: LAB | Facility: CLINIC | Age: 65
End: 2020-10-20
Payer: COMMERCIAL

## 2020-10-20 DIAGNOSIS — E78.2 MIXED HYPERLIPIDEMIA: Chronic | ICD-10-CM

## 2020-10-20 DIAGNOSIS — L97.509 TYPE 2 DIABETES MELLITUS WITH FOOT ULCER, WITHOUT LONG-TERM CURRENT USE OF INSULIN (HCC): Chronic | ICD-10-CM

## 2020-10-20 DIAGNOSIS — Z79.899 ENCOUNTER FOR LONG-TERM (CURRENT) USE OF MEDICATIONS: ICD-10-CM

## 2020-10-20 DIAGNOSIS — E11.621 TYPE 2 DIABETES MELLITUS WITH FOOT ULCER, WITHOUT LONG-TERM CURRENT USE OF INSULIN (HCC): Chronic | ICD-10-CM

## 2020-10-20 LAB
ALBUMIN SERPL BCP-MCNC: 3.8 G/DL (ref 3.5–5)
ALP SERPL-CCNC: 97 U/L (ref 46–116)
ALT SERPL W P-5'-P-CCNC: 21 U/L (ref 12–78)
ANION GAP SERPL CALCULATED.3IONS-SCNC: 2 MMOL/L (ref 4–13)
AST SERPL W P-5'-P-CCNC: 17 U/L (ref 5–45)
BASOPHILS # BLD AUTO: 0.06 THOUSANDS/ΜL (ref 0–0.1)
BASOPHILS NFR BLD AUTO: 1 % (ref 0–1)
BILIRUB SERPL-MCNC: 0.76 MG/DL (ref 0.2–1)
BUN SERPL-MCNC: 20 MG/DL (ref 5–25)
CALCIUM SERPL-MCNC: 9.6 MG/DL (ref 8.3–10.1)
CHLORIDE SERPL-SCNC: 105 MMOL/L (ref 100–108)
CHOLEST SERPL-MCNC: 161 MG/DL (ref 50–200)
CO2 SERPL-SCNC: 29 MMOL/L (ref 21–32)
CREAT SERPL-MCNC: 0.86 MG/DL (ref 0.6–1.3)
EOSINOPHIL # BLD AUTO: 0.28 THOUSAND/ΜL (ref 0–0.61)
EOSINOPHIL NFR BLD AUTO: 4 % (ref 0–6)
ERYTHROCYTE [DISTWIDTH] IN BLOOD BY AUTOMATED COUNT: 13.2 % (ref 11.6–15.1)
GFR SERPL CREATININE-BSD FRML MDRD: 90 ML/MIN/1.73SQ M
GLUCOSE P FAST SERPL-MCNC: 144 MG/DL (ref 65–99)
HCT VFR BLD AUTO: 41.3 % (ref 36.5–49.3)
HDLC SERPL-MCNC: 46 MG/DL
HGB BLD-MCNC: 13.9 G/DL (ref 12–17)
IMM GRANULOCYTES # BLD AUTO: 0.03 THOUSAND/UL (ref 0–0.2)
IMM GRANULOCYTES NFR BLD AUTO: 0 % (ref 0–2)
LDLC SERPL CALC-MCNC: 85 MG/DL (ref 0–100)
LYMPHOCYTES # BLD AUTO: 2.09 THOUSANDS/ΜL (ref 0.6–4.47)
LYMPHOCYTES NFR BLD AUTO: 29 % (ref 14–44)
MCH RBC QN AUTO: 33.7 PG (ref 26.8–34.3)
MCHC RBC AUTO-ENTMCNC: 33.7 G/DL (ref 31.4–37.4)
MCV RBC AUTO: 100 FL (ref 82–98)
MONOCYTES # BLD AUTO: 0.79 THOUSAND/ΜL (ref 0.17–1.22)
MONOCYTES NFR BLD AUTO: 11 % (ref 4–12)
NEUTROPHILS # BLD AUTO: 4.07 THOUSANDS/ΜL (ref 1.85–7.62)
NEUTS SEG NFR BLD AUTO: 55 % (ref 43–75)
NONHDLC SERPL-MCNC: 115 MG/DL
NRBC BLD AUTO-RTO: 0 /100 WBCS
PLATELET # BLD AUTO: 206 THOUSANDS/UL (ref 149–390)
PMV BLD AUTO: 11 FL (ref 8.9–12.7)
POTASSIUM SERPL-SCNC: 4.8 MMOL/L (ref 3.5–5.3)
PROT SERPL-MCNC: 9 G/DL (ref 6.4–8.2)
RBC # BLD AUTO: 4.13 MILLION/UL (ref 3.88–5.62)
SODIUM SERPL-SCNC: 136 MMOL/L (ref 136–145)
TRIGL SERPL-MCNC: 152 MG/DL
WBC # BLD AUTO: 7.32 THOUSAND/UL (ref 4.31–10.16)

## 2020-10-20 PROCEDURE — 85025 COMPLETE CBC W/AUTO DIFF WBC: CPT

## 2020-10-20 PROCEDURE — 80061 LIPID PANEL: CPT

## 2020-10-20 PROCEDURE — 36415 COLL VENOUS BLD VENIPUNCTURE: CPT

## 2020-10-20 PROCEDURE — 80053 COMPREHEN METABOLIC PANEL: CPT

## 2020-10-22 DIAGNOSIS — M1A.00X0 IDIOPATHIC CHRONIC GOUT WITHOUT TOPHUS, UNSPECIFIED SITE: ICD-10-CM

## 2020-10-22 RX ORDER — ALLOPURINOL 300 MG/1
300 TABLET ORAL DAILY
Qty: 90 TABLET | Refills: 1 | Status: SHIPPED | OUTPATIENT
Start: 2020-10-22 | End: 2021-04-22 | Stop reason: SDUPTHER

## 2020-10-31 ENCOUNTER — HOSPITAL ENCOUNTER (EMERGENCY)
Facility: HOSPITAL | Age: 65
Discharge: HOME | End: 2020-10-31
Attending: EMERGENCY MEDICINE | Admitting: EMERGENCY MEDICINE
Payer: COMMERCIAL

## 2020-10-31 VITALS
TEMPERATURE: 97 F | RESPIRATION RATE: 16 BRPM | WEIGHT: 315 LBS | DIASTOLIC BLOOD PRESSURE: 102 MMHG | HEART RATE: 80 BPM | SYSTOLIC BLOOD PRESSURE: 180 MMHG | OXYGEN SATURATION: 100 % | HEIGHT: 74 IN | BODY MASS INDEX: 40.43 KG/M2

## 2020-10-31 DIAGNOSIS — R04.0 LEFT-SIDED NOSEBLEED: Primary | ICD-10-CM

## 2020-10-31 PROCEDURE — 30901 CONTROL OF NOSEBLEED: CPT | Performed by: PHYSICIAN ASSISTANT

## 2020-10-31 PROCEDURE — 99284 EMERGENCY DEPT VISIT MOD MDM: CPT | Performed by: PHYSICIAN ASSISTANT

## 2020-10-31 PROCEDURE — 99283 EMERGENCY DEPT VISIT LOW MDM: CPT

## 2020-10-31 RX ORDER — AMOXICILLIN AND CLAVULANATE POTASSIUM 875; 125 MG/1; MG/1
1 TABLET, FILM COATED ORAL EVERY 12 HOURS SCHEDULED
Qty: 14 TABLET | Refills: 0 | Status: SHIPPED | OUTPATIENT
Start: 2020-10-31 | End: 2020-11-07

## 2020-10-31 RX ORDER — LIDOCAINE HYDROCHLORIDE 20 MG/ML
15 SOLUTION OROPHARYNGEAL ONCE
Status: COMPLETED | OUTPATIENT
Start: 2020-10-31 | End: 2020-10-31

## 2020-10-31 RX ORDER — OXYMETAZOLINE HYDROCHLORIDE 0.05 G/100ML
2 SPRAY NASAL ONCE
Status: COMPLETED | OUTPATIENT
Start: 2020-10-31 | End: 2020-10-31

## 2020-10-31 RX ADMIN — OXYMETAZOLINE HYDROCHLORIDE 2 SPRAY: 0.05 SPRAY NASAL at 12:04

## 2020-10-31 RX ADMIN — LIDOCAINE HYDROCHLORIDE 15 ML: 20 SOLUTION ORAL; TOPICAL at 13:04

## 2020-11-12 ENCOUNTER — OFFICE VISIT (OUTPATIENT)
Dept: FAMILY MEDICINE CLINIC | Facility: CLINIC | Age: 65
End: 2020-11-12
Payer: COMMERCIAL

## 2020-11-12 VITALS
WEIGHT: 315 LBS | DIASTOLIC BLOOD PRESSURE: 88 MMHG | BODY MASS INDEX: 40.43 KG/M2 | SYSTOLIC BLOOD PRESSURE: 148 MMHG | HEART RATE: 90 BPM | OXYGEN SATURATION: 99 % | TEMPERATURE: 97.9 F | HEIGHT: 74 IN

## 2020-11-12 DIAGNOSIS — R04.0 EPISTAXIS: ICD-10-CM

## 2020-11-12 DIAGNOSIS — L97.509 TYPE 2 DIABETES MELLITUS WITH FOOT ULCER, WITHOUT LONG-TERM CURRENT USE OF INSULIN (HCC): Chronic | ICD-10-CM

## 2020-11-12 DIAGNOSIS — E11.621 TYPE 2 DIABETES MELLITUS WITH FOOT ULCER, WITHOUT LONG-TERM CURRENT USE OF INSULIN (HCC): Chronic | ICD-10-CM

## 2020-11-12 DIAGNOSIS — I10 ESSENTIAL HYPERTENSION: Primary | Chronic | ICD-10-CM

## 2020-11-12 DIAGNOSIS — I10 ESSENTIAL HYPERTENSION: Chronic | ICD-10-CM

## 2020-11-12 PROCEDURE — 4010F ACE/ARB THERAPY RXD/TAKEN: CPT | Performed by: FAMILY MEDICINE

## 2020-11-12 PROCEDURE — 99213 OFFICE O/P EST LOW 20 MIN: CPT | Performed by: FAMILY MEDICINE

## 2020-11-12 PROCEDURE — 3077F SYST BP >= 140 MM HG: CPT | Performed by: FAMILY MEDICINE

## 2020-11-12 PROCEDURE — 3008F BODY MASS INDEX DOCD: CPT | Performed by: FAMILY MEDICINE

## 2020-11-12 PROCEDURE — 1036F TOBACCO NON-USER: CPT | Performed by: FAMILY MEDICINE

## 2020-11-12 PROCEDURE — 1160F RVW MEDS BY RX/DR IN RCRD: CPT | Performed by: FAMILY MEDICINE

## 2020-11-12 PROCEDURE — 3079F DIAST BP 80-89 MM HG: CPT | Performed by: FAMILY MEDICINE

## 2020-11-12 RX ORDER — LISINOPRIL 40 MG/1
TABLET ORAL
Qty: 30 TABLET | Refills: 5 | Status: SHIPPED | OUTPATIENT
Start: 2020-11-12 | End: 2021-02-11 | Stop reason: SDUPTHER

## 2020-11-12 RX ORDER — LISINOPRIL 40 MG/1
40 TABLET ORAL DAILY
Qty: 30 TABLET | Refills: 5 | Status: SHIPPED | OUTPATIENT
Start: 2020-11-12 | End: 2020-11-12

## 2020-11-24 ENCOUNTER — TELEPHONE (OUTPATIENT)
Dept: FAMILY MEDICINE CLINIC | Facility: CLINIC | Age: 65
End: 2020-11-24

## 2020-11-27 ENCOUNTER — VBI (OUTPATIENT)
Dept: ADMINISTRATIVE | Facility: OTHER | Age: 65
End: 2020-11-27

## 2020-12-10 ENCOUNTER — TELEPHONE (OUTPATIENT)
Dept: FAMILY MEDICINE CLINIC | Facility: CLINIC | Age: 65
End: 2020-12-10

## 2020-12-15 ENCOUNTER — LAB (OUTPATIENT)
Dept: LAB | Facility: CLINIC | Age: 65
End: 2020-12-15
Payer: COMMERCIAL

## 2020-12-15 DIAGNOSIS — E11.621 TYPE 2 DIABETES MELLITUS WITH FOOT ULCER, WITHOUT LONG-TERM CURRENT USE OF INSULIN (HCC): Chronic | ICD-10-CM

## 2020-12-15 DIAGNOSIS — L97.509 TYPE 2 DIABETES MELLITUS WITH FOOT ULCER, WITHOUT LONG-TERM CURRENT USE OF INSULIN (HCC): Chronic | ICD-10-CM

## 2020-12-15 LAB
ANION GAP SERPL CALCULATED.3IONS-SCNC: 8 MMOL/L (ref 4–13)
BUN SERPL-MCNC: 16 MG/DL (ref 5–25)
CALCIUM SERPL-MCNC: 9.4 MG/DL (ref 8.3–10.1)
CHLORIDE SERPL-SCNC: 105 MMOL/L (ref 100–108)
CO2 SERPL-SCNC: 27 MMOL/L (ref 21–32)
CREAT SERPL-MCNC: 0.79 MG/DL (ref 0.6–1.3)
EST. AVERAGE GLUCOSE BLD GHB EST-MCNC: 166 MG/DL
GFR SERPL CREATININE-BSD FRML MDRD: 93 ML/MIN/1.73SQ M
GLUCOSE P FAST SERPL-MCNC: 164 MG/DL (ref 65–99)
HBA1C MFR BLD: 7.4 %
POTASSIUM SERPL-SCNC: 4.5 MMOL/L (ref 3.5–5.3)
SODIUM SERPL-SCNC: 140 MMOL/L (ref 136–145)

## 2020-12-15 PROCEDURE — 3051F HG A1C>EQUAL 7.0%<8.0%: CPT | Performed by: FAMILY MEDICINE

## 2020-12-15 PROCEDURE — 83036 HEMOGLOBIN GLYCOSYLATED A1C: CPT

## 2020-12-15 PROCEDURE — 36415 COLL VENOUS BLD VENIPUNCTURE: CPT

## 2020-12-15 PROCEDURE — 80048 BASIC METABOLIC PNL TOTAL CA: CPT

## 2020-12-18 ENCOUNTER — TELEPHONE (OUTPATIENT)
Dept: FAMILY MEDICINE CLINIC | Facility: CLINIC | Age: 65
End: 2020-12-18

## 2020-12-18 DIAGNOSIS — L03.115 CELLULITIS OF RIGHT LOWER EXTREMITY: ICD-10-CM

## 2020-12-18 RX ORDER — AMMONIUM LACTATE 12 G/100G
LOTION TOPICAL 2 TIMES DAILY PRN
Qty: 400 G | Refills: 11 | Status: SHIPPED | OUTPATIENT
Start: 2020-12-18 | End: 2022-01-18 | Stop reason: SDUPTHER

## 2020-12-22 ENCOUNTER — OFFICE VISIT (OUTPATIENT)
Dept: FAMILY MEDICINE CLINIC | Facility: CLINIC | Age: 65
End: 2020-12-22
Payer: COMMERCIAL

## 2020-12-22 VITALS
BODY MASS INDEX: 40.43 KG/M2 | HEIGHT: 74 IN | OXYGEN SATURATION: 98 % | DIASTOLIC BLOOD PRESSURE: 88 MMHG | SYSTOLIC BLOOD PRESSURE: 160 MMHG | TEMPERATURE: 97.1 F | WEIGHT: 315 LBS | HEART RATE: 82 BPM

## 2020-12-22 DIAGNOSIS — Z86.73 H/O: CVA (CEREBROVASCULAR ACCIDENT): ICD-10-CM

## 2020-12-22 DIAGNOSIS — I48.20 CHRONIC ATRIAL FIBRILLATION (HCC): Chronic | ICD-10-CM

## 2020-12-22 DIAGNOSIS — E78.2 MIXED HYPERLIPIDEMIA: Chronic | ICD-10-CM

## 2020-12-22 DIAGNOSIS — R60.0 BILATERAL EDEMA OF LOWER EXTREMITY: Chronic | ICD-10-CM

## 2020-12-22 DIAGNOSIS — M1A.00X0 IDIOPATHIC CHRONIC GOUT WITHOUT TOPHUS, UNSPECIFIED SITE: ICD-10-CM

## 2020-12-22 DIAGNOSIS — I10 ESSENTIAL HYPERTENSION: Chronic | ICD-10-CM

## 2020-12-22 DIAGNOSIS — E11.42 DIABETIC POLYNEUROPATHY ASSOCIATED WITH TYPE 2 DIABETES MELLITUS (HCC): Primary | ICD-10-CM

## 2020-12-22 PROCEDURE — 1036F TOBACCO NON-USER: CPT | Performed by: FAMILY MEDICINE

## 2020-12-22 PROCEDURE — 99214 OFFICE O/P EST MOD 30 MIN: CPT | Performed by: FAMILY MEDICINE

## 2020-12-22 PROCEDURE — 1160F RVW MEDS BY RX/DR IN RCRD: CPT | Performed by: FAMILY MEDICINE

## 2020-12-22 PROCEDURE — 3008F BODY MASS INDEX DOCD: CPT | Performed by: FAMILY MEDICINE

## 2020-12-22 PROCEDURE — 3079F DIAST BP 80-89 MM HG: CPT | Performed by: FAMILY MEDICINE

## 2020-12-22 PROCEDURE — 3077F SYST BP >= 140 MM HG: CPT | Performed by: FAMILY MEDICINE

## 2020-12-22 RX ORDER — CLONIDINE HYDROCHLORIDE 0.1 MG/1
0.1 TABLET ORAL EVERY 12 HOURS SCHEDULED
Qty: 60 TABLET | Refills: 5 | Status: SHIPPED | OUTPATIENT
Start: 2020-12-22 | End: 2021-07-23 | Stop reason: SDUPTHER

## 2021-01-05 DIAGNOSIS — I48.20 CHRONIC ATRIAL FIBRILLATION (HCC): Chronic | ICD-10-CM

## 2021-01-05 RX ORDER — FUROSEMIDE 80 MG
TABLET ORAL
Qty: 90 TABLET | Refills: 3 | Status: SHIPPED | OUTPATIENT
Start: 2021-01-05 | End: 2022-01-03

## 2021-02-09 DIAGNOSIS — L97.509 TYPE 2 DIABETES MELLITUS WITH FOOT ULCER, WITHOUT LONG-TERM CURRENT USE OF INSULIN (HCC): Chronic | ICD-10-CM

## 2021-02-09 DIAGNOSIS — E11.621 TYPE 2 DIABETES MELLITUS WITH FOOT ULCER, WITHOUT LONG-TERM CURRENT USE OF INSULIN (HCC): Chronic | ICD-10-CM

## 2021-02-09 RX ORDER — GLIPIZIDE 5 MG/1
5 TABLET ORAL DAILY
Qty: 90 TABLET | Refills: 3 | Status: SHIPPED | OUTPATIENT
Start: 2021-02-09 | End: 2022-01-24 | Stop reason: SDUPTHER

## 2021-02-11 DIAGNOSIS — I10 ESSENTIAL HYPERTENSION: Chronic | ICD-10-CM

## 2021-02-11 PROCEDURE — 4010F ACE/ARB THERAPY RXD/TAKEN: CPT | Performed by: FAMILY MEDICINE

## 2021-02-11 RX ORDER — LISINOPRIL 40 MG/1
40 TABLET ORAL DAILY
Qty: 90 TABLET | Refills: 3 | Status: SHIPPED | OUTPATIENT
Start: 2021-02-11 | End: 2021-12-15 | Stop reason: DRUGHIGH

## 2021-03-18 DIAGNOSIS — E78.2 MIXED HYPERLIPIDEMIA: Chronic | ICD-10-CM

## 2021-03-18 RX ORDER — ATORVASTATIN CALCIUM 40 MG/1
40 TABLET, FILM COATED ORAL DAILY
Qty: 90 TABLET | Refills: 3 | Status: SHIPPED | OUTPATIENT
Start: 2021-03-18 | End: 2022-04-26 | Stop reason: SDUPTHER

## 2021-03-19 ENCOUNTER — RA CDI HCC (OUTPATIENT)
Dept: OTHER | Facility: HOSPITAL | Age: 66
End: 2021-03-19

## 2021-03-19 NOTE — PROGRESS NOTES
Nathalie [facility] 73  coding oppertunities             Chart reviewed, (number of) suggestions sent to provider: 3      DX: E66 01 Morbid Obesity--BMI 52  DX: E11 42 Type 2 diabetes mellitus with diabetic polyneuropathy  DX: I48 20 Chronic atrial fibrillation, unspecified     no response from provider

## 2021-03-25 ENCOUNTER — OFFICE VISIT (OUTPATIENT)
Dept: FAMILY MEDICINE CLINIC | Facility: CLINIC | Age: 66
End: 2021-03-25
Payer: COMMERCIAL

## 2021-03-25 VITALS
OXYGEN SATURATION: 98 % | SYSTOLIC BLOOD PRESSURE: 130 MMHG | DIASTOLIC BLOOD PRESSURE: 80 MMHG | WEIGHT: 315 LBS | BODY MASS INDEX: 42.66 KG/M2 | HEART RATE: 82 BPM | TEMPERATURE: 97.8 F | HEIGHT: 72 IN

## 2021-03-25 DIAGNOSIS — I10 ESSENTIAL HYPERTENSION: Chronic | ICD-10-CM

## 2021-03-25 DIAGNOSIS — E78.2 MIXED HYPERLIPIDEMIA: Chronic | ICD-10-CM

## 2021-03-25 DIAGNOSIS — M1A.09X0 IDIOPATHIC CHRONIC GOUT OF MULTIPLE SITES WITHOUT TOPHUS: ICD-10-CM

## 2021-03-25 DIAGNOSIS — I48.20 CHRONIC ATRIAL FIBRILLATION (HCC): Chronic | ICD-10-CM

## 2021-03-25 DIAGNOSIS — E11.42 DIABETIC POLYNEUROPATHY ASSOCIATED WITH TYPE 2 DIABETES MELLITUS (HCC): Primary | ICD-10-CM

## 2021-03-25 PROBLEM — L97.509 TYPE 2 DIABETES MELLITUS WITH FOOT ULCER, WITHOUT LONG-TERM CURRENT USE OF INSULIN (HCC): Chronic | Status: RESOLVED | Noted: 2018-11-30 | Resolved: 2021-03-25

## 2021-03-25 PROBLEM — E11.621 TYPE 2 DIABETES MELLITUS WITH FOOT ULCER, WITHOUT LONG-TERM CURRENT USE OF INSULIN (HCC): Chronic | Status: RESOLVED | Noted: 2018-11-30 | Resolved: 2021-03-25

## 2021-03-25 PROCEDURE — 1160F RVW MEDS BY RX/DR IN RCRD: CPT | Performed by: FAMILY MEDICINE

## 2021-03-25 PROCEDURE — 3725F SCREEN DEPRESSION PERFORMED: CPT | Performed by: FAMILY MEDICINE

## 2021-03-25 PROCEDURE — 99214 OFFICE O/P EST MOD 30 MIN: CPT | Performed by: FAMILY MEDICINE

## 2021-03-25 PROCEDURE — 3288F FALL RISK ASSESSMENT DOCD: CPT | Performed by: FAMILY MEDICINE

## 2021-03-25 PROCEDURE — 3079F DIAST BP 80-89 MM HG: CPT | Performed by: FAMILY MEDICINE

## 2021-03-25 PROCEDURE — 1036F TOBACCO NON-USER: CPT | Performed by: FAMILY MEDICINE

## 2021-03-25 PROCEDURE — 3008F BODY MASS INDEX DOCD: CPT | Performed by: FAMILY MEDICINE

## 2021-03-25 PROCEDURE — 1101F PT FALLS ASSESS-DOCD LE1/YR: CPT | Performed by: FAMILY MEDICINE

## 2021-03-25 PROCEDURE — 3075F SYST BP GE 130 - 139MM HG: CPT | Performed by: FAMILY MEDICINE

## 2021-03-25 NOTE — PROGRESS NOTES
Assessment/Plan:    Diabetic polyneuropathy associated with type 2 diabetes mellitus ([facility] [address] )    Lab Results   Component Value Date    HGBA1C 7 4 (H) 12/15/2020    patient has type 2 diabetes mellitus  He has neuropathy on exam   He does have a CMP and hemoglobin A1c ordered  I asked him to have this done as soon as he can and I will contact him with the results  His goal hemoglobin A1c is less than 7 0  I encouraged the patient to lose weight  He did lose 5 lb since his last visit  However, he needs to lose much more than that  We discussed routine diabetic foot care  Essential hypertension    Blood pressure is well controlled on his current regiment  I recommended no change with his current medication    Chronic atrial fibrillation [facility])   Patient has chronic atrial fibrillation  His rate is controlled  He is adequately anticoagulated on Eliquis  I recommended no changes with his regiment  Gout, unspecified    Patient has chronic idiopathic gout  He is currently stable  A uric acid level has been ordered previously  He is to have this done  His goal is less than 6 0  He will continue allopurinol  Mixed hyperlipidemia    Patient has hyperlipidemia  A direct LDL cholesterol is on order  He needs to have this done so I can contact him with the results and make any necessary changes to his medication  His goal LDL cholesterol is less than 70  He will continue atorvastatin  Lymphedema   The patient is currently using a sequential venous compression device for his lymphedema  He tells me he does this daily and it is helping to keep his edema out  Unfortunately, he is unable to wear compression stockings  He had compression stocks on but these only came up to his ankles  He tells me it helps with the edema in his feet  He had great difficulty getting these on  In fact, I had to put these on for him after I examined his feet  He was unable to get them on         Diagnoses and all orders for this visit:    Diabetic polyneuropathy associated with type 2 diabetes mellitus ([address] )    Essential hypertension    Chronic atrial fibrillation (HCC)    Idiopathic chronic gout of multiple sites without tophus    Mixed hyperlipidemia          Subjective:      Patient ID: Eduar Perez is a 74 y o  male  This is a 68-year-old white male who presents to the office today for his routine checkup  Patient is doing well  He got his 1st COVID-19 virus vaccination  He tells me he has an upcoming colonoscopy  He tells me that he plans on attending the fairs and working this summer  He tells me all the fairs are currently planning on opening, regardless of what [facility] [address] says  It should be noted that the patient had labs ordered on 12/22 that was post to have been done for today  The patient did not get them done  The following portions of the patient's history were reviewed and updated as appropriate: allergies, current medications, past family history, past medical history, past social history, past surgical history and problem list     Review of Systems   Constitutional: Negative for activity change, appetite change and unexpected weight change  Respiratory: Negative for cough and shortness of breath  Cardiovascular: Positive for leg swelling  Negative for chest pain and palpitations  Gastrointestinal: Negative for abdominal distention, abdominal pain, anal bleeding, blood in stool, constipation, diarrhea and nausea  Genitourinary:        Denies nocturia and weak urinary stream   Skin:        Denies erythema or drainage from his legs         Objective:      /80   Pulse 82   Temp 97 8 °F (36 6 °C) (Temporal)   Ht 6' 0 25" (1 835 m)   Wt (!) 164 kg (361 lb 8 oz)   SpO2 98%   BMI 48 69 kg/m²          Physical Exam  Vitals signs reviewed  Constitutional:       Comments: This is a 68-year-old white male who appears his stated age  He is pleasant, cooperative, and in no distress  HENT:      Head: Normocephalic and atraumatic  Right Ear: Tympanic membrane, ear canal and external ear normal       Left Ear: Tympanic membrane, ear canal and external ear normal       Mouth/Throat:      Mouth: Mucous membranes are moist       Pharynx: Oropharynx is clear  No oropharyngeal exudate or posterior oropharyngeal erythema  Eyes:      General: No scleral icterus  Right eye: No discharge  Left eye: No discharge  Conjunctiva/sclera: Conjunctivae normal       Pupils: Pupils are equal, round, and reactive to light  Neck:      Musculoskeletal: Neck supple  Cardiovascular:      Rate and Rhythm: Normal rate  Rhythm irregular  Pulses: no weak pulses          Dorsalis pedis pulses are 0 on the right side and 0 on the left side  Posterior tibial pulses are 0 on the right side and 0 on the left side  Heart sounds: Normal heart sounds  No murmur  No friction rub  No gallop  Comments: Heart is irregularly irregular with a well-controlled ventricular response  Pulmonary:      Effort: Pulmonary effort is normal  No respiratory distress  Breath sounds: Normal breath sounds  No stridor  No wheezing, rhonchi or rales  Abdominal:      General: Bowel sounds are normal  There is no distension  Palpations: Abdomen is soft  There is no mass  Tenderness: There is no abdominal tenderness  There is no guarding  Comments: There is no hepatosplenomegaly   Musculoskeletal:      Right lower leg: Edema (  There is2/4 lower extremity edema noted bilaterally ) present  Left lower leg: Edema present  Feet:      Right foot:      Skin integrity: No ulcer, skin breakdown, erythema, warmth, callus or dry skin  Left foot:      Skin integrity: No ulcer, skin breakdown, erythema, warmth, callus or dry skin  Lymphadenopathy:      Cervical: No cervical adenopathy  Skin:     General: Skin is warm  Findings: No rash     Psychiatric:         Mood and Affect: Mood normal          Behavior: Behavior normal          Thought Content: Thought content normal          Judgment: Judgment normal          Patient's shoes and socks removed  Right Foot/Ankle   Right Foot Inspection  Skin Exam: skin normal and skin intact no dry skin, no warmth, no callus, no erythema, no maceration, no abnormal color, no pre-ulcer, no ulcer and no callus                          Toe Exam: swellingno tenderness and  no right toe deformity  Sensory   Vibration: diminished  Proprioception: intact   Monofilament testing: diminished  Vascular  Capillary refills: < 3 seconds  The right DP pulse is 0  The right PT pulse is 0  Left Foot/Ankle  Left Foot Inspection  Skin Exam: skin normal and skin intactno dry skin, no warmth, no erythema, no maceration, normal color, no pre-ulcer, no ulcer and no callus                         Toe Exam: swellingno tenderness, no erythema and no left toe deformity                   Sensory   Vibration: diminished  Proprioception: intact  Monofilament: diminished  Vascular  Capillary refills: < 3 seconds  The left DP pulse is 0  The left PT pulse is 0  Assign Risk Category:  No deformity present; Loss of protective sensation;  No weak pulses       Risk: 1

## 2021-03-25 NOTE — PATIENT INSTRUCTIONS
Foot Care for People with Diabetes   AMBULATORY CARE:   What you need to know about foot care:   · Foot care helps protect your feet and prevent foot ulcers or sores  Long-term high blood sugar levels can damage the blood vessels and nerves in your legs and feet  This damage makes it hard to feel pressure, pain, temperature, and touch  You may not be able to feel a cut or sore, or shoes that are too tight  Foot care is needed to prevent serious problems, such as an infection or amputation  · Diabetes may cause your toes to become crooked or curved under  These changes may affect the way you walk and can lead to increased pressure on your foot  The pressure can decrease blood flow to your feet  Lack of blood flow increases your risk for a foot ulcer  Do not ignore small problems, such as dry skin or small wounds  These can become life-threatening over time without proper care  Call your care team provider if:   · Your feet become numb, weak, or hard to move  · You have pus draining from a sore on your foot  · You have a wound on your foot that gets bigger, deeper, or does not heal      · You see blisters, cuts, scratches, calluses, or sores on your foot  · You have a fever, and your feet become red, warm, and swollen  · Your toenails become thick, curled, or yellow  · You find it hard to check your feet because your vision is poor  · You have questions or concerns about your condition or care  How to care for your feet:   · Check your feet each day  Look at your whole foot, including the bottom, and between and under your toes  Check for wounds, corns, and calluses  Use a mirror to see the bottom of your feet  The skin on your feet may be shiny, tight, or darker than normal  Your feet may also be cold and pale  Feel your feet by running your hands along the tops, bottoms, sides, and between your toes   Redness, swelling, and warmth are signs of blood flow problems that can lead to a foot ulcer  Do not try to remove corns or calluses yourself  · Wash your feet each day with soap and warm water  Do not use hot water, because this can injure your foot  Dry your feet gently with a towel after you wash them  Dry between and under your toes  · Apply lotion or a moisturizer on your dry feet  Ask your care team provider what lotions are best to use  Do not put lotion or moisturizer between your toes  Moisture between your toes could lead to skin breakdown  · Cut your toenails correctly  File or cut your toenails straight across  Use a soft brush to clean around your toenails  If your toenails are very thick, you may need to have a care team provider or specialist cut them  · Protect your feet  Do not walk barefoot or wear your shoes without socks  Check your shoes for rocks or other objects that can hurt your feet  Wear cotton socks to help keep your feet dry  Wear socks without toe seams, or wear them with the seams inside out  Change your socks each day  Do not wear socks that are dirty or damp  · Wear shoes that fit well  Wear shoes that do not rub against any area of your feet  Your shoes should be ½ to ¾ inch (1 to 2 centimeters) longer than your feet  Your shoes should also have extra space around the widest part of your feet  Walking or athletic shoes with laces or straps that adjust are best  Ask your care team provider for help to choose shoes that fit you best  Ask him or her if you need to wear an insert, orthotic, or bandage on your feet  · Go to your follow-up visits  Your care team provider will do a foot exam at least once a year  You may need a foot exam more often if you have nerve damage, foot deformities, or ulcers  He will check for nerve damage and how well you can feel your feet  He will check your shoes to see if they fit well  · Do not smoke  Smoking can damage your blood vessels and put you at increased risk for foot ulcers   Ask your care team provider for information if you currently smoke and need help to quit  E-cigarettes or smokeless tobacco still contain nicotine  Talk to your care team provider before you use these products  Follow up with your diabetes care team provider or foot specialist as directed: You will need to have your feet checked at least once a year  You may need a foot exam more often if you have nerve damage, foot deformities, or ulcers  Write down your questions so you remember to ask them during your visits  © Copyright [address] Information is for End User's use only and may not be sold, redistributed or otherwise used for commercial purposes  All illustrations and images included in CareNotes® are the copyrighted property of [employer]  or [address] [place], [place] [address]jacob   The above information is an  only  It is not intended as medical advice for individual conditions or treatments  Talk to your doctor, nurse or pharmacist before following any medical regimen to see if it is safe and effective for you

## 2021-03-26 NOTE — ASSESSMENT & PLAN NOTE
Lab Results   Component Value Date    HGBA1C 7 4 (H) 12/15/2020    patient has type 2 diabetes mellitus  He has neuropathy on exam   He does have a CMP and hemoglobin A1c ordered  I asked him to have this done as soon as he can and I will contact him with the results  His goal hemoglobin A1c is less than 7 0  I encouraged the patient to lose weight  He did lose 5 lb since his last visit  However, he needs to lose much more than that  We discussed routine diabetic foot care

## 2021-03-26 NOTE — ASSESSMENT & PLAN NOTE
Patient has hyperlipidemia  A direct LDL cholesterol is on order  He needs to have this done so I can contact him with the results and make any necessary changes to his medication  His goal LDL cholesterol is less than 70  He will continue atorvastatin

## 2021-03-26 NOTE — ASSESSMENT & PLAN NOTE
Blood pressure is well controlled on his current regiment    I recommended no change with his current medication

## 2021-03-26 NOTE — ASSESSMENT & PLAN NOTE
Patient has chronic idiopathic gout  He is currently stable  A uric acid level has been ordered previously  He is to have this done  His goal is less than 6 0  He will continue allopurinol

## 2021-03-26 NOTE — ASSESSMENT & PLAN NOTE
The patient is currently using a sequential venous compression device for his lymphedema  He tells me he does this daily and it is helping to keep his edema out  Unfortunately, he is unable to wear compression stockings  He had compression stocks on but these only came up to his ankles  He tells me it helps with the edema in his feet  He had great difficulty getting these on  In fact, I had to put these on for him after I examined his feet  He was unable to get them on

## 2021-03-29 ENCOUNTER — APPOINTMENT (OUTPATIENT)
Dept: LAB | Facility: CLINIC | Age: 66
End: 2021-03-29
Payer: COMMERCIAL

## 2021-03-29 DIAGNOSIS — E11.42 DIABETIC POLYNEUROPATHY ASSOCIATED WITH TYPE 2 DIABETES MELLITUS (HCC): ICD-10-CM

## 2021-03-29 DIAGNOSIS — E78.2 MIXED HYPERLIPIDEMIA: Chronic | ICD-10-CM

## 2021-03-29 DIAGNOSIS — M1A.00X0 IDIOPATHIC CHRONIC GOUT WITHOUT TOPHUS, UNSPECIFIED SITE: ICD-10-CM

## 2021-03-29 LAB
ALBUMIN SERPL BCP-MCNC: 3.7 G/DL (ref 3.5–5)
ALP SERPL-CCNC: 97 U/L (ref 46–116)
ALT SERPL W P-5'-P-CCNC: 23 U/L (ref 12–78)
ANION GAP SERPL CALCULATED.3IONS-SCNC: 5 MMOL/L (ref 4–13)
AST SERPL W P-5'-P-CCNC: 9 U/L (ref 5–45)
BILIRUB SERPL-MCNC: 0.74 MG/DL (ref 0.2–1)
BUN SERPL-MCNC: 18 MG/DL (ref 5–25)
CALCIUM SERPL-MCNC: 9.4 MG/DL (ref 8.3–10.1)
CHLORIDE SERPL-SCNC: 103 MMOL/L (ref 100–108)
CO2 SERPL-SCNC: 29 MMOL/L (ref 21–32)
CREAT SERPL-MCNC: 0.95 MG/DL (ref 0.6–1.3)
EST. AVERAGE GLUCOSE BLD GHB EST-MCNC: 163 MG/DL
GFR SERPL CREATININE-BSD FRML MDRD: 82 ML/MIN/1.73SQ M
GLUCOSE P FAST SERPL-MCNC: 158 MG/DL (ref 65–99)
HBA1C MFR BLD: 7.3 %
LDLC SERPL DIRECT ASSAY-MCNC: 94 MG/DL (ref 0–100)
POTASSIUM SERPL-SCNC: 4.3 MMOL/L (ref 3.5–5.3)
PROT SERPL-MCNC: 8.5 G/DL (ref 6.4–8.2)
SODIUM SERPL-SCNC: 137 MMOL/L (ref 136–145)
URATE SERPL-MCNC: 5.3 MG/DL (ref 4.2–8)

## 2021-03-29 PROCEDURE — 84550 ASSAY OF BLOOD/URIC ACID: CPT

## 2021-03-29 PROCEDURE — 83036 HEMOGLOBIN GLYCOSYLATED A1C: CPT

## 2021-03-29 PROCEDURE — 80053 COMPREHEN METABOLIC PANEL: CPT

## 2021-03-29 PROCEDURE — 36415 COLL VENOUS BLD VENIPUNCTURE: CPT

## 2021-03-29 PROCEDURE — 3051F HG A1C>EQUAL 7.0%<8.0%: CPT | Performed by: FAMILY MEDICINE

## 2021-03-29 PROCEDURE — 83721 ASSAY OF BLOOD LIPOPROTEIN: CPT

## 2021-04-07 ENCOUNTER — ANESTHESIA (OUTPATIENT)
Dept: GASTROENTEROLOGY | Facility: HOSPITAL | Age: 66
End: 2021-04-07

## 2021-04-07 ENCOUNTER — HOSPITAL ENCOUNTER (OUTPATIENT)
Dept: GASTROENTEROLOGY | Facility: HOSPITAL | Age: 66
Setting detail: OUTPATIENT SURGERY
Discharge: HOME | End: 2021-04-07
Attending: INTERNAL MEDICINE | Admitting: INTERNAL MEDICINE
Payer: COMMERCIAL

## 2021-04-07 ENCOUNTER — ANESTHESIA EVENT (OUTPATIENT)
Dept: GASTROENTEROLOGY | Facility: HOSPITAL | Age: 66
End: 2021-04-07

## 2021-04-07 VITALS
HEART RATE: 82 BPM | OXYGEN SATURATION: 97 % | TEMPERATURE: 98.4 F | RESPIRATION RATE: 18 BRPM | SYSTOLIC BLOOD PRESSURE: 124 MMHG | DIASTOLIC BLOOD PRESSURE: 81 MMHG

## 2021-04-07 DIAGNOSIS — Z86.010 PERSONAL HISTORY OF COLONIC POLYPS: ICD-10-CM

## 2021-04-07 LAB — GLUCOSE SERPL-MCNC: 139 MG/DL (ref 65–140)

## 2021-04-07 PROCEDURE — 88305 TISSUE EXAM BY PATHOLOGIST: CPT | Performed by: PATHOLOGY

## 2021-04-07 PROCEDURE — 82948 REAGENT STRIP/BLOOD GLUCOSE: CPT

## 2021-04-07 RX ORDER — LIDOCAINE HYDROCHLORIDE 10 MG/ML
INJECTION, SOLUTION EPIDURAL; INFILTRATION; INTRACAUDAL; PERINEURAL AS NEEDED
Status: DISCONTINUED | OUTPATIENT
Start: 2021-04-07 | End: 2021-04-07

## 2021-04-07 RX ORDER — PROPOFOL 10 MG/ML
INJECTION, EMULSION INTRAVENOUS AS NEEDED
Status: DISCONTINUED | OUTPATIENT
Start: 2021-04-07 | End: 2021-04-07

## 2021-04-07 RX ORDER — SODIUM CHLORIDE, SODIUM LACTATE, POTASSIUM CHLORIDE, CALCIUM CHLORIDE 600; 310; 30; 20 MG/100ML; MG/100ML; MG/100ML; MG/100ML
125 INJECTION, SOLUTION INTRAVENOUS CONTINUOUS
Status: DISCONTINUED | OUTPATIENT
Start: 2021-04-07 | End: 2021-04-11 | Stop reason: HOSPADM

## 2021-04-07 RX ADMIN — PROPOFOL 20 MG: 10 INJECTION, EMULSION INTRAVENOUS at 08:10

## 2021-04-07 RX ADMIN — PROPOFOL 20 MG: 10 INJECTION, EMULSION INTRAVENOUS at 08:18

## 2021-04-07 RX ADMIN — PROPOFOL 20 MG: 10 INJECTION, EMULSION INTRAVENOUS at 08:19

## 2021-04-07 RX ADMIN — PROPOFOL 80 MG: 10 INJECTION, EMULSION INTRAVENOUS at 08:01

## 2021-04-07 RX ADMIN — PROPOFOL 20 MG: 10 INJECTION, EMULSION INTRAVENOUS at 08:05

## 2021-04-07 RX ADMIN — PROPOFOL 20 MG: 10 INJECTION, EMULSION INTRAVENOUS at 08:16

## 2021-04-07 RX ADMIN — PROPOFOL 20 MG: 10 INJECTION, EMULSION INTRAVENOUS at 08:24

## 2021-04-07 RX ADMIN — PROPOFOL 20 MG: 10 INJECTION, EMULSION INTRAVENOUS at 08:04

## 2021-04-07 RX ADMIN — PROPOFOL 20 MG: 10 INJECTION, EMULSION INTRAVENOUS at 08:02

## 2021-04-07 RX ADMIN — PROPOFOL 20 MG: 10 INJECTION, EMULSION INTRAVENOUS at 08:14

## 2021-04-07 RX ADMIN — PROPOFOL 20 MG: 10 INJECTION, EMULSION INTRAVENOUS at 08:07

## 2021-04-07 RX ADMIN — PHENYLEPHRINE HYDROCHLORIDE 100 MCG: 10 INJECTION INTRAVENOUS at 08:16

## 2021-04-07 RX ADMIN — PROPOFOL 40 MG: 10 INJECTION, EMULSION INTRAVENOUS at 08:08

## 2021-04-07 RX ADMIN — LIDOCAINE HYDROCHLORIDE 50 MG: 10 INJECTION, SOLUTION EPIDURAL; INFILTRATION; INTRACAUDAL; PERINEURAL at 08:01

## 2021-04-07 RX ADMIN — SODIUM CHLORIDE, SODIUM LACTATE, POTASSIUM CHLORIDE, AND CALCIUM CHLORIDE 125 ML/HR: .6; .31; .03; .02 INJECTION, SOLUTION INTRAVENOUS at 07:48

## 2021-04-07 NOTE — INTERVAL H&P NOTE
H&P reviewed  After examining the patient I find no changes in the patients condition since the H&P had been written      Vitals:    04/07/21 0734   BP: 139/98   Pulse: 84   Resp: 18   Temp: (!) 97 2 °F (36 2 °C)   SpO2: 100%

## 2021-04-07 NOTE — ANESTHESIA POSTPROCEDURE EVALUATION
Post-Op Assessment Note    CV Status:  Stable    Pain management: adequate     Mental Status:  Alert and awake   Hydration Status:  Euvolemic   PONV Controlled:  Controlled   Airway Patency:  Patent      Post Op Vitals Reviewed: Yes      Staff: CRNA         No complications documented      /81 (04/07/21 0834)    Temp 98 4 °F (36 9 °C) (04/07/21 0834)    Pulse 82 (04/07/21 0834)   Resp 18 (04/07/21 0834)    SpO2 97 % (04/07/21 0834)

## 2021-04-07 NOTE — DISCHARGE INSTRUCTIONS
Colonoscopy   WHAT YOU NEED TO KNOW:   A colonoscopy is a procedure to examine the inside of your colon (intestine) with a scope  Polyps or tissue growths may have been removed during your colonoscopy  It is normal to feel bloated and to have some abdominal discomfort  You should be passing gas  If you have hemorrhoids or you had polyps removed, you may have a small amount of bleeding  DISCHARGE INSTRUCTIONS:   Call your doctor if:   · You have a large amount of bright red blood in your bowel movements  · Your abdomen is hard and firm and you have severe pain  · You have sudden trouble breathing  · You develop a rash or hives  · You have a fever within 24 hours of your procedure  · You have not had a bowel movement for 3 days after your procedure  · You have questions or concerns about your condition or care  After your colonoscopy:   · Do not lift, strain, or run  for 3 days  · Rest as much as possible  You have been given medicine to relax you  Do not  drive or make important decisions for at least 24 hours  Return to your normal activity as directed  · Relieve gas and discomfort from bloating  by lying on your left side with a heating pad on your abdomen  You may need to take short walks to help the gas move out  Eat small meals until bloating is relieved  If you had polyps removed: For 7 days after your procedure:  · Do not  take aspirin  · Do not  go on long car rides  Help prevent constipation:   · Eat a variety of healthy foods  Healthy foods include fruit, vegetables, whole-grain breads, low-fat dairy products, beans, lean meat, and fish  Ask if you need to be on a special diet  Your healthcare provider may recommend that you eat high-fiber foods such as cooked beans  Fiber helps you have regular bowel movements  · Drink liquids as directed  Adults should drink between 9 and 13 eight-ounce cups of liquid every day   Ask what amount is best for you  For most people, good liquids to drink are water, juice, and milk  · Exercise as directed  Talk to your healthcare provider about the best exercise plan for you  Exercise can help prevent constipation, decrease your blood pressure and improve your health  Follow up with your healthcare provider as directed:  Write down your questions so you remember to ask them during your visits  © Copyright [address] Information is for End User's use only and may not be sold, redistributed or otherwise used for commercial purposes  All illustrations and images included in CareNotes® are the copyrighted property of A D A M , Inc  or [facility] Sonali Cota   The above information is an  only  It is not intended as medical advice for individual conditions or treatments  Talk to your doctor, nurse or pharmacist before following any medical regimen to see if it is safe and effective for you  Colorectal Polyps   WHAT YOU NEED TO KNOW:   What are colorectal polyps? Colorectal polyps are small growths of tissue in the lining of the colon and rectum  Most polyps are hyperplastic polyps and are usually benign (noncancerous)  Certain types of polyps, called adenomatous polyps, may turn into cancer  What increases my risk of colorectal polyps? The exact cause of colorectal polyps is unknown  The following may increase your risk:  · Older age    · A diet of foods high in fat and low in fiber     · Family history of polyps    · Intestinal diseases, such as Crohn's disease or ulcerative colitis    · An unhealthy lifestyle, such as physical inactivity, smoking, or drinking alcohol    · Obesity    What are the signs and symptoms of colorectal polyps? · Blood in your bowel movement or bleeding from the rectum    · Change in bowel movement habits, such as diarrhea and constipation    · Abdominal pain    How are colorectal polyps diagnosed?   You should have fecal blood screening once a year for colorectal disease if you are over 48years old  You should be screened earlier if you have an intestinal disease or a family history of polyps or colorectal cancer  During this screening, a sample of your bowel movement is checked for blood, which may be an early sign of colorectal polyps or cancer  You may also need any of the following tests:  · Digital rectal exam:  Your healthcare provider will examine your anus and use a finger to check your rectum for polyps  · Barium enema: A barium enema is an x-ray of the colon  A tube is put into your anus, and a liquid called barium is put through the tube  Barium is used so that healthcare providers can see your colon better on the x-ray film  · Virtual colonoscopy: This is a CT scan that takes pictures of the inside of your colon and rectum  A small, flexible tube is put into your rectum and air or carbon dioxide (gas) is used to expand your colon  This lets healthcare providers clearly see your colon and any polyps on a monitor  · Colonoscopy or sigmoidoscopy: These procedures help your healthcare provider see the inside of your colon using a flexible tube with a small light and camera on the end  During a sigmoidoscopy, your healthcare provider will only look at rectum and lower colon  During a colonoscopy, healthcare providers will look at the full length of your colon  Healthcare providers may remove a small amount of tissue from the colon for a biopsy  How are colorectal polyps treated? A polypectomy is a minimally invasive procedure to remove your polyps  They may be removed during a colonoscopy or sigmoidoscopy  Your healthcare provider may need to remove the polyps with a laparoscope  Laparoscopy is done by inserting a small, flexible scope into incisions made on your abdomen  What are the risks of colorectal polyps? You may bleed during a colonoscopy procedure  Your bowel may be perforated (torn) when polyps are removed   This may lead to an open abdominal surgery  During surgery, you may bleed too much or get an infection  Adenomatous polyps that are not removed may turn into cancer and become more difficult to treat  Where can I find support and more information? · Naila Castle ([facility])  [place], [place] [address]  Phone: [phone]  Web Address: Jo Ann Maykel  [facility] nih gov    When should I contact my healthcare provider? · You have a fever  · You have chills, a cough, or feel weak and achy  · You have abdominal pain that does not go away or gets worse after you take medicine  · Your abdomen is swollen  · You are losing weight without trying  · You have questions or concerns about your condition or care  When should I seek immediate care or call 911? · You have sudden shortness of breath  · You have a fast heart rate, fast breathing, or are too dizzy to stand up  · You have severe abdominal pain  · You see blood in your bowel movement  CARE AGREEMENT:   You have the right to help plan your care  Learn about your health condition and how it may be treated  Discuss treatment options with your healthcare providers to decide what care you want to receive  You always have the right to refuse treatment  The above information is an  only  It is not intended as medical advice for individual conditions or treatments  Talk to your doctor, nurse or pharmacist before following any medical regimen to see if it is safe and effective for you  © Copyright [address] Information is for End User's use only and may not be sold, redistributed or otherwise used for commercial purposes   All illustrations and images included in CareNotes® are the copyrighted property of A D A M , Inc  or Moses Holman      Diverticulosis   WHAT YOU NEED TO KNOW:   Diverticulosis is a condition that causes small pockets called diverticula to form in your intestine  These pockets make it difficult for bowel movements to pass through your digestive system  DISCHARGE INSTRUCTIONS:   Seek care immediately if:   · You have severe pain on the left side of your lower abdomen  · Your bowel movements are bright or dark red  Contact your healthcare provider if:   · You have a fever and chills  · You feel dizzy or lightheaded  · You have nausea, or you are vomiting  · You have a change in your bowel movements  · You have questions or concerns about your condition or care  Medicines:   · Medicines  to soften your bowel movements may be given  You may also need medicines to treat symptoms such as bloating and pain  · Take your medicine as directed  Contact your healthcare provider if you think your medicine is not helping or if you have side effects  Tell him or her if you are allergic to any medicine  Keep a list of the medicines, vitamins, and herbs you take  Include the amounts, and when and why you take them  Bring the list or the pill bottles to follow-up visits  Carry your medicine list with you in case of an emergency  Self-care: The goal of treatment is to manage any symptoms you have and prevent other problems such as diverticulitis  Diverticulitis is swelling or infection of the diverticula  Your healthcare provider may recommend any of the following:  · Eat a variety of high-fiber foods  High-fiber foods help you have regular bowel movements  High-fiber foods include cooked beans, fruits, vegetables, and some cereals  Most adults need 25 to 35 grams of fiber each day  Your healthcare provider may recommend that you have more  Ask your healthcare provider how much fiber you need  Increase fiber slowly  You may have abdominal discomfort, bloating, and gas if you add fiber to your diet too quickly  You may need to take a fiber supplement if you are not getting enough fiber from food  · Drink liquids as directed    You may need to drink 2 to 3 liters (8 to 12 cups) of liquids every day  Ask your healthcare provider how much liquid to drink each day and which liquids are best for you  · Apply heat  on your abdomen for 20 to 30 minutes every 2 hours for as many days as directed  Heat helps decrease pain and muscle spasms  Help prevent diverticulitis or other symptoms: The following may help decrease your risk for diverticulitis or symptoms, such as bleeding  Talk to your provider about these or other things you can do to prevent problems that may occur with diverticulosis  · Exercise regularly  Ask your healthcare provider about the best exercise plan for you  Exercise can help you have regular bowel movements  Get 30 minutes of exercise on most days of the week  · Maintain a healthy weight  Ask your healthcare provider how much you should weigh  Ask him or her to help you create a weight loss plan if you are overweight  · Do not smoke  Nicotine and other chemicals in cigarettes increase your risk for diverticulitis  Ask your healthcare provider for information if you currently smoke and need help to quit  E-cigarettes or smokeless tobacco still contain nicotine  Talk to your healthcare provider before you use these products  · Ask your healthcare provider if it is safe to take NSAIDs  NSAIDs may increase your risk of diverticulitis  Follow up with your healthcare provider as directed:  Write down your questions so you remember to ask them during your visits  © Copyright [address] Information is for End User's use only and may not be sold, redistributed or otherwise used for commercial purposes  All illustrations and images included in CareNotes® are the copyrighted property of A D A M , Inc  or [address] [place], [place] [address]edith Cota   The above information is an  only  It is not intended as medical advice for individual conditions or treatments   Talk to your doctor, nurse or pharmacist before following any medical regimen to see if it is safe and effective for you  Diverticulosis Diet   WHAT YOU NEED TO KNOW:   What is a diverticulosis diet? A diverticulosis diet includes high-fiber foods  High-fiber foods help you have regular bowel movements  Extra fiber may decrease your risk of forming new diverticula (small pockets) in your intestine  A high-fiber diet may also help prevent diverticulitis  Diverticulitis is a painful condition that occurs when diverticula become inflamed or infected  You do not need to avoid nuts, seeds, corn, or popcorn while you are on a diverticulosis diet  How much fiber do I need? You may need 25 to 35 grams of fiber each day  Ask your dietitian or healthcare provider how much fiber you should have  Increase your intake of fiber slowly  When you eat more fiber, you may have gas and feel bloated  You may need to take a fiber supplement if you do not get enough fiber from food  Drink plenty of liquids as you increase the fiber in your diet  Your dietitian or healthcare provider may recommend 8 eight-ounce cups or more each day  Ask which liquids are best for you  Which foods are high in fiber? · Foods with at least 4 grams of fiber per serving:      ? ? to ½ cup of high-fiber cereal (check the nutrition label on the box)    ? ½ cup of blackberries or raspberries    ? 4 dried prunes    ? 1 cooked artichoke    ? ½ cup of cooked legumes, such as lentils, or red, kidney, and correia beans    · Foods with 1 to 3 grams of fiber per serving:      ? 1 slice of whole-wheat, pumpernickel, or rye bread    ? 4 whole-wheat crackers    ? ½ cup of cereal with 1 to 3 grams of fiber per serving (check the nutrition label on the box)    ? 1 piece of fruit, such as an apple, banana, pear, kiwi, or orange    ? 3 dates    ? ½ cup of canned apricots, fruit cocktail, peaches, or pears    ?  ½ cup of raw or cooked vegetables, such as carrots, cauliflower, cabbage, spinach, squash, or corn    When should I contact my healthcare provider? · You have questions about a high-fiber diet  · You have a change in your bowel movements  · You have an upset stomach  · You have a fever  · You have pain in your lower abdomen on the left side  · You have questions about your condition or care  CARE AGREEMENT:   You have the right to help plan your care  Learn about your health condition and how it may be treated  Discuss treatment options with your healthcare providers to decide what care you want to receive  You always have the right to refuse treatment  The above information is an  only  It is not intended as medical advice for individual conditions or treatments  Talk to your doctor, nurse or pharmacist before following any medical regimen to see if it is safe and effective for you  © Copyright [address] Information is for End User's use only and may not be sold, redistributed or otherwise used for commercial purposes   All illustrations and images included in CareNotes® are the copyrighted property of A D A M , Inc  or [address] [place] [address]

## 2021-04-07 NOTE — ANESTHESIA PREPROCEDURE EVALUATION
Procedure:  COLONOSCOPY    Relevant Problems   CARDIO   (+) Chronic atrial fibrillation (HCC)   (+) Coronary artery disease involving native coronary artery of native heart without angina pectoris   (+) Essential hypertension   (+) Mixed hyperlipidemia      MUSCULOSKELETAL   (+) Gout, unspecified      NEURO/PSYCH   (+) Chronic pain   (+) Diabetic polyneuropathy associated with type 2 diabetes mellitus (HCC)   (+) H/O: CVA (cerebrovascular accident)      PULMONARY   (+) DIAMOND (obstructive sleep apnea)      CAD/PCI/MI/CHF -- denies  H/O STROKE, NO RESIDUAL; NO DYSPHAGIA  COPD/ASTHMA/DIAMOND -- H/O DIAMOND BUT NO HOME CPAP  PROBS WITH PRIOR ANESTHESIA -- DENIES  NPO STATUS CONFIRMED    [address]     Transthoracic Echocardiogram  2D, M-mode, Doppler, and Color Doppler     Study date:  2019     Patient: Frances Cm  MR number: [id number]  Account number: [de-identified]  : 1955  Age: 70 years  Gender: Male  Status: Outpatient  Location: [facility] [place]  Height: 74 in  Weight: 359 3 lb  BP: 146/ 70 mmHg     Indications: Atrial Fibrillation     Diagnoses: I48 0 - Atrial fibrillation, I67 9 - Cerebrovascular disease, unspecified     Sonographer:  Kavya Falk RDCS  Primary Physician:  Feng Wyatt DO  Referring Physician:  Alyce Sanchez MD  Group:  [facility]  Interpreting Physician: THIERRY Perry      IMPRESSIONS:  Although no cardiac source of embolism was identified, the possibility cannot be ruled out on the basis of this study      SUMMARY     LEFT VENTRICLE:  Systolic function was normal  Ejection fraction was estimated in the range of 60 % to 65 %  There were no regional wall motion abnormalities  Wall thickness was increased  There was mild concentric hypertrophy      LEFT ATRIUM:  The atrium was mildly dilated      MITRAL VALVE:  There was mild annular calcification    There was mild regurgitation      TRICUSPID VALVE:  There was mild regurgitation      HISTORY: PRIOR HISTORY: Morbid Obesity, Diabetes, Bilateral Lower Leg Edema, Chronic Atrial Fibrillation    Lab Results   Component Value Date    WBC 7 32 10/20/2020    HGB 13 9 10/20/2020     10/20/2020     Lab Results   Component Value Date    SODIUM 137 03/29/2021    K 4 3 03/29/2021    BUN 18 03/29/2021    CREATININE 0 95 03/29/2021    EGFR 82 03/29/2021     Lab Results   Component Value Date    PTT 40 (H) 07/04/2019      Lab Results   Component Value Date    INR 1 57 (H) 07/05/2019           Lab Results   Component Value Date    HGBA1C 7 3 (H) 03/29/2021         Physical Exam    Airway    Mallampati score: III  TM Distance: >3 FB       Dental       Cardiovascular      Pulmonary      Other Findings  "A FEW MISSING"      Anesthesia Plan  ASA Score- 3     Anesthesia Type- IV sedation with anesthesia with ASA Monitors  Additional Monitors:   Airway Plan:     Comment: SUSHIL Pope , have personally seen and evaluated the patient prior to anesthetic care  I have reviewed the pre-anesthetic record, and other medical records if appropriate to the anesthetic care  If a CRNA is involved in the case, I have reviewed the CRNA assessment, if present, and agree  Risks/benefits and alternatives discussed with patient including possible PONV, sore throat, and possibility of rare anesthetic and surgical emergencies          Plan Factors-    Chart reviewed  EKG reviewed  Imaging results reviewed  Existing labs reviewed  Patient summary reviewed  Induction-     Postoperative Plan-     Informed Consent- Anesthetic plan and risks discussed with patient  I personally reviewed this patient with the CRNA  Discussed and agreed on the Anesthesia Plan with the CRNA  Judy Michel

## 2021-04-12 DIAGNOSIS — I48.20 CHRONIC ATRIAL FIBRILLATION (HCC): ICD-10-CM

## 2021-04-22 DIAGNOSIS — M1A.00X0 IDIOPATHIC CHRONIC GOUT WITHOUT TOPHUS, UNSPECIFIED SITE: ICD-10-CM

## 2021-04-22 RX ORDER — ALLOPURINOL 300 MG/1
300 TABLET ORAL DAILY
Qty: 90 TABLET | Refills: 1 | Status: SHIPPED | OUTPATIENT
Start: 2021-04-22 | End: 2021-11-01 | Stop reason: SDUPTHER

## 2021-05-24 ENCOUNTER — VBI (OUTPATIENT)
Dept: ADMINISTRATIVE | Facility: OTHER | Age: 66
End: 2021-05-24

## 2021-05-28 DIAGNOSIS — E11.42 DIABETIC POLYNEUROPATHY ASSOCIATED WITH TYPE 2 DIABETES MELLITUS (HCC): Primary | ICD-10-CM

## 2021-06-21 ENCOUNTER — VBI (OUTPATIENT)
Dept: ADMINISTRATIVE | Facility: OTHER | Age: 66
End: 2021-06-21

## 2021-07-23 DIAGNOSIS — I10 ESSENTIAL HYPERTENSION: Chronic | ICD-10-CM

## 2021-07-23 RX ORDER — CLONIDINE HYDROCHLORIDE 0.1 MG/1
0.1 TABLET ORAL EVERY 12 HOURS SCHEDULED
Qty: 60 TABLET | Refills: 5 | Status: SHIPPED | OUTPATIENT
Start: 2021-07-23 | End: 2022-05-04 | Stop reason: SDUPTHER

## 2021-08-20 ENCOUNTER — VBI (OUTPATIENT)
Dept: ADMINISTRATIVE | Facility: OTHER | Age: 66
End: 2021-08-20

## 2021-09-15 ENCOUNTER — VBI (OUTPATIENT)
Dept: ADMINISTRATIVE | Facility: OTHER | Age: 66
End: 2021-09-15

## 2021-09-28 ENCOUNTER — OFFICE VISIT (OUTPATIENT)
Dept: URGENT CARE | Facility: CLINIC | Age: 66
End: 2021-09-28
Payer: COMMERCIAL

## 2021-09-28 VITALS — RESPIRATION RATE: 18 BRPM | HEART RATE: 84 BPM | TEMPERATURE: 97.7 F | OXYGEN SATURATION: 98 %

## 2021-09-28 DIAGNOSIS — J20.9 ACUTE BRONCHITIS, UNSPECIFIED ORGANISM: Primary | ICD-10-CM

## 2021-09-28 DIAGNOSIS — R05.9 COUGH: ICD-10-CM

## 2021-09-28 PROCEDURE — U0003 INFECTIOUS AGENT DETECTION BY NUCLEIC ACID (DNA OR RNA); SEVERE ACUTE RESPIRATORY SYNDROME CORONAVIRUS 2 (SARS-COV-2) (CORONAVIRUS DISEASE [COVID-19]), AMPLIFIED PROBE TECHNIQUE, MAKING USE OF HIGH THROUGHPUT TECHNOLOGIES AS DESCRIBED BY CMS-2020-01-R: HCPCS | Performed by: PHYSICIAN ASSISTANT

## 2021-09-28 PROCEDURE — U0005 INFEC AGEN DETEC AMPLI PROBE: HCPCS | Performed by: PHYSICIAN ASSISTANT

## 2021-09-28 PROCEDURE — S9083 URGENT CARE CENTER GLOBAL: HCPCS | Performed by: PHYSICIAN ASSISTANT

## 2021-09-28 PROCEDURE — 99213 OFFICE O/P EST LOW 20 MIN: CPT | Performed by: PHYSICIAN ASSISTANT

## 2021-09-28 RX ORDER — AZITHROMYCIN 250 MG/1
TABLET, FILM COATED ORAL
Qty: 6 TABLET | Refills: 0 | Status: SHIPPED | OUTPATIENT
Start: 2021-09-28 | End: 2021-10-02

## 2021-09-28 NOTE — PROGRESS NOTES
[facility]    NAME: Jennifer Chao is a 74 y o  male  : 1955    MRN: [id number]  DATE: 2021  TIME: 9:30 AM    Assessment and Plan   Acute bronchitis, unspecified organism [J20 9]  1  Acute bronchitis, unspecified organism  azithromycin (ZITHROMAX) 250 mg tablet    [facility]Cov[facility]   2  Cough         Patient Instructions     Patient Instructions   I have prescribed an antibiotic for the infection  Please take the antibiotic as prescribed and finish the entire prescription  I recommend that the patient takes an over the counter probiotic or eats yogurt with live cultures in it [place]) to keep good bacteria in the gut and help prevent diarrhea  Wash hands frequently to prevent the spread of infection  Can use over the counter cough and cold medications to help with symptoms  Ibuprofen and/or tylenol as needed for pain or fever  If not improving over the next 7-10 days, follow up with PCP  Chief Complaint     Chief Complaint   Patient presents with    Cough       History of Present Illness    57-year-old male here with complaint postnasal drip and cough for the last week  Cough of the whitish sputum  Denies any fever chills or body aches  Patient is fully vaccinated for COVID  Review of Systems   Review of Systems   Constitutional: Negative for chills, fatigue and fever  HENT: Positive for congestion and postnasal drip  Negative for ear pain, sinus pressure and sore throat  Respiratory: Positive for cough  Negative for shortness of breath and wheezing  Neurological: Negative for headaches  All other systems reviewed and are negative        Current Medications     Current Outpatient Medications:     acetaminophen (TYLENOL) 500 mg tablet, Take 500 mg by mouth every 6 (six) hours as needed for mild pain, Disp: , Rfl:     allopurinol (ZYLOPRIM) 300 mg tablet, Take 1 tablet (300 mg total) by mouth daily, Disp: 90 tablet, Rfl: 1    ammonium lactate (LAC-HYDRIN) 12 % lotion, Apply topically 2 (two) times a day as needed for dry skin, Disp: 400 g, Rfl: 11    apixaban (ELIQUIS) 5 mg, Take 1 tablet (5 mg total) by mouth 2 (two) times a day, Disp: 60 tablet, Rfl: 5    atorvastatin (LIPITOR) 40 mg tablet, Take 1 tablet (40 mg total) by mouth daily, Disp: 90 tablet, Rfl: 3    azithromycin (ZITHROMAX) 250 mg tablet, Take 2 tablets today then 1 tablet daily x 4 days, Disp: 6 tablet, Rfl: 0    cloNIDine (CATAPRES) 0 1 mg tablet, Take 1 tablet (0 1 mg total) by mouth every 12 (twelve) hours, Disp: 60 tablet, Rfl: 5    furosemide (LASIX) 80 mg tablet, take 1 tablet by mouth once daily, Disp: 90 tablet, Rfl: 3    glipiZIDE (GLUCOTROL) 5 mg tablet, Take 1 tablet (5 mg total) by mouth daily, Disp: 90 tablet, Rfl: 3    lisinopril (ZESTRIL) 40 mg tablet, Take 1 tablet (40 mg total) by mouth daily, Disp: 90 tablet, Rfl: 3    metFORMIN (GLUCOPHAGE) 1000 MG tablet, Take 1 tablet (1,000 mg total) by mouth 2 (two) times a day with meals, Disp: 180 tablet, Rfl: 3    metoprolol succinate (TOPROL-XL) 50 mg 24 hr tablet, Take 25 mg by mouth daily, Disp: , Rfl:     Current Allergies     Allergies as of 09/28/2021    (No Known Allergies)          The following portions of the patient's history were reviewed and updated as appropriate: allergies, current medications, past family history, past medical history, past social history, past surgical history and problem list    Past Medical History:   Diagnosis Date    Bilateral cellulitis of lower leg     Bilateral edema of lower extremity     Chronic pain     Diabetes mellitus (HCC)     Gout     last assessed: 4/10/2019    Hemiplegia and hemiparesis following cerebral infarction affecting right dominant side ([facility] [address] )     last assessed: 1/10/2019    Hypertension     Lymphedema     DIAMOND (obstructive sleep apnea)     does not treat this    Stroke (HCC)     Type 2 diabetes mellitus with foot ulcer, without long-term current use of insulin ([facility] [address] ) 11/30/2018    Venous insufficiency (chronic) (peripheral)     last assessed: 7/5/2018     Past Surgical History:   Procedure Laterality Date    HERNIA REPAIR       Family History   Problem Relation Age of Onset    Heart failure Mother     Heart failure Father      Social History     Socioeconomic History    Marital status:      Spouse name: Not on file    Number of children: Not on file    Years of education: Not on file    Highest education level: Not on file   Occupational History    Not on file   Tobacco Use    Smoking status: Never Smoker    Smokeless tobacco: Never Used   Vaping Use    Vaping Use: Never used   Substance and Sexual Activity    Alcohol use: Yes    Drug use: No    Sexual activity: Not Currently   Other Topics Concern    Not on file   Social History Narrative    Caffeine use- iced tea on occasion     Social Determinants of Health     Financial Resource Strain:     Difficulty of Paying Living Expenses:    Food Insecurity:     Worried About Running Out of Food in the Last Year:     Ran Out of Food in the Last Year:    Transportation Needs:     Lack of Transportation (Medical):  Lack of Transportation (Non-Medical):    Physical Activity:     Days of Exercise per Week:     Minutes of Exercise per Session:    Stress:     Feeling of Stress :    Social Connections:     Frequency of Communication with Friends and Family:     Frequency of Social Gatherings with Friends and Family:     Attends Holiness Services:     Active Member of Clubs or Organizations:     Attends Club or Organization Meetings:     Marital Status:    Intimate Partner Violence:     Fear of Current or Ex-Partner:     Emotionally Abused:     Physically Abused:     Sexually Abused:      Medications have been verified  Objective   Pulse 84   Temp 97 7 °F (36 5 °C)   Resp 18   SpO2 98%      Physical Exam   Physical Exam  Vitals reviewed  Constitutional:       General: He is not in acute distress  Appearance: He is well-developed  HENT:      Head: Normocephalic and atraumatic  Right Ear: Tympanic membrane normal       Left Ear: Tympanic membrane normal       Nose: Congestion present  No mucosal edema  Mouth/Throat:      Mouth: Mucous membranes are moist       Pharynx: No posterior oropharyngeal erythema  Cardiovascular:      Rate and Rhythm: Normal rate and regular rhythm  Heart sounds: Normal heart sounds  Pulmonary:      Effort: Pulmonary effort is normal  No respiratory distress  Breath sounds: Normal breath sounds

## 2021-09-29 ENCOUNTER — TELEPHONE (OUTPATIENT)
Dept: URGENT CARE | Facility: CLINIC | Age: 66
End: 2021-09-29

## 2021-09-29 LAB — SARS-COV-2 RNA RESP QL NAA+PROBE: NEGATIVE

## 2021-11-01 ENCOUNTER — TELEPHONE (OUTPATIENT)
Dept: FAMILY MEDICINE CLINIC | Facility: CLINIC | Age: 66
End: 2021-11-01

## 2021-11-01 DIAGNOSIS — M1A.00X0 IDIOPATHIC CHRONIC GOUT WITHOUT TOPHUS, UNSPECIFIED SITE: ICD-10-CM

## 2021-11-01 RX ORDER — ALLOPURINOL 300 MG/1
300 TABLET ORAL DAILY
Qty: 90 TABLET | Refills: 1 | Status: SHIPPED | OUTPATIENT
Start: 2021-11-01 | End: 2022-04-26 | Stop reason: SDUPTHER

## 2021-11-02 ENCOUNTER — OFFICE VISIT (OUTPATIENT)
Dept: FAMILY MEDICINE CLINIC | Facility: CLINIC | Age: 66
End: 2021-11-02
Payer: COMMERCIAL

## 2021-11-02 VITALS
HEART RATE: 87 BPM | DIASTOLIC BLOOD PRESSURE: 88 MMHG | SYSTOLIC BLOOD PRESSURE: 126 MMHG | BODY MASS INDEX: 42.66 KG/M2 | HEIGHT: 72 IN | WEIGHT: 315 LBS | TEMPERATURE: 96.9 F | OXYGEN SATURATION: 99 %

## 2021-11-02 DIAGNOSIS — I48.20 CHRONIC ATRIAL FIBRILLATION (HCC): Chronic | ICD-10-CM

## 2021-11-02 DIAGNOSIS — R53.83 OTHER FATIGUE: ICD-10-CM

## 2021-11-02 DIAGNOSIS — I89.0 LYMPHEDEMA: ICD-10-CM

## 2021-11-02 DIAGNOSIS — Z12.5 PROSTATE CANCER SCREENING: ICD-10-CM

## 2021-11-02 DIAGNOSIS — E78.5 DYSLIPIDEMIA: ICD-10-CM

## 2021-11-02 DIAGNOSIS — M1A.09X0 IDIOPATHIC CHRONIC GOUT OF MULTIPLE SITES WITHOUT TOPHUS: ICD-10-CM

## 2021-11-02 DIAGNOSIS — Z23 ENCOUNTER FOR IMMUNIZATION: ICD-10-CM

## 2021-11-02 DIAGNOSIS — E11.42 DIABETIC POLYNEUROPATHY ASSOCIATED WITH TYPE 2 DIABETES MELLITUS (HCC): Primary | ICD-10-CM

## 2021-11-02 DIAGNOSIS — Z00.00 ENCOUNTER FOR MEDICARE ANNUAL WELLNESS EXAM: ICD-10-CM

## 2021-11-02 DIAGNOSIS — I10 ESSENTIAL HYPERTENSION: Chronic | ICD-10-CM

## 2021-11-02 LAB
CREAT UR-MCNC: 46.5 MG/DL
MICROALBUMIN UR-MCNC: 8.5 MG/L (ref 0–20)
MICROALBUMIN/CREAT 24H UR: 18 MG/G CREATININE (ref 0–30)

## 2021-11-02 PROCEDURE — 99214 OFFICE O/P EST MOD 30 MIN: CPT | Performed by: FAMILY MEDICINE

## 2021-11-02 PROCEDURE — 3074F SYST BP LT 130 MM HG: CPT | Performed by: FAMILY MEDICINE

## 2021-11-02 PROCEDURE — G0439 PPPS, SUBSEQ VISIT: HCPCS | Performed by: FAMILY MEDICINE

## 2021-11-02 PROCEDURE — 82570 ASSAY OF URINE CREATININE: CPT | Performed by: FAMILY MEDICINE

## 2021-11-02 PROCEDURE — 3725F SCREEN DEPRESSION PERFORMED: CPT | Performed by: FAMILY MEDICINE

## 2021-11-02 PROCEDURE — 1160F RVW MEDS BY RX/DR IN RCRD: CPT | Performed by: FAMILY MEDICINE

## 2021-11-02 PROCEDURE — 1170F FXNL STATUS ASSESSED: CPT | Performed by: FAMILY MEDICINE

## 2021-11-02 PROCEDURE — 3079F DIAST BP 80-89 MM HG: CPT | Performed by: FAMILY MEDICINE

## 2021-11-02 PROCEDURE — 3288F FALL RISK ASSESSMENT DOCD: CPT | Performed by: FAMILY MEDICINE

## 2021-11-02 PROCEDURE — 1125F AMNT PAIN NOTED PAIN PRSNT: CPT | Performed by: FAMILY MEDICINE

## 2021-11-02 PROCEDURE — G0009 ADMIN PNEUMOCOCCAL VACCINE: HCPCS

## 2021-11-02 PROCEDURE — 3061F NEG MICROALBUMINURIA REV: CPT | Performed by: FAMILY MEDICINE

## 2021-11-02 PROCEDURE — 90732 PPSV23 VACC 2 YRS+ SUBQ/IM: CPT

## 2021-11-02 PROCEDURE — 1036F TOBACCO NON-USER: CPT | Performed by: FAMILY MEDICINE

## 2021-11-02 PROCEDURE — 3008F BODY MASS INDEX DOCD: CPT | Performed by: FAMILY MEDICINE

## 2021-11-02 PROCEDURE — 90662 IIV NO PRSV INCREASED AG IM: CPT

## 2021-11-02 PROCEDURE — G0008 ADMIN INFLUENZA VIRUS VAC: HCPCS

## 2021-11-02 PROCEDURE — 82043 UR ALBUMIN QUANTITATIVE: CPT | Performed by: FAMILY MEDICINE

## 2021-11-02 PROCEDURE — 4040F PNEUMOC VAC/ADMIN/RCVD: CPT | Performed by: FAMILY MEDICINE

## 2021-11-09 ENCOUNTER — APPOINTMENT (OUTPATIENT)
Dept: LAB | Facility: CLINIC | Age: 66
End: 2021-11-09
Payer: COMMERCIAL

## 2021-11-09 DIAGNOSIS — R53.83 OTHER FATIGUE: ICD-10-CM

## 2021-11-09 DIAGNOSIS — E11.42 DIABETIC POLYNEUROPATHY ASSOCIATED WITH TYPE 2 DIABETES MELLITUS (HCC): ICD-10-CM

## 2021-11-09 DIAGNOSIS — Z12.5 PROSTATE CANCER SCREENING: ICD-10-CM

## 2021-11-09 LAB
ALBUMIN SERPL BCP-MCNC: 3.2 G/DL (ref 3.5–5)
ALP SERPL-CCNC: 90 U/L (ref 46–116)
ALT SERPL W P-5'-P-CCNC: 22 U/L (ref 12–78)
ANION GAP SERPL CALCULATED.3IONS-SCNC: 4 MMOL/L (ref 4–13)
AST SERPL W P-5'-P-CCNC: 13 U/L (ref 5–45)
BASOPHILS # BLD AUTO: 0.03 THOUSANDS/ΜL (ref 0–0.1)
BASOPHILS NFR BLD AUTO: 0 % (ref 0–1)
BILIRUB SERPL-MCNC: 0.76 MG/DL (ref 0.2–1)
BUN SERPL-MCNC: 18 MG/DL (ref 5–25)
CALCIUM ALBUM COR SERPL-MCNC: 10.1 MG/DL (ref 8.3–10.1)
CALCIUM SERPL-MCNC: 9.5 MG/DL (ref 8.3–10.1)
CHLORIDE SERPL-SCNC: 102 MMOL/L (ref 100–108)
CHOLEST SERPL-MCNC: 150 MG/DL (ref 50–200)
CO2 SERPL-SCNC: 29 MMOL/L (ref 21–32)
CREAT SERPL-MCNC: 0.92 MG/DL (ref 0.6–1.3)
EOSINOPHIL # BLD AUTO: 0.19 THOUSAND/ΜL (ref 0–0.61)
EOSINOPHIL NFR BLD AUTO: 3 % (ref 0–6)
ERYTHROCYTE [DISTWIDTH] IN BLOOD BY AUTOMATED COUNT: 13.2 % (ref 11.6–15.1)
GFR SERPL CREATININE-BSD FRML MDRD: 85 ML/MIN/1.73SQ M
GLUCOSE P FAST SERPL-MCNC: 130 MG/DL (ref 65–99)
HCT VFR BLD AUTO: 39.4 % (ref 36.5–49.3)
HDLC SERPL-MCNC: 41 MG/DL
HGB BLD-MCNC: 13.1 G/DL (ref 12–17)
IMM GRANULOCYTES # BLD AUTO: 0.04 THOUSAND/UL (ref 0–0.2)
IMM GRANULOCYTES NFR BLD AUTO: 1 % (ref 0–2)
LDLC SERPL CALC-MCNC: 65 MG/DL (ref 0–100)
LYMPHOCYTES # BLD AUTO: 1.94 THOUSANDS/ΜL (ref 0.6–4.47)
LYMPHOCYTES NFR BLD AUTO: 26 % (ref 14–44)
MCH RBC QN AUTO: 33.5 PG (ref 26.8–34.3)
MCHC RBC AUTO-ENTMCNC: 33.2 G/DL (ref 31.4–37.4)
MCV RBC AUTO: 101 FL (ref 82–98)
MONOCYTES # BLD AUTO: 0.66 THOUSAND/ΜL (ref 0.17–1.22)
MONOCYTES NFR BLD AUTO: 9 % (ref 4–12)
NEUTROPHILS # BLD AUTO: 4.7 THOUSANDS/ΜL (ref 1.85–7.62)
NEUTS SEG NFR BLD AUTO: 61 % (ref 43–75)
NRBC BLD AUTO-RTO: 0 /100 WBCS
PLATELET # BLD AUTO: 185 THOUSANDS/UL (ref 149–390)
PMV BLD AUTO: 10.4 FL (ref 8.9–12.7)
POTASSIUM SERPL-SCNC: 4.5 MMOL/L (ref 3.5–5.3)
PROT SERPL-MCNC: 7.9 G/DL (ref 6.4–8.2)
PSA SERPL-MCNC: 1.3 NG/ML (ref 0–4)
RBC # BLD AUTO: 3.91 MILLION/UL (ref 3.88–5.62)
SODIUM SERPL-SCNC: 135 MMOL/L (ref 136–145)
TRIGL SERPL-MCNC: 220 MG/DL
TSH SERPL DL<=0.05 MIU/L-ACNC: 1.87 UIU/ML (ref 0.36–3.74)
WBC # BLD AUTO: 7.56 THOUSAND/UL (ref 4.31–10.16)

## 2021-11-09 PROCEDURE — 80061 LIPID PANEL: CPT

## 2021-11-09 PROCEDURE — G0103 PSA SCREENING: HCPCS

## 2021-11-09 PROCEDURE — 36415 COLL VENOUS BLD VENIPUNCTURE: CPT

## 2021-11-09 PROCEDURE — 80053 COMPREHEN METABOLIC PANEL: CPT

## 2021-11-09 PROCEDURE — 84443 ASSAY THYROID STIM HORMONE: CPT

## 2021-11-09 PROCEDURE — 85025 COMPLETE CBC W/AUTO DIFF WBC: CPT

## 2021-11-15 LAB
LEFT EYE DIABETIC RETINOPATHY: NORMAL
RIGHT EYE DIABETIC RETINOPATHY: NORMAL
SEVERITY (EYE EXAM): NORMAL

## 2021-11-22 ENCOUNTER — TELEPHONE (OUTPATIENT)
Dept: FAMILY MEDICINE CLINIC | Facility: CLINIC | Age: 66
End: 2021-11-22

## 2021-12-14 DIAGNOSIS — I48.20 CHRONIC ATRIAL FIBRILLATION (HCC): ICD-10-CM

## 2021-12-15 ENCOUNTER — OFFICE VISIT (OUTPATIENT)
Dept: CARDIOLOGY CLINIC | Facility: CLINIC | Age: 66
End: 2021-12-15
Payer: COMMERCIAL

## 2021-12-15 VITALS
WEIGHT: 315 LBS | BODY MASS INDEX: 42.66 KG/M2 | DIASTOLIC BLOOD PRESSURE: 100 MMHG | HEIGHT: 72 IN | HEART RATE: 99 BPM | SYSTOLIC BLOOD PRESSURE: 160 MMHG

## 2021-12-15 DIAGNOSIS — E78.2 MIXED HYPERLIPIDEMIA: Chronic | ICD-10-CM

## 2021-12-15 DIAGNOSIS — I10 ESSENTIAL HYPERTENSION: Chronic | ICD-10-CM

## 2021-12-15 DIAGNOSIS — I48.20 CHRONIC ATRIAL FIBRILLATION (HCC): Primary | ICD-10-CM

## 2021-12-15 DIAGNOSIS — N52.8 OTHER MALE ERECTILE DYSFUNCTION: ICD-10-CM

## 2021-12-15 DIAGNOSIS — I10 BENIGN ESSENTIAL HTN: ICD-10-CM

## 2021-12-15 PROCEDURE — 93000 ELECTROCARDIOGRAM COMPLETE: CPT | Performed by: INTERNAL MEDICINE

## 2021-12-15 PROCEDURE — 3077F SYST BP >= 140 MM HG: CPT | Performed by: INTERNAL MEDICINE

## 2021-12-15 PROCEDURE — 3080F DIAST BP >= 90 MM HG: CPT | Performed by: INTERNAL MEDICINE

## 2021-12-15 PROCEDURE — 1036F TOBACCO NON-USER: CPT | Performed by: INTERNAL MEDICINE

## 2021-12-15 PROCEDURE — 4010F ACE/ARB THERAPY RXD/TAKEN: CPT | Performed by: INTERNAL MEDICINE

## 2021-12-15 PROCEDURE — 1160F RVW MEDS BY RX/DR IN RCRD: CPT | Performed by: INTERNAL MEDICINE

## 2021-12-15 PROCEDURE — 99214 OFFICE O/P EST MOD 30 MIN: CPT | Performed by: INTERNAL MEDICINE

## 2021-12-15 PROCEDURE — 3008F BODY MASS INDEX DOCD: CPT | Performed by: INTERNAL MEDICINE

## 2021-12-15 RX ORDER — LOSARTAN POTASSIUM 50 MG/1
50 TABLET ORAL DAILY
Qty: 90 TABLET | Refills: 5 | Status: SHIPPED | OUTPATIENT
Start: 2021-12-15

## 2021-12-15 RX ORDER — SILDENAFIL 50 MG/1
50 TABLET, FILM COATED ORAL DAILY PRN
Qty: 10 TABLET | Refills: 5 | Status: SHIPPED | OUTPATIENT
Start: 2021-12-15 | End: 2021-12-29 | Stop reason: SDUPTHER

## 2021-12-20 ENCOUNTER — VBI (OUTPATIENT)
Dept: ADMINISTRATIVE | Facility: OTHER | Age: 66
End: 2021-12-20

## 2021-12-28 DIAGNOSIS — N52.8 OTHER MALE ERECTILE DYSFUNCTION: ICD-10-CM

## 2021-12-29 RX ORDER — SILDENAFIL 100 MG/1
100 TABLET, FILM COATED ORAL DAILY PRN
Qty: 12 TABLET | Refills: 5 | Status: SHIPPED | OUTPATIENT
Start: 2021-12-29 | End: 2022-01-11

## 2022-01-03 ENCOUNTER — APPOINTMENT (OUTPATIENT)
Dept: RADIOLOGY | Facility: CLINIC | Age: 67
End: 2022-01-03
Payer: COMMERCIAL

## 2022-01-03 ENCOUNTER — OFFICE VISIT (OUTPATIENT)
Dept: URGENT CARE | Facility: CLINIC | Age: 67
End: 2022-01-03
Payer: COMMERCIAL

## 2022-01-03 VITALS
BODY MASS INDEX: 48.15 KG/M2 | OXYGEN SATURATION: 96 % | RESPIRATION RATE: 16 BRPM | DIASTOLIC BLOOD PRESSURE: 92 MMHG | TEMPERATURE: 97.2 F | SYSTOLIC BLOOD PRESSURE: 158 MMHG | HEART RATE: 88 BPM | WEIGHT: 315 LBS

## 2022-01-03 DIAGNOSIS — R09.82 POST-NASAL DRIP: ICD-10-CM

## 2022-01-03 DIAGNOSIS — R05.9 COUGH: ICD-10-CM

## 2022-01-03 DIAGNOSIS — J90 PLEURAL EFFUSION, BILATERAL: Primary | ICD-10-CM

## 2022-01-03 DIAGNOSIS — I48.20 CHRONIC ATRIAL FIBRILLATION (HCC): Chronic | ICD-10-CM

## 2022-01-03 PROCEDURE — U0005 INFEC AGEN DETEC AMPLI PROBE: HCPCS

## 2022-01-03 PROCEDURE — 99213 OFFICE O/P EST LOW 20 MIN: CPT

## 2022-01-03 PROCEDURE — 71046 X-RAY EXAM CHEST 2 VIEWS: CPT

## 2022-01-03 PROCEDURE — S9083 URGENT CARE CENTER GLOBAL: HCPCS

## 2022-01-03 PROCEDURE — U0003 INFECTIOUS AGENT DETECTION BY NUCLEIC ACID (DNA OR RNA); SEVERE ACUTE RESPIRATORY SYNDROME CORONAVIRUS 2 (SARS-COV-2) (CORONAVIRUS DISEASE [COVID-19]), AMPLIFIED PROBE TECHNIQUE, MAKING USE OF HIGH THROUGHPUT TECHNOLOGIES AS DESCRIBED BY CMS-2020-01-R: HCPCS

## 2022-01-03 RX ORDER — FUROSEMIDE 80 MG
TABLET ORAL
Qty: 90 TABLET | Refills: 3 | Status: SHIPPED | OUTPATIENT
Start: 2022-01-03

## 2022-01-03 RX ORDER — FLUTICASONE PROPIONATE 50 MCG
1 SPRAY, SUSPENSION (ML) NASAL DAILY
Qty: 9.9 ML | Refills: 0 | Status: SHIPPED | OUTPATIENT
Start: 2022-01-03 | End: 2022-05-04 | Stop reason: ALTCHOICE

## 2022-01-03 NOTE — PATIENT INSTRUCTIONS
Pleural Effusion   AMBULATORY CARE:   Pleural effusion  is fluid buildup in the space between the layers of the pleura  The pleura is a thin piece of tissue with 2 layers  One layer rests directly on the lungs  The other rests on the chest wall  There is normally a small amount of fluid between these layers  This fluid helps your lungs move easily when you breathe  Common signs and symptoms:   · Cough, shortness of breath    · Breathing faster than usual    · Chest pain when you breathe in or cough    · Fever    Call your local emergency number (911 in the [address] [facility]) if:   · You find it very hard to breathe  · You feel faint, or you cannot think clearly  Seek care immediately if:   · Your breathing problems do not go away, or they get worse  Call your doctor if:   · Your lips or fingernails turn blue  · You have a fever  · Your pain does not go away or gets worse  · You cough up yellow, green, gray, or bloody mucus  · You have questions or concerns about your condition or care  Treatment  depends on the cause of your pleural effusion and your symptoms  You may need any of the following:  · Cardiac medicines  may be needed if your pleural effusion is caused by heart failure  · Antibiotics  help treat an infection caused by bacteria  · NSAIDs  help decrease swelling and pain or fever  This medicine is available with or without a doctor's order  NSAIDs can cause stomach bleeding or kidney problems in certain people  If you take blood thinner medicine, always ask your healthcare provider if NSAIDs are safe for you  Always read the medicine label and follow directions  · Prescription pain medicine  may be given  Ask your healthcare provider how to take this medicine safely  Some prescription pain medicines contain acetaminophen  Do not take other medicines that contain acetaminophen without talking to your healthcare provider  Too much acetaminophen may cause liver damage   Prescription pain medicine may cause constipation  Ask your healthcare provider how to prevent or treat constipation  · Chemotherapy  may be needed if your pleural effusion is caused by cancer  · Steroids ,or other types of medicines, may be given to decrease swelling  · Drainage  of extra pleural fluid may be done using thoracentesis or a chest tube  A chest tube may stay in your chest for days or weeks  This allows the extra fluid around your lungs to drain over time  You may need medicines put directly into your chest if the fluid does not drain out easily  · Surgery  may be needed if your pleural effusion keeps coming back or if it increases your risk for other problems  Prevent another pleural effusion:  Maintain a healthy lifestyle:  · Eat a variety of healthy foods  Healthy foods help with overall health  Healthy foods include fruit, vegetables, whole-grain breads, low-fat dairy products, beans, lean meat, and fish  Limit sugar, alcohol, and fat  · Do not smoke  and do not allow others to smoke around you  Nicotine and other chemicals in cigarettes and cigars increase your risk for lung infections such as pneumonia  Ask your healthcare provider for information if you currently smoke and need help to quit  E-cigarettes or smokeless tobacco still contain nicotine  Talk to your healthcare provider before you use these products  · Drink liquids as directed and rest as needed  Liquids help keep your air passages moist  This can help your body get rid of germs and other irritants  Ask your healthcare provider how much liquid to drink each day and which liquids are best for you  You may feel like resting more  Slowly start to do more each day  Rest when you feel it is needed  · Exercise regularly  Ask about the best exercise plan for you  Exercise will lower your blood pressure and decrease stress  This helps decrease your risk for another pleural effusion, or a lung infection      Follow up with your doctor or specialist as directed: You may need to see a specialist depending on the cause of your pleural effusion  Write down your questions so you remember to ask them during your visits  © Copyright [place] 2021 Information is for End User's use only and may not be sold, redistributed or otherwise used for commercial purposes  All illustrations and images included in CareNotes® are the copyrighted property of A D A M , Inc  or [employer]   The above information is an  only  It is not intended as medical advice for individual conditions or treatments  Talk to your doctor, nurse or pharmacist before following any medical regimen to see if it is safe and effective for you

## 2022-01-03 NOTE — PROGRESS NOTES
[facility]        NAME: Huong Covarrubias is a 74 y o  male  : 1955    MRN: [id number]  DATE: January 3, 2022  TIME: 5:54 PM    Assessment and Plan   Pleural effusion, bilateral [J90]  1  Pleural effusion, bilateral     2  Cough  XR chest pa & lateral    COVID Only -Office Collect    fluticasone (FLONASE) 50 mcg/act nasal spray   3  Post-nasal drip  COVID Only -Office Collect    fluticasone (FLONASE) 50 mcg/act nasal spray     X-ray and clinical presentation concerning for fluid volume overload  Suggest patient proceed to emergency department, patient declined at this time an AMA form signed  Patient states he will follow-up with his PCP tomorrow  Patient Instructions     Follow-up with her PCP tomorrow  Take Flonase as directed  COVID test takes approximately 5 days to come back  Chief Complaint     Chief Complaint   Patient presents with    Cough     persistent  Increases with cough  No fever, some post nasal drip  No body aches or chills  Covid vaccine x3 and flu vaccine received  History of Present Illness       Patient is a 57-year-old male who presents to the office today complaining of cough for 1 year  He states that he was evaluated for this cough in September was put on azithromycin which seemed to help for a little bit but the cough never completely went away  He states that over the past week cough has gotten worse  States that his sputum has turned white this past week as well  Patient complains of postnasal drip as well  Denies any sick contacts patient states that he has no history of heart failure that he is aware of and is on Lasix for bilateral lower extremity edema  He states that the edema in his lower extremities is not worse than normal   He states that he does not sleep in a bed and sleeps in the recliner chair and always has  He also states that he had cauterization of his nares done epistaxis within the past year    Patient does have a history of sleep apnea but states he does not tolerate any CPAP machines and therefore does not wear 1  Patient states that his lisinopril was changed to losartan recently  Review of Systems   Review of Systems   Constitutional: Negative for chills  HENT: Positive for congestion, postnasal drip, rhinorrhea and sinus pain  Negative for ear pain  Respiratory: Positive for cough  Negative for shortness of breath and wheezing  Cardiovascular: Negative for chest pain and palpitations  Gastrointestinal: Negative for diarrhea, nausea and vomiting  Musculoskeletal: Negative for myalgias  Neurological: Positive for headaches  All other systems reviewed and are negative          Current Medications       Current Outpatient Medications:     acetaminophen (TYLENOL) 500 mg tablet, Take 500 mg by mouth every 6 (six) hours as needed for mild pain, Disp: , Rfl:     allopurinol (ZYLOPRIM) 300 mg tablet, Take 1 tablet (300 mg total) by mouth daily, Disp: 90 tablet, Rfl: 1    ammonium lactate (LAC-HYDRIN) 12 % lotion, Apply topically 2 (two) times a day as needed for dry skin, Disp: 400 g, Rfl: 11    apixaban (ELIQUIS) 5 mg, Take 1 tablet (5 mg total) by mouth 2 (two) times a day, Disp: 180 tablet, Rfl: 3    atorvastatin (LIPITOR) 40 mg tablet, Take 1 tablet (40 mg total) by mouth daily, Disp: 90 tablet, Rfl: 3    cloNIDine (CATAPRES) 0 1 mg tablet, Take 1 tablet (0 1 mg total) by mouth every 12 (twelve) hours, Disp: 60 tablet, Rfl: 5    furosemide (LASIX) 80 mg tablet, take 1 tablet by mouth once daily, Disp: 90 tablet, Rfl: 3    glipiZIDE (GLUCOTROL) 5 mg tablet, Take 1 tablet (5 mg total) by mouth daily, Disp: 90 tablet, Rfl: 3    losartan (COZAAR) 50 mg tablet, Take 1 tablet (50 mg total) by mouth daily, Disp: 90 tablet, Rfl: 5    metFORMIN (GLUCOPHAGE) 1000 MG tablet, Take 1 tablet (1,000 mg total) by mouth 2 (two) times a day with meals, Disp: 180 tablet, Rfl: 3    metoprolol succinate (TOPROL-XL) 50 mg 24 hr tablet, Take 25 mg by mouth daily, Disp: , Rfl:     sildenafil (VIAGRA) 100 mg tablet, Take 1 tablet (100 mg total) by mouth daily as needed for erectile dysfunction, Disp: 12 tablet, Rfl: 5    fluticasone (FLONASE) 50 mcg/act nasal spray, 1 spray into each nostril daily, Disp: 9 9 mL, Rfl: 0    Current Allergies     Allergies as of 01/03/2022    (No Known Allergies)            The following portions of the patient's history were reviewed and updated as appropriate: allergies, current medications, past family history, past medical history, past social history, past surgical history and problem list      Past Medical History:   Diagnosis Date    Bilateral cellulitis of lower leg     Bilateral edema of lower extremity     Chronic pain     Diabetes mellitus ([facility] [address] )     Gout     last assessed: 4/10/2019    Hemiplegia and hemiparesis following cerebral infarction affecting right dominant side ([facility] [address] )     last assessed: 1/10/2019    Hypertension     Lymphedema     DIAMOND (obstructive sleep apnea)     does not treat this    Stroke ([facility] )     Type 2 diabetes mellitus with foot ulcer, without long-term current use of insulin ([facility] ) 11/30/2018    Venous insufficiency (chronic) (peripheral)     last assessed: 7/5/2018       Past Surgical History:   Procedure Laterality Date    HERNIA REPAIR         Family History   Problem Relation Age of Onset    Heart failure Mother     Heart failure Father          Medications have been verified  Objective   /92   Pulse 88   Temp (!) 97 2 °F (36 2 °C)   Resp 16   Wt (!) 161 kg (355 lb)   SpO2 96%   BMI 48 15 kg/m²        Physical Exam     Physical Exam  Vitals and nursing note reviewed  Constitutional:       General: He is not in acute distress  Appearance: Normal appearance  He is obese  He is not ill-appearing or toxic-appearing     HENT:      Right Ear: Tympanic membrane normal       Left Ear: Tympanic membrane normal       Nose: Congestion and rhinorrhea present  Right Turbinates: Enlarged  Left Turbinates: Enlarged  Right Sinus: No maxillary sinus tenderness or frontal sinus tenderness  Left Sinus: No maxillary sinus tenderness or frontal sinus tenderness  Mouth/Throat:      Mouth: Mucous membranes are moist       Pharynx: Oropharynx is clear  Comments: Postnasal drip noted  Cardiovascular:      Rate and Rhythm: Normal rate  Rhythm irregular  Pulses: Normal pulses  Heart sounds: Normal heart sounds  No murmur heard  No gallop  Comments: Venous stasis discoloration of bilateral lower extremity  Pulmonary:      Effort: Pulmonary effort is normal  No respiratory distress  Breath sounds: Wheezing present  Musculoskeletal:      Right lower le+ Edema present  Left lower le+ Edema present  Skin:     General: Skin is warm and dry  Neurological:      Mental Status: He is alert

## 2022-01-04 ENCOUNTER — TELEPHONE (OUTPATIENT)
Dept: FAMILY MEDICINE CLINIC | Facility: CLINIC | Age: 67
End: 2022-01-04

## 2022-01-04 NOTE — TELEPHONE ENCOUNTER
Patient called this am asking dr to review the urgent care visit notes from 1/3/2022  As per dr Ramírez he did review the xrays and notes  his instructions were to have the patient quarantine until the covid test comes back  he was also told if he feels he is getting any worst then to go to the er as directed by urgent care  patient understood these instructions

## 2022-01-05 DIAGNOSIS — E11.621 TYPE 2 DIABETES MELLITUS WITH FOOT ULCER, WITHOUT LONG-TERM CURRENT USE OF INSULIN (HCC): Chronic | ICD-10-CM

## 2022-01-05 DIAGNOSIS — L97.509 TYPE 2 DIABETES MELLITUS WITH FOOT ULCER, WITHOUT LONG-TERM CURRENT USE OF INSULIN (HCC): Chronic | ICD-10-CM

## 2022-01-05 LAB — SARS-COV-2 RNA RESP QL NAA+PROBE: NEGATIVE

## 2022-01-11 DIAGNOSIS — N52.8 OTHER MALE ERECTILE DYSFUNCTION: Primary | ICD-10-CM

## 2022-01-11 RX ORDER — SILDENAFIL CITRATE 100 MG
100 TABLET ORAL DAILY PRN
Qty: 12 TABLET | Refills: 3 | Status: SHIPPED | OUTPATIENT
Start: 2022-01-11 | End: 2022-01-14 | Stop reason: HOSPADM

## 2022-01-12 ENCOUNTER — TELEPHONE (OUTPATIENT)
Dept: CARDIOLOGY CLINIC | Facility: CLINIC | Age: 67
End: 2022-01-12

## 2022-01-12 NOTE — TELEPHONE ENCOUNTER
Patient tried sildenafil 50 mg and 100 mg prn  Both ineffective  Patient wants to try BRAND-NAME VIAGRA  Unfortunately, will need prior auth [facility]  Form filled out and faxed  If denied, will check if okay to try Cialis, which may be covered under insurance and/or Good RX card  After speaking further with the patient, he wants to know if he can try 150 mg or 200 mg of sildenafil or would generic Cialis  be worth a try? And if so, how to prescribe  Is this something you can help him with in Dr Jenae De León absence  He is getting anxious  I just spoke with Dr Parvin Chambers and he does not feel right prescribing higher dose of sildenafil and feels Cialis will not be effective as well  He is recommending the patient see ED

## 2022-01-14 DIAGNOSIS — N52.9 IMPOTENCE OF ORGANIC ORIGIN: Primary | ICD-10-CM

## 2022-01-14 RX ORDER — TADALAFIL 20 MG/1
20 TABLET ORAL DAILY PRN
Qty: 5 TABLET | Refills: 2 | Status: SHIPPED | OUTPATIENT
Start: 2022-01-14 | End: 2022-02-14 | Stop reason: SDUPTHER

## 2022-01-14 RX ORDER — TADALAFIL 20 MG/1
20 TABLET ORAL DAILY PRN
Qty: 10 TABLET | Refills: 0 | Status: CANCELLED | OUTPATIENT
Start: 2022-01-14

## 2022-01-14 RX ORDER — TADALAFIL 2.5 MG/1
2.5 TABLET ORAL DAILY PRN
Status: CANCELLED | OUTPATIENT
Start: 2022-01-14

## 2022-01-14 NOTE — TELEPHONE ENCOUNTER
If the generic didn't work, then the brand name won't work either  He absolutely cannot take 150-200 mg of viagra  He would have a heart attack or stroke  He can try 20 mg of cialis  I will send that to his pharmacy  If it doesn't work, his only options are prostaglandin injections whic h he gives himself in the penis or penile implant surgery  Please let him know I sent in the cialis to his pharmacy  Don't waste your time trying to get a prior auth for brand name viagra  He cannot combine viagra and cialis either

## 2022-01-18 DIAGNOSIS — L03.115 CELLULITIS OF RIGHT LOWER EXTREMITY: ICD-10-CM

## 2022-01-18 RX ORDER — AMMONIUM LACTATE 12 G/100G
LOTION TOPICAL 2 TIMES DAILY PRN
Qty: 400 G | Refills: 11 | Status: SHIPPED | OUTPATIENT
Start: 2022-01-18

## 2022-01-24 DIAGNOSIS — L97.509 TYPE 2 DIABETES MELLITUS WITH FOOT ULCER, WITHOUT LONG-TERM CURRENT USE OF INSULIN (HCC): Chronic | ICD-10-CM

## 2022-01-24 DIAGNOSIS — E11.621 TYPE 2 DIABETES MELLITUS WITH FOOT ULCER, WITHOUT LONG-TERM CURRENT USE OF INSULIN (HCC): Chronic | ICD-10-CM

## 2022-01-24 RX ORDER — GLIPIZIDE 5 MG/1
5 TABLET ORAL DAILY
Qty: 90 TABLET | Refills: 3 | Status: SHIPPED | OUTPATIENT
Start: 2022-01-24

## 2022-02-14 DIAGNOSIS — N52.9 IMPOTENCE OF ORGANIC ORIGIN: ICD-10-CM

## 2022-02-14 RX ORDER — TADALAFIL 20 MG/1
20 TABLET ORAL DAILY PRN
Qty: 5 TABLET | Refills: 2 | Status: SHIPPED | OUTPATIENT
Start: 2022-02-14

## 2022-04-26 ENCOUNTER — RA CDI HCC (OUTPATIENT)
Dept: OTHER | Facility: HOSPITAL | Age: 67
End: 2022-04-26

## 2022-04-26 DIAGNOSIS — M1A.00X0 IDIOPATHIC CHRONIC GOUT WITHOUT TOPHUS, UNSPECIFIED SITE: ICD-10-CM

## 2022-04-26 DIAGNOSIS — E78.2 MIXED HYPERLIPIDEMIA: Chronic | ICD-10-CM

## 2022-04-26 NOTE — PROGRESS NOTES
[facility]  coding opportunities          Chart Reviewed number of suggestions sent to Provider: 1   [id number]    Patients Insurance     Medicare Insurance: [employer] Advantage

## 2022-04-27 RX ORDER — ATORVASTATIN CALCIUM 40 MG/1
40 TABLET, FILM COATED ORAL DAILY
Qty: 90 TABLET | Refills: 3 | Status: SHIPPED | OUTPATIENT
Start: 2022-04-27 | End: 2022-05-19 | Stop reason: ALTCHOICE

## 2022-04-27 RX ORDER — ALLOPURINOL 300 MG/1
300 TABLET ORAL DAILY
Qty: 90 TABLET | Refills: 3 | Status: SHIPPED | OUTPATIENT
Start: 2022-04-27

## 2022-05-04 ENCOUNTER — OFFICE VISIT (OUTPATIENT)
Dept: FAMILY MEDICINE CLINIC | Facility: CLINIC | Age: 67
End: 2022-05-04
Payer: COMMERCIAL

## 2022-05-04 VITALS
WEIGHT: 315 LBS | SYSTOLIC BLOOD PRESSURE: 146 MMHG | OXYGEN SATURATION: 98 % | BODY MASS INDEX: 42.66 KG/M2 | HEIGHT: 72 IN | HEART RATE: 91 BPM | DIASTOLIC BLOOD PRESSURE: 96 MMHG | TEMPERATURE: 98.9 F

## 2022-05-04 DIAGNOSIS — E11.42 DIABETIC POLYNEUROPATHY ASSOCIATED WITH TYPE 2 DIABETES MELLITUS (HCC): Primary | ICD-10-CM

## 2022-05-04 DIAGNOSIS — I48.20 CHRONIC ATRIAL FIBRILLATION (HCC): ICD-10-CM

## 2022-05-04 DIAGNOSIS — E66.01 MORBID OBESITY WITH BMI OF 45.0-49.9, ADULT (HCC): ICD-10-CM

## 2022-05-04 DIAGNOSIS — E78.2 MIXED HYPERLIPIDEMIA: ICD-10-CM

## 2022-05-04 DIAGNOSIS — M1A.09X0 IDIOPATHIC CHRONIC GOUT OF MULTIPLE SITES WITHOUT TOPHUS: ICD-10-CM

## 2022-05-04 DIAGNOSIS — I10 ESSENTIAL HYPERTENSION: Chronic | ICD-10-CM

## 2022-05-04 DIAGNOSIS — R05.3 CHRONIC COUGH: ICD-10-CM

## 2022-05-04 PROCEDURE — 99214 OFFICE O/P EST MOD 30 MIN: CPT | Performed by: FAMILY MEDICINE

## 2022-05-04 PROCEDURE — 1160F RVW MEDS BY RX/DR IN RCRD: CPT | Performed by: FAMILY MEDICINE

## 2022-05-04 PROCEDURE — 3077F SYST BP >= 140 MM HG: CPT | Performed by: FAMILY MEDICINE

## 2022-05-04 PROCEDURE — 3080F DIAST BP >= 90 MM HG: CPT | Performed by: FAMILY MEDICINE

## 2022-05-04 PROCEDURE — 3008F BODY MASS INDEX DOCD: CPT | Performed by: FAMILY MEDICINE

## 2022-05-04 PROCEDURE — 1036F TOBACCO NON-USER: CPT | Performed by: FAMILY MEDICINE

## 2022-05-04 RX ORDER — CLONIDINE HYDROCHLORIDE 0.1 MG/1
0.1 TABLET ORAL EVERY 12 HOURS SCHEDULED
Qty: 180 TABLET | Refills: 2 | Status: SHIPPED | OUTPATIENT
Start: 2022-05-04

## 2022-05-04 NOTE — PATIENT INSTRUCTIONS
Foot Care for People with Diabetes   AMBULATORY CARE:   What you need to know about foot care:   · Foot care helps protect your feet and prevent foot ulcers or sores  Long-term high blood sugar levels can damage the blood vessels and nerves in your legs and feet  This damage makes it hard to feel pressure, pain, temperature, and touch  You may not be able to feel a cut or sore, or shoes that are too tight  Foot care is needed to prevent serious problems, such as an infection or amputation  · Diabetes may cause your toes to become crooked or curved under  These changes may affect the way you walk and can lead to increased pressure on your foot  The pressure can decrease blood flow to your feet  Lack of blood flow increases your risk for a foot ulcer  Do not ignore small problems, such as dry skin or small wounds  These can become life-threatening over time without proper care  Call your care team provider if:   · Your feet become numb, weak, or hard to move  · You have pus draining from a sore on your foot  · You have a wound on your foot that gets bigger, deeper, or does not heal      · You see blisters, cuts, scratches, calluses, or sores on your foot  · You have a fever, and your feet become red, warm, and swollen  · Your toenails become thick, curled, or yellow  · You find it hard to check your feet because your vision is poor  · You have questions or concerns about your condition or care  How to care for your feet:   · Check your feet each day  Look at your whole foot, including the bottom, and between and under your toes  Check for wounds, corns, and calluses  Use a mirror to see the bottom of your feet  The skin on your feet may be shiny, tight, or darker than normal  Your feet may also be cold and pale  Feel your feet by running your hands along the tops, bottoms, sides, and between your toes   Redness, swelling, and warmth are signs of blood flow problems that can lead to a foot ulcer  Do not try to remove corns or calluses yourself  · Wash your feet each day with soap and warm water  Do not use hot water, because this can injure your foot  Dry your feet gently with a towel after you wash them  Dry between and under your toes  · Apply lotion or a moisturizer on your dry feet  Ask your care team provider what lotions are best to use  Do not put lotion or moisturizer between your toes  Moisture between your toes could lead to skin breakdown  · Cut your toenails correctly  File or cut your toenails straight across  Use a soft brush to clean around your toenails  If your toenails are very thick, you may need to have a care team provider or specialist cut them  · Protect your feet  Do not walk barefoot or wear your shoes without socks  Check your shoes for rocks or other objects that can hurt your feet  Wear cotton socks to help keep your feet dry  Wear socks without toe seams, or wear them with the seams inside out  Change your socks each day  Do not wear socks that are dirty or damp  · Wear shoes that fit well  Wear shoes that do not rub against any area of your feet  Your shoes should be ½ to ¾ inch (1 to 2 centimeters) longer than your feet  Your shoes should also have extra space around the widest part of your feet  Walking or athletic shoes with laces or straps that adjust are best  Ask your care team provider for help to choose shoes that fit you best  Ask him or her if you need to wear an insert, orthotic, or bandage on your feet  · Go to your follow-up visits  Your care team provider will do a foot exam at least once a year  You may need a foot exam more often if you have nerve damage, foot deformities, or ulcers  He will check for nerve damage and how well you can feel your feet  He will check your shoes to see if they fit well  · Do not smoke  Smoking can damage your blood vessels and put you at increased risk for foot ulcers   Ask your care team provider for information if you currently smoke and need help to quit  E-cigarettes or smokeless tobacco still contain nicotine  Talk to your care team provider before you use these products  Follow up with your diabetes care team provider or foot specialist as directed: You will need to have your feet checked at least once a year  You may need a foot exam more often if you have nerve damage, foot deformities, or ulcers  Write down your questions so you remember to ask them during your visits  © Copyright [employer] 2022 Information is for End User's use only and may not be sold, redistributed or otherwise used for commercial purposes  All illustrations and images included in CareNotes® are the copyrighted property of A D A M [employer] Inc  or [facility] Sonali Cota   The above information is an  only  It is not intended as medical advice for individual conditions or treatments  Talk to your doctor, nurse or pharmacist before following any medical regimen to see if it is safe and effective for you

## 2022-05-04 NOTE — PROGRESS NOTES
Assessment/Plan:    Diabetic polyneuropathy associated with type 2 diabetes mellitus ([facility] [address] )    Lab Results   Component Value Date    HGBA1C 7 3 (H) 03/29/2021   Patient has type 2 diabetes mellitus  Glycemic control is unknown  Patient has not had a hemoglobin A1c done now in over a year  He could not tell me will kind of blood sugars he is seeing at home  He did not have a log book with him  I am not certain that the patient is actually even checking his own blood sugars  I ordered fasting blood work and I asked him to get it done as soon as possible  I will contact him with the results  We discussed routine diabetic foot care  Chronic atrial fibrillation [facility])  Patient has chronic atrial fibrillation  Rate is controlled  Patient is adequately anticoagulated on Eliquis 5 mg b i d     Mixed hyperlipidemia  Patient has hyperlipidemia  I ordered a direct LDL cholesterol  His goal LDL cholesterol is less than 70  He will continue atorvastatin 40 mg daily     Gout, unspecified  Denies any recent attacks of gout  Continue allopurinol 300 mg daily  I ordered a uric acid level  His goal is less than 6 0    Essential hypertension  Blood pressure was noted to be elevated today  I reviewed the patient's medications  It does not appear that he is taking his clonidine  His last prescription for clonidine was given in July of 2021  He was given a 6 month supply  He should have been out of the medication several months ago  I discussed this with the patient  He does not recall taking any medication twice a day for his blood pressure  I sent in a new prescription for clonidine  I asked has see him back again in 3 months so that I can recheck his pressure  He should follow a low-sodium diet  I encouraged the patient to continue to try to lose weight  Chronic cough  The patient has a chronic cough  He reports cough of 1 year duration or more    I did not witness the patient cough during his entire visit today  I reviewed urgent care note  I also reviewed his chest x-ray  I referred the patient to Pulmonary to evaluate the chronic cough       Diagnoses and all orders for this visit:    Diabetic polyneuropathy associated with type 2 diabetes mellitus ([facility] [address] )  -     Hemoglobin A1C; Future  -     Comprehensive metabolic panel; Future    Essential hypertension  -     cloNIDine (CATAPRES) 0 1 mg tablet; Take 1 tablet (0 1 mg total) by mouth every 12 (twelve) hours    Idiopathic chronic gout of multiple sites without tophus  -     Uric acid; Future    Mixed hyperlipidemia  -     LDL cholesterol, direct; Future    Chronic atrial fibrillation (HCC)    Morbid obesity with BMI of 45 0-49 9, adult (HCC)    Chronic cough  -     Ambulatory Referral to Pulmonology; Future          Subjective:      Patient ID: Pepe Reyes is a 69 y o  male  This is a 26-year-old white male who presents to the office today for his routine checkup  The patient is doing well  He complains of a chronic cough  He tells me he has had this cough for a year now  He saw Cardiology who changed him from lisinopril to losartan  Cough did not change  He was evaluated by urgent care  He had negative COVID test   He had negative chest x-ray  He denies heartburn and indigestion  He tells me he has been trying to lose weight  He now has a girlfriend in his spending a lot of time at her house in [place]  The following portions of the patient's history were reviewed and updated as appropriate: allergies, current medications, past family history, past medical history, past social history, past surgical history and problem list     Review of Systems   Constitutional: Negative for activity change and appetite change  HENT: Positive for postnasal drip  Negative for congestion  Respiratory: Positive for cough  Negative for shortness of breath and wheezing  Cardiovascular: Positive for leg swelling   Negative for chest pain and palpitations  Gastrointestinal: Negative for abdominal distention, abdominal pain, blood in stool, constipation and diarrhea  Denies heartburn   Neurological: Negative for numbness  Objective:      /96 (BP Location: Left arm, Patient Position: Sitting, Cuff Size: Large)   Pulse 91   Temp 98 9 °F (37 2 °C) (Tympanic)   Ht 6' (1 829 m)   Wt (!) 155 kg (341 lb 4 8 oz)   SpO2 98%   BMI 46 29 kg/m²          Physical Exam  Vitals reviewed  Constitutional:       Comments: Patient is a morbidly obese 75-year-old white male who appears his stated age  He is in no apparent distress   HENT:      Head: Normocephalic and atraumatic  Right Ear: Tympanic membrane, ear canal and external ear normal  There is no impacted cerumen  Left Ear: Tympanic membrane, ear canal and external ear normal       Mouth/Throat:      Mouth: Mucous membranes are moist       Pharynx: Oropharynx is clear  No oropharyngeal exudate or posterior oropharyngeal erythema  Eyes:      General: No scleral icterus  Right eye: No discharge  Left eye: No discharge  Conjunctiva/sclera: Conjunctivae normal       Pupils: Pupils are equal, round, and reactive to light  Cardiovascular:      Rate and Rhythm: Normal rate  Rhythm irregular  Pulses: Pulses are weak  Dorsalis pedis pulses are 0 on the right side and 0 on the left side  Posterior tibial pulses are 0 on the right side and 0 on the left side  Heart sounds: Normal heart sounds  No murmur heard  No friction rub  No gallop  Comments: Heart was irregularly irregular with a well-controlled ventricular response  Due to the patient's pedal edema, I could not appreciate his pedal pulses  Pulmonary:      Effort: No respiratory distress  Breath sounds: Normal breath sounds  No stridor  No wheezing, rhonchi or rales  Comments: The patient did not cough during his entire visit  He was not tachypneic    Abdominal: General: Bowel sounds are normal  There is no distension  Palpations: Abdomen is soft  There is no mass  Tenderness: There is no abdominal tenderness  There is no guarding  Musculoskeletal:      Cervical back: Neck supple  Right lower leg: Edema (Patient has pitting edema noted to the knees with pedal edema also appreciated ) present  Left lower leg: Edema present  Feet:      Right foot:      Skin integrity: No ulcer, skin breakdown, erythema, warmth, callus or dry skin  Left foot:      Skin integrity: No ulcer, skin breakdown, erythema, warmth, callus or dry skin  Lymphadenopathy:      Cervical: No cervical adenopathy  Psychiatric:         Mood and Affect: Mood normal          Behavior: Behavior normal          Thought Content: Thought content normal          Judgment: Judgment normal          Patient's shoes and socks removed  Right Foot/Ankle   Right Foot Inspection  Skin Exam: skin normal and skin intact  No dry skin, no warmth, no callus, no erythema, no maceration, no abnormal color, no pre-ulcer, no ulcer and no callus  Toe Exam: No swelling, no tenderness, erythema and  no right toe deformity    Sensory   Vibration: diminished  Proprioception: intact  Monofilament testing: diminished    Vascular  Capillary refills: < 3 seconds  The right DP pulse is 0  The right PT pulse is 0  Left Foot/Ankle  Left Foot Inspection  Skin Exam: skin normal and skin intact  No dry skin, no warmth, no erythema, no maceration, normal color, no pre-ulcer, no ulcer and no callus  Toe Exam: No swelling, no tenderness, no erythema and no left toe deformity  Sensory   Vibration: diminished  Proprioception: intact  Monofilament testing: diminished    Vascular  Capillary refills: < 3 seconds  The left DP pulse is 0  The left PT pulse is 0       Assign Risk Category  No deformity present  Loss of protective sensation  Weak pulses  Risk: 2

## 2022-05-05 ENCOUNTER — APPOINTMENT (OUTPATIENT)
Dept: LAB | Facility: CLINIC | Age: 67
End: 2022-05-05
Payer: COMMERCIAL

## 2022-05-05 DIAGNOSIS — E78.2 MIXED HYPERLIPIDEMIA: ICD-10-CM

## 2022-05-05 DIAGNOSIS — M1A.09X0 IDIOPATHIC CHRONIC GOUT OF MULTIPLE SITES WITHOUT TOPHUS: ICD-10-CM

## 2022-05-05 DIAGNOSIS — E11.42 DIABETIC POLYNEUROPATHY ASSOCIATED WITH TYPE 2 DIABETES MELLITUS (HCC): ICD-10-CM

## 2022-05-05 LAB
ALBUMIN SERPL BCP-MCNC: 3.6 G/DL (ref 3.5–5)
ALP SERPL-CCNC: 100 U/L (ref 46–116)
ALT SERPL W P-5'-P-CCNC: 20 U/L (ref 12–78)
ANION GAP SERPL CALCULATED.3IONS-SCNC: 5 MMOL/L (ref 4–13)
AST SERPL W P-5'-P-CCNC: 15 U/L (ref 5–45)
BILIRUB SERPL-MCNC: 0.8 MG/DL (ref 0.2–1)
BUN SERPL-MCNC: 19 MG/DL (ref 5–25)
CALCIUM SERPL-MCNC: 9.4 MG/DL (ref 8.3–10.1)
CHLORIDE SERPL-SCNC: 105 MMOL/L (ref 100–108)
CO2 SERPL-SCNC: 26 MMOL/L (ref 21–32)
CREAT SERPL-MCNC: 1.01 MG/DL (ref 0.6–1.3)
EST. AVERAGE GLUCOSE BLD GHB EST-MCNC: 123 MG/DL
GFR SERPL CREATININE-BSD FRML MDRD: 75 ML/MIN/1.73SQ M
GLUCOSE P FAST SERPL-MCNC: 125 MG/DL (ref 65–99)
HBA1C MFR BLD: 5.9 %
LDLC SERPL DIRECT ASSAY-MCNC: 93 MG/DL (ref 0–100)
POTASSIUM SERPL-SCNC: 4.2 MMOL/L (ref 3.5–5.3)
PROT SERPL-MCNC: 8.2 G/DL (ref 6.4–8.2)
SODIUM SERPL-SCNC: 136 MMOL/L (ref 136–145)
URATE SERPL-MCNC: 5.3 MG/DL (ref 4.2–8)

## 2022-05-05 PROCEDURE — 83721 ASSAY OF BLOOD LIPOPROTEIN: CPT

## 2022-05-05 PROCEDURE — 3044F HG A1C LEVEL LT 7.0%: CPT | Performed by: FAMILY MEDICINE

## 2022-05-05 PROCEDURE — 84550 ASSAY OF BLOOD/URIC ACID: CPT

## 2022-05-05 PROCEDURE — 83036 HEMOGLOBIN GLYCOSYLATED A1C: CPT

## 2022-05-05 PROCEDURE — 36415 COLL VENOUS BLD VENIPUNCTURE: CPT

## 2022-05-05 PROCEDURE — 80053 COMPREHEN METABOLIC PANEL: CPT

## 2022-05-05 NOTE — ASSESSMENT & PLAN NOTE
Lab Results   Component Value Date    HGBA1C 7 3 (H) 03/29/2021   Patient has type 2 diabetes mellitus  Glycemic control is unknown  Patient has not had a hemoglobin A1c done now in over a year  He could not tell me will kind of blood sugars he is seeing at home  He did not have a log book with him  I am not certain that the patient is actually even checking his own blood sugars  I ordered fasting blood work and I asked him to get it done as soon as possible  I will contact him with the results  We discussed routine diabetic foot care

## 2022-05-05 NOTE — ASSESSMENT & PLAN NOTE
Denies any recent attacks of gout  Continue allopurinol 300 mg daily  I ordered a uric acid level    His goal is less than 6 0

## 2022-05-05 NOTE — ASSESSMENT & PLAN NOTE
The patient has a chronic cough  He reports cough of 1 year duration or more  I did not witness the patient cough during his entire visit today  I reviewed urgent care note  I also reviewed his chest x-ray    I referred the patient to Pulmonary to evaluate the chronic cough

## 2022-05-05 NOTE — ASSESSMENT & PLAN NOTE
Patient has hyperlipidemia  I ordered a direct LDL cholesterol  His goal LDL cholesterol is less than 70   He will continue atorvastatin 40 mg daily

## 2022-05-05 NOTE — ASSESSMENT & PLAN NOTE
Blood pressure was noted to be elevated today  I reviewed the patient's medications  It does not appear that he is taking his clonidine  His last prescription for clonidine was given in July of 2021  He was given a 6 month supply  He should have been out of the medication several months ago  I discussed this with the patient  He does not recall taking any medication twice a day for his blood pressure  I sent in a new prescription for clonidine  I asked has see him back again in 3 months so that I can recheck his pressure  He should follow a low-sodium diet  I encouraged the patient to continue to try to lose weight

## 2022-05-05 NOTE — ASSESSMENT & PLAN NOTE
Patient has chronic atrial fibrillation  Rate is controlled  Patient is adequately anticoagulated on Eliquis 5 mg b i d

## 2022-05-19 DIAGNOSIS — E78.2 MIXED HYPERLIPIDEMIA: Primary | ICD-10-CM

## 2022-05-19 RX ORDER — ATORVASTATIN CALCIUM 80 MG/1
80 TABLET, FILM COATED ORAL DAILY
Qty: 90 TABLET | Refills: 3 | Status: SHIPPED | OUTPATIENT
Start: 2022-05-19 | End: 2023-03-06 | Stop reason: SDUPTHER

## 2022-08-09 ENCOUNTER — OFFICE VISIT (OUTPATIENT)
Dept: FAMILY MEDICINE CLINIC | Facility: CLINIC | Age: 67
End: 2022-08-09
Payer: COMMERCIAL

## 2022-08-09 VITALS
WEIGHT: 315 LBS | OXYGEN SATURATION: 97 % | HEART RATE: 92 BPM | SYSTOLIC BLOOD PRESSURE: 130 MMHG | HEIGHT: 72 IN | TEMPERATURE: 99.1 F | BODY MASS INDEX: 42.66 KG/M2 | DIASTOLIC BLOOD PRESSURE: 80 MMHG

## 2022-08-09 DIAGNOSIS — E11.42 DIABETIC POLYNEUROPATHY ASSOCIATED WITH TYPE 2 DIABETES MELLITUS (HCC): Primary | ICD-10-CM

## 2022-08-09 DIAGNOSIS — I48.20 CHRONIC ATRIAL FIBRILLATION (HCC): ICD-10-CM

## 2022-08-09 DIAGNOSIS — E78.2 MIXED HYPERLIPIDEMIA: ICD-10-CM

## 2022-08-09 DIAGNOSIS — Z12.5 PROSTATE CANCER SCREENING: ICD-10-CM

## 2022-08-09 DIAGNOSIS — I10 ESSENTIAL HYPERTENSION: Chronic | ICD-10-CM

## 2022-08-09 PROCEDURE — 1160F RVW MEDS BY RX/DR IN RCRD: CPT | Performed by: FAMILY MEDICINE

## 2022-08-09 PROCEDURE — 3079F DIAST BP 80-89 MM HG: CPT | Performed by: FAMILY MEDICINE

## 2022-08-09 PROCEDURE — 99214 OFFICE O/P EST MOD 30 MIN: CPT | Performed by: FAMILY MEDICINE

## 2022-08-09 PROCEDURE — 3075F SYST BP GE 130 - 139MM HG: CPT | Performed by: FAMILY MEDICINE

## 2022-08-09 RX ORDER — METOPROLOL SUCCINATE 25 MG/1
25 TABLET, EXTENDED RELEASE ORAL DAILY
Qty: 90 TABLET | Refills: 3 | Status: SHIPPED | OUTPATIENT
Start: 2022-08-09

## 2022-08-09 NOTE — PROGRESS NOTES
Assessment/Plan:    Essential hypertension  I rechecked the patient's blood pressure myself and found it to be 130/80  Blood pressure is much better controlled with the addition of clonidine  I recommended no change with his current blood pressure medications  Chronic atrial fibrillation (HCC)  Heart rate was brisk  Patient's meds were reviewed  He has not been taking the metoprolol, apparently for quite some time  I restarted the patient on metoprolol succ ER 25 mg daily  Continue Eliquis 5 mg twice a day  Diabetic polyneuropathy associated with type 2 diabetes mellitus ([place] [address] )    Lab Results   Component Value Date    HGBA1C 5 9 (H) 05/05/2022   Diabetes is under excellent control  Patient lost 5 lbs  Since his last visit  I encouraged him to continue to lose weight  I ordered a Hgba1c, CMP, and urine albumin to creatinine ratio for his next office visit  We discussed his neuropathy, which is the cause of his foot symptoms  Continue routine follow up with podiatry for diabetic foot care  Mixed hyperlipidemia  A fasting lipid panel has been ordered  Goal LDL cholesterol is less than 70  Patient will continue intensive lipid lowering therapy with atorvastatin 80 mg daily       Diagnoses and all orders for this visit:    Diabetic polyneuropathy associated with type 2 diabetes mellitus ([facility] )  -     Hemoglobin A1C; Future  -     Comprehensive metabolic panel; Future  -     Microalbumin / creatinine urine ratio; Future    Mixed hyperlipidemia  -     Lipid Panel with Direct LDL reflex; Future    Prostate cancer screening  -     PSA, Total Screen; Future    Chronic atrial fibrillation (HCC)  -     metoprolol succinate (TOPROL-XL) 25 mg 24 hr tablet; Take 1 tablet (25 mg total) by mouth daily    Essential hypertension          Subjective:      Patient ID: William Ross is a 69 y o  male  This is a 77-year-old white male who presents to the office today for his routine checkup    Patient is doing well and has no complaints  He was confused about his medications  He did not have a list of his medications but he did not recognize some of the medications on his list   After lengthy discussion, I determined he is taking 8 medications  It seems to be that the 1 medication he is currently not taking his the metoprolol succinate 50 mg, 1/2 tablet daily  He is now back on the clonidine and denies any side effects from this medication  The following portions of the patient's history were reviewed and updated as appropriate: allergies, current medications, past family history, past medical history, past social history, past surgical history and problem list     Review of Systems   Constitutional: Negative for activity change and appetite change  HENT: Positive for postnasal drip  Respiratory: Positive for cough  Negative for shortness of breath  Cardiovascular: Negative for chest pain, palpitations and leg swelling  Gastrointestinal: Negative for abdominal distention, abdominal pain, blood in stool, constipation, diarrhea and nausea  Neurological:        Reports occasional tight feeling in the plantar aspect of his feet         Objective:      /80   Pulse 92   Temp 99 1 °F (37 3 °C) (Tympanic)   Ht 6' (1 829 m)   Wt (!) 152 kg (336 lb 3 2 oz)   SpO2 97%   BMI 45 60 kg/m²          Physical Exam  Vitals reviewed  Constitutional:       Comments: This patient is a morbidly obese 57-year-old white male who appears his stated age  He is in no apparent distress   HENT:      Head: Normocephalic and atraumatic  Right Ear: Tympanic membrane, ear canal and external ear normal  There is no impacted cerumen  Left Ear: Tympanic membrane, ear canal and external ear normal  There is no impacted cerumen  Mouth/Throat:      Mouth: Mucous membranes are moist       Pharynx: Oropharynx is clear  No oropharyngeal exudate or posterior oropharyngeal erythema  Eyes:      General: No scleral icterus  Right eye: No discharge  Left eye: No discharge  Conjunctiva/sclera: Conjunctivae normal       Pupils: Pupils are equal, round, and reactive to light  Cardiovascular:      Rate and Rhythm: Tachycardia present  Rhythm irregular  Heart sounds: Normal heart sounds  No murmur heard  No friction rub  No gallop  Comments: Heart was irregularly irregular with a brisk ventricular response  Pulmonary:      Effort: Pulmonary effort is normal  No respiratory distress  Breath sounds: Normal breath sounds  No stridor  No wheezing, rhonchi or rales  Abdominal:      General: Bowel sounds are normal  There is no distension  Palpations: Abdomen is soft  There is no mass  Tenderness: There is no abdominal tenderness  There is no guarding  Musculoskeletal:      Cervical back: Neck supple  Lymphadenopathy:      Cervical: No cervical adenopathy  Skin:     Comments: There were chronic venous stasis changes present of both lower extremities   Psychiatric:         Mood and Affect: Mood normal          Behavior: Behavior normal          Thought Content: Thought content normal          Judgment: Judgment normal        extremities:  No cyanosis or clubbing of the digits  Ankle and pedal edema were noted

## 2022-08-09 NOTE — ASSESSMENT & PLAN NOTE
Heart rate was brisk  Patient's meds were reviewed  He has not been taking the metoprolol, apparently for quite some time  I restarted the patient on metoprolol succ ER 25 mg daily  Continue Eliquis 5 mg twice a day

## 2022-08-09 NOTE — ASSESSMENT & PLAN NOTE
A fasting lipid panel has been ordered  Goal LDL cholesterol is less than 70    Patient will continue intensive lipid lowering therapy with atorvastatin 80 mg daily

## 2022-08-09 NOTE — PATIENT INSTRUCTIONS
Foot Care for People with Diabetes   AMBULATORY CARE:   What you need to know about foot care:   · Foot care helps protect your feet and prevent foot ulcers or sores  Long-term high blood sugar levels can damage the blood vessels and nerves in your legs and feet  This damage makes it hard to feel pressure, pain, temperature, and touch  You may not be able to feel a cut or sore, or shoes that are too tight  Foot care is needed to prevent serious problems, such as an infection or amputation  · Diabetes may cause your toes to become crooked or curved under  These changes may affect the way you walk and can lead to increased pressure on your foot  The pressure can decrease blood flow to your feet  Lack of blood flow increases your risk for a foot ulcer  Do not ignore small problems, such as dry skin or small wounds  These can become life-threatening over time without proper care  Call your care team provider if:   · Your feet become numb, weak, or hard to move  · You have pus draining from a sore on your foot  · You have a wound on your foot that gets bigger, deeper, or does not heal      · You see blisters, cuts, scratches, calluses, or sores on your foot  · You have a fever, and your feet become red, warm, and swollen  · Your toenails become thick, curled, or yellow  · You find it hard to check your feet because your vision is poor  · You have questions or concerns about your condition or care  How to care for your feet:   · Check your feet each day  Look at your whole foot, including the bottom, and between and under your toes  Check for wounds, corns, and calluses  Use a mirror to see the bottom of your feet  The skin on your feet may be shiny, tight, or darker than normal  Your feet may also be cold and pale  Feel your feet by running your hands along the tops, bottoms, sides, and between your toes   Redness, swelling, and warmth are signs of blood flow problems that can lead to a foot ulcer  Do not try to remove corns or calluses yourself  · Wash your feet each day with soap and warm water  Do not use hot water, because this can injure your foot  Dry your feet gently with a towel after you wash them  Dry between and under your toes  · Apply lotion or a moisturizer on your dry feet  Ask your care team provider what lotions are best to use  Do not put lotion or moisturizer between your toes  Moisture between your toes could lead to skin breakdown  · Cut your toenails correctly  File or cut your toenails straight across  Use a soft brush to clean around your toenails  If your toenails are very thick, you may need to have a care team provider or specialist cut them  · Protect your feet  Do not walk barefoot or wear your shoes without socks  Check your shoes for rocks or other objects that can hurt your feet  Wear cotton socks to help keep your feet dry  Wear socks without toe seams, or wear them with the seams inside out  Change your socks each day  Do not wear socks that are dirty or damp  · Wear shoes that fit well  Wear shoes that do not rub against any area of your feet  Your shoes should be ½ to ¾ inch (1 to 2 centimeters) longer than your feet  Your shoes should also have extra space around the widest part of your feet  Walking or athletic shoes with laces or straps that adjust are best  Ask your care team provider for help to choose shoes that fit you best  Ask him or her if you need to wear an insert, orthotic, or bandage on your feet  · Go to your follow-up visits  Your care team provider will do a foot exam at least once a year  You may need a foot exam more often if you have nerve damage, foot deformities, or ulcers  He will check for nerve damage and how well you can feel your feet  He will check your shoes to see if they fit well  · Do not smoke  Smoking can damage your blood vessels and put you at increased risk for foot ulcers   Ask your care team provider for information if you currently smoke and need help to quit  E-cigarettes or smokeless tobacco still contain nicotine  Talk to your care team provider before you use these products  Follow up with your diabetes care team provider or foot specialist as directed: You will need to have your feet checked at least once a year  You may need a foot exam more often if you have nerve damage, foot deformities, or ulcers  Write down your questions so you remember to ask them during your visits  © Copyright [employer] 2022 Information is for End User's use only and may not be sold, redistributed or otherwise used for commercial purposes  All illustrations and images included in CareNotes® are the copyrighted property of A D A M , Inc  or [facility] Sonali Cota   The above information is an  only  It is not intended as medical advice for individual conditions or treatments  Talk to your doctor, nurse or pharmacist before following any medical regimen to see if it is safe and effective for you

## 2022-08-09 NOTE — ASSESSMENT & PLAN NOTE
Lab Results   Component Value Date    HGBA1C 5 9 (H) 05/05/2022   Diabetes is under excellent control  Patient lost 5 lbs  Since his last visit  I encouraged him to continue to lose weight  I ordered a Hgba1c, CMP, and urine albumin to creatinine ratio for his next office visit  We discussed his neuropathy, which is the cause of his foot symptoms  Continue routine follow up with podiatry for diabetic foot care

## 2022-08-09 NOTE — ASSESSMENT & PLAN NOTE
I rechecked the patient's blood pressure myself and found it to be 130/80  Blood pressure is much better controlled with the addition of clonidine  I recommended no change with his current blood pressure medications

## 2022-08-11 NOTE — PROGRESS NOTES
Assessment/Plan:    Type 2 diabetes mellitus with foot ulcer, without long-term current use of insulin ([facility])    Lab Results   Component Value Date    HGBA1C 7 0 (H) 10/01/2019    Patient has type 2 diabetes mellitus  He did lose 4 lb since his last visit  I encouraged the patient to continue to try to lose more weight  We also discussed diabetic foot care  Fasting labs were ordered  I will make further recommendations to the patient when I get these results  I did recommend closer follow-up for the patient and I attempted to schedule him a follow-up visit for July but he declined  He plans to be out on the road  He plans to come back in September  Essential hypertension  Blood pressure is borderline  When the medical assistant checked his blood pressure, she got 142/98  I recheck his blood pressure and got 84 diastolic  I note that his blood pressures have been borderline  I am going to recommend we increase lisinopril to 20 mg daily    Mixed hyperlipidemia  Patient has hyperlipidemia  I ordered a direct LDL cholesterol  He will continue statin therapy    Gout, unspecified  Patient has gout which is currently stable  He denies any recent attacks  I did order a uric acid level on him  He reports compliance with his allopurinol  Chronic atrial fibrillation [facility])  Patient has chronic atrial fibrillation  Rate is controlled on metoprolol  He is anticoagulated on Eliquis  I recommended no change with his current regiment       Diagnoses and all orders for this visit:    Type 2 diabetes mellitus with foot ulcer, without long-term current use of insulin ([facility] )  -     Hemoglobin A1C; Future  -     Comprehensive metabolic panel; Future    Diabetic eye exam [facility])  -     Ambulatory referral to Ophthalmology; Future    Body mass index (BMI) 40 0-44 9, adult ([facility])    Idiopathic chronic gout without tophus, unspecified site  -     Uric acid;  Future    Mixed hyperlipidemia  -     LDL cholesterol, direct; Preceptor Attestation:   Patient seen, evaluated and discussed with the resident. I have verified the content of the note, which accurately reflects my assessment of the patient and the plan of care.   Supervising Physician:  Pancho Sanchez MD    Future    Colon cancer screening  -     Cologuard; Future    Prostate cancer screening  -     PSA, Total Screen; Future    Encounter for Medicare annual wellness exam    Essential hypertension    Other orders  -     atorvastatin (LIPITOR) 40 mg tablet; Take 1 tablet by mouth daily        BMI Counseling: Body mass index is 45 23 kg/m²  The BMI is above normal  Nutrition recommendations include decreasing portion sizes, encouraging healthy choices of fruits and vegetables, decreasing fast food intake, consuming healthier snacks, limiting drinks that contain sugar and moderation in carbohydrate intake  Exercise recommendations include exercising 3-5 times per week  Subjective:      Patient ID: Luis Manuel Calderon is a 77 y o  male  Patient is a 58-year-old white male who presents to the office today for his annual Medicare wellness exam as well as for his routine physical   The patient is doing well and has no complaints  He tells me he has been trying to keep his legs wrap at this makes his skin to dry any gets sores  He is keeping them elevated to try to keep the edema down  He notes he gets numbness and tingling in the bottom his feet  He gets a tight sensation which he attributes to the edema  He does check his blood pressures regularly at home  He tells me his blood pressures have been well controlled  He checks them once a day  He states his blood sugars have been controlled as well  He is still hoping that he will go on the road this summer to his different carnivals  The following portions of the patient's history were reviewed and updated as appropriate: allergies, current medications, past family history, past medical history, past social history, past surgical history and problem list     Review of Systems   Constitutional: Negative for activity change, appetite change and unexpected weight change  Respiratory: Negative for cough, shortness of breath and wheezing      Cardiovascular: Positive for leg swelling  Negative for chest pain and palpitations  Gastrointestinal: Negative for abdominal distention, abdominal pain, blood in stool, constipation, diarrhea and nausea  Endocrine: Negative for polydipsia, polyphagia and polyuria  Skin: Negative for color change, rash and wound  Objective:      /84   Pulse 92   Temp 97 9 °F (36 6 °C) (Tympanic)   Ht 6' 2" (1 88 m)   Wt (!) 160 kg (352 lb 4 8 oz)   SpO2 98%   BMI 45 23 kg/m²          Physical Exam   Constitutional:   This is a morbidly obese 67-year-old white male who appears his stated age  He was pleasant, cooperative, and in no distress   HENT:   Head: Normocephalic and atraumatic  Right Ear: External ear normal    Left Ear: External ear normal    Mouth/Throat: Oropharynx is clear and moist  No oropharyngeal exudate  Tympanic membranes are clear   Eyes: Pupils are equal, round, and reactive to light  Conjunctivae are normal  No scleral icterus  Neck: Neck supple  No tracheal deviation present  No thyromegaly present  Cardiovascular: Normal rate and normal heart sounds  Exam reveals no gallop and no friction rub  Pulses are no weak pulses  No murmur heard  Pulses:       Dorsalis pedis pulses are 1+ on the right side, and 1+ on the left side  Posterior tibial pulses are 0 (too edematous) on the right side, and 0 (too edematous) on the left side  Patient is irregularly irregular with a well-controlled ventricular response   Pulmonary/Chest: Effort normal  No stridor  No respiratory distress  He has no wheezes  He has no rales  Abdominal: Soft  Bowel sounds are normal  He exhibits no distension and no mass  There is no tenderness  There is no guarding  Feet:   Right Foot:   Skin Integrity: Negative for ulcer, skin breakdown, erythema, warmth, callus or dry skin  Left Foot:   Skin Integrity: Negative for ulcer, skin breakdown, erythema, warmth, callus or dry skin     Lymphadenopathy:     He has no cervical adenopathy  Psychiatric: He has a normal mood and affect  His behavior is normal  Judgment and thought content normal    Vitals reviewed  Patient's shoes and socks removed  Right Foot/Ankle   Right Foot Inspection  Skin Exam: skin normal and skin intact no dry skin, no warmth, no callus, no erythema, no maceration, no abnormal color, no pre-ulcer, no ulcer and no callus                          Toe Exam: no swelling, no tenderness, erythema and  no right toe deformity  Sensory   Vibration: diminished    Monofilament testing: diminished  Vascular  Capillary refills: < 3 seconds  The right DP pulse is 1+  The right PT pulse is 0 (too edematous)  Left Foot/Ankle  Left Foot Inspection  Skin Exam: skin normal and skin intactno dry skin, no warmth, no erythema, no maceration, normal color, no pre-ulcer, no ulcer and no callus                         Toe Exam: no swelling, no tenderness, no erythema and no left toe deformity                   Sensory   Vibration: diminished  Proprioception: intact  Monofilament: diminished  Vascular  Capillary refills: < 3 seconds  The left DP pulse is 1+  The left PT pulse is 0 (too edematous)  Assign Risk Category:  No deformity present; Loss of protective sensation; No weak pulses       Risk: 1  Extremities: There is lower extremity edema noted bilaterally to the knees  No cyanosis or clubbing  Chronic venous stasis changes are present in both legs

## 2022-08-12 ENCOUNTER — TELEPHONE (OUTPATIENT)
Dept: ADMINISTRATIVE | Facility: OTHER | Age: 67
End: 2022-08-12

## 2022-08-12 NOTE — TELEPHONE ENCOUNTER
----- Message from Liliane Madsenvard sent at 8/10/2022  4:43 PM EDT -----  Regarding: CARE GAP REQUEST  08/10/22 4:43 PM    Hello, our patient No patient name on file  has had Diabetic Eye Exam completed/performed  Please assist in updating the patient chart by making an External outreach to [address] [place] [address] located in [facility] The date of service is 2022      Thank you,  Rishi Wolfe MA   [place]

## 2022-08-12 NOTE — LETTER
Diabetic Eye Exam Form    Date Requested: 22  Patient: China Encarnacion  Patient : 1955   Referring Provider: Shane Duque DO    DIABETIC Eye Exam Date _______________________________    Type of Exam MUST be documented for Diabetic Eye Exams  Please CHECK ONE  Retinal Exam       Dilated Retinal Exam       OCT       Optomap-Iris Exam      Fundus Photography     Left Eye - Please check Retinopathy AND Type or No Retinopathy      Exam did show retinopathy    Exam did not show retinopathy         Mild     Proliferative           Moderate    Severe            None         Right Eye - Please check Retinopathy AND Type or No Retinopathy     Exam did show retinopathy    Exam did not show retinopathy         Mild     Proliferative        Moderate    Severe        None       Comments __________________________________________________________    Practice Providing Exam ______________________________________________    Exam Performed By (print name) _______________________________________      Provider Signature ___________________________________________________    These reports are needed for  compliance  Please fax this completed form and a copy of the Diabetic Eye Exam report to our office located at [place] [address] as soon as possible via [phone] attention [place]: Phone [phone]  We thank you for your assistance in treating our mutual patient

## 2022-08-12 NOTE — TELEPHONE ENCOUNTER
Upon review of the In Basket request and the patient's chart, initial outreach has been made via fax, please see Contacts section for details       Thank you  Cheko Pa MA

## 2022-08-16 NOTE — TELEPHONE ENCOUNTER
Upon review of the In Basket request we were able to locate, review, and update the patient chart as requested for Diabetic Eye Exam     Any additional questions or concerns should be emailed to the Practice Liaisons via [email or web address] email, please do not reply via In Basket      Thank you  Alessandro Bertrand MA

## 2022-09-12 ENCOUNTER — CONSULT (OUTPATIENT)
Dept: PULMONOLOGY | Facility: CLINIC | Age: 67
End: 2022-09-12
Payer: COMMERCIAL

## 2022-09-12 VITALS
HEART RATE: 89 BPM | RESPIRATION RATE: 18 BRPM | WEIGHT: 315 LBS | TEMPERATURE: 98.1 F | BODY MASS INDEX: 42.66 KG/M2 | DIASTOLIC BLOOD PRESSURE: 82 MMHG | OXYGEN SATURATION: 91 % | HEIGHT: 72 IN | SYSTOLIC BLOOD PRESSURE: 120 MMHG

## 2022-09-12 DIAGNOSIS — R05.3 CHRONIC COUGH: ICD-10-CM

## 2022-09-12 PROCEDURE — 3074F SYST BP LT 130 MM HG: CPT | Performed by: INTERNAL MEDICINE

## 2022-09-12 PROCEDURE — 99203 OFFICE O/P NEW LOW 30 MIN: CPT | Performed by: INTERNAL MEDICINE

## 2022-09-12 PROCEDURE — 3079F DIAST BP 80-89 MM HG: CPT | Performed by: INTERNAL MEDICINE

## 2022-09-12 RX ORDER — AZELASTINE 1 MG/ML
1 SPRAY, METERED NASAL 2 TIMES DAILY
Qty: 20 ML | Refills: 5 | Status: SHIPPED | OUTPATIENT
Start: 2022-09-12

## 2022-09-12 NOTE — PROGRESS NOTES
Pulmonary Consultation   [place] 69 y o  male MRN: [id number]  9/12/2022      Assessment:  Chronic cough   · Appears to be UACS/PND, 2/2 neck/allergic rhinitis, possible LPR/silent reflux   · Clear lung fields on exam, lifelong nonsmoker no associations with wheezing or sputum production   · Hx chronic rhinitis, recurrent epistaxis s/p cauterization last was 1 year ago    Plan:   · A trial of nasal antihistamine/Astelin nasal spray  · May use p r n  Zyrtec  · Counseled about anti-reflux measures   · If not improving, will likely require ENT evaluation/direct laryngoscopy    Return if symptoms worsen or fail to improve  History of Present Illness     New Consult for: chronic cough      Background  69 y o  male with a h/o morbid obesity, DIAMOND,  CVA, lymphedema, HTN, DLD, DM2/PN, CAD, chronic afib, gout  Presented today for initial evaluation by Pulmonary for a chronic cough  States that it feels like showed a changing/thickening, has been going on for about 1 year  Infrequently able to produce some clear sputum otherwise no associated with wheezing, chest cough, new dyspnea or pleuritic chest pain  Reports associations with postnasal drip  States that he had multiple episodes of epistaxis in the past, Last cauterized about 1 year ago  Reports chronic rhinitis symptoms that does not stop  No prior history of lung disorders, asthma, or COPD, lifelong nonsmoker/nonalcoholic  Used to work in the [employer] or his life, retired at age of 56, currently works intermittently  Review of Systems  As per hpi, all other systems reviewed and were negative  Studies:  Imaging and other studies: I have personally reviewed pertinent films in PACS    CXR 1/3/2022 - bilateral hyperinflation    Pulmonary function testing:       EKG, Pathology, and Other Studies: I have personally reviewed pertinent reports  TTE 1/12/2019 - EF 65%, no WMA, mild concentric hypertrophy  Mildly dilated LA   No RV size and function      Historical Information   Past Medical History:   Diagnosis Date    Bilateral cellulitis of lower leg     Bilateral edema of lower extremity     Chronic pain     Diabetes mellitus ([facility] )     ED (erectile dysfunction)     Gout     last assessed: 4/10/2019    Hemiplegia and hemiparesis following cerebral infarction affecting right dominant side ([facility] )     last assessed: 1/10/2019    Hypertension     Lymphedema     DIAMOND (obstructive sleep apnea)     does not treat this    Stroke ([place] [address] )     Type 2 diabetes mellitus with foot ulcer, without long-term current use of insulin ([place] [address] ) 11/30/2018    Venous insufficiency (chronic) (peripheral)     last assessed: 7/5/2018     Past Surgical History:   Procedure Laterality Date    COLONOSCOPY      HERNIA REPAIR       Family History   Problem Relation Age of Onset    Heart failure Mother     Heart failure Father          Medications/Allergies: Reviewed    Vitals: Blood pressure 120/82, pulse 89, temperature 98 1 °F (36 7 °C), resp  rate 18, height 6' (1 829 m), weight (!) 153 kg (337 lb 9 6 oz), SpO2 91 %  Body mass index is 45 79 kg/m²  Oxygen Therapy  SpO2: 91 %  Oxygen Therapy: None (Room air)      Physical Exam  Body mass index is 45 79 kg/m²     Gen: not in acute distress, + morbidly obese  Neck/Eyes: supple, no adenopathy, PERRL  Ear: normal appearance, no significant hearing impairment  Nose:  normal nasal mucosa, no drainage  Mouth:  unremarkable/normal appearance of lips, teeth and gums  Oropharynx: mucosa is moist, Mallampati type 3  Salivary glands: soft nontender  Chest: normal respiratory efforts, clear breath sounds bilaterally  CV: RRR, no murmurs appreciated, +LE lymphedema  Abdomen: soft, non tender  Extremities:  No observed deformity, chronic venous changes of the legs   Skin: unremarkable  Neuro: AAO X3, no focal motor deficit         Labs:  Lab Results   Component Value Date    WBC 7 56 11/09/2021    HGB 13 1 11/09/2021    HCT 39 4 11/09/2021     (H) 11/09/2021     11/09/2021     Lab Results   Component Value Date    CALCIUM 9 4 05/05/2022    K 4 2 05/05/2022    CO2 26 05/05/2022     05/05/2022    BUN 19 05/05/2022    CREATININE 1 01 05/05/2022     No results found for: IGE  Lab Results   Component Value Date    ALT 20 05/05/2022    AST 15 05/05/2022    ALKPHOS 100 05/05/2022           Portions of the record may have been created with voice recognition software  Occasional wrong word or "sound a like" substitutions may have occurred due to the inherent limitations of voice recognition software  Read the chart carefully and recognize, using context, where substitutions have occurred    SUSHIL [facility]

## 2022-09-13 ENCOUNTER — VBI (OUTPATIENT)
Dept: ADMINISTRATIVE | Facility: OTHER | Age: 67
End: 2022-09-13

## 2022-11-17 ENCOUNTER — APPOINTMENT (OUTPATIENT)
Dept: LAB | Facility: CLINIC | Age: 67
End: 2022-11-17

## 2022-11-17 ENCOUNTER — RA CDI HCC (OUTPATIENT)
Dept: OTHER | Facility: HOSPITAL | Age: 67
End: 2022-11-17

## 2022-11-17 DIAGNOSIS — Z12.5 PROSTATE CANCER SCREENING: ICD-10-CM

## 2022-11-17 DIAGNOSIS — E11.42 DIABETIC POLYNEUROPATHY ASSOCIATED WITH TYPE 2 DIABETES MELLITUS (HCC): ICD-10-CM

## 2022-11-17 DIAGNOSIS — E78.2 MIXED HYPERLIPIDEMIA: ICD-10-CM

## 2022-11-17 LAB
ALBUMIN SERPL BCP-MCNC: 3.4 G/DL (ref 3.5–5)
ALP SERPL-CCNC: 87 U/L (ref 46–116)
ALT SERPL W P-5'-P-CCNC: 25 U/L (ref 12–78)
ANION GAP SERPL CALCULATED.3IONS-SCNC: 3 MMOL/L (ref 4–13)
AST SERPL W P-5'-P-CCNC: 12 U/L (ref 5–45)
BILIRUB SERPL-MCNC: 1.07 MG/DL (ref 0.2–1)
BUN SERPL-MCNC: 19 MG/DL (ref 5–25)
CALCIUM ALBUM COR SERPL-MCNC: 9.9 MG/DL (ref 8.3–10.1)
CALCIUM SERPL-MCNC: 9.4 MG/DL (ref 8.3–10.1)
CHLORIDE SERPL-SCNC: 106 MMOL/L (ref 96–108)
CHOLEST SERPL-MCNC: 139 MG/DL
CO2 SERPL-SCNC: 29 MMOL/L (ref 21–32)
CREAT SERPL-MCNC: 0.88 MG/DL (ref 0.6–1.3)
CREAT UR-MCNC: 151 MG/DL
EST. AVERAGE GLUCOSE BLD GHB EST-MCNC: 120 MG/DL
GFR SERPL CREATININE-BSD FRML MDRD: 87 ML/MIN/1.73SQ M
GLUCOSE P FAST SERPL-MCNC: 108 MG/DL (ref 65–99)
HBA1C MFR BLD: 5.8 %
HDLC SERPL-MCNC: 41 MG/DL
LDLC SERPL CALC-MCNC: 65 MG/DL (ref 0–100)
MICROALBUMIN UR-MCNC: 131 MG/L (ref 0–20)
MICROALBUMIN/CREAT 24H UR: 87 MG/G CREATININE (ref 0–30)
POTASSIUM SERPL-SCNC: 4.8 MMOL/L (ref 3.5–5.3)
PROT SERPL-MCNC: 8.4 G/DL (ref 6.4–8.4)
PSA SERPL-MCNC: 1.5 NG/ML (ref 0–4)
SODIUM SERPL-SCNC: 138 MMOL/L (ref 135–147)
TRIGL SERPL-MCNC: 163 MG/DL

## 2022-11-17 NOTE — PROGRESS NOTES
[facility]ath[facility]  coding opportunities       Chart reviewed, no opportunity found:   [address]        Patients Insurance     Medicare Insurance: [employer]

## 2022-11-22 ENCOUNTER — OFFICE VISIT (OUTPATIENT)
Dept: FAMILY MEDICINE CLINIC | Facility: CLINIC | Age: 67
End: 2022-11-22

## 2022-11-22 VITALS
DIASTOLIC BLOOD PRESSURE: 98 MMHG | BODY MASS INDEX: 40.43 KG/M2 | HEIGHT: 74 IN | WEIGHT: 315 LBS | TEMPERATURE: 98 F | HEART RATE: 87 BPM | SYSTOLIC BLOOD PRESSURE: 168 MMHG | OXYGEN SATURATION: 97 %

## 2022-11-22 DIAGNOSIS — I48.21 PERMANENT ATRIAL FIBRILLATION (HCC): ICD-10-CM

## 2022-11-22 DIAGNOSIS — I10 ESSENTIAL HYPERTENSION: Chronic | ICD-10-CM

## 2022-11-22 DIAGNOSIS — B34.9 VIRAL INFECTION, UNSPECIFIED: ICD-10-CM

## 2022-11-22 DIAGNOSIS — E11.42 DIABETIC POLYNEUROPATHY ASSOCIATED WITH TYPE 2 DIABETES MELLITUS (HCC): Primary | ICD-10-CM

## 2022-11-22 DIAGNOSIS — E78.2 MIXED HYPERLIPIDEMIA: Chronic | ICD-10-CM

## 2022-11-22 DIAGNOSIS — Z00.00 ENCOUNTER FOR MEDICARE ANNUAL WELLNESS EXAM: ICD-10-CM

## 2022-11-22 LAB
SARS-COV-2 AG UPPER RESP QL IA: NEGATIVE
VALID CONTROL: NORMAL

## 2022-11-22 RX ORDER — CLONIDINE HYDROCHLORIDE 0.2 MG/1
0.2 TABLET ORAL 2 TIMES DAILY
Qty: 180 TABLET | Refills: 3 | Status: SHIPPED | OUTPATIENT
Start: 2022-11-22

## 2022-11-22 NOTE — ASSESSMENT & PLAN NOTE
Patient has atrial fibrillation  Rate is well controlled  Patient is adequately anticoagulated on Eliquis

## 2022-11-22 NOTE — ASSESSMENT & PLAN NOTE
A rapid antigen COVID test was done in the office today  The patient was negative  It appears he has a viral URI  I have simply recommended symptomatic treatment    I suggested Mucinex DM, Tylenol, increased in fluid intake and rest   Return if no improvement or worsens

## 2022-11-22 NOTE — PATIENT INSTRUCTIONS
Medicare Preventive Visit Patient Instructions  Thank you for completing your Welcome to Medicare Visit or Medicare Annual Wellness Visit today  Your next wellness visit will be due in one year (11/23/2023)  The screening/preventive services that you may require over the next 5-10 years are detailed below  Some tests may not apply to you based off risk factors and/or age  Screening tests ordered at today's visit but not completed yet may show as past due  Also, please note that scanned in results may not display below  Preventive Screenings:  Service Recommendations Previous Testing/Comments   Colorectal Cancer Screening  · Colonoscopy    · Fecal Occult Blood Test (FOBT)/Fecal Immunochemical Test (FIT)  · Fecal DNA/Cologuard Test  · Flexible Sigmoidoscopy Age: 39-70 years old   Colonoscopy: every 10 years (May be performed more frequently if at higher risk)  OR  FOBT/FIT: every 1 year  OR  Cologuard: every 3 years  OR  Sigmoidoscopy: every 5 years  Screening may be recommended earlier than age 39 if at higher risk for colorectal cancer  Also, an individualized decision between you and your healthcare provider will decide whether screening between the ages of 74-80 would be appropriate   Colonoscopy: 04/07/2021  FOBT/FIT: Not on file  Cologuard: 07/14/2020  Sigmoidoscopy: Not on file    Screening Current     Prostate Cancer Screening Individualized decision between patient and health care provider in men between ages of 53-78   Medicare will cover every 12 months beginning on the day after your 50th birthday PSA: 1 5 ng/mL     Screening Current     Hepatitis C Screening Once for adults born between 1945 and 1965  More frequently in patients at high risk for Hepatitis C Hep C Antibody: 10/01/2019    Screening Current   Diabetes Screening 1-2 times per year if you're at risk for diabetes or have pre-diabetes Fasting glucose: 108 mg/dL (11/17/2022)  A1C: 5 8 % (11/17/2022)  Screening Not Indicated  History Diabetes Cholesterol Screening Once every 5 years if you don't have a lipid disorder  May order more often based on risk factors  Lipid panel: 11/17/2022  Screening Not Indicated  History Lipid Disorder      Other Preventive Screenings Covered by Medicare:  1  Abdominal Aortic Aneurysm (AAA) Screening: covered once if your at risk  You're considered to be at risk if you have a family history of AAA or a male between the age of 73-68 who smoking at least 100 cigarettes in your lifetime  2  Lung Cancer Screening: covers low dose CT scan once per year if you meet all of the following conditions: (1) Age 50-69; (2) No signs or symptoms of lung cancer; (3) Current smoker or have quit smoking within the last 15 years; (4) You have a tobacco smoking history of at least 20 pack years (packs per day x number of years you smoked); (5) You get a written order from a healthcare provider  3  Glaucoma Screening: covered annually if you're considered high risk: (1) You have diabetes OR (2) Family history of glaucoma OR (3)  aged 48 and older OR (3)  American aged 72 and older  3  Osteoporosis Screening: covered every 2 years if you meet one of the following conditions: (1) Have a vertebral abnormality; (2) On glucocorticoid therapy for more than 3 months; (3) Have primary hyperparathyroidism; (4) On osteoporosis medications and need to assess response to drug therapy  5  HIV Screening: covered annually if you're between the age of 12-76  Also covered annually if you are younger than 13 and older than 72 with risk factors for HIV infection  For pregnant patients, it is covered up to 3 times per pregnancy      Immunizations:  Immunization Recommendations   Influenza Vaccine Annual influenza vaccination during flu season is recommended for all persons aged >= 6 months who do not have contraindications   Pneumococcal Vaccine   * Pneumococcal conjugate vaccine = PCV13 (Prevnar 13), PCV15 (Vaxneuvance), PCV20 (Prevnar 20)  * Pneumococcal polysaccharide vaccine = PPSV23 (Pneumovax) Adults 2364 years old: 1-3 doses may be recommended based on certain risk factors  Adults 72 years old: 1-2 doses may be recommended based off what pneumonia vaccine you previously received   Hepatitis B Vaccine 3 dose series if at intermediate or high risk (ex: diabetes, end stage renal disease, liver disease)   Tetanus (Td) Vaccine - COST NOT COVERED BY MEDICARE PART B Following completion of primary series, a booster dose should be given every 10 years to maintain immunity against tetanus  Td may also be given as tetanus wound prophylaxis  Tdap Vaccine - COST NOT COVERED BY MEDICARE PART B Recommended at least once for all adults  For pregnant patients, recommended with each pregnancy  Shingles Vaccine (Shingrix) - COST NOT COVERED BY MEDICARE PART B  2 shot series recommended in those aged 48 and above     Health Maintenance Due:      Topic Date Due   • Colorectal Cancer Screening  04/07/2031   • Hepatitis C Screening  Completed     Immunizations Due:      Topic Date Due   • Hepatitis B Vaccine (1 of 3 - 3-dose series) Never done   • COVID-19 Vaccine (4 - Booster for Moderna series) 03/26/2022   • Influenza Vaccine (1) 09/01/2022     Advance Directives   What are advance directives? Advance directives are legal documents that state your wishes and plans for medical care  These plans are made ahead of time in case you lose your ability to make decisions for yourself  Advance directives can apply to any medical decision, such as the treatments you want, and if you want to donate organs  What are the types of advance directives? There are many types of advance directives, and each state has rules about how to use them  You may choose a combination of any of the following:  · Living will: This is a written record of the treatment you want   You can also choose which treatments you do not want, which to limit, and which to stop at a certain time  This includes surgery, medicine, IV fluid, and tube feedings  · Durable power of  for healthcare [facility]): This is a written record that states who you want to make healthcare choices for you when you are unable to make them for yourself  This person, called a proxy, is usually a family member or a friend  You may choose more than 1 proxy  · Do not resuscitate (DNR) order:  A DNR order is used in case your heart stops beating or you stop breathing  It is a request not to have certain forms of treatment, such as CPR  A DNR order may be included in other types of advance directives  · Medical directive: This covers the care that you want if you are in a coma, near death, or unable to make decisions for yourself  You can list the treatments you want for each condition  Treatment may include pain medicine, surgery, blood transfusions, dialysis, IV or tube feedings, and a ventilator (breathing machine)  · Values history: This document has questions about your views, beliefs, and how you feel and think about life  This information can help others choose the care that you would choose  Why are advance directives important? An advance directive helps you control your care  Although spoken wishes may be used, it is better to have your wishes written down  Spoken wishes can be misunderstood, or not followed  Treatments may be given even if you do not want them  An advance directive may make it easier for your family to make difficult choices about your care  Weight Management   Why it is important to manage your weight:  Being overweight increases your risk of health conditions such as heart disease, high blood pressure, type 2 diabetes, and certain types of cancer  It can also increase your risk for osteoarthritis, sleep apnea, and other respiratory problems  Aim for a slow, steady weight loss  Even a small amount of weight loss can lower your risk of health problems    How to lose weight safely:  A safe and healthy way to lose weight is to eat fewer calories and get regular exercise  You can lose up about 1 pound a week by decreasing the number of calories you eat by 500 calories each day  Healthy meal plan for weight management:  A healthy meal plan includes a variety of foods, contains fewer calories, and helps you stay healthy  A healthy meal plan includes the following:  · Eat whole-grain foods more often  A healthy meal plan should contain fiber  Fiber is the part of grains, fruits, and vegetables that is not broken down by your body  Whole-grain foods are healthy and provide extra fiber in your diet  Some examples of whole-grain foods are whole-wheat breads and pastas, oatmeal, brown rice, and bulgur  · Eat a variety of vegetables every day  Include dark, leafy greens such as spinach, kale, heena greens, and mustard greens  Eat yellow and orange vegetables such as carrots, sweet potatoes, and winter squash  · Eat a variety of fruits every day  Choose fresh or canned fruit (canned in its own juice or light syrup) instead of juice  Fruit juice has very little or no fiber  · Eat low-fat dairy foods  Drink fat-free (skim) milk or 1% milk  Eat fat-free yogurt and low-fat cottage cheese  Try low-fat cheeses such as mozzarella and other reduced-fat cheeses  · Choose meat and other protein foods that are low in fat  Choose beans or other legumes such as split peas or lentils  Choose fish, skinless poultry (chicken or turkey), or lean cuts of red meat (beef or pork)  Before you cook meat or poultry, cut off any visible fat  · Use less fat and oil  Try baking foods instead of frying them  Add less fat, such as margarine, sour cream, regular salad dressing and mayonnaise to foods  Eat fewer high-fat foods  Some examples of high-fat foods include french fries, doughnuts, ice cream, and cakes  · Eat fewer sweets  Limit foods and drinks that are high in sugar   This includes candy, cookies, regular soda, and sweetened drinks  Exercise:  Exercise at least 30 minutes per day on most days of the week  Some examples of exercise include walking, biking, dancing, and swimming  You can also fit in more physical activity by taking the stairs instead of the elevator or parking farther away from stores  Ask your healthcare provider about the best exercise plan for you  © Copyright [employer] 2018 Information is for End User's use only and may not be sold, redistributed or otherwise used for commercial purposes   All illustrations and images included in CareNotes® are the copyrighted property of [employer]  or [address] [place] [address] [employer]

## 2022-11-22 NOTE — ASSESSMENT & PLAN NOTE
Lab Results   Component Value Date    HGBA1C 5 8 (H) 11/17/2022   Patient has type 2 diabetes mellitus which is well controlled, despite his morbid obesity and his poor diet  I advised the patient that if he was able to follow a diet, I could likely get him off of some of his medication  I reviewed with the patient his labs today  His fasting blood glucose was 108  Hemoglobin A1c is 5 8%  Urine albumin to creatinine ratio was 87  I explained to the patient that his goal is less than 30  He needs to keep his blood sugar down and his blood pressure down in order to treat this  We discussed routine diabetic foot care

## 2022-11-22 NOTE — PROGRESS NOTES
Assessment and Plan:     Problem List Items Addressed This Visit        Endocrine    Diabetic polyneuropathy associated with type 2 diabetes mellitus ([facility] [address] ) - Primary       Lab Results   Component Value Date    HGBA1C 5 8 (H) 11/17/2022   Patient has type 2 diabetes mellitus which is well controlled, despite his morbid obesity and his poor diet  I advised the patient that if he was able to follow a diet, I could likely get him off of some of his medication  I reviewed with the patient his labs today  His fasting blood glucose was 108  Hemoglobin A1c is 5 8%  Urine albumin to creatinine ratio was 87  I explained to the patient that his goal is less than 30  He needs to keep his blood sugar down and his blood pressure down in order to treat this  We discussed routine diabetic foot care  Relevant Orders    Basic metabolic panel    Hemoglobin A1C       Cardiovascular and Mediastinum    Essential hypertension (Chronic)     Blood pressure was elevated today  I check his blood pressure myself and found his blood pressure to be 150/96  I increased his clonidine to 0 2 mg twice a day  He will continue losartan and metoprolol  Relevant Medications    cloNIDine (CATAPRES) 0 2 mg tablet    Permanent atrial fibrillation [facility])     Patient has atrial fibrillation  Rate is well controlled  Patient is adequately anticoagulated on Eliquis  Other    Mixed hyperlipidemia (Chronic)     Lipid panel was reviewed and discussed with the patient  Total cholesterol is 139  Triglycerides are 163  HDL cholesterol is 41  LDL cholesterol is 65  Goal is less than 70  He will continue intensive lipid lowering therapy with atorvastatin 80 mg daily          Encounter for Medicare annual wellness exam    Viral infection, unspecified     A rapid antigen COVID test was done in the office today  The patient was negative  It appears he has a viral URI  I have simply recommended symptomatic treatment    I suggested Mucinex DM, Tylenol, increased in fluid intake and rest   Return if no improvement or worsens         Relevant Orders    Poct Covid 19 Rapid Antigen Test (Completed)     BMI Counseling: Body mass index is 43 35 kg/m²  The BMI is above normal  Nutrition recommendations include decreasing portion sizes, decreasing fast food intake, consuming healthier snacks and moderation in carbohydrate intake  Rationale for BMI follow-up plan is due to patient being overweight or obese  Depression Screening and Follow-up Plan: Patient was screened for depression during today's encounter  They screened negative with a PHQ-2 score of 0  Preventive health issues were discussed with patient, and age appropriate screening tests were ordered as noted in patient's After Visit Summary  Personalized health advice and appropriate referrals for health education or preventive services given if needed, as noted in patient's After Visit Summary  History of Present Illness:     Patient presents for a Medicare Wellness Visit    This is a 13-year-old white male presents to the office today for his routine checkup as well as for his annual Medicare wellness exam   The patient complains of a head cold for 1 week  He denies any fever or chills  He has not done a home COVID test on himself  He splits his time between his girlfriend's home in the [place] area and his home in [place] [facility]  He does not follow a diet  He tells me enjoys eating sweets and he is particularly fond of chocolate  Patient Care Team:  Renata Rutledge DO as PCP - General (Family Medicine)  Bianca Carreon MD (Pulmonary Disease)     Review of Systems:     Review of Systems   Constitutional: Negative for chills, diaphoresis, fatigue and fever  HENT: Positive for congestion  Cardiovascular: Negative for chest pain, palpitations and leg swelling     Gastrointestinal: Negative for abdominal distention, abdominal pain, blood in stool, constipation, diarrhea and nausea  Genitourinary: Negative for dysuria and frequency          Problem List:     Patient Active Problem List   Diagnosis   • Vomiting   • Chronic pain   • Dizzy   • Bilateral edema of lower extremity   • Cellulitis of right lower extremity   • Tinnitus of right ear   • Permanent atrial fibrillation (HCC)   • Peripheral neuropathy   • DIAMOND (obstructive sleep apnea)   • Essential hypertension   • Sepsis due to cellulitis (HCC)   • Mixed hyperlipidemia   • Gout, unspecified   • H/O: CVA (cerebrovascular accident)   • Diabetic polyneuropathy associated with type 2 diabetes mellitus ([place] [id number] )   • Encounter for Medicare annual wellness exam   • Prostate cancer screening   • Colon cancer screening   • Actinic keratoses   • Coronary artery disease involving native coronary artery of native heart without angina pectoris   • Epistaxis   • Lymphedema   • Other fatigue   • Encounter for immunization   • Dyslipidemia   • Other male erectile dysfunction   • Chronic cough   • Morbid obesity with BMI of 45 0-49 9, adult ([place] [id number] )   • Viral infection, unspecified      Past Medical and Surgical History:     Past Medical History:   Diagnosis Date   • Bilateral cellulitis of lower leg    • Bilateral edema of lower extremity    • Chronic pain    • Diabetes mellitus ([place] [id number] )    • ED (erectile dysfunction)    • Gout     last assessed: 4/10/2019   • Hemiplegia and hemiparesis following cerebral infarction affecting right dominant side ([place] [address] )     last assessed: 1/10/2019   • Hypertension    • Lymphedema    • DIAMOND (obstructive sleep apnea)     does not treat this   • Stroke [facility])    • Type 2 diabetes mellitus with foot ulcer, without long-term current use of insulin ([place] [id number] ) 11/30/2018   • Venous insufficiency (chronic) (peripheral)     last assessed: 7/5/2018     Past Surgical History:   Procedure Laterality Date   • COLONOSCOPY     • HERNIA REPAIR        Family History:     Family History   Problem Relation Age of Onset   • Heart failure Mother    • Heart failure Father       Social History:     Social History     Socioeconomic History   • Marital status:      Spouse name: None   • Number of children: None   • Years of education: None   • Highest education level: None   Occupational History   • None   Tobacco Use   • Smoking status: Never   • Smokeless tobacco: Never   Vaping Use   • Vaping Use: Never used   Substance and Sexual Activity   • Alcohol use: Yes     Alcohol/week: 4 0 standard drinks     Types: 4 Standard drinks or equivalent per week     Comment: occ   • Drug use: No   • Sexual activity: Not Currently   Other Topics Concern   • None   Social History Narrative    Caffeine use- iced tea on occasion     Social Determinants of Health     Financial Resource Strain: Low Risk    • Difficulty of Paying Living Expenses: Not very hard   Food Insecurity: Not on file   Transportation Needs: No Transportation Needs   • Lack of Transportation (Medical): No   • Lack of Transportation (Non-Medical): No   Physical Activity: Not on file   Stress: Not on file   Social Connections: Not on file   Intimate Partner Violence: Not on file   Housing Stability: Not on file      Medications and Allergies:     Current Outpatient Medications   Medication Sig Dispense Refill   • acetaminophen (TYLENOL) 500 mg tablet Take 500 mg by mouth every 6 (six) hours as needed for mild pain     • allopurinol (ZYLOPRIM) 300 mg tablet Take 1 tablet (300 mg total) by mouth daily 90 tablet 3   • ammonium lactate (LAC-HYDRIN) 12 % lotion Apply topically 2 (two) times a day as needed for dry skin 400 g 11   • apixaban (ELIQUIS) 5 mg Take 1 tablet (5 mg total) by mouth 2 (two) times a day 180 tablet 3   • atorvastatin (LIPITOR) 80 mg tablet Take 1 tablet (80 mg total) by mouth in the morning   90 tablet 3   • azelastine (ASTELIN) 0 1 % nasal spray 1 spray into each nostril 2 (two) times a day Use in each nostril as directed 20 mL 5   • cloNIDine (CATAPRES) 0 2 mg tablet Take 1 tablet (0 2 mg total) by mouth 2 (two) times a day 180 tablet 3   • furosemide (LASIX) 80 mg tablet take 1 tablet by mouth once daily 90 tablet 3   • glipiZIDE (GLUCOTROL) 5 mg tablet Take 1 tablet (5 mg total) by mouth daily 90 tablet 3   • losartan (COZAAR) 50 mg tablet Take 1 tablet (50 mg total) by mouth daily 90 tablet 5   • metFORMIN (GLUCOPHAGE) 1000 MG tablet Take 1 tablet (1,000 mg total) by mouth 2 (two) times a day with meals 180 tablet 3   • metoprolol succinate (TOPROL-XL) 25 mg 24 hr tablet Take 1 tablet (25 mg total) by mouth daily 90 tablet 3   • tadalafil (CIALIS) 20 MG tablet Take 1 tablet (20 mg total) by mouth daily as needed for erectile dysfunction 5 tablet 2     No current facility-administered medications for this visit  No Known Allergies   Immunizations:     Immunization History   Administered Date(s) Administered   • COVID-19 MODERNA VACC 0 5 ML IM 03/19/2021, 04/16/2021, 11/26/2021   • Influenza, high dose seasonal 0 7 mL 09/30/2019, 09/16/2020, 11/02/2021   • Pneumococcal Conjugate 13-Valent 09/30/2019   • Pneumococcal Polysaccharide PPV23 11/02/2021      Health Maintenance:         Topic Date Due   • Colorectal Cancer Screening  04/07/2031   • Hepatitis C Screening  Completed         Topic Date Due   • Hepatitis B Vaccine (1 of 3 - 3-dose series) Never done   • COVID-19 Vaccine (4 - Booster for Moderna series) 03/26/2022   • Influenza Vaccine (1) 09/01/2022      Medicare Screening Tests and Risk Assessments:     Queta Kaiser is here for his Subsequent Wellness visit  Last Medicare Wellness visit information reviewed, patient interviewed and updates made to the record today  Health Risk Assessment:   Patient rates overall health as good  Patient feels that their physical health rating is same  Patient is satisfied with their life  Eyesight was rated as same  Hearing was rated as same  Patient feels that their emotional and mental health rating is same   Patients states they are never, rarely angry  Patient states they are never, rarely unusually tired/fatigued  Pain experienced in the last 7 days has been none  Patient states that he has experienced no weight loss or gain in last 6 months  Depression Screening:   PHQ-2 Score: 0      Fall Risk Screening: In the past year, patient has experienced: no history of falling in past year      Home Safety:  Patient does not have trouble with stairs inside or outside of their home  Patient has working smoke alarms and has working carbon monoxide detector  Home safety hazards include: none  Nutrition:   Current diet is Regular  Advised to follow a diabetic diet    Medications:   Patient is not currently taking any over-the-counter supplements  Patient is able to manage medications  Activities of Daily Living (ADLs)/Instrumental Activities of Daily Living (IADLs):   Walk and transfer into and out of bed and chair?: Yes  Dress and groom yourself?: Yes    Bathe or shower yourself?: Yes    Feed yourself? Yes  Do your laundry/housekeeping?: Yes  Manage your money, pay your bills and track your expenses?: Yes  Make your own meals?: Yes    Do your own shopping?: Yes    Previous Hospitalizations:   Any hospitalizations or ED visits within the last 12 months?: No      Advance Care Planning:   Living will: No    Durable POA for healthcare:  Yes    Advanced directive: No      Cognitive Screening:   Provider or family/friend/caregiver concerned regarding cognition?: No    PREVENTIVE SCREENINGS      Cardiovascular Screening:    General: Screening Not Indicated and History Lipid Disorder      Diabetes Screening:     General: Screening Not Indicated and History Diabetes      Colorectal Cancer Screening:     General: Screening Current      Prostate Cancer Screening:    General: Screening Current      Osteoporosis Screening:    General: Screening Not Indicated      Abdominal Aortic Aneurysm (AAA) Screening:    Risk factors include: age between 73-68 yo        General: Screening Not Indicated      Lung Cancer Screening:     General: Screening Not Indicated      Hepatitis C Screening:    General: Screening Current    Screening, Brief Intervention, and Referral to Treatment (SBIRT)    Screening  Typical number of drinks in a day: 0  Typical number of drinks in a week: 0  Interpretation: Low risk drinking behavior  AUDIT-C Screenin) How often did you have a drink containing alcohol in the past year? monthly or less  2) How many drinks did you have on a typical day when you were drinking in the past year? 1 to 2  3) How often did you have 6 or more drinks on one occasion in the past year? never    AUDIT-C Score: 1  Interpretation: Score 0-3 (male): Negative screen for alcohol misuse    Single Item Drug Screening:  How often have you used an illegal drug (including marijuana) or a prescription medication for non-medical reasons in the past year? never    Single Item Drug Screen Score: 0  Interpretation: Negative screen for possible drug use disorder    No results found  Physical Exam:     /98   Pulse 87   Temp 98 °F (36 7 °C) (Tympanic)   Ht 6' 2" (1 88 m)   Wt (!) 153 kg (337 lb 9 6 oz)   SpO2 97%   BMI 43 35 kg/m²     Physical Exam  Vitals reviewed  Constitutional:       Comments: This is a morbidly obese 68-year-old white male who appears his stated age  He is pleasant, cooperative, and in no distress  HENT:      Head: Normocephalic and atraumatic  Right Ear: Tympanic membrane, ear canal and external ear normal  There is no impacted cerumen  Left Ear: Tympanic membrane, ear canal and external ear normal  There is no impacted cerumen  Mouth/Throat:      Mouth: Mucous membranes are moist       Pharynx: Oropharynx is clear  No oropharyngeal exudate or posterior oropharyngeal erythema  Eyes:      General: No scleral icterus  Right eye: No discharge  Left eye: No discharge        Conjunctiva/sclera: Conjunctivae normal       Pupils: Pupils are equal, round, and reactive to light  Cardiovascular:      Rate and Rhythm: Normal rate  Rhythm irregular  Heart sounds: Normal heart sounds  No murmur heard  No friction rub  No gallop  Comments: Heart is irregularly irregular with a well-controlled ventricular response  Pulmonary:      Effort: Pulmonary effort is normal  No respiratory distress  Breath sounds: Normal breath sounds  No stridor  No wheezing, rhonchi or rales  Musculoskeletal:      Cervical back: Neck supple  Lymphadenopathy:      Cervical: No cervical adenopathy  Psychiatric:         Mood and Affect: Mood normal          Behavior: Behavior normal          Thought Content: Thought content normal          Judgment: Judgment normal         Extremities:  Without cyanosis, clubbing, or edema      Mortimer Boards, DO

## 2022-11-22 NOTE — ASSESSMENT & PLAN NOTE
Blood pressure was elevated today  I check his blood pressure myself and found his blood pressure to be 150/96  I increased his clonidine to 0 2 mg twice a day  He will continue losartan and metoprolol

## 2022-11-22 NOTE — ASSESSMENT & PLAN NOTE
Lipid panel was reviewed and discussed with the patient  Total cholesterol is 139  Triglycerides are 163  HDL cholesterol is 41  LDL cholesterol is 65  Goal is less than 70   He will continue intensive lipid lowering therapy with atorvastatin 80 mg daily

## 2022-12-20 ENCOUNTER — VBI (OUTPATIENT)
Dept: ADMINISTRATIVE | Facility: OTHER | Age: 67
End: 2022-12-20

## 2023-01-03 ENCOUNTER — APPOINTMENT (EMERGENCY)
Dept: RADIOLOGY | Facility: HOSPITAL | Age: 68
End: 2023-01-03

## 2023-01-03 ENCOUNTER — HOSPITAL ENCOUNTER (EMERGENCY)
Facility: HOSPITAL | Age: 68
Discharge: HOME | End: 2023-01-03
Attending: EMERGENCY MEDICINE

## 2023-01-03 VITALS
OXYGEN SATURATION: 98 % | SYSTOLIC BLOOD PRESSURE: 190 MMHG | RESPIRATION RATE: 20 BRPM | TEMPERATURE: 98.4 F | HEART RATE: 68 BPM | DIASTOLIC BLOOD PRESSURE: 102 MMHG

## 2023-01-03 DIAGNOSIS — R06.02 SOB (SHORTNESS OF BREATH): ICD-10-CM

## 2023-01-03 DIAGNOSIS — R07.9 CHEST PAIN: Primary | ICD-10-CM

## 2023-01-03 DIAGNOSIS — R03.0 ELEVATED BLOOD PRESSURE READING: ICD-10-CM

## 2023-01-03 LAB
2HR DELTA HS TROPONIN: 0 NG/L
ALBUMIN SERPL BCP-MCNC: 3.4 G/DL (ref 3.5–5)
ALP SERPL-CCNC: 99 U/L (ref 46–116)
ALT SERPL W P-5'-P-CCNC: 23 U/L (ref 12–78)
ANION GAP SERPL CALCULATED.3IONS-SCNC: 5 MMOL/L (ref 4–13)
AST SERPL W P-5'-P-CCNC: 14 U/L (ref 5–45)
ATRIAL RATE: 357 BPM
BASOPHILS # BLD AUTO: 0.05 THOUSANDS/ÂΜL (ref 0–0.1)
BASOPHILS NFR BLD AUTO: 1 % (ref 0–1)
BILIRUB SERPL-MCNC: 0.81 MG/DL (ref 0.2–1)
BUN SERPL-MCNC: 19 MG/DL (ref 5–25)
CALCIUM ALBUM COR SERPL-MCNC: 9.7 MG/DL (ref 8.3–10.1)
CALCIUM SERPL-MCNC: 9.2 MG/DL (ref 8.3–10.1)
CARDIAC TROPONIN I PNL SERPL HS: 4 NG/L
CARDIAC TROPONIN I PNL SERPL HS: 4 NG/L
CHLORIDE SERPL-SCNC: 104 MMOL/L (ref 96–108)
CO2 SERPL-SCNC: 28 MMOL/L (ref 21–32)
CREAT SERPL-MCNC: 1.19 MG/DL (ref 0.6–1.3)
EOSINOPHIL # BLD AUTO: 0.25 THOUSAND/ÂΜL (ref 0–0.61)
EOSINOPHIL NFR BLD AUTO: 3 % (ref 0–6)
ERYTHROCYTE [DISTWIDTH] IN BLOOD BY AUTOMATED COUNT: 12.9 % (ref 11.6–15.1)
GFR SERPL CREATININE-BSD FRML MDRD: 61 ML/MIN/1.73SQ M
GLUCOSE SERPL-MCNC: 143 MG/DL (ref 65–140)
HCT VFR BLD AUTO: 40.7 % (ref 36.5–49.3)
HGB BLD-MCNC: 13.6 G/DL (ref 12–17)
IMM GRANULOCYTES # BLD AUTO: 0.06 THOUSAND/UL (ref 0–0.2)
IMM GRANULOCYTES NFR BLD AUTO: 1 % (ref 0–2)
LYMPHOCYTES # BLD AUTO: 2.14 THOUSANDS/ÂΜL (ref 0.6–4.47)
LYMPHOCYTES NFR BLD AUTO: 23 % (ref 14–44)
MCH RBC QN AUTO: 32.8 PG (ref 26.8–34.3)
MCHC RBC AUTO-ENTMCNC: 33.4 G/DL (ref 31.4–37.4)
MCV RBC AUTO: 98 FL (ref 82–98)
MONOCYTES # BLD AUTO: 0.72 THOUSAND/ÂΜL (ref 0.17–1.22)
MONOCYTES NFR BLD AUTO: 8 % (ref 4–12)
NEUTROPHILS # BLD AUTO: 5.99 THOUSANDS/ÂΜL (ref 1.85–7.62)
NEUTS SEG NFR BLD AUTO: 64 % (ref 43–75)
NRBC BLD AUTO-RTO: 0 /100 WBCS
PLATELET # BLD AUTO: 200 THOUSANDS/UL (ref 149–390)
PMV BLD AUTO: 10.6 FL (ref 8.9–12.7)
POTASSIUM SERPL-SCNC: 4 MMOL/L (ref 3.5–5.3)
PROT SERPL-MCNC: 8.5 G/DL (ref 6.4–8.4)
QRS AXIS: -19 DEGREES
QRSD INTERVAL: 106 MS
QT INTERVAL: 414 MS
QTC INTERVAL: 447 MS
RBC # BLD AUTO: 4.15 MILLION/UL (ref 3.88–5.62)
SODIUM SERPL-SCNC: 137 MMOL/L (ref 135–147)
T WAVE AXIS: 59 DEGREES
VENTRICULAR RATE: 70 BPM
WBC # BLD AUTO: 9.21 THOUSAND/UL (ref 4.31–10.16)

## 2023-01-03 NOTE — DISCHARGE INSTRUCTIONS
Your blood pressure was elevated while in the ED today  Follow up with your PCP  Your evaluation was negative today  Follow up with cardiology  Return to the ED if symptoms worsen

## 2023-01-03 NOTE — ED ATTENDING ATTESTATION
1/3/2023  IMarvin MD, saw and evaluated the patient  I have discussed the patient with the resident/non-physician practitioner and agree with the resident's/non-physician practitioner's findings, Plan of Care, and MDM as documented in the resident's/non-physician practitioner's note, except where noted  All available labs and Radiology studies were reviewed  I was present for key portions of any procedure(s) performed by the resident/non-physician practitioner and I was immediately available to provide assistance  At this point I agree with the current assessment done in the Emergency Department  I have conducted an independent evaluation of this patient a history and physical is as follows:    ED Course  ED Course as of 01/03/23 0627   Tue Jan 03, 2023   0547 Per resident h&P 68 YO male presents for; chest discmfort; movement does not exacerbate his symptoms; recent change in medication O; afebrile ; CV irr irr I/P Tn ECG     Emergency Department Note- Malini Dong 69 y o  male MRN: [id number]    Unit/Bed#: ED 03 Encounter: [id number]    Malini Dong is a 69 y o  male who presents with   Chief Complaint   Patient presents with   • Dizziness     Pt states he felt "woozy" and has some chest burning  States he ate saurkraut for new years and thinks he may have indigestion         History of Present Illness   HPI:  Malini Dong is a 69 y o  male who presents for evaluation of:  Episode of epigastric burning after eating sauerkraut earlier tonight  He notes he also had some indigestion and felt a little dizzy  In the ED, he notes that his symptoms have completely resolved  He is back to his baseline  He denies any sort of chest discomfort and dyspnea  He denies syncope and near syncope  Review of Systems   Constitutional: Negative for fatigue and fever  HENT: Negative for congestion and sore throat  Respiratory: Negative for cough and shortness of breath      Cardiovascular: Negative for chest pain and palpitations  Gastrointestinal: Positive for abdominal pain (x burning sensation epigastric)  Negative for nausea  Genitourinary: Negative for flank pain and frequency  Neurological: Positive for dizziness  Negative for light-headedness and headaches  Psychiatric/Behavioral: Negative for dysphoric mood and hallucinations  All other systems reviewed and are negative  Historical Information   Past Medical History:   Diagnosis Date   • Bilateral cellulitis of lower leg    • Bilateral edema of lower extremity    • Chronic pain    • Diabetes mellitus (HCC)    • ED (erectile dysfunction)    • Gout     last assessed: 4/10/2019   • Hemiplegia and hemiparesis following cerebral infarction affecting right dominant side ([facility] )     last assessed: 1/10/2019   • Hypertension    • Lymphedema    • DIAMOND (obstructive sleep apnea)     does not treat this   • Stroke [facility])    • Type 2 diabetes mellitus with foot ulcer, without long-term current use of insulin ([place] [id number] ) 11/30/2018   • Venous insufficiency (chronic) (peripheral)     last assessed: 7/5/2018     Past Surgical History:   Procedure Laterality Date   • COLONOSCOPY     • HERNIA REPAIR       Social History   Social History     Substance and Sexual Activity   Alcohol Use Yes   • Alcohol/week: 4 0 standard drinks   • Types: 4 Standard drinks or equivalent per week    Comment: occ     Social History     Substance and Sexual Activity   Drug Use No     Social History     Tobacco Use   Smoking Status Never   Smokeless Tobacco Never     Family History:   Family History   Problem Relation Age of Onset   • Heart failure Mother    • Heart failure Father        Meds/Allergies   PTA meds:   Prior to Admission Medications   Prescriptions Last Dose Informant Patient Reported?  Taking?   acetaminophen (TYLENOL) 500 mg tablet   Yes No   Sig: Take 500 mg by mouth every 6 (six) hours as needed for mild pain   allopurinol (ZYLOPRIM) 300 mg tablet   No No   Sig: Take 1 tablet (300 mg total) by mouth daily   ammonium lactate (LAC-HYDRIN) 12 % lotion   No No   Sig: Apply topically 2 (two) times a day as needed for dry skin   apixaban (ELIQUIS) 5 mg   No No   Sig: Take 1 tablet (5 mg total) by mouth 2 (two) times a day   atorvastatin (LIPITOR) 80 mg tablet   No No   Sig: Take 1 tablet (80 mg total) by mouth in the morning     azelastine (ASTELIN) 0 1 % nasal spray   No No   Si spray into each nostril 2 (two) times a day Use in each nostril as directed   cloNIDine (CATAPRES) 0 2 mg tablet   No No   Sig: Take 1 tablet (0 2 mg total) by mouth 2 (two) times a day   furosemide (LASIX) 80 mg tablet   No No   Sig: take 1 tablet by mouth once daily   glipiZIDE (GLUCOTROL) 5 mg tablet   No No   Sig: Take 1 tablet (5 mg total) by mouth daily   losartan (COZAAR) 50 mg tablet   No No   Sig: Take 1 tablet (50 mg total) by mouth daily   metFORMIN (GLUCOPHAGE) 1000 MG tablet   No No   Sig: Take 1 tablet (1,000 mg total) by mouth 2 (two) times a day with meals   metoprolol succinate (TOPROL-XL) 25 mg 24 hr tablet   No No   Sig: Take 1 tablet (25 mg total) by mouth daily   tadalafil (CIALIS) 20 MG tablet   No No   Sig: Take 1 tablet (20 mg total) by mouth daily as needed for erectile dysfunction      Facility-Administered Medications: None     No Known Allergies    Objective   First Vitals:   Blood Pressure: (!) 183/93 (23 0504)  Pulse: 79 (23 0500)  Temperature: 98 4 °F (36 9 °C) (23 0500)  Temp Source: Oral (23 0500)  Respirations: 18 (23 0500)  SpO2: 97 % (23 0500)    Current Vitals:   Blood Pressure: (!) 173/99 (23 0615)  Pulse: 70 (23 0615)  Temperature: 98 4 °F (36 9 °C) (23 0500)  Temp Source: Oral (23 0500)  Respirations: 20 (23)  SpO2: 98 % (23)    No intake or output data in the 24 hours ending 23    Invasive Devices     Peripheral Intravenous Line  Duration           Peripheral IV 23 Right;Ventral (anterior) Forearm <1 day                Physical Exam  Vitals and nursing note reviewed  Constitutional:       General: He is not in acute distress  Appearance: Normal appearance  He is well-developed  HENT:      Head: Normocephalic and atraumatic  Right Ear: External ear normal       Left Ear: External ear normal       Nose: Nose normal       Mouth/Throat:      Pharynx: No oropharyngeal exudate  Eyes:      Conjunctiva/sclera: Conjunctivae normal       Pupils: Pupils are equal, round, and reactive to light  Cardiovascular:      Rate and Rhythm: Normal rate and regular rhythm  Pulmonary:      Effort: Pulmonary effort is normal  No respiratory distress  Abdominal:      General: Abdomen is flat  There is no distension  Palpations: Abdomen is soft  Musculoskeletal:         General: No deformity  Normal range of motion  Cervical back: Normal range of motion and neck supple  Skin:     General: Skin is warm and dry  Capillary Refill: Capillary refill takes less than 2 seconds  Neurological:      General: No focal deficit present  Mental Status: He is alert and oriented to person, place, and time  Mental status is at baseline  Coordination: Coordination normal    Psychiatric:         Mood and Affect: Mood normal          Behavior: Behavior normal          Thought Content: Thought content normal          Judgment: Judgment normal            Medical Decision Makin  Epigastric burning sensation: Symptoms now resolved; ECG, troponin      Recent Results (from the past 36 hour(s))   ECG 12 lead    Collection Time: 23  5:17 AM   Result Value Ref Range    Ventricular Rate 70 BPM    Atrial Rate 357 BPM    MA Interval  ms    QRSD Interval 106 ms    QT Interval 414 ms    QTC Interval 447 ms    P Axis  degrees    QRS Axis -19 degrees    T Wave Axis 59 degrees   Comprehensive metabolic panel    Collection Time: 23  5:49 AM   Result Value Ref Range    Sodium 137 135 - 147 mmol/L    Potassium 4 0 3 5 - 5 3 mmol/L    Chloride 104 96 - 108 mmol/L    CO2 28 21 - 32 mmol/L    ANION GAP 5 4 - 13 mmol/L    BUN 19 5 - 25 mg/dL    Creatinine 1 19 0 60 - 1 30 mg/dL    Glucose 143 (H) 65 - 140 mg/dL    Calcium 9 2 8 3 - 10 1 mg/dL    Corrected Calcium 9 7 8 3 - 10 1 mg/dL    AST 14 5 - 45 U/L    ALT 23 12 - 78 U/L    Alkaline Phosphatase 99 46 - 116 U/L    Total Protein 8 5 (H) 6 4 - 8 4 g/dL    Albumin 3 4 (L) 3 5 - 5 0 g/dL    Total Bilirubin 0 81 0 20 - 1 00 mg/dL    eGFR 61 ml/min/1 73sq m   HS Troponin 0hr (reflex protocol)    Collection Time: 01/03/23  5:49 AM   Result Value Ref Range    hs TnI 0hr 4 "Refer to ACS Flowchart"- see link ng/L     XR chest 1 view portable    (Results Pending)         Portions of the record may have been created with voice recognition software  Occasional wrong word or "sound a like" substitutions may have occurred due to the inherent limitations of voice recognition software  Read the chart carefully and recognize, using context, where substitutions have occurred          Critical Care Time  Procedures

## 2023-01-03 NOTE — ED PROVIDER NOTES
History  Chief Complaint   Patient presents with   • Dizziness     Pt states he felt "woozy" and has some chest burning  States he ate saurkraut for new years and thinks he may have indigestion     HPI     29-year-old male presents to the ED for evaluation of central chest tightness that woke him up from sleep at 0300 today  Medical history significant for hypertension, atrial fibrillation on Eliquis, hyperlipidemia, T2DM  Patient reports indigestion and increased belching last night  He reports some lightheadedness with standing this morning as well, which has improved  Patient states he feels back to his baseline at this time  Denies any recent illness  Denies fever, chills, chest pain, shortness of breath, abdominal pain, nausea, vomiting, diarrhea, dysuria, hematuria  Denies history of MI  Prior to Admission Medications   Prescriptions Last Dose Informant Patient Reported? Taking?   acetaminophen (TYLENOL) 500 mg tablet   Yes Yes   Sig: Take 500 mg by mouth every 6 (six) hours as needed for mild pain   allopurinol (ZYLOPRIM) 300 mg tablet   No Yes   Sig: Take 1 tablet (300 mg total) by mouth daily   ammonium lactate (LAC-HYDRIN) 12 % lotion   No Yes   Sig: Apply topically 2 (two) times a day as needed for dry skin   apixaban (ELIQUIS) 5 mg   No Yes   Sig: Take 1 tablet (5 mg total) by mouth 2 (two) times a day   atorvastatin (LIPITOR) 80 mg tablet   No Yes   Sig: Take 1 tablet (80 mg total) by mouth in the morning     azelastine (ASTELIN) 0 1 % nasal spray   No Yes   Si spray into each nostril 2 (two) times a day Use in each nostril as directed   cloNIDine (CATAPRES) 0 2 mg tablet   No Yes   Sig: Take 1 tablet (0 2 mg total) by mouth 2 (two) times a day   furosemide (LASIX) 80 mg tablet   No Yes   Sig: take 1 tablet by mouth once daily   glipiZIDE (GLUCOTROL) 5 mg tablet   No Yes   Sig: Take 1 tablet (5 mg total) by mouth daily   losartan (COZAAR) 50 mg tablet   No Yes   Sig: Take 1 tablet (50 mg total) by mouth daily   metFORMIN (GLUCOPHAGE) 1000 MG tablet   No Yes   Sig: Take 1 tablet (1,000 mg total) by mouth 2 (two) times a day with meals   metoprolol succinate (TOPROL-XL) 25 mg 24 hr tablet   No Yes   Sig: Take 1 tablet (25 mg total) by mouth daily   tadalafil (CIALIS) 20 MG tablet   No No   Sig: Take 1 tablet (20 mg total) by mouth daily as needed for erectile dysfunction      Facility-Administered Medications: None       Past Medical History:   Diagnosis Date   • Bilateral cellulitis of lower leg    • Bilateral edema of lower extremity    • Chronic pain    • Diabetes mellitus (HCC)    • ED (erectile dysfunction)    • Gout     last assessed: 4/10/2019   • Hemiplegia and hemiparesis following cerebral infarction affecting right dominant side ([facility] )     last assessed: 1/10/2019   • Hypertension    • Lymphedema    • DIAMOND (obstructive sleep apnea)     does not treat this   • Stroke [facility])    • Type 2 diabetes mellitus with foot ulcer, without long-term current use of insulin ([facility] ) 11/30/2018   • Venous insufficiency (chronic) (peripheral)     last assessed: 7/5/2018       Past Surgical History:   Procedure Laterality Date   • COLONOSCOPY     • HERNIA REPAIR         Family History   Problem Relation Age of Onset   • Heart failure Mother    • Heart failure Father      I have reviewed and agree with the history as documented  E-Cigarette/Vaping   • E-Cigarette Use Never User      E-Cigarette/Vaping Substances   • Nicotine No    • THC No    • CBD No    • Flavoring No    • Other No    • Unknown No      Social History     Tobacco Use   • Smoking status: Never   • Smokeless tobacco: Never   Vaping Use   • Vaping Use: Never used   Substance Use Topics   • Alcohol use: Yes     Alcohol/week: 4 0 standard drinks     Types: 4 Standard drinks or equivalent per week     Comment: occ   • Drug use: No        Review of Systems   Constitutional: Negative for chills and fever     HENT: Negative for congestion, ear pain, rhinorrhea and sore throat  Eyes: Negative for pain and visual disturbance  Respiratory: Negative for cough and shortness of breath  Cardiovascular: Positive for chest pain  Negative for palpitations  Gastrointestinal: Negative for abdominal pain, diarrhea, nausea and vomiting  Genitourinary: Negative for dysuria and hematuria  Musculoskeletal: Negative for arthralgias and back pain  Skin: Negative for color change and rash  Neurological: Positive for light-headedness  Negative for dizziness, seizures, syncope, facial asymmetry, weakness and headaches  All other systems reviewed and are negative  Physical Exam  ED Triage Vitals   Temperature Pulse Respirations Blood Pressure SpO2   01/03/23 0500 01/03/23 0500 01/03/23 0500 01/03/23 0504 01/03/23 0500   98 4 °F (36 9 °C) 79 18 (!) 183/93 97 %      Temp Source Heart Rate Source Patient Position - Orthostatic VS BP Location FiO2 (%)   01/03/23 0500 01/03/23 0500 01/03/23 0730 01/03/23 0730 --   Oral Monitor Lying Left arm       Pain Score       01/03/23 0730       No Pain             Orthostatic Vital Signs  Vitals:    01/03/23 0504 01/03/23 0615 01/03/23 0630 01/03/23 0730   BP: (!) 183/93 (!) 173/99 (!) 199/86 (!) 190/102   Pulse:  70 66 68   Patient Position - Orthostatic VS:    Lying       Physical Exam  Vitals and nursing note reviewed  Constitutional:       General: He is not in acute distress  Appearance: Normal appearance  He is well-developed  He is obese  He is not ill-appearing, toxic-appearing or diaphoretic  HENT:      Head: Normocephalic and atraumatic  Mouth/Throat:      Mouth: Mucous membranes are moist    Eyes:      Extraocular Movements: Extraocular movements intact  Conjunctiva/sclera: Conjunctivae normal       Pupils: Pupils are equal, round, and reactive to light  Cardiovascular:      Rate and Rhythm: Normal rate  Rhythm irregular  Heart sounds: No murmur heard    Pulmonary:      Effort: Pulmonary effort is normal  No respiratory distress  Breath sounds: Normal breath sounds  No stridor  No wheezing, rhonchi or rales  Abdominal:      Palpations: Abdomen is soft  Tenderness: There is no abdominal tenderness  There is no guarding or rebound  Musculoskeletal:         General: No swelling  Cervical back: Neck supple  Skin:     General: Skin is warm and dry  Capillary Refill: Capillary refill takes less than 2 seconds  Comments: Chronic venous stasis skin changes noted to BL PATRICE    Neurological:      General: No focal deficit present  Mental Status: He is alert and oriented to person, place, and time  Cranial Nerves: No cranial nerve deficit  Sensory: No sensory deficit  Motor: No weakness        Coordination: Coordination normal    Psychiatric:         Mood and Affect: Mood normal          ED Medications  Medications - No data to display    Diagnostic Studies  Results Reviewed     Procedure Component Value Units Date/Time    HS Troponin I 2hr [[id number]]  (Normal) Collected: 01/03/23 0732    Lab Status: Final result Specimen: Blood from Arm, Right Updated: 01/03/23 0811     hs TnI 2hr 4 ng/L      Delta 2hr hsTnI 0 ng/L     HS Troponin I 4hr [[id number]]     Lab Status: No result Specimen: Blood     CBC and differential [[id number]] Collected: 01/03/23 0549    Lab Status: Final result Specimen: Blood from Arm, Right Updated: 01/03/23 0656     WBC 9 21 Thousand/uL      RBC 4 15 Million/uL      Hemoglobin 13 6 g/dL      Hematocrit 40 7 %      MCV 98 fL      MCH 32 8 pg      MCHC 33 4 g/dL      RDW 12 9 %      MPV 10 6 fL      Platelets 815 Thousands/uL      nRBC 0 /100 WBCs      Neutrophils Relative 64 %      Immat GRANS % 1 %      Lymphocytes Relative 23 %      Monocytes Relative 8 %      Eosinophils Relative 3 %      Basophils Relative 1 %      Neutrophils Absolute 5 99 Thousands/µL      Immature Grans Absolute 0 06 Thousand/uL      Lymphocytes Absolute 2 14 Thousands/µL      Monocytes Absolute 0 72 Thousand/µL      Eosinophils Absolute 0 25 Thousand/µL      Basophils Absolute 0 05 Thousands/µL     HS Troponin 0hr (reflex protocol) [[id number]]  (Normal) Collected: 01/03/23 0549    Lab Status: Final result Specimen: Blood from Arm, Right Updated: 01/03/23 0627     hs TnI 0hr 4 ng/L     Comprehensive metabolic panel [[id number]]  (Abnormal) Collected: 01/03/23 0549    Lab Status: Final result Specimen: Blood from Arm, Right Updated: 01/03/23 6435     Sodium 137 mmol/L      Potassium 4 0 mmol/L      Chloride 104 mmol/L      CO2 28 mmol/L      ANION GAP 5 mmol/L      BUN 19 mg/dL      Creatinine 1 19 mg/dL      Glucose 143 mg/dL      Calcium 9 2 mg/dL      Corrected Calcium 9 7 mg/dL      AST 14 U/L      ALT 23 U/L      Alkaline Phosphatase 99 U/L      Total Protein 8 5 g/dL      Albumin 3 4 g/dL      Total Bilirubin 0 81 mg/dL      eGFR 61 ml/min/1 73sq m     Narrative:      [place]enita guidelines for Chronic Kidney Disease (CKD):   •  Stage 1 with normal or high GFR (GFR > 90 mL/min/1 73 square meters)  •  Stage 2 Mild CKD (GFR = 60-89 mL/min/1 73 square meters)  •  Stage 3A Moderate CKD (GFR = 45-59 mL/min/1 73 square meters)  •  Stage 3B Moderate CKD (GFR = 30-44 mL/min/1 73 square meters)  •  Stage 4 Severe CKD (GFR = 15-29 mL/min/1 73 square meters)  •  Stage 5 End Stage CKD (GFR <15 mL/min/1 73 square meters)  Note: GFR calculation is accurate only with a steady state creatinine                 XR chest 1 view portable   ED Interpretation by Jorge Elena MD (01/03 7294)   No focal infiltrate            Procedures  ECG 12 Lead Documentation Only    Date/Time: 1/3/2023 8:28 AM  Performed by: Malcolm Lomax MD  Authorized by:  Malcolm Lomax MD     Indications / Diagnosis:  Cp  ECG reviewed by me, the ED Provider: yes    Patient location:  ED  Previous ECG:     Previous ECG:  Compared to current    Comparison ECG info:  11/29/2018 9532  Rate:     ECG rate:  70    ECG rate assessment: normal    Rhythm:     Rhythm: atrial fibrillation    QRS:     QRS axis:  Normal    QRS intervals:  Normal  ST segments:     ST segments:  Normal          ED Course  ED Course as of 01/03/23 0836   Tue Jan 03, 2023   0628 Sodium: 137  wnl   0628 hs TnI 0hr: 4  Symptoms started around 0300 today, will need repeat trop which would be due around 0749             HEART Risk Score    Flowsheet Row Most Recent Value   Heart Score Risk Calculator    History 0 Filed at: 01/03/2023 0833   ECG 0 Filed at: 01/03/2023 5191   Age 2 Filed at: 01/03/2023 9326   Risk Factors 2 Filed at: 01/03/2023 0934   Troponin 0 Filed at: 01/03/2023 6129   HEART Score 4 Filed at: 01/03/2023 0674                      SBIRT 22yo+    Flowsheet Row Most Recent Value   SBIRT (23 yo +)    In order to provide better care to our patients, we are screening all of our patients for alcohol and drug use  Would it be okay to ask you these screening questions? Unable to answer at this time Filed at: 01/03/2023 0704                Medical Decision Making    33-year-old male medical history significant for hypertension, atrial fibrillation on Eliquis, hyperlipidemia, type 2 diabetes mellitus presents to the ED for evaluation of central chest tightness that woke him up from sleep at 0300 today  Also endorses some lightheadedness with standing this morning as well  Patient denies any complaints at this time and states he feels back to his baseline  Hemodynamically stable  Afebrile  Laying on the stretcher in no acute distress  Exam notable for irregularly irregular heart rhythm  Chronic venous stasis skin changes noted to bilateral lower extremities  Normal neurological exam     CBC and CMP within normal limits  Initial troponin was 4, delta troponin pending at time of discharge  EKG shows atrial fibrillation with out any evidence of acute ischemia  ED preliminary interpretation of chest x-ray shows no focal infiltrate  Patient care was signed out to Dr Adalberto Johnson, discussed anticipated plan for discharge home if delta troponin within normal limits  Disposition  Final diagnoses:   Chest pain   SOB (shortness of breath)   Elevated blood pressure reading     Time reflects when diagnosis was documented in both MDM as applicable and the Disposition within this note     Time User Action Codes Description Comment    1/3/2023  8:32 AM Nnamdi Gal Add [R07 9] Chest pain     1/3/2023  8:32 AM Nnamdi Gal Add [R06 02] SOB (shortness of breath)     1/3/2023  8:33 AM Nnamdi Gal Add [R03 0] Elevated blood pressure reading       ED Disposition     ED Disposition   Discharge    Condition   Stable    Date/Time   Tue Reynaldo 3, 2023  8:34 AM    Comment   Concepción Backgila discharge to home/self care  Follow-up Information     Follow up With Specialties Details Why Contact Info Additional Information    Fiordaliza Lara DO Family Medicine  For Emergency Department Follow-up [address] [place] [address]  [address]  [facility] [id number]       [address] [place] [address] [address] [facility] Emergency Department Emergency Medicine  If symptoms worsen [address] [id number]  958 [facility] [address] Emergency Department, [address], [place], [address]    12[address] [facility] Cardiology  For Emergency Department Follow-up [address]  [address] [place] [address] Cardiology [address], [address]          Patient's Medications   Discharge Prescriptions    No medications on file         PDMP Review     None           ED Provider  Attending physically available and evaluated Concepción Gallardo I managed the patient along with the ED Attending      Electronically Signed by         Eusebia Davalos MD  01/03/23 6914

## 2023-01-05 DIAGNOSIS — I48.20 CHRONIC ATRIAL FIBRILLATION (HCC): Chronic | ICD-10-CM

## 2023-01-05 RX ORDER — FUROSEMIDE 80 MG
TABLET ORAL
Qty: 90 TABLET | Refills: 3 | Status: SHIPPED | OUTPATIENT
Start: 2023-01-05

## 2023-01-16 DIAGNOSIS — L97.509 TYPE 2 DIABETES MELLITUS WITH FOOT ULCER, WITHOUT LONG-TERM CURRENT USE OF INSULIN (HCC): Chronic | ICD-10-CM

## 2023-01-16 DIAGNOSIS — I48.20 CHRONIC ATRIAL FIBRILLATION (HCC): ICD-10-CM

## 2023-01-16 DIAGNOSIS — E11.621 TYPE 2 DIABETES MELLITUS WITH FOOT ULCER, WITHOUT LONG-TERM CURRENT USE OF INSULIN (HCC): Chronic | ICD-10-CM

## 2023-01-16 RX ORDER — GLIPIZIDE 5 MG/1
TABLET ORAL
Qty: 90 TABLET | Refills: 0 | Status: SHIPPED | OUTPATIENT
Start: 2023-01-16

## 2023-01-16 RX ORDER — APIXABAN 5 MG/1
TABLET, FILM COATED ORAL
Qty: 180 TABLET | Refills: 0 | Status: SHIPPED | OUTPATIENT
Start: 2023-01-16

## 2023-01-17 ENCOUNTER — OFFICE VISIT (OUTPATIENT)
Dept: CARDIOLOGY CLINIC | Facility: CLINIC | Age: 68
End: 2023-01-17

## 2023-01-17 VITALS
BODY MASS INDEX: 40.43 KG/M2 | SYSTOLIC BLOOD PRESSURE: 142 MMHG | WEIGHT: 315 LBS | HEIGHT: 74 IN | HEART RATE: 76 BPM | DIASTOLIC BLOOD PRESSURE: 86 MMHG

## 2023-01-17 DIAGNOSIS — E66.01 MORBID OBESITY WITH BMI OF 45.0-49.9, ADULT (HCC): ICD-10-CM

## 2023-01-17 DIAGNOSIS — E78.2 MIXED HYPERLIPIDEMIA: Chronic | ICD-10-CM

## 2023-01-17 DIAGNOSIS — I25.119 CORONARY ARTERY DISEASE INVOLVING NATIVE CORONARY ARTERY OF NATIVE HEART WITH ANGINA PECTORIS (HCC): Primary | ICD-10-CM

## 2023-01-17 DIAGNOSIS — I48.21 PERMANENT ATRIAL FIBRILLATION (HCC): ICD-10-CM

## 2023-01-17 DIAGNOSIS — E11.42 DIABETIC POLYNEUROPATHY ASSOCIATED WITH TYPE 2 DIABETES MELLITUS (HCC): ICD-10-CM

## 2023-01-17 NOTE — ASSESSMENT & PLAN NOTE
Recent chest discomfort which she blames on indigestion but not entirely clear that this is accurate  Multiple coronary risk factors  Will arrange for [facility] study as he cannot walk fast enough for treadmill testing

## 2023-01-17 NOTE — PROGRESS NOTES
Patient ID: Sukhwinder Marte is a 69 y o  male  Plan:      Coronary artery disease involving native coronary artery of native heart with angina pectoris ([address] )  Recent chest discomfort which she blames on indigestion but not entirely clear that this is accurate  Multiple coronary risk factors  Will arrange for [facility] study as he cannot walk fast enough for treadmill testing  Permanent atrial fibrillation (HCC)  Rate controlled  On Eliquis for stroke prevention  Mixed hyperlipidemia  Tolerating statin therapy  Follow up Plan/Other summary comments:  If the [facility] is not too revealing then follow-up visit and EKG in 1 year  HPI: Patient is seen in follow-up today regarding the above issues  Since the last visit in general he has done well but he was in the [facility] emergency room because of chest tightness  He was thought to have indigestion and sent home but he was due for cardiology follow-up regardless  No clear-cut exertional chest pain but exertion is limited  Most recent or relevant cardiac/vascular testing:    Echo 1/12/19: EF 60-65%  Mild LVH  Mild MAC with mild MR  Mild TR          Past Surgical History:   Procedure Laterality Date   • COLONOSCOPY     • HERNIA REPAIR         Lipid Profile:   Lab Results   Component Value Date    TRIG 163 (H) 11/17/2022    HDL 41 11/17/2022         Review of Systems   10  point ROS  was otherwise non pertinent or negative except as per HPI or as below  Gait: Normal          Objective:     /86   Pulse 76 Comment: irregular  Ht 6' 2" (1 88 m)   Wt (!) 156 kg (343 lb)   BMI 44 04 kg/m²     PHYSICAL EXAM:    General:  Normal appearance in no distress  Eyes:  Anicteric  Oral mucosa:  Moist   Neck:  No JVD  Carotid upstrokes are brisk without bruits  No masses  Chest:  Clear to auscultation  Cardiac: Irregularly irregular  No palpable PMI  Normal S1 and S2  No murmur gallop or rub    Abdomen:  Soft and nontender  No palpable organomegaly or aortic enlargement  Extremities: Venous stasis changes  Musculoskeletal:  Symmetric  Vascular:  Femoral pulses are brisk without bruits  Popliteal pulses are intact bilaterally  Pedal pulses are intact  Neuro:  Grossly symmetric  Psych:  Alert and oriented x3          Current Outpatient Medications:   •  acetaminophen (TYLENOL) 500 mg tablet, Take 500 mg by mouth every 6 (six) hours as needed for mild pain, Disp: , Rfl:   •  allopurinol (ZYLOPRIM) 300 mg tablet, Take 1 tablet (300 mg total) by mouth daily, Disp: 90 tablet, Rfl: 3  •  ammonium lactate (LAC-HYDRIN) 12 % lotion, Apply topically 2 (two) times a day as needed for dry skin, Disp: 400 g, Rfl: 11  •  atorvastatin (LIPITOR) 80 mg tablet, Take 1 tablet (80 mg total) by mouth in the morning , Disp: 90 tablet, Rfl: 3  •  azelastine (ASTELIN) 0 1 % nasal spray, 1 spray into each nostril 2 (two) times a day Use in each nostril as directed, Disp: 20 mL, Rfl: 5  •  cloNIDine (CATAPRES) 0 2 mg tablet, Take 1 tablet (0 2 mg total) by mouth 2 (two) times a day, Disp: 180 tablet, Rfl: 3  •  Eliquis 5 MG, Take 1 tablet by mouth twice daily, Disp: 180 tablet, Rfl: 0  •  furosemide (LASIX) 80 mg tablet, take 1 tablet by mouth once daily, Disp: 90 tablet, Rfl: 3  •  glipiZIDE (GLUCOTROL) 5 mg tablet, Take 1 tablet by mouth once daily, Disp: 90 tablet, Rfl: 0  •  losartan (COZAAR) 50 mg tablet, Take 1 tablet (50 mg total) by mouth daily, Disp: 90 tablet, Rfl: 5  •  metFORMIN (GLUCOPHAGE) 1000 MG tablet, Take 1 tablet (1,000 mg total) by mouth 2 (two) times a day with meals, Disp: 180 tablet, Rfl: 3  •  metoprolol succinate (TOPROL-XL) 25 mg 24 hr tablet, Take 1 tablet (25 mg total) by mouth daily, Disp: 90 tablet, Rfl: 3  •  tadalafil (CIALIS) 20 MG tablet, Take 1 tablet (20 mg total) by mouth daily as needed for erectile dysfunction, Disp: 5 tablet, Rfl: 2  No Known Allergies  Past Medical History:   Diagnosis Date   • Bilateral cellulitis of lower leg    • Bilateral edema of lower extremity    • Chronic pain    • Diabetes mellitus (HCC)    • ED (erectile dysfunction)    • Gout     last assessed: 4/10/2019   • Hemiplegia and hemiparesis following cerebral infarction affecting right dominant side ([facility] )     last assessed: 1/10/2019   • Hypertension    • Lymphedema    • DIAMOND (obstructive sleep apnea)     does not treat this   • Stroke [facility])    • Type 2 diabetes mellitus with foot ulcer, without long-term current use of insulin ([facility] ) 11/30/2018   • Venous insufficiency (chronic) (peripheral)     last assessed: 7/5/2018           Social History     Tobacco Use   Smoking Status Never   Smokeless Tobacco Never

## 2023-01-30 DIAGNOSIS — L97.509 TYPE 2 DIABETES MELLITUS WITH FOOT ULCER, WITHOUT LONG-TERM CURRENT USE OF INSULIN (HCC): Chronic | ICD-10-CM

## 2023-01-30 DIAGNOSIS — E11.621 TYPE 2 DIABETES MELLITUS WITH FOOT ULCER, WITHOUT LONG-TERM CURRENT USE OF INSULIN (HCC): Chronic | ICD-10-CM

## 2023-01-31 ENCOUNTER — HOSPITAL ENCOUNTER (OUTPATIENT)
Dept: NUCLEAR MEDICINE | Facility: HOSPITAL | Age: 68
Discharge: HOME | End: 2023-01-31
Attending: INTERNAL MEDICINE

## 2023-01-31 ENCOUNTER — HOSPITAL ENCOUNTER (OUTPATIENT)
Dept: NON INVASIVE DIAGNOSTICS | Facility: HOSPITAL | Age: 68
Discharge: HOME | End: 2023-01-31
Attending: INTERNAL MEDICINE

## 2023-01-31 VITALS
DIASTOLIC BLOOD PRESSURE: 96 MMHG | HEIGHT: 74 IN | OXYGEN SATURATION: 97 % | SYSTOLIC BLOOD PRESSURE: 156 MMHG | BODY MASS INDEX: 40.43 KG/M2 | HEART RATE: 82 BPM | WEIGHT: 315 LBS

## 2023-01-31 DIAGNOSIS — I48.21 PERMANENT ATRIAL FIBRILLATION (HCC): ICD-10-CM

## 2023-01-31 DIAGNOSIS — E78.2 MIXED HYPERLIPIDEMIA: Chronic | ICD-10-CM

## 2023-01-31 DIAGNOSIS — I25.119 CORONARY ARTERY DISEASE INVOLVING NATIVE CORONARY ARTERY OF NATIVE HEART WITH ANGINA PECTORIS (HCC): ICD-10-CM

## 2023-01-31 LAB
CHEST PAIN STATEMENT: NORMAL
MAX DIASTOLIC BP: 96 MMHG
MAX HEART RATE: 153 BPM
MAX HR PERCENT: 101 %
MAX HR: 153 BPM
MAX PREDICTED HEART RATE: 151 BPM
MAX. SYSTOLIC BP: 172 MMHG
NUC STRESS EJECTION FRACTION: 50 %
PROTOCOL NAME: NORMAL
RATE PRESSURE PRODUCT: NORMAL
REASON FOR TERMINATION: NORMAL
SL CV REST NUCLEAR ISOTOPE DOSE: 13.2 MCI
SL CV STRESS NUCLEAR ISOTOPE DOSE: 40.1 MCI
SL CV STRESS RECOVERY BP: NORMAL MMHG
SL CV STRESS RECOVERY HR: 81 BPM
SL CV STRESS RECOVERY O2 SAT: 97 %
STRESS ANGINA INDEX: 0
STRESS BASELINE BP: NORMAL MMHG
STRESS BASELINE HR: 82 BPM
STRESS DUKE TREADMILL SCORE: 0
STRESS O2 SAT REST: 97 %
STRESS PEAK HR: 153 BPM
STRESS POST EXERCISE DUR SEC: 0 SEC
STRESS POST O2 SAT PEAK: 98 %
STRESS POST PEAK BP: 172 MMHG
STRESS ST DEPRESSION: 0 MM
TARGET HR FORMULA: NORMAL
TIME IN EXERCISE PHASE: NORMAL

## 2023-01-31 RX ADMIN — REGADENOSON 0.4 MG: 0.08 INJECTION, SOLUTION INTRAVENOUS at 09:35

## 2023-03-06 ENCOUNTER — TELEPHONE (OUTPATIENT)
Dept: FAMILY MEDICINE CLINIC | Facility: CLINIC | Age: 68
End: 2023-03-06

## 2023-03-06 DIAGNOSIS — I10 ESSENTIAL HYPERTENSION: Chronic | ICD-10-CM

## 2023-03-06 DIAGNOSIS — I10 BENIGN ESSENTIAL HTN: ICD-10-CM

## 2023-03-06 DIAGNOSIS — E78.2 MIXED HYPERLIPIDEMIA: ICD-10-CM

## 2023-03-06 DIAGNOSIS — I48.20 CHRONIC ATRIAL FIBRILLATION (HCC): ICD-10-CM

## 2023-03-06 RX ORDER — ATORVASTATIN CALCIUM 80 MG/1
80 TABLET, FILM COATED ORAL DAILY
Qty: 90 TABLET | Refills: 3 | Status: SHIPPED | OUTPATIENT
Start: 2023-03-06

## 2023-03-06 RX ORDER — LOSARTAN POTASSIUM 50 MG/1
50 TABLET ORAL DAILY
Qty: 90 TABLET | Refills: 5 | Status: SHIPPED | OUTPATIENT
Start: 2023-03-06

## 2023-03-06 RX ORDER — CLONIDINE HYDROCHLORIDE 0.2 MG/1
0.2 TABLET ORAL 2 TIMES DAILY
Qty: 180 TABLET | Refills: 3 | Status: SHIPPED | OUTPATIENT
Start: 2023-03-06

## 2023-03-09 ENCOUNTER — APPOINTMENT (OUTPATIENT)
Dept: LAB | Facility: CLINIC | Age: 68
End: 2023-03-09

## 2023-03-09 DIAGNOSIS — E11.42 DIABETIC POLYNEUROPATHY ASSOCIATED WITH TYPE 2 DIABETES MELLITUS (HCC): ICD-10-CM

## 2023-03-09 LAB
ANION GAP SERPL CALCULATED.3IONS-SCNC: 4 MMOL/L (ref 4–13)
BUN SERPL-MCNC: 15 MG/DL (ref 5–25)
CALCIUM SERPL-MCNC: 9.6 MG/DL (ref 8.3–10.1)
CHLORIDE SERPL-SCNC: 104 MMOL/L (ref 96–108)
CO2 SERPL-SCNC: 26 MMOL/L (ref 21–32)
CREAT SERPL-MCNC: 0.99 MG/DL (ref 0.6–1.3)
EST. AVERAGE GLUCOSE BLD GHB EST-MCNC: 131 MG/DL
GFR SERPL CREATININE-BSD FRML MDRD: 76 ML/MIN/1.73SQ M
GLUCOSE P FAST SERPL-MCNC: 110 MG/DL (ref 65–99)
HBA1C MFR BLD: 6.2 %
POTASSIUM SERPL-SCNC: 4.2 MMOL/L (ref 3.5–5.3)
SODIUM SERPL-SCNC: 134 MMOL/L (ref 135–147)

## 2023-03-20 ENCOUNTER — RA CDI HCC (OUTPATIENT)
Dept: OTHER | Facility: HOSPITAL | Age: 68
End: 2023-03-20

## 2023-03-20 NOTE — PROGRESS NOTES
[facility]  coding opportunities       Chart reviewed, no opportunity found:   [address]        Patients Insurance     Medicare Insurance: [employer]

## 2023-03-27 ENCOUNTER — OFFICE VISIT (OUTPATIENT)
Dept: FAMILY MEDICINE CLINIC | Facility: CLINIC | Age: 68
End: 2023-03-27

## 2023-03-27 VITALS
SYSTOLIC BLOOD PRESSURE: 124 MMHG | HEIGHT: 74 IN | HEART RATE: 87 BPM | BODY MASS INDEX: 40.43 KG/M2 | DIASTOLIC BLOOD PRESSURE: 86 MMHG | OXYGEN SATURATION: 100 % | TEMPERATURE: 97.7 F | WEIGHT: 315 LBS

## 2023-03-27 DIAGNOSIS — E11.42 DIABETIC POLYNEUROPATHY ASSOCIATED WITH TYPE 2 DIABETES MELLITUS (HCC): Primary | ICD-10-CM

## 2023-03-27 DIAGNOSIS — E78.2 MIXED HYPERLIPIDEMIA: Chronic | ICD-10-CM

## 2023-03-27 DIAGNOSIS — I10 ESSENTIAL HYPERTENSION: Chronic | ICD-10-CM

## 2023-03-27 DIAGNOSIS — R04.0 EPISTAXIS: ICD-10-CM

## 2023-03-27 NOTE — ASSESSMENT & PLAN NOTE
Patient has not had any serious nosebleeds  However, I have recommended he use saline nasal spray frequently to moisturize his nasal passages  I have also recommended a try to keep his windows open at night when he goes to bed  If the nosebleeds persist, then he is going to have to go back to see ENT  2 years ago, he required a nasal cautery

## 2023-03-27 NOTE — ASSESSMENT & PLAN NOTE
Lab Results   Component Value Date    HGBA1C 6 2 (H) 03/09/2023     The patient has type 2 diabetes mellitus  I reviewed his labs  His fasting blood glucose is 110  Hemoglobin A1c is 6 2%  His goal is less than 7 0  We discussed routine diabetic foot care  I recommended he see podiatry    He is currently up-to-date with his dilated eye exam   He will continue metformin and glipizide

## 2023-03-27 NOTE — PROGRESS NOTES
Name: Kristan Koch      : 1955      MRN: [id number]  Encounter Provider: Sierra Latif DO  Encounter Date: 3/27/2023   [facility]    Assessment & Plan     1  Diabetic polyneuropathy associated with type 2 diabetes mellitus [facility])  Assessment & Plan:    Lab Results   Component Value Date    HGBA1C 6 2 (H) 2023     The patient has type 2 diabetes mellitus  I reviewed his labs  His fasting blood glucose is 110  Hemoglobin A1c is 6 2%  His goal is less than 7 0  We discussed routine diabetic foot care  I recommended he see podiatry  He is currently up-to-date with his dilated eye exam   He will continue metformin and glipizide    Orders:  -     Hemoglobin A1C; Future; Expected date: 2023  -     Comprehensive metabolic panel; Future; Expected date: 2023  -     Microalbumin / creatinine urine ratio; Future; Expected date: 2023    2  Mixed hyperlipidemia  Assessment & Plan:  A direct LDL cholesterol was ordered for the patient's next office visit  His goal LDL cholesterol is less than 70  He will continue intensive lipid-lowering therapy with atorvastatin 80 mg daily  Orders:  -     LDL cholesterol, direct; Future    3  Essential hypertension  Assessment & Plan:  Patient has hypertension  I checked his blood pressure myself and found his blood pressure to be reasonably well controlled at 124/86  The patient will continue losartan, metoprolol, and clonidine  4  Epistaxis  Assessment & Plan:  Patient has not had any serious nosebleeds  However, I have recommended he use saline nasal spray frequently to moisturize his nasal passages  I have also recommended a try to keep his windows open at night when he goes to bed  If the nosebleeds persist, then he is going to have to go back to see ENT  2 years ago, he required a nasal cautery             Subjective      This is a 47-year-old white male who presents to the office today for her "routine checkup  The patient is doing well has no complaints  He was seen in the emergency department on January 3 with chest pain  He recently underwent a stress test which was negative  He reports compliance with his medication  He reports decreased lower extremity edema  Review of Systems   HENT: Positive for nosebleeds  Cardiovascular: Positive for leg swelling  Negative for chest pain and palpitations  Gastrointestinal: Negative for abdominal distention, abdominal pain, blood in stool, constipation, diarrhea and nausea     Genitourinary:        Denies nocturia and weak urinary stream       Current Outpatient Medications on File Prior to Visit   Medication Sig   • acetaminophen (TYLENOL) 500 mg tablet Take 500 mg by mouth every 6 (six) hours as needed for mild pain   • allopurinol (ZYLOPRIM) 300 mg tablet Take 1 tablet (300 mg total) by mouth daily   • ammonium lactate (LAC-HYDRIN) 12 % lotion Apply topically 2 (two) times a day as needed for dry skin   • atorvastatin (LIPITOR) 80 mg tablet Take 1 tablet (80 mg total) by mouth daily   • azelastine (ASTELIN) 0 1 % nasal spray 1 spray into each nostril 2 (two) times a day Use in each nostril as directed   • cloNIDine (CATAPRES) 0 2 mg tablet Take 1 tablet (0 2 mg total) by mouth 2 (two) times a day   • Eliquis 5 MG Take 1 tablet by mouth twice daily   • furosemide (LASIX) 80 mg tablet take 1 tablet by mouth once daily   • glipiZIDE (GLUCOTROL) 5 mg tablet Take 1 tablet by mouth once daily   • losartan (COZAAR) 50 mg tablet Take 1 tablet (50 mg total) by mouth daily   • metFORMIN (GLUCOPHAGE) 1000 MG tablet TAKE 1 TABLET BY MOUTH TWICE DAILY WITH MEALS   • metoprolol succinate (TOPROL-XL) 25 mg 24 hr tablet Take 1 tablet (25 mg total) by mouth daily   • tadalafil (CIALIS) 20 MG tablet Take 1 tablet (20 mg total) by mouth daily as needed for erectile dysfunction       Objective     /86   Pulse 87   Temp 97 7 °F (36 5 °C) (Tympanic)   Ht 6' 2 4\" " (1 89 m)   Wt (!) 154 kg (339 lb 1 6 oz)   SpO2 100%   BMI 43 07 kg/m²     Physical Exam  Vitals reviewed  Constitutional:       Comments: Patient is a 68-year-old white male who appears his stated age  He is pleasant, cooperative, and in no distress   HENT:      Head: Normocephalic and atraumatic  Right Ear: Tympanic membrane, ear canal and external ear normal  There is no impacted cerumen  Left Ear: Tympanic membrane, ear canal and external ear normal  There is no impacted cerumen  Mouth/Throat:      Mouth: Mucous membranes are moist       Pharynx: Oropharynx is clear  No oropharyngeal exudate or posterior oropharyngeal erythema  Eyes:      General: No scleral icterus  Right eye: No discharge  Left eye: No discharge  Conjunctiva/sclera: Conjunctivae normal       Pupils: Pupils are equal, round, and reactive to light  Cardiovascular:      Rate and Rhythm: Normal rate  Rhythm irregular  Pulses: no weak pulses          Dorsalis pedis pulses are 1+ on the right side and 1+ on the left side  Posterior tibial pulses are 0 on the right side and 0 on the left side  Heart sounds: Normal heart sounds  No murmur heard  No friction rub  No gallop  Comments: Heart is irregularly irregular  Ventricular rate is well controlled  Pulmonary:      Effort: Pulmonary effort is normal  No respiratory distress  Breath sounds: Normal breath sounds  No stridor  No wheezing, rhonchi or rales  Musculoskeletal:      Cervical back: Neck supple  Right lower leg: Edema present  Left lower leg: Edema present  Comments: There is +1/4 lower extremity edema noted bilaterally   Feet:      Right foot:      Skin integrity: No ulcer, skin breakdown, erythema, warmth, callus or dry skin  Left foot:      Skin integrity: No ulcer, skin breakdown, erythema, warmth, callus or dry skin  Lymphadenopathy:      Cervical: No cervical adenopathy     Psychiatric: Mood and Affect: Mood normal          Behavior: Behavior normal          Thought Content: Thought content normal          Judgment: Judgment normal         Patient's shoes and socks removed  Right Foot/Ankle   Right Foot Inspection  Skin Exam: skin normal and skin intact  No dry skin, no warmth, no callus, no erythema, no maceration, no abnormal color, no pre-ulcer, no ulcer and no callus  Toe Exam: No swelling, no tenderness, erythema and  no right toe deformity    Sensory   Vibration: diminished  Proprioception: intact  Monofilament testing: diminished    Vascular  Capillary refills: < 3 seconds  The right DP pulse is 1+  The right PT pulse is 0  Left Foot/Ankle  Left Foot Inspection  Skin Exam: skin normal and skin intact  No dry skin, no warmth, no erythema, no maceration, normal color, no pre-ulcer, no ulcer and no callus  Toe Exam: No swelling, no tenderness, no erythema and no left toe deformity  Sensory   Vibration: diminished  Proprioception: intact  Monofilament testing: diminished    Vascular  Capillary refills: < 3 seconds  The left DP pulse is 1+  The left PT pulse is 0       Assign Risk Category  No deformity present  Loss of protective sensation  No weak pulses  Risk: 1      Shyanne Pleasanton, DO

## 2023-03-27 NOTE — ASSESSMENT & PLAN NOTE
Patient has hypertension  I checked his blood pressure myself and found his blood pressure to be reasonably well controlled at 124/86  The patient will continue losartan, metoprolol, and clonidine

## 2023-03-27 NOTE — ASSESSMENT & PLAN NOTE
A direct LDL cholesterol was ordered for the patient's next office visit  His goal LDL cholesterol is less than 70  He will continue intensive lipid-lowering therapy with atorvastatin 80 mg daily

## 2023-07-07 ENCOUNTER — APPOINTMENT (OUTPATIENT)
Dept: LAB | Facility: CLINIC | Age: 68
End: 2023-07-07
Payer: COMMERCIAL

## 2023-07-07 DIAGNOSIS — E78.2 MIXED HYPERLIPIDEMIA: Chronic | ICD-10-CM

## 2023-07-07 DIAGNOSIS — E11.42 DIABETIC POLYNEUROPATHY ASSOCIATED WITH TYPE 2 DIABETES MELLITUS (HCC): ICD-10-CM

## 2023-07-07 LAB
ALBUMIN SERPL BCP-MCNC: 3.6 G/DL (ref 3.5–5)
ALP SERPL-CCNC: 93 U/L (ref 46–116)
ALT SERPL W P-5'-P-CCNC: 22 U/L (ref 12–78)
ANION GAP SERPL CALCULATED.3IONS-SCNC: 3 MMOL/L
AST SERPL W P-5'-P-CCNC: 18 U/L (ref 5–45)
BILIRUB SERPL-MCNC: 1.05 MG/DL (ref 0.2–1)
BUN SERPL-MCNC: 17 MG/DL (ref 5–25)
CALCIUM SERPL-MCNC: 9.1 MG/DL (ref 8.3–10.1)
CHLORIDE SERPL-SCNC: 108 MMOL/L (ref 96–108)
CO2 SERPL-SCNC: 28 MMOL/L (ref 21–32)
CREAT SERPL-MCNC: 0.84 MG/DL (ref 0.6–1.3)
CREAT UR-MCNC: 259 MG/DL
GFR SERPL CREATININE-BSD FRML MDRD: 88 ML/MIN/1.73SQ M
GLUCOSE P FAST SERPL-MCNC: 123 MG/DL (ref 65–99)
LDLC SERPL DIRECT ASSAY-MCNC: 72 MG/DL (ref 0–100)
MICROALBUMIN UR-MCNC: 326 MG/L (ref 0–20)
MICROALBUMIN/CREAT 24H UR: 126 MG/G CREATININE (ref 0–30)
POTASSIUM SERPL-SCNC: 4.2 MMOL/L (ref 3.5–5.3)
PROT SERPL-MCNC: 8.5 G/DL (ref 6.4–8.4)
SODIUM SERPL-SCNC: 139 MMOL/L (ref 135–147)

## 2023-07-07 PROCEDURE — 83036 HEMOGLOBIN GLYCOSYLATED A1C: CPT

## 2023-07-07 PROCEDURE — 83721 ASSAY OF BLOOD LIPOPROTEIN: CPT

## 2023-07-07 PROCEDURE — 36415 COLL VENOUS BLD VENIPUNCTURE: CPT

## 2023-07-07 PROCEDURE — 82570 ASSAY OF URINE CREATININE: CPT

## 2023-07-07 PROCEDURE — 82043 UR ALBUMIN QUANTITATIVE: CPT

## 2023-07-07 PROCEDURE — 80053 COMPREHEN METABOLIC PANEL: CPT

## 2023-07-09 LAB
EST. AVERAGE GLUCOSE BLD GHB EST-MCNC: 131 MG/DL
HBA1C MFR BLD: 6.2 %

## 2023-07-11 ENCOUNTER — OFFICE VISIT (OUTPATIENT)
Dept: FAMILY MEDICINE CLINIC | Facility: CLINIC | Age: 68
End: 2023-07-11
Payer: COMMERCIAL

## 2023-07-11 VITALS
BODY MASS INDEX: 40.43 KG/M2 | HEART RATE: 83 BPM | OXYGEN SATURATION: 98 % | DIASTOLIC BLOOD PRESSURE: 96 MMHG | TEMPERATURE: 96.8 F | HEIGHT: 74 IN | WEIGHT: 315 LBS | SYSTOLIC BLOOD PRESSURE: 142 MMHG

## 2023-07-11 DIAGNOSIS — N18.2 CHRONIC KIDNEY DISEASE, STAGE II (MILD): ICD-10-CM

## 2023-07-11 DIAGNOSIS — E78.2 MIXED HYPERLIPIDEMIA: Chronic | ICD-10-CM

## 2023-07-11 DIAGNOSIS — I10 ESSENTIAL HYPERTENSION: Primary | Chronic | ICD-10-CM

## 2023-07-11 DIAGNOSIS — E11.42 TYPE 2 DIABETES MELLITUS WITH DIABETIC POLYNEUROPATHY, WITHOUT LONG-TERM CURRENT USE OF INSULIN (HCC): ICD-10-CM

## 2023-07-11 DIAGNOSIS — I48.21 PERMANENT ATRIAL FIBRILLATION (HCC): ICD-10-CM

## 2023-07-11 PROCEDURE — 99214 OFFICE O/P EST MOD 30 MIN: CPT | Performed by: FAMILY MEDICINE

## 2023-07-11 RX ORDER — LOSARTAN POTASSIUM 100 MG/1
100 TABLET ORAL DAILY
Qty: 90 TABLET | Refills: 3 | Status: SHIPPED | OUTPATIENT
Start: 2023-07-11

## 2023-07-11 NOTE — PROGRESS NOTES
Name: Jessika Welch      : 1955      MRN: [id number]  Encounter Provider: Sophia Florez DO  Encounter Date: 2023   Encounter department: [address] [place] [address] PRIMARY CARE    Assessment & Plan     1. Essential hypertension  Assessment & Plan:  Has hypertension. Blood pressure was less than optimally controlled. Blood pressure, when I checked it today, was 142/90. For now, I increased the dose of his losartan to 100 mg daily. This may also help with his microalbuminuria. Orders:  -     losartan (COZAAR) 100 MG tablet; Take 1 tablet (100 mg total) by mouth daily    2. Type 2 diabetes mellitus with diabetic polyneuropathy, without long-term current use of insulin [facility])  Assessment & Plan:    Lab Results   Component Value Date    HGBA1C 6.2 (H) 2023   Patient has type 2 diabetes mellitus. His diabetes is well controlled. I reviewed the patient's labs. His fasting blood glucose was 123. Hemoglobin A1c is 6.2%. His urine albumin to creatinine ratio was elevated at 126. Patient denies any hypoglycemic events. I will have him continue glipizide 5 mg daily. I reduced his dose of metformin to 500 mg twice daily because of complaints of diarrhea. I ordered repeat fasting labs for his next visit. We discussed routine diabetic foot care. Patient no longer sees podiatry. I really think he should see podiatry regularly. Orders:  -     metFORMIN (GLUCOPHAGE) 1000 MG tablet; Take 0.5 tablets (500 mg total) by mouth 2 (two) times a day with meals  -     Hemoglobin A1C; Future; Expected date: 10/11/2023  -     Comprehensive metabolic panel; Future; Expected date: 10/11/2023    3. Permanent atrial fibrillation [facility])  Assessment & Plan:  Rate is controlled. Patient will continue metoprolol succinate ER 25 mg daily and Eliquis 5 mg twice a day.       4. Chronic kidney disease, stage II (mild)  Assessment & Plan:  Lab Results   Component Value Date    EGFR 88 2023    EGFR 76 2023    EGFR 61 01/03/2023    CREATININE 0.84 07/07/2023    CREATININE 0.99 03/09/2023    CREATININE 1.19 01/03/2023   Patient's creatinine is normal at 0.84 with an estimated GFR of 88. However, his urine albumin to creatinine ratio is consistently elevated, indicating chronic kidney disease stage II. As noted, I increased the dose of the patient's losartan. 5. Mixed hyperlipidemia  Assessment & Plan:  Patient's direct LDL cholesterol is 72. His goal is less than 70. Continue intensive lipid-lowering therapy with atorvastatin 80 mg daily           Subjective      This is a 70-year-old white male who presents to the office today for his routine checkup. The patient's main complaint today is fatigue. Does have CPAP at home but does not use it. However, I did learn today that his lady friend is currently in the hospital.  She had a bad stroke. She is not doing well. She also just completed chemotherapy for some type of malignancy. The patient did have blood work done in anticipation of his visit today. He tells me he has been compliant with his medication. Review of Systems   Constitutional: Negative for activity change and appetite change. Respiratory: Negative for cough and shortness of breath. Cardiovascular: Positive for leg swelling. Negative for chest pain and palpitations. Gastrointestinal: Positive for diarrhea. Negative for abdominal distention, abdominal pain, blood in stool, constipation and nausea.        Current Outpatient Medications on File Prior to Visit   Medication Sig   • acetaminophen (TYLENOL) 500 mg tablet Take 500 mg by mouth every 6 (six) hours as needed for mild pain   • allopurinol (ZYLOPRIM) 300 mg tablet Take 1 tablet by mouth once daily   • ammonium lactate (LAC-HYDRIN) 12 % lotion Apply topically 2 (two) times a day as needed for dry skin   • atorvastatin (LIPITOR) 80 mg tablet Take 1 tablet (80 mg total) by mouth daily   • azelastine (ASTELIN) 0.1 % nasal spray 1 spray into each nostril 2 (two) times a day Use in each nostril as directed   • cloNIDine (CATAPRES) 0.2 mg tablet Take 1 tablet (0.2 mg total) by mouth 2 (two) times a day   • Eliquis 5 MG Take 1 tablet by mouth twice daily   • furosemide (LASIX) 80 mg tablet take 1 tablet by mouth once daily   • glipiZIDE (GLUCOTROL) 5 mg tablet Take 1 tablet by mouth once daily   • metoprolol succinate (TOPROL-XL) 25 mg 24 hr tablet Take 1 tablet (25 mg total) by mouth daily   • tadalafil (CIALIS) 20 MG tablet Take 1 tablet (20 mg total) by mouth daily as needed for erectile dysfunction   • [DISCONTINUED] losartan (COZAAR) 50 mg tablet Take 1 tablet (50 mg total) by mouth daily   • [DISCONTINUED] metFORMIN (GLUCOPHAGE) 1000 MG tablet TAKE 1 TABLET BY MOUTH TWICE DAILY WITH MEALS       Objective     /96 (BP Location: Left arm, Patient Position: Sitting, Cuff Size: Large)   Pulse 83   Temp (!) 96.8 °F (36 °C) (Tympanic)   Ht 6' 2" (1.88 m)   Wt (!) 151 kg (333 lb 14.4 oz)   SpO2 98%   BMI 42.87 kg/m²     Physical Exam  Vitals reviewed. Constitutional:       Comments: Patient is a 49-year-old white male who appears his stated age. He is pleasant, cooperative, and in no distress   HENT:      Head: Normocephalic and atraumatic. Right Ear: Tympanic membrane, ear canal and external ear normal. There is no impacted cerumen. Left Ear: Tympanic membrane, ear canal and external ear normal. There is no impacted cerumen. Mouth/Throat:      Mouth: Mucous membranes are moist.      Pharynx: Oropharynx is clear. No oropharyngeal exudate or posterior oropharyngeal erythema. Eyes:      General: No scleral icterus. Right eye: No discharge. Left eye: No discharge. Conjunctiva/sclera: Conjunctivae normal.      Pupils: Pupils are equal, round, and reactive to light. Neck:      Vascular: No carotid bruit. Comments: Thyromegaly is noted  Cardiovascular:      Rate and Rhythm: Normal rate. Rhythm irregular. Heart sounds: Normal heart sounds. No murmur heard. No friction rub. No gallop. Comments: Heart is irregularly irregular. The ventricular rate is well controlled  Pulmonary:      Effort: Pulmonary effort is normal. No respiratory distress. Breath sounds: Normal breath sounds. No stridor. No wheezing, rhonchi or rales. Musculoskeletal:      Cervical back: Neck supple. Lymphadenopathy:      Cervical: No cervical adenopathy. Psychiatric:         Mood and Affect: Mood normal.         Behavior: Behavior normal.         Thought Content:  Thought content normal.         Judgment: Judgment normal.     Extremities: Without cyanosis, clubbing, or edema  Lucille Tejeda DO

## 2023-07-12 NOTE — ASSESSMENT & PLAN NOTE
Patient's direct LDL cholesterol is 72. His goal is less than 70.   Continue intensive lipid-lowering therapy with atorvastatin 80 mg daily

## 2023-07-12 NOTE — ASSESSMENT & PLAN NOTE
Lab Results   Component Value Date    HGBA1C 6.2 (H) 07/07/2023   Patient has type 2 diabetes mellitus. His diabetes is well controlled. I reviewed the patient's labs. His fasting blood glucose was 123. Hemoglobin A1c is 6.2%. His urine albumin to creatinine ratio was elevated at 126. Patient denies any hypoglycemic events. I will have him continue glipizide 5 mg daily. I reduced his dose of metformin to 500 mg twice daily because of complaints of diarrhea. I ordered repeat fasting labs for his next visit. We discussed routine diabetic foot care. Patient no longer sees podiatry. I really think he should see podiatry regularly.

## 2023-07-12 NOTE — ASSESSMENT & PLAN NOTE
Rate is controlled. Patient will continue metoprolol succinate ER 25 mg daily and Eliquis 5 mg twice a day.

## 2023-07-12 NOTE — ASSESSMENT & PLAN NOTE
Has hypertension. Blood pressure was less than optimally controlled. Blood pressure, when I checked it today, was 142/90. For now, I increased the dose of his losartan to 100 mg daily. This may also help with his microalbuminuria.

## 2023-07-12 NOTE — ASSESSMENT & PLAN NOTE
Lab Results   Component Value Date    EGFR 88 07/07/2023    EGFR 76 03/09/2023    EGFR 61 01/03/2023    CREATININE 0.84 07/07/2023    CREATININE 0.99 03/09/2023    CREATININE 1.19 01/03/2023   Patient's creatinine is normal at 0.84 with an estimated GFR of 88. However, his urine albumin to creatinine ratio is consistently elevated, indicating chronic kidney disease stage II. As noted, I increased the dose of the patient's losartan.

## 2023-07-23 DIAGNOSIS — L97.509 TYPE 2 DIABETES MELLITUS WITH FOOT ULCER, WITHOUT LONG-TERM CURRENT USE OF INSULIN (HCC): Chronic | ICD-10-CM

## 2023-07-23 DIAGNOSIS — M1A.00X0 IDIOPATHIC CHRONIC GOUT WITHOUT TOPHUS, UNSPECIFIED SITE: ICD-10-CM

## 2023-07-23 DIAGNOSIS — E11.621 TYPE 2 DIABETES MELLITUS WITH FOOT ULCER, WITHOUT LONG-TERM CURRENT USE OF INSULIN (HCC): Chronic | ICD-10-CM

## 2023-07-23 DIAGNOSIS — I48.20 CHRONIC ATRIAL FIBRILLATION (HCC): ICD-10-CM

## 2023-07-23 RX ORDER — ALLOPURINOL 300 MG/1
TABLET ORAL
Qty: 90 TABLET | Refills: 0 | Status: SHIPPED | OUTPATIENT
Start: 2023-07-23

## 2023-07-23 RX ORDER — APIXABAN 5 MG/1
TABLET, FILM COATED ORAL
Qty: 180 TABLET | Refills: 0 | Status: SHIPPED | OUTPATIENT
Start: 2023-07-23

## 2023-07-23 RX ORDER — GLIPIZIDE 5 MG/1
TABLET ORAL
Qty: 90 TABLET | Refills: 0 | Status: SHIPPED | OUTPATIENT
Start: 2023-07-23

## 2023-08-10 DIAGNOSIS — E11.42 TYPE 2 DIABETES MELLITUS WITH DIABETIC POLYNEUROPATHY, WITHOUT LONG-TERM CURRENT USE OF INSULIN (HCC): ICD-10-CM

## 2023-08-10 DIAGNOSIS — I48.20 CHRONIC ATRIAL FIBRILLATION (HCC): ICD-10-CM

## 2023-08-10 RX ORDER — METOPROLOL SUCCINATE 25 MG/1
25 TABLET, EXTENDED RELEASE ORAL DAILY
Qty: 90 TABLET | Refills: 0 | Status: SHIPPED | OUTPATIENT
Start: 2023-08-10

## 2023-10-11 ENCOUNTER — OFFICE VISIT (OUTPATIENT)
Dept: FAMILY MEDICINE CLINIC | Facility: CLINIC | Age: 68
End: 2023-10-11
Payer: COMMERCIAL

## 2023-10-11 VITALS
SYSTOLIC BLOOD PRESSURE: 160 MMHG | DIASTOLIC BLOOD PRESSURE: 86 MMHG | HEIGHT: 74 IN | HEART RATE: 69 BPM | OXYGEN SATURATION: 99 % | TEMPERATURE: 97.8 F | WEIGHT: 315 LBS | BODY MASS INDEX: 40.43 KG/M2

## 2023-10-11 DIAGNOSIS — M17.11 PRIMARY OSTEOARTHRITIS OF RIGHT KNEE: ICD-10-CM

## 2023-10-11 DIAGNOSIS — I10 ESSENTIAL HYPERTENSION: Chronic | ICD-10-CM

## 2023-10-11 DIAGNOSIS — M54.16 LUMBAR RADICULOPATHY: Primary | ICD-10-CM

## 2023-10-11 PROCEDURE — 99214 OFFICE O/P EST MOD 30 MIN: CPT | Performed by: FAMILY MEDICINE

## 2023-10-11 RX ORDER — OXYCODONE HYDROCHLORIDE AND ACETAMINOPHEN 5; 325 MG/1; MG/1
1 TABLET ORAL EVERY 8 HOURS PRN
Qty: 25 TABLET | Refills: 0 | Status: SHIPPED | OUTPATIENT
Start: 2023-10-11

## 2023-10-11 NOTE — PROGRESS NOTES
Name: Jose Simmons      : 1955      MRN: [id number]  Encounter Provider: Juve Woody DO  Encounter Date: 10/11/2023   Encounter department: [address] [place] [address]     1. Lumbar radiculopathy  Assessment & Plan:  Patient has a right-sided lumbar radiculopathy. Patient was worried about a DVT. I told the patient it is highly unlikely given the fact that taking Eliquis. Also, physical exam is not consistent with DVT. Unfortunately, due to the Eliquis, I cannot prescribe this patient NSAIDs. He is diabetic and I really do not want to give him prednisone. Given his age, he is not a candidate for muscle relaxers. I did queried the [place] prescription drug monitoring program.  There were no red flags and it was safe to proceed. The patient was given a prescription for oxycodone-acetaminophen. He may take 1 every 8 hours as needed for pain. We discussed potential complications, including sedation and constipation. He may need to take a laxative as needed while on this medication. He also should not drive while taking this medication. I placed a referral with comprehensive pain management. X-rays of the lumbosacral spine were ordered. A follow-up visit has been scheduled. Orders:  -     Ambulatory Referral to [facility]; Future  -     oxyCODONE-acetaminophen (Percocet) 5-325 mg per tablet; Take 1 tablet by mouth every 8 (eight) hours as needed for moderate pain Max Daily Amount: 3 tablets  -     XR spine lumbar minimum 4 views non injury; Future; Expected date: 10/11/2023    2. Primary osteoarthritis of right knee  Assessment & Plan:  I feel the patient does have osteoarthritis of the right knee. I am not sure how much this is contributing to his pain. I think the primary pain source is lumbar. I am going to get an x-ray of the right knee to assess the severity of his osteoarthritis. Orders:  -     XR knee 3 vw right non injury;  Future; Expected date: 10/11/2023    3. Essential hypertension  Assessment & Plan:  Blood pressure was elevated today. I did not make any change with his medication. Patient does have a follow-up visit for his back. He actually has a regular checkup scheduled. I will reassess his blood pressure at that time. He plans to get his blood work done just prior to that visit. Subjective      Patient is a 78-year-old white male who presents to the office today accompanied by his son. He complains of a 3-week history of pain in his right leg. He reports pain goes across his lower back and he has pain that radiates down his right leg all the way to the foot. It is associated with numbness in the leg. He denies any trauma. He tells me that getting up from a seated position makes it worse. The only thing that helps him feel better is lying down flat. He does report lower extremity edema. He is tells me it is no worse than normal.  He also tells me that he has been compliant on his Eliquis and has not missed any doses. Review of Systems   Cardiovascular:  Positive for leg swelling. Negative for chest pain and palpitations. Gastrointestinal:  Positive for constipation. Negative for abdominal distention, abdominal pain, diarrhea and nausea. Musculoskeletal:  Positive for back pain. Neurological:  Positive for dizziness and numbness. Negative for weakness.        Current Outpatient Medications on File Prior to Visit   Medication Sig    acetaminophen (TYLENOL) 500 mg tablet Take 500 mg by mouth every 6 (six) hours as needed for mild pain    allopurinol (ZYLOPRIM) 300 mg tablet Take 1 tablet by mouth once daily    ammonium lactate (LAC-HYDRIN) 12 % lotion Apply topically 2 (two) times a day as needed for dry skin    atorvastatin (LIPITOR) 80 mg tablet Take 1 tablet (80 mg total) by mouth daily    azelastine (ASTELIN) 0.1 % nasal spray 1 spray into each nostril 2 (two) times a day Use in each nostril as directed    cloNIDine (CATAPRES) 0.2 mg tablet Take 1 tablet (0.2 mg total) by mouth 2 (two) times a day    Eliquis 5 MG Take 1 tablet by mouth twice daily    furosemide (LASIX) 80 mg tablet take 1 tablet by mouth once daily    glipiZIDE (GLUCOTROL) 5 mg tablet Take 1 tablet by mouth once daily    losartan (COZAAR) 100 MG tablet Take 1 tablet (100 mg total) by mouth daily    metFORMIN (GLUCOPHAGE) 1000 MG tablet TAKE 1 TABLET BY MOUTH TWICE DAILY WITH MEALS    metoprolol succinate (TOPROL-XL) 25 mg 24 hr tablet Take 1 tablet by mouth once daily    tadalafil (CIALIS) 20 MG tablet Take 1 tablet (20 mg total) by mouth daily as needed for erectile dysfunction       Objective     /86   Pulse 69   Temp 97.8 °F (36.6 °C) (Tympanic)   Ht 6' 2" (1.88 m)   Wt (!) 155 kg (342 lb 8 oz)   SpO2 99%   BMI 43.97 kg/m²     Physical Exam  Vitals reviewed. Constitutional:       Comments: This is a 47-year-old white male who appears his stated age. He is morbidly obese and in no apparent distress   Cardiovascular:      Rate and Rhythm: Normal rate. Rhythm irregular. Heart sounds: Normal heart sounds. No murmur heard. No friction rub. No gallop. Pulmonary:      Effort: Pulmonary effort is normal. No respiratory distress. Breath sounds: Normal breath sounds. No stridor. No wheezing or rales. Musculoskeletal:      Comments: There is no tenderness noted to palpation in the bilateral paraspinal lumbar musculature. There was no scoliosis or list.  Lumbar flexion was 75 degrees. However, extension and sidebending are severely limited. There is mild swelling of the right knee as well. Skin:     Comments: Skin of the lower extremities was warm to the touch with no skin mottling   Neurological:      Comments: Deep tendon reflexes were +2/4 in the patella bilaterally and in the right Achilles. Left Achilles reflex was +1/4. Motor strength was +5/5 bilaterally.   Straight leg raising sign was positive on the right     Extremities: Patient has edema noted of both lower extremities, left more so than right.   There was no calf tenderness and Homans' sign is noted to be negative bilaterally    Mina Bryant, DO

## 2023-10-12 ENCOUNTER — NURSE TRIAGE (OUTPATIENT)
Dept: PHYSICAL THERAPY | Facility: OTHER | Age: 68
End: 2023-10-12

## 2023-10-12 ENCOUNTER — APPOINTMENT (OUTPATIENT)
Dept: RADIOLOGY | Facility: CLINIC | Age: 68
End: 2023-10-12
Payer: COMMERCIAL

## 2023-10-12 DIAGNOSIS — M54.16 LUMBAR RADICULOPATHY: ICD-10-CM

## 2023-10-12 DIAGNOSIS — M54.50 ACUTE BILATERAL LOW BACK PAIN, UNSPECIFIED WHETHER SCIATICA PRESENT: Primary | ICD-10-CM

## 2023-10-12 DIAGNOSIS — M17.11 PRIMARY OSTEOARTHRITIS OF RIGHT KNEE: ICD-10-CM

## 2023-10-12 PROCEDURE — 73562 X-RAY EXAM OF KNEE 3: CPT

## 2023-10-12 PROCEDURE — 72110 X-RAY EXAM L-2 SPINE 4/>VWS: CPT

## 2023-10-12 NOTE — ASSESSMENT & PLAN NOTE
Blood pressure was elevated today. I did not make any change with his medication. Patient does have a follow-up visit for his back. He actually has a regular checkup scheduled. I will reassess his blood pressure at that time. He plans to get his blood work done just prior to that visit.

## 2023-10-12 NOTE — TELEPHONE ENCOUNTER
Additional Information   Negative: Is this related to a work injury? Negative: Is this related to an MVA? Negative: Are you currently recieving homecare services? Background - Initial Assessment  Clinical complaint: Pain is bilat low back, radiates down right leg to the foot. Does have numbness in right leg, but that is not as bad since starting medications. Pain started 3 weeks ago, NKI. Was seen by PCP 10/11/23. Had Xray on 10/12/23, waiting on results.    Date of onset: 3 weeks  Frequency of pain: intermittent with medications  Quality of pain: like an arthritis ache    Protocols used: [facility] Protocol

## 2023-10-12 NOTE — ASSESSMENT & PLAN NOTE
Patient has a right-sided lumbar radiculopathy. Patient was worried about a DVT. I told the patient it is highly unlikely given the fact that taking Eliquis. Also, physical exam is not consistent with DVT. Unfortunately, due to the Eliquis, I cannot prescribe this patient NSAIDs. He is diabetic and I really do not want to give him prednisone. Given his age, he is not a candidate for muscle relaxers. I did queried the [place] prescription drug monitoring program.  There were no red flags and it was safe to proceed. The patient was given a prescription for oxycodone-acetaminophen. He may take 1 every 8 hours as needed for pain. We discussed potential complications, including sedation and constipation. He may need to take a laxative as needed while on this medication. He also should not drive while taking this medication. I placed a referral with comprehensive pain management. X-rays of the lumbosacral spine were ordered. A follow-up visit has been scheduled.

## 2023-10-12 NOTE — ASSESSMENT & PLAN NOTE
I feel the patient does have osteoarthritis of the right knee. I am not sure how much this is contributing to his pain. I think the primary pain source is lumbar. I am going to get an x-ray of the right knee to assess the severity of his osteoarthritis.

## 2023-10-16 NOTE — TELEPHONE ENCOUNTER
Additional Information   Negative: Has the patient had unexplained weight loss? Negative: Does the patient have a fever? Negative: Is the patient experiencing blood in sputum? Negative: Has the patient experienced major trauma? (fall from height, high speed collision, direct blow to spine) and is also experiencing nausea, light-headedness, or loss of consciousness? Negative: Is the patient experiencing urine retention? Negative: Is the patient experiencing acute drop foot or paralysis? Negative: Is this a chronic condition? Protocols used: [address] [place] [address] Protocol    Nurse completed triage and NO RF s/s present. Referral entered for the [address] [place] [address] site. Patient is unable to take down or enter the phone number to the facility at this time. He will have his son look the contact info up for him. Nurse offered to leave a VM, but the patient does not know how to retrieve his messages. Nurse understood and provided him with the web-site for son to visit. Patients information was sent to the preferred site and pt made aware clerical would be calling to schedule appointment. Nurse encouraged her to call site if he does not hear from clerical beforehand. Patient Agreed. Patient did not voice any additional questions or concerns at this time. Patient is aware current complaints, relevant dx, additional referrals and treatment/options will be discussed at the evaluation/consult. All information regarding plan to be evaluated by the therapist was reviewed. Patient is in agreement with nurse's explanation of intended care path discussed today. Patient very appreciative of CB and referral placement for the evaluation with an Advanced Spine Therapist.     Nurse wished him well and referral closed.

## 2023-10-19 ENCOUNTER — VBI (OUTPATIENT)
Dept: ADMINISTRATIVE | Facility: OTHER | Age: 68
End: 2023-10-19

## 2023-10-23 ENCOUNTER — TELEPHONE (OUTPATIENT)
Dept: FAMILY MEDICINE CLINIC | Facility: CLINIC | Age: 68
End: 2023-10-23

## 2023-10-23 DIAGNOSIS — I48.20 CHRONIC ATRIAL FIBRILLATION (HCC): ICD-10-CM

## 2023-10-23 DIAGNOSIS — M1A.00X0 IDIOPATHIC CHRONIC GOUT WITHOUT TOPHUS, UNSPECIFIED SITE: ICD-10-CM

## 2023-10-23 DIAGNOSIS — L97.509 TYPE 2 DIABETES MELLITUS WITH FOOT ULCER, WITHOUT LONG-TERM CURRENT USE OF INSULIN (HCC): Chronic | ICD-10-CM

## 2023-10-23 DIAGNOSIS — E11.621 TYPE 2 DIABETES MELLITUS WITH FOOT ULCER, WITHOUT LONG-TERM CURRENT USE OF INSULIN (HCC): Chronic | ICD-10-CM

## 2023-10-23 RX ORDER — ALLOPURINOL 300 MG/1
TABLET ORAL
Qty: 90 TABLET | Refills: 0 | Status: SHIPPED | OUTPATIENT
Start: 2023-10-23

## 2023-10-23 RX ORDER — GLIPIZIDE 5 MG/1
TABLET ORAL
Qty: 90 TABLET | Refills: 0 | Status: SHIPPED | OUTPATIENT
Start: 2023-10-23

## 2023-10-23 RX ORDER — APIXABAN 5 MG/1
TABLET, FILM COATED ORAL
Qty: 180 TABLET | Refills: 0 | Status: SHIPPED | OUTPATIENT
Start: 2023-10-23

## 2023-10-30 ENCOUNTER — EVALUATION (OUTPATIENT)
Dept: PHYSICAL THERAPY | Facility: CLINIC | Age: 68
End: 2023-10-30
Payer: COMMERCIAL

## 2023-10-30 DIAGNOSIS — M54.50 ACUTE BILATERAL LOW BACK PAIN, UNSPECIFIED WHETHER SCIATICA PRESENT: Primary | ICD-10-CM

## 2023-10-30 PROCEDURE — 97161 PT EVAL LOW COMPLEX 20 MIN: CPT | Performed by: PHYSICAL MEDICINE & REHABILITATION

## 2023-10-30 PROCEDURE — 97530 THERAPEUTIC ACTIVITIES: CPT | Performed by: PHYSICAL MEDICINE & REHABILITATION

## 2023-10-30 NOTE — PROGRESS NOTES
PT Evaluation     Today's date: 10/30/2023  Patient name: Cecilio Jacobs  : 1955  MRN: [id number]  Referring provider: Lisseth Valentine DO  Dx:   Encounter Diagnosis     ICD-10-CM    1. Acute bilateral low back pain, unspecified whether sciatica present  M54.50 Ambulatory referral to PT spine                     Assessment  Assessment details: Cecilio Jacobs is a pleasant 77 y.o. male who presents to PT following acute episode of  R LBP with R LE radiating pain/sx. Overall he notes his sx have greatly improved and he feels he is back to his baseline. Minimal c/o R lower leg soreness today at IE. Pt notes he feels his ROM and strength are at his "normal" LOF. NO red flags noted at IE. Pt was educated in red flags and signs of progressively worsening sx/function with need for urgent referral; understanding noted/reported. At this time, the pt does not wish to pursue PT tx as he feels he is mostly if not totally back to PLOF; notes he would like consult only today and will return to PT prn. Advice was given for pt to remain active as tolerated but to continue to monitor sx. Advised pt to avoid prolonged periods of sitting or inactivity. Red flags reviewed as noted. Understanding noted/reported. No questions/concerns after session. Will d/c PT at this time; pt to return to PT or contact PT prn upcoming If pain/sx return. Thank you for your referral.     No further referral appears necessary at this time based upon examination results.         Impairments: abnormal coordination, abnormal muscle firing, abnormal or restricted ROM, abnormal movement, activity intolerance and impaired physical strength    Symptom irritability: lowUnderstanding of Dx/Px/POC: fair   Prognosis: good    Plan  Plan details: Pt defers PT  Consult only  D/c PT   Referral necessary: No  Treatment plan discussed with: patient    Subjective Evaluation    History of Present Illness  Mechanism of injury: Hx of waxing/waning LBP per pt for year; not as bad as recent episode however. Notes about 3-4 weeks ago, developed onset of R lower leg pain that was shooting up into R thigh. Sharp/shooting pain as well as numbness up into R LE. Traveled up into L/S as well. Noted the pain made him feel like he was going to fall at times with walking/WB on RLE. No progressive LE weakness, numbness, no BBI, and no saddle sx. Due to cont'd pain, f/u with his PCP. Given Oxycodone and referred to [facility]. Xrays were done and reviewed. No other tx to date. RTD end of the month. Notes overall his pain/sx are " a lot better". Minimal pain in RLE leg today. Was able to sleep last night without pain. Notes he is back to ADLs without limitations. 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 No new sx/complaints. Denies any present LBP. No n/t in R/L LE (other than chronic neuropathy R/L foot/ankle without change). No weakness/progressive weakness. No BBI. No saddle sx. No recent trauma/injury. At present feels like he is back to his baseline. Would like PT consult only at this time (defers treatment); will consult with PT prn if pain/sx return. Quality of life: good    Patient Goals  Patient goals for therapy: decreased pain (Feels he is doing well with this goal at this time)    Pain  Current pain ratin  At best pain ratin  At worst pain ratin  Location: R lateral leg to knee now; was going up into R thigh into L/S  Quality: sharp, radiating and knife-like (tingling, gnawing)  Relieving factors: change in position and rest (Tylenol Arthritis)  Exacerbated by: getting up and down.   Progression: improved    Social Support  Lives with: adult children    Employment status: working (Funnel cake stand, carnival business)  Hand dominance: right    Treatments  Previous treatment: medication  Current treatment: medication and physical therapy    Objective     Concurrent Complaints  Negative for night pain, disturbed sleep, bladder dysfunction, bowel dysfunction, saddle (S4) numbness and history of trauma    Postural Observations  Seated posture: fair  Standing posture: fair  Correction of posture: has no consistent effect    Additional Postural Observation Details  Seated: Forward head/rounded shoulders  Increased thoracic kyphosis   B scapular depression and protraction with mild winging   PPT    Standing: Forward flex, rounded shoulders forward head  Increased AMIE  Slight lateral flex to R with forward flexion     Neurological Testing     Sensation     Lumbar   Left   Intact: light touch    Right   Intact: light touch    Reflexes   Left   Patellar (L4): normal (2+)  Achilles (S1): trace (1+)  Clonus sign: negative    Right   Patellar (L4): normal (2+)  Achilles (S1): trace (1+)  Clonus sign: negative    Additional Neurological Details  Chronic neuropathy R/L foot ; no change per pt      Active Range of Motion     Lumbar   Flexion: Active lumbar flexion: HS tightness B at end range. Restriction level: moderate  Extension: Active lumbar extension: min discomfort central /B L/S at end range locally. Restriction level: minimal  Left lateral flexion:  Restriction level: moderate  Right lateral flexion: Active right lumbar lateral flexion: min pain R L/S locally at end range. with pain Restriction level: moderate  Left rotation: Active left lumbar rotation: Min pain R L/S locally at end range.   with pain Restriction level: moderate  Right rotation:  Restriction level: moderate    Additional Active Range of Motion Details  No change in R LE pain/sx with or following L/S AROM in standing   Limited SB and rotation AROM noted B; chronic per pt  Limited forward flex 2* HS tightness B     Strength/Myotome Testing     Left Hip   Planes of Motion   Flexion: 4  Abduction: 4+  Adduction: 4+    Right Hip   Planes of Motion   Flexion: 4  Abduction: 4+  Adduction: 4+    Left Knee   Flexion: 4+  Extension: 4+    Right Knee   Flexion: 4+  Extension: 4 (pain R lateral knee-min)    Left Ankle/Foot   Dorsiflexion: 4+  Plantar flexion: 4+    Right Ankle/Foot   Dorsiflexion: 4+  Plantar flexion: 4+    Tests     Lumbar     Left   Negative slump test.     Right   Negative slump test.            Precautions: DM , neuropathy, cellulitis, chronic pain, hx CVA, HTN , lymphedema       Re-eval Date: NA    Date 10/30/2023        Visit Count 1       FOTO 10/30/2023        Pain In See IE       Pain Out See IE           Manuals 10/30/2023                                        Neuro Re-Ed                                                                Ther Ex                                                                        Ther Activity 10' total pt education regarding pathophysiology/pathoanatomy of present pain/sx and condition and , pt education regarding activity modification to avoid exacerbation of sx and delayed recovery; advice for pt to remain active at flori, postural awareness with sitting/work activity, avoidance of prolonged periods of inactivity or sitting                          Gait Training                        Modalities

## 2023-11-07 DIAGNOSIS — I48.20 CHRONIC ATRIAL FIBRILLATION (HCC): ICD-10-CM

## 2023-11-07 RX ORDER — METOPROLOL SUCCINATE 25 MG/1
25 TABLET, EXTENDED RELEASE ORAL DAILY
Qty: 90 TABLET | Refills: 0 | Status: SHIPPED | OUTPATIENT
Start: 2023-11-07

## 2023-11-30 ENCOUNTER — RA CDI HCC (OUTPATIENT)
Dept: OTHER | Facility: HOSPITAL | Age: 68
End: 2023-11-30

## 2023-11-30 NOTE — PROGRESS NOTES
[address] [facility] coding opportunities       Chart reviewed, no opportunity found: [address]        Patients Insurance     Medicare Insurance: [employer] Advantage

## 2023-12-04 DIAGNOSIS — E11.42 TYPE 2 DIABETES MELLITUS WITH DIABETIC POLYNEUROPATHY, WITHOUT LONG-TERM CURRENT USE OF INSULIN (HCC): ICD-10-CM

## 2023-12-05 ENCOUNTER — APPOINTMENT (OUTPATIENT)
Dept: LAB | Facility: CLINIC | Age: 68
End: 2023-12-05
Payer: COMMERCIAL

## 2023-12-05 DIAGNOSIS — E11.42 TYPE 2 DIABETES MELLITUS WITH DIABETIC POLYNEUROPATHY, WITHOUT LONG-TERM CURRENT USE OF INSULIN (HCC): ICD-10-CM

## 2023-12-05 LAB
ALBUMIN SERPL BCP-MCNC: 4 G/DL (ref 3.5–5)
ALP SERPL-CCNC: 76 U/L (ref 34–104)
ALT SERPL W P-5'-P-CCNC: 18 U/L (ref 7–52)
ANION GAP SERPL CALCULATED.3IONS-SCNC: 4 MMOL/L
AST SERPL W P-5'-P-CCNC: 15 U/L (ref 13–39)
BILIRUB SERPL-MCNC: 0.67 MG/DL (ref 0.2–1)
BUN SERPL-MCNC: 23 MG/DL (ref 5–25)
CALCIUM SERPL-MCNC: 9 MG/DL (ref 8.4–10.2)
CHLORIDE SERPL-SCNC: 103 MMOL/L (ref 96–108)
CO2 SERPL-SCNC: 27 MMOL/L (ref 21–32)
CREAT SERPL-MCNC: 0.88 MG/DL (ref 0.6–1.3)
EST. AVERAGE GLUCOSE BLD GHB EST-MCNC: 166 MG/DL
GFR SERPL CREATININE-BSD FRML MDRD: 87 ML/MIN/1.73SQ M
GLUCOSE P FAST SERPL-MCNC: 155 MG/DL (ref 65–99)
HBA1C MFR BLD: 7.4 %
POTASSIUM SERPL-SCNC: 4.7 MMOL/L (ref 3.5–5.3)
PROT SERPL-MCNC: 8.2 G/DL (ref 6.4–8.4)
SODIUM SERPL-SCNC: 134 MMOL/L (ref 135–147)

## 2023-12-05 PROCEDURE — 36415 COLL VENOUS BLD VENIPUNCTURE: CPT

## 2023-12-05 PROCEDURE — 80053 COMPREHEN METABOLIC PANEL: CPT

## 2023-12-05 PROCEDURE — 83036 HEMOGLOBIN GLYCOSYLATED A1C: CPT

## 2023-12-08 ENCOUNTER — OFFICE VISIT (OUTPATIENT)
Dept: FAMILY MEDICINE CLINIC | Facility: CLINIC | Age: 68
End: 2023-12-08
Payer: COMMERCIAL

## 2023-12-08 VITALS
HEIGHT: 74 IN | WEIGHT: 315 LBS | HEART RATE: 82 BPM | DIASTOLIC BLOOD PRESSURE: 80 MMHG | TEMPERATURE: 97.4 F | BODY MASS INDEX: 40.43 KG/M2 | SYSTOLIC BLOOD PRESSURE: 134 MMHG | OXYGEN SATURATION: 99 %

## 2023-12-08 DIAGNOSIS — J01.90 ACUTE NON-RECURRENT SINUSITIS, UNSPECIFIED LOCATION: ICD-10-CM

## 2023-12-08 DIAGNOSIS — I48.21 PERMANENT ATRIAL FIBRILLATION (HCC): ICD-10-CM

## 2023-12-08 DIAGNOSIS — E78.5 DYSLIPIDEMIA: ICD-10-CM

## 2023-12-08 DIAGNOSIS — Z23 ENCOUNTER FOR IMMUNIZATION: ICD-10-CM

## 2023-12-08 DIAGNOSIS — Z12.5 PROSTATE CANCER SCREENING: ICD-10-CM

## 2023-12-08 DIAGNOSIS — I10 ESSENTIAL HYPERTENSION: Chronic | ICD-10-CM

## 2023-12-08 DIAGNOSIS — E11.42 TYPE 2 DIABETES MELLITUS WITH DIABETIC POLYNEUROPATHY, WITHOUT LONG-TERM CURRENT USE OF INSULIN (HCC): Primary | ICD-10-CM

## 2023-12-08 DIAGNOSIS — Z00.00 MEDICARE ANNUAL WELLNESS VISIT, SUBSEQUENT: ICD-10-CM

## 2023-12-08 PROCEDURE — G0439 PPPS, SUBSEQ VISIT: HCPCS | Performed by: FAMILY MEDICINE

## 2023-12-08 PROCEDURE — 90662 IIV NO PRSV INCREASED AG IM: CPT

## 2023-12-08 PROCEDURE — G0008 ADMIN INFLUENZA VIRUS VAC: HCPCS

## 2023-12-08 PROCEDURE — 99214 OFFICE O/P EST MOD 30 MIN: CPT | Performed by: FAMILY MEDICINE

## 2023-12-08 RX ORDER — CEFPODOXIME PROXETIL 200 MG/1
200 TABLET, FILM COATED ORAL 2 TIMES DAILY
Qty: 20 TABLET | Refills: 0 | Status: SHIPPED | OUTPATIENT
Start: 2023-12-08 | End: 2023-12-18

## 2023-12-08 NOTE — PROGRESS NOTES
Assessment and Plan:     Problem List Items Addressed This Visit          Endocrine    Type 2 diabetes mellitus with diabetic polyneuropathy, without long-term current use of insulin ([address]) - Primary       Lab Results   Component Value Date    HGBA1C 7.4 (H) 12/05/2023   Patient has type 2 diabetes mellitus. His fasting blood glucose was 155. Hemoglobin A1c is 7.4%. I note that his weight increased by 5 pounds since his last visit. I discussed this with the patient. The patient really needs to do a better job with diet and he needs to lose weight. For now, I did not make any change with his medication. He will continue metformin 1000 mg twice a day. Continue glipizide 5 mg daily. If hemoglobin A1c remains above goal, I may need to consider increasing the dose of glipizide or perhaps adding another agent to his regiment. If his insurance would cover it, I think he would do well with a GLP-1 agonist.  This would also help him to lose weight. Today, we discussed routine diabetic footcare. I also urged the patient to make an appointment to see his ophthalmologist for dilated eye exam.  I gave him a form to give to his ophthalmologist when he sees them so that I can get a copy of his report         Relevant Orders    Hemoglobin A1C    Comprehensive metabolic panel       Respiratory    Acute non-recurrent sinusitis     Patient has an acute sinusitis. He was started on Vantin 200 mg. He will take 1 tablet twice a day for 10 days. He has been instructed to increase fluid intake and rest.  Return if no improvement or worsens         Relevant Medications    cefpodoxime (VANTIN) 200 mg tablet       Cardiovascular and Mediastinum    Essential hypertension (Chronic)     Patient has hypertension. I rechecked his blood pressure myself and found his blood pressure to be 134/80. The patient will continue his current regiment.   He will continue metoprolol succinate ER 25 mg daily, which he is also taking for his atrial fibrillation. Continue losartan 100 mg daily and clonidine 0.2 mg twice daily. Permanent atrial fibrillation [facility])     Patient has atrial fibrillation. Rate is currently controlled. He will continue metoprolol succinate ER 25 mg daily and Eliquis 5 mg twice daily            Other    Prostate cancer screening    Relevant Orders    PSA, Total Screen    Encounter for immunization    Relevant Orders    influenza vaccine, high-dose, PF 0.7 mL (FLUZONE HIGH-DOSE) (Completed)    Dyslipidemia     Fasting lipid panel was ordered for his next office visit. His goal cholesterol is less than 70. The patient will continue intensive lipid-lowering therapy with atorvastatin 80 mg daily         Relevant Orders    Lipid Panel with Direct LDL reflex    Medicare annual wellness visit, subsequent       Depression Screening and Follow-up Plan: Patient was screened for depression during today's encounter. They screened negative with a PHQ-2 score of 0. Preventive health issues were discussed with patient, and age appropriate screening tests were ordered as noted in patient's After Visit Summary. Personalized health advice and appropriate referrals for health education or preventive services given if needed, as noted in patient's After Visit Summary. History of Present Illness:     Patient presents for a Medicare Wellness Visit    Is a 68-year-old white male who presents to the office today for his annual Medicare wellness exam as well as for routine checkup. Patient's main complaint today is that he has a cold that he cannot get rid of. He tells me it began in September. He tells me over the past few weeks it has been bad. He says he tested negative for COVID-19 3 times although I am unsure exactly when he tested. I believe he tested himself at various times since September. He complains of coughing and chest congestion. He denies any shortness of breath. He denies wheezing. He denies orthopnea.   He did have blood work done in anticipation of his visit today. Patient Care Team:  Real Nguyen DO as PCP - General (Family Medicine)  Caron Romero MD (Pulmonary Disease)     Review of Systems:     Review of Systems   Constitutional:  Negative for chills, fatigue and fever. HENT:  Positive for congestion, sinus pressure and sinus pain. Respiratory:  Positive for cough. Negative for shortness of breath and wheezing. Cardiovascular:  Positive for leg swelling. Negative for chest pain and palpitations. Gastrointestinal:  Negative for abdominal distention, abdominal pain, blood in stool, constipation, diarrhea and nausea.         Problem List:     Patient Active Problem List   Diagnosis    Vomiting    Chronic pain    Dizzy    Bilateral edema of lower extremity    Cellulitis of right lower extremity    Tinnitus of right ear    Permanent atrial fibrillation (HCC)    Peripheral neuropathy    DIAMOND (obstructive sleep apnea)    Essential hypertension    Sepsis due to cellulitis ([address])    Mixed hyperlipidemia    Gout, unspecified    H/O: CVA (cerebrovascular accident)    Type 2 diabetes mellitus with diabetic polyneuropathy, without long-term current use of insulin ([address])    Encounter for Medicare annual wellness exam    Prostate cancer screening    Colon cancer screening    Actinic keratoses    Coronary artery disease involving native coronary artery of native heart with angina pectoris (HCC)    Epistaxis    Lymphedema    Other fatigue    Encounter for immunization    Dyslipidemia    Other male erectile dysfunction    Chronic cough    Morbid obesity with BMI of 45.0-49.9, adult ([address])    Viral infection, unspecified    Chronic kidney disease, stage II (mild)    Lumbar radiculopathy    Primary osteoarthritis of right knee    Medicare annual wellness visit, subsequent    Acute non-recurrent sinusitis      Past Medical and Surgical History:     Past Medical History:   Diagnosis Date    Bilateral cellulitis of lower leg     Bilateral edema of lower extremity     Chronic pain     Diabetes mellitus ([address])     ED (erectile dysfunction)     Gout     last assessed: 4/10/2019    Hemiplegia and hemiparesis following cerebral infarction affecting right dominant side ([address])     last assessed: 1/10/2019    Hypertension     Lymphedema     DIAMOND (obstructive sleep apnea)     does not treat this    Stroke [facility])     Type 2 diabetes mellitus with foot ulcer, without long-term current use of insulin ([address]) 11/30/2018    Venous insufficiency (chronic) (peripheral)     last assessed: 7/5/2018     Past Surgical History:   Procedure Laterality Date    COLONOSCOPY      HERNIA REPAIR        Family History:     Family History   Problem Relation Age of Onset    Heart failure Mother     Heart failure Father       Social History:     Social History     Socioeconomic History    Marital status:      Spouse name: None    Number of children: None    Years of education: None    Highest education level: None   Occupational History    None   Tobacco Use    Smoking status: Never    Smokeless tobacco: Never   Vaping Use    Vaping status: Never Used   Substance and Sexual Activity    Alcohol use: Yes     Alcohol/week: 4.0 standard drinks of alcohol     Types: 4 Standard drinks or equivalent per week     Comment: occ    Drug use: No    Sexual activity: Not Currently   Other Topics Concern    None   Social History Narrative    Caffeine use- iced tea on occasion     Social Determinants of Health     Financial Resource Strain: Low Risk  (12/8/2023)    Overall Financial Resource Strain (CARDIA)     Difficulty of Paying Living Expenses: Not very hard   Food Insecurity: Not on file   Transportation Needs: No Transportation Needs (12/8/2023)    PRAPARE - Transportation     Lack of Transportation (Medical): No     Lack of Transportation (Non-Medical):  No   Physical Activity: Not on file   Stress: Not on file   Social Connections: Not on file Intimate Partner Violence: Not on file   Housing Stability: Not on file      Medications and Allergies:     Current Outpatient Medications   Medication Sig Dispense Refill    acetaminophen (TYLENOL) 500 mg tablet Take 500 mg by mouth every 6 (six) hours as needed for mild pain      allopurinol (ZYLOPRIM) 300 mg tablet Take 1 tablet by mouth once daily 90 tablet 0    ammonium lactate (LAC-HYDRIN) 12 % lotion Apply topically 2 (two) times a day as needed for dry skin 400 g 11    atorvastatin (LIPITOR) 80 mg tablet Take 1 tablet (80 mg total) by mouth daily 90 tablet 3    azelastine (ASTELIN) 0.1 % nasal spray 1 spray into each nostril 2 (two) times a day Use in each nostril as directed 20 mL 5    cefpodoxime (VANTIN) 200 mg tablet Take 1 tablet (200 mg total) by mouth 2 (two) times a day for 10 days 20 tablet 0    cloNIDine (CATAPRES) 0.2 mg tablet Take 1 tablet (0.2 mg total) by mouth 2 (two) times a day 180 tablet 3    Eliquis 5 MG Take 1 tablet by mouth twice daily 180 tablet 0    furosemide (LASIX) 80 mg tablet take 1 tablet by mouth once daily 90 tablet 3    glipiZIDE (GLUCOTROL) 5 mg tablet Take 1 tablet by mouth once daily 90 tablet 0    losartan (COZAAR) 100 MG tablet Take 1 tablet (100 mg total) by mouth daily 90 tablet 3    metFORMIN (GLUCOPHAGE) 1000 MG tablet TAKE 1 TABLET BY MOUTH TWICE DAILY WITH MEALS 180 tablet 0    metoprolol succinate (TOPROL-XL) 25 mg 24 hr tablet Take 1 tablet by mouth once daily 90 tablet 0    tadalafil (CIALIS) 20 MG tablet Take 1 tablet (20 mg total) by mouth daily as needed for erectile dysfunction 5 tablet 2     No current facility-administered medications for this visit.      No Known Allergies   Immunizations:     Immunization History   Administered Date(s) Administered    COVID-19 MODERNA VACC 0.5 ML IM 03/19/2021, 04/16/2021, 11/26/2021    Influenza, high dose seasonal 0.7 mL 09/30/2019, 09/16/2020, 11/02/2021, 12/08/2023    Pneumococcal Conjugate 13-Valent 09/30/2019 Pneumococcal Polysaccharide PPV23 11/02/2021      Health Maintenance:         Topic Date Due    Colorectal Cancer Screening  04/07/2031    Hepatitis C Screening  Completed         Topic Date Due    COVID-19 Vaccine (4 - 2023-24 season) 09/01/2023      Medicare Screening Tests and Risk Assessments:     Ronda Fischer is here for his Subsequent Wellness visit. Last Medicare Wellness visit information reviewed, patient interviewed and updates made to the record today. Health Risk Assessment:   Patient rates overall health as good. Patient feels that their physical health rating is same. Patient is satisfied with their life. Eyesight was rated as same. Hearing was rated as same. Patient feels that their emotional and mental health rating is same. Patients states they are never, rarely angry. Patient states they are never, rarely unusually tired/fatigued. Pain experienced in the last 7 days has been none. Patient states that he has experienced no weight loss or gain in last 6 months. Depression Screening:   PHQ-2 Score: 0      Fall Risk Screening: In the past year, patient has experienced: no history of falling in past year      Home Safety:  Patient does not have trouble with stairs inside or outside of their home. Patient has working smoke alarms and has working carbon monoxide detector. Home safety hazards include: none. Nutrition:   Current diet is Regular. Advised to follow a diabetic diet    Medications:   Patient is not currently taking any over-the-counter supplements. Patient is able to manage medications. Activities of Daily Living (ADLs)/Instrumental Activities of Daily Living (IADLs):   Walk and transfer into and out of bed and chair?: Yes  Dress and groom yourself?: Yes    Bathe or shower yourself?: Yes    Feed yourself?  Yes  Do your laundry/housekeeping?: Yes  Manage your money, pay your bills and track your expenses?: Yes  Make your own meals?: Yes    Do your own shopping?: Yes    Previous Hospitalizations:   Any hospitalizations or ED visits within the last 12 months?: No      Advance Care Planning:   Living will: No    Durable POA for healthcare: No      Cognitive Screening:   Provider or family/friend/caregiver concerned regarding cognition?: No    PREVENTIVE SCREENINGS      Cardiovascular Screening:    General: Screening Not Indicated and History Lipid Disorder      Diabetes Screening:     General: Screening Not Indicated and History Diabetes      Colorectal Cancer Screening:     General: Screening Current      Prostate Cancer Screening:    General: Risks and Benefits Discussed    Due for: PSA      Osteoporosis Screening:    General: Screening Not Indicated      Abdominal Aortic Aneurysm (AAA) Screening:    Risk factors include: age between 70-77 yo        General: Screening Not Indicated      Lung Cancer Screening:     General: Screening Not Indicated      Hepatitis C Screening:    General: Screening Current    Screening, Brief Intervention, and Referral to Treatment (SBIRT)    Screening  Typical number of drinks in a day: 0  Typical number of drinks in a week: 0  Interpretation: Low risk drinking behavior. AUDIT-C Screenin) How often did you have a drink containing alcohol in the past year? monthly or less  2) How many drinks did you have on a typical day when you were drinking in the past year?  1 to 2  3) How often did you have 6 or more drinks on one occasion in the past year? never    AUDIT-C Score: 1  Interpretation: Score 0-3 (male): Negative screen for alcohol misuse    Single Item Drug Screening:  How often have you used an illegal drug (including marijuana) or a prescription medication for non-medical reasons in the past year? never    Single Item Drug Screen Score: 0  Interpretation: Negative screen for possible drug use disorder    Review of Current Opioid Use  Opioid Risk Tool (ORT) Score: 0  Opioid Risk Tool (ORT) Interpretation: Score 0-3: Low risk for opioid misuse    No results found. Physical Exam:     /80   Pulse 82   Temp (!) 97.4 °F (36.3 °C) (Tympanic)   Ht 6' 2" (1.88 m)   Wt (!) 158 kg (347 lb 6.4 oz)   SpO2 99%   BMI 44.60 kg/m²     Physical Exam  Vitals reviewed. Constitutional:       Comments: This is a obese 64-year-old white male who appears his stated age. He is nonseptic in appearance and in no apparent distress   HENT:      Head: Normocephalic and atraumatic. Comments: There is tenderness noted to palpation over the paranasal sinuses     Right Ear: Tympanic membrane, ear canal and external ear normal. There is no impacted cerumen. Left Ear: Tympanic membrane, ear canal and external ear normal. There is no impacted cerumen. Nose: Congestion present. No rhinorrhea. Mouth/Throat:      Mouth: Mucous membranes are moist.      Pharynx: Oropharynx is clear. No oropharyngeal exudate or posterior oropharyngeal erythema. Eyes:      General: No scleral icterus. Right eye: No discharge. Left eye: No discharge. Conjunctiva/sclera: Conjunctivae normal.   Cardiovascular:      Rate and Rhythm: Normal rate. Rhythm irregular. Pulses: Pulses are weak. Dorsalis pedis pulses are 0 on the right side and 0 on the left side. Posterior tibial pulses are 0 on the right side and 0 on the left side. Heart sounds: Normal heart sounds. No murmur heard. No friction rub. No gallop. Comments: Heart was irregularly irregular with a well-controlled ventricular response  Pulmonary:      Effort: Pulmonary effort is normal. No respiratory distress. Breath sounds: Normal breath sounds. No stridor. No wheezing, rhonchi or rales. Musculoskeletal:      Cervical back: Neck supple. Right lower leg: Edema present. Left lower leg: Edema present. Comments:  There is +2/4 lower extremity edema noted bilaterally   Feet:      Right foot:      Skin integrity: No ulcer, skin breakdown, erythema, warmth, callus or dry skin. Left foot:      Skin integrity: No ulcer, skin breakdown, erythema, warmth, callus or dry skin. Lymphadenopathy:      Cervical: No cervical adenopathy. Skin:     Comments: Chronic venous stasis changes were present with no venous stasis ulcers   Psychiatric:         Mood and Affect: Mood normal.         Behavior: Behavior normal.         Thought Content: Thought content normal.         Judgment: Judgment normal.        Patient's shoes and socks removed. Right Foot/Ankle   Right Foot Inspection  Skin Exam: skin normal. Skin not intact, no dry skin, no warmth, no callus, no erythema, no maceration, no abnormal color, no pre-ulcer, no ulcer and no callus. Toe Exam: No swelling, no tenderness, erythema and  no right toe deformity    Sensory   Vibration: absent  Proprioception: intact  Monofilament testing: diminished    Vascular  Capillary refills: elevated  The right DP pulse is 0. The right PT pulse is 0. Left Foot/Ankle  Left Foot Inspection  Skin Exam: skin normal and skin intact. No dry skin, no warmth, no erythema, no maceration, normal color, no pre-ulcer, no ulcer and no callus. Toe Exam: No swelling, no tenderness, no erythema and no left toe deformity. Sensory   Vibration: absent  Proprioception: intact  Monofilament testing: diminished    Vascular  Capillary refills: elevated  The left DP pulse is 0. The left PT pulse is 0.      Assign Risk Category  No deformity present  Loss of protective sensation  Weak pulses  Risk: 2      Brenda Maxwellmer, DO

## 2023-12-08 NOTE — PATIENT INSTRUCTIONS
Medicare Preventive Visit Patient Instructions  Thank you for completing your Welcome to Medicare Visit or Medicare Annual Wellness Visit today. Your next wellness visit will be due in one year (12/8/2024). The screening/preventive services that you may require over the next 5-10 years are detailed below. Some tests may not apply to you based off risk factors and/or age. Screening tests ordered at today's visit but not completed yet may show as past due. Also, please note that scanned in results may not display below. Preventive Screenings:  Service Recommendations Previous Testing/Comments   Colorectal Cancer Screening  Colonoscopy    Fecal Occult Blood Test (FOBT)/Fecal Immunochemical Test (FIT)  Fecal DNA/Cologuard Test  Flexible Sigmoidoscopy Age: 43-73 years old   Colonoscopy: every 10 years (May be performed more frequently if at higher risk)  OR  FOBT/FIT: every 1 year  OR  Cologuard: every 3 years  OR  Sigmoidoscopy: every 5 years  Screening may be recommended earlier than age 39 if at higher risk for colorectal cancer. Also, an individualized decision between you and your healthcare provider will decide whether screening between the ages of 77-80 would be appropriate.  Colonoscopy: 04/07/2021  FOBT/FIT: Not on file  Cologuard: Not on file  Sigmoidoscopy: Not on file    Screening Current     Prostate Cancer Screening Individualized decision between patient and health care provider in men between ages of 53-66   Medicare will cover every 12 months beginning on the day after your 50th birthday PSA: 1.5 ng/mL           Hepatitis C Screening Once for adults born between 1945 and 1965  More frequently in patients at high risk for Hepatitis C Hep C Antibody: 10/01/2019    Screening Current   Diabetes Screening 1-2 times per year if you're at risk for diabetes or have pre-diabetes Fasting glucose: 155 mg/dL (12/5/2023)  A1C: 7.4 % (12/5/2023)  Screening Not Indicated  History Diabetes   Cholesterol Screening Once every 5 years if you don't have a lipid disorder. May order more often based on risk factors. Lipid panel: 11/17/2022  Screening Not Indicated  History Lipid Disorder      Other Preventive Screenings Covered by Medicare:  Abdominal Aortic Aneurysm (AAA) Screening: covered once if your at risk. You're considered to be at risk if you have a family history of AAA or a male between the age of 70-76 who smoking at least 100 cigarettes in your lifetime. Lung Cancer Screening: covers low dose CT scan once per year if you meet all of the following conditions: (1) Age 48-67; (2) No signs or symptoms of lung cancer; (3) Current smoker or have quit smoking within the last 15 years; (4) You have a tobacco smoking history of at least 20 pack years (packs per day x number of years you smoked); (5) You get a written order from a healthcare provider. Glaucoma Screening: covered annually if you're considered high risk: (1) You have diabetes OR (2) Family history of glaucoma OR (3)  aged 48 and older OR (3)  American aged 72 and older  Osteoporosis Screening: covered every 2 years if you meet one of the following conditions: (1) Have a vertebral abnormality; (2) On glucocorticoid therapy for more than 3 months; (3) Have primary hyperparathyroidism; (4) On osteoporosis medications and need to assess response to drug therapy. HIV Screening: covered annually if you're between the age of 14-79. Also covered annually if you are younger than 13 and older than 72 with risk factors for HIV infection. For pregnant patients, it is covered up to 3 times per pregnancy.     Immunizations:  Immunization Recommendations   Influenza Vaccine Annual influenza vaccination during flu season is recommended for all persons aged >= 6 months who do not have contraindications   Pneumococcal Vaccine   * Pneumococcal conjugate vaccine = PCV13 (Prevnar 13), PCV15 (Vaxneuvance), PCV20 (Prevnar 20)  * Pneumococcal polysaccharide vaccine = PPSV23 (Pneumovax) Adults 21-49 yo with certain risk factors or if 69+ yo  If never received any pneumonia vaccine: recommend Prevnar 20 (PCV20)  Give PCV20 if previously received 1 dose of PCV13 or PPSV23   Hepatitis B Vaccine 3 dose series if at intermediate or high risk (ex: diabetes, end stage renal disease, liver disease)   Respiratory syncytial virus (RSV) Vaccine - COVERED BY MEDICARE PART D  * RSVPreF3 (Arexvy) CDC recommends that adults 61years of age and older may receive a single dose of RSV vaccine using shared clinical decision-making (SCDM)   Tetanus (Td) Vaccine - COST NOT COVERED BY MEDICARE PART B Following completion of primary series, a booster dose should be given every 10 years to maintain immunity against tetanus. Td may also be given as tetanus wound prophylaxis. Tdap Vaccine - COST NOT COVERED BY MEDICARE PART B Recommended at least once for all adults. For pregnant patients, recommended with each pregnancy. Shingles Vaccine (Shingrix) - COST NOT COVERED BY MEDICARE PART B  2 shot series recommended in those 19 years and older who have or will have weakened immune systems or those 50 years and older     Health Maintenance Due:      Topic Date Due   • Colorectal Cancer Screening  04/07/2031   • Hepatitis C Screening  Completed     Immunizations Due:      Topic Date Due   • COVID-19 Vaccine (4 - Moderna series) 01/21/2022   • Influenza Vaccine (1) 09/01/2023     Advance Directives   What are advance directives? Advance directives are legal documents that state your wishes and plans for medical care. These plans are made ahead of time in case you lose your ability to make decisions for yourself. Advance directives can apply to any medical decision, such as the treatments you want, and if you want to donate organs. What are the types of advance directives? There are many types of advance directives, and each state has rules about how to use them.  You may choose a combination of any of the following:  Living will: This is a written record of the treatment you want. You can also choose which treatments you do not want, which to limit, and which to stop at a certain time. This includes surgery, medicine, IV fluid, and tube feedings. Durable power of  for healthcare [facility]): This is a written record that states who you want to make healthcare choices for you when you are unable to make them for yourself. This person, called a proxy, is usually a family member or a friend. You may choose more than 1 proxy. Do not resuscitate (DNR) order:  A DNR order is used in case your heart stops beating or you stop breathing. It is a request not to have certain forms of treatment, such as CPR. A DNR order may be included in other types of advance directives. Medical directive: This covers the care that you want if you are in a coma, near death, or unable to make decisions for yourself. You can list the treatments you want for each condition. Treatment may include pain medicine, surgery, blood transfusions, dialysis, IV or tube feedings, and a ventilator (breathing machine). Values history: This document has questions about your views, beliefs, and how you feel and think about life. This information can help others choose the care that you would choose. Why are advance directives important? An advance directive helps you control your care. Although spoken wishes may be used, it is better to have your wishes written down. Spoken wishes can be misunderstood, or not followed. Treatments may be given even if you do not want them. An advance directive may make it easier for your family to make difficult choices about your care. Weight Management   Why it is important to manage your weight:  Being overweight increases your risk of health conditions such as heart disease, high blood pressure, type 2 diabetes, and certain types of cancer.  It can also increase your risk for osteoarthritis, sleep apnea, and other respiratory problems. Aim for a slow, steady weight loss. Even a small amount of weight loss can lower your risk of health problems. How to lose weight safely:  A safe and healthy way to lose weight is to eat fewer calories and get regular exercise. You can lose up about 1 pound a week by decreasing the number of calories you eat by 500 calories each day. Healthy meal plan for weight management:  A healthy meal plan includes a variety of foods, contains fewer calories, and helps you stay healthy. A healthy meal plan includes the following:  Eat whole-grain foods more often. A healthy meal plan should contain fiber. Fiber is the part of grains, fruits, and vegetables that is not broken down by your body. Whole-grain foods are healthy and provide extra fiber in your diet. Some examples of whole-grain foods are whole-wheat breads and pastas, oatmeal, brown rice, and bulgur. Eat a variety of vegetables every day. Include dark, leafy greens such as spinach, kale, heena greens, and mustard greens. Eat yellow and orange vegetables such as carrots, sweet potatoes, and winter squash. Eat a variety of fruits every day. Choose fresh or canned fruit (canned in its own juice or light syrup) instead of juice. Fruit juice has very little or no fiber. Eat low-fat dairy foods. Drink fat-free (skim) milk or 1% milk. Eat fat-free yogurt and low-fat cottage cheese. Try low-fat cheeses such as mozzarella and other reduced-fat cheeses. Choose meat and other protein foods that are low in fat. Choose beans or other legumes such as split peas or lentils. Choose fish, skinless poultry (chicken or turkey), or lean cuts of red meat (beef or pork). Before you cook meat or poultry, cut off any visible fat. Use less fat and oil. Try baking foods instead of frying them. Add less fat, such as margarine, sour cream, regular salad dressing and mayonnaise to foods. Eat fewer high-fat foods.  Some examples of high-fat foods include french fries, doughnuts, ice cream, and cakes. Eat fewer sweets. Limit foods and drinks that are high in sugar. This includes candy, cookies, regular soda, and sweetened drinks. Exercise:  Exercise at least 30 minutes per day on most days of the week. Some examples of exercise include walking, biking, dancing, and swimming. You can also fit in more physical activity by taking the stairs instead of the elevator or parking farther away from stores. Ask your healthcare provider about the best exercise plan for you. Narcotic (Opioid) Safety    Use narcotics safely:  Take prescribed narcotics exactly as directed  Do not give narcotics to others or take narcotics that belong to someone else  Do not mix narcotics without medicines or alcohol  Do not drive or operate heavy machinery after you take the narcotic  Monitor for side effects and notify your healthcare provider if you experienced side effects such as nausea, sleepiness, itching, or trouble thinking clearly. Manage constipation:    Constipation is the most common side effect of narcotic medicine. Constipation is when you have hard, dry bowel movements, or you go longer than usual between bowel movements. Tell your healthcare provider about all changes in your bowel movements while you are taking narcotics. He or she may recommend laxative medicine to help you have a bowel movement. He or she may also change the kind of narcotic you are taking, or change when you take it. The following are more ways you can prevent or relieve constipation:    Drink liquids as directed. You may need to drink extra liquids to help soften and move your bowels. Ask how much liquid to drink each day and which liquids are best for you. Eat high-fiber foods. This may help decrease constipation by adding bulk to your bowel movements. High-fiber foods include fruits, vegetables, whole-grain breads and cereals, and beans.  Your healthcare provider or dietitian can help you create a high-fiber meal plan. Your provider may also recommend a fiber supplement if you cannot get enough fiber from food. Exercise regularly. Regular physical activity can help stimulate your intestines. Walking is a good exercise to prevent or relieve constipation. Ask which exercises are best for you. Schedule a time each day to have a bowel movement. This may help train your body to have regular bowel movements. Bend forward while you are on the toilet to help move the bowel movement out. Sit on the toilet for at least 10 minutes, even if you do not have a bowel movement. Store narcotics safely:   Store narcotics where others cannot easily get them. Keep them in a locked cabinet or secure area. Do not  keep them in a purse or other bag you carry with you. A person may be looking for something else and find the narcotics. Make sure narcotics are stored out of the reach of children. A child can easily overdose on narcotics. Narcotics may look like candy to a small child. The best way to dispose of narcotics: The laws vary by country and area. In the [facility], the best way is to return the narcotics through a take-back program. This program is offered by the [employer] The following are options for using the program:  Take the narcotics to a [employer] collection site. The site is often a law enforcement center. Call your local law enforcement center for scheduled take-back days in your area. You will be given information on where to go if the collection site is in a different location. Take the narcotics to an approved pharmacy or hospital.  A pharmacy or hospital may be set up as a collection site. You will need to ask if it is a [facility] collection site if you were not directed there. A pharmacy or doctor's office may not be able to take back narcotics unless it is a [facility] site. Use a mail-back system. This means you are given containers to put the narcotics into.  You will then mail them in the containers. Use a take-back drop box. This is a place to leave the narcotics at any time. People and animals will not be able to get into the box. Your local law enforcement agency can tell you where to find a drop box in your area. Other ways to manage pain:   Ask your healthcare provider about non-narcotic medicines to control pain. Nonprescription medicines include NSAIDs (such as ibuprofen) and acetaminophen. Prescription medicines include muscle relaxers, antidepressants, and steroids. Pain may be managed without any medicines. Some ways to relieve pain include massage, aromatherapy, or meditation. Physical or occupational therapy may also help. For more information:   Drug Enforcement Administration  [address]  Phone: 6- 260 - 247-5786  Web Address: [place].. [place].gov/drug_disposal/    [address] [place] [address]  Phone: 4- 256 - 281-1981  Web Address: http://F[employer]/     © Copyright [employer] 2018 Information is for End User's use only and may not be sold, redistributed or otherwise used for commercial purposes.  All illustrations and images included in CareNotes® are the copyrighted property of A.D.A.M., Inc. or [address] [place] [address]

## 2023-12-13 NOTE — ASSESSMENT & PLAN NOTE
Patient has atrial fibrillation. Rate is currently controlled.   He will continue metoprolol succinate ER 25 mg daily and Eliquis 5 mg twice daily

## 2023-12-13 NOTE — ASSESSMENT & PLAN NOTE
Lab Results   Component Value Date    HGBA1C 7.4 (H) 12/05/2023   Patient has type 2 diabetes mellitus. His fasting blood glucose was 155. Hemoglobin A1c is 7.4%. I note that his weight increased by 5 pounds since his last visit. I discussed this with the patient. The patient really needs to do a better job with diet and he needs to lose weight. For now, I did not make any change with his medication. He will continue metformin 1000 mg twice a day. Continue glipizide 5 mg daily. If hemoglobin A1c remains above goal, I may need to consider increasing the dose of glipizide or perhaps adding another agent to his regiment. If his insurance would cover it, I think he would do well with a GLP-1 agonist.  This would also help him to lose weight. Today, we discussed routine diabetic footcare.   I also urged the patient to make an appointment to see his ophthalmologist for dilated eye exam.  I gave him a form to give to his ophthalmologist when he sees them so that I can get a copy of his report

## 2023-12-13 NOTE — ASSESSMENT & PLAN NOTE
Patient has an acute sinusitis. He was started on Vantin 200 mg. He will take 1 tablet twice a day for 10 days.   He has been instructed to increase fluid intake and rest.  Return if no improvement or worsens

## 2023-12-13 NOTE — ASSESSMENT & PLAN NOTE
Patient has hypertension. I rechecked his blood pressure myself and found his blood pressure to be 134/80. The patient will continue his current regiment. He will continue metoprolol succinate ER 25 mg daily, which he is also taking for his atrial fibrillation. Continue losartan 100 mg daily and clonidine 0.2 mg twice daily.

## 2023-12-13 NOTE — ASSESSMENT & PLAN NOTE
Fasting lipid panel was ordered for his next office visit. His goal cholesterol is less than 70.   The patient will continue intensive lipid-lowering therapy with atorvastatin 80 mg daily

## 2023-12-29 ENCOUNTER — TELEPHONE (OUTPATIENT)
Dept: FAMILY MEDICINE CLINIC | Facility: CLINIC | Age: 68
End: 2023-12-29

## 2023-12-29 DIAGNOSIS — J01.90 ACUTE NON-RECURRENT SINUSITIS, UNSPECIFIED LOCATION: Primary | ICD-10-CM

## 2023-12-29 RX ORDER — CEFUROXIME AXETIL 500 MG/1
500 TABLET ORAL 2 TIMES DAILY
Qty: 20 TABLET | Refills: 0 | Status: SHIPPED | OUTPATIENT
Start: 2023-12-29 | End: 2024-01-08

## 2023-12-29 NOTE — TELEPHONE ENCOUNTER
Patient called asking if you could send in a stronger antibiotic. He does not feel any better from the antibiotic that you gave him. Patient has tested several times for Covid and came back negative every time. Please send to [facility] [place].

## 2023-12-31 DIAGNOSIS — I48.20 CHRONIC ATRIAL FIBRILLATION (HCC): Chronic | ICD-10-CM

## 2024-01-02 RX ORDER — FUROSEMIDE 80 MG
TABLET ORAL
Qty: 90 TABLET | Refills: 3 | Status: SHIPPED | OUTPATIENT
Start: 2024-01-02

## 2024-01-10 ENCOUNTER — VBI (OUTPATIENT)
Dept: ADMINISTRATIVE | Facility: OTHER | Age: 69
End: 2024-01-10

## 2024-01-12 ENCOUNTER — TELEPHONE (OUTPATIENT)
Dept: FAMILY MEDICINE CLINIC | Facility: CLINIC | Age: 69
End: 2024-01-12

## 2024-01-12 NOTE — TELEPHONE ENCOUNTER
Called patient in regards to his DM eye exam. Patient stated he has not had the chance to get it scheduled yet. States he will call next week to get it scheduled.

## 2024-01-17 DIAGNOSIS — E11.621 TYPE 2 DIABETES MELLITUS WITH FOOT ULCER, WITHOUT LONG-TERM CURRENT USE OF INSULIN (HCC): Chronic | ICD-10-CM

## 2024-01-17 DIAGNOSIS — M1A.00X0 IDIOPATHIC CHRONIC GOUT WITHOUT TOPHUS, UNSPECIFIED SITE: ICD-10-CM

## 2024-01-17 DIAGNOSIS — L03.115 CELLULITIS OF RIGHT LOWER EXTREMITY: ICD-10-CM

## 2024-01-17 DIAGNOSIS — L97.509 TYPE 2 DIABETES MELLITUS WITH FOOT ULCER, WITHOUT LONG-TERM CURRENT USE OF INSULIN (HCC): Chronic | ICD-10-CM

## 2024-01-17 RX ORDER — GLIPIZIDE 5 MG/1
TABLET ORAL
Qty: 90 TABLET | Refills: 0 | Status: SHIPPED | OUTPATIENT
Start: 2024-01-17

## 2024-01-17 RX ORDER — ALLOPURINOL 300 MG/1
TABLET ORAL
Qty: 90 TABLET | Refills: 0 | Status: SHIPPED | OUTPATIENT
Start: 2024-01-17

## 2024-01-17 RX ORDER — AMMONIUM LACTATE 12 G/100G
LOTION TOPICAL
Qty: 400 G | Refills: 0 | Status: SHIPPED | OUTPATIENT
Start: 2024-01-17

## 2024-01-23 LAB
LEFT EYE DIABETIC RETINOPATHY: NORMAL
RIGHT EYE DIABETIC RETINOPATHY: NORMAL

## 2024-01-23 PROCEDURE — 2023F DILAT RTA XM W/O RTNOPTHY: CPT | Performed by: FAMILY MEDICINE

## 2024-01-30 ENCOUNTER — TELEPHONE (OUTPATIENT)
Dept: ADMINISTRATIVE | Facility: OTHER | Age: 69
End: 2024-01-30

## 2024-01-30 NOTE — TELEPHONE ENCOUNTER
Upon review of the In Basket request we were able to locate, review, and update the patient chart as requested for Diabetic Eye Exam.    Any additional questions or concerns should be emailed to the Practice Liaisons via the appropriate education email address, please do not reply via In GiveGab.    Thank you  REGINALDO CUNHA

## 2024-01-30 NOTE — TELEPHONE ENCOUNTER
----- Message from Darlene Askew MA sent at 1/30/2024 10:18 AM EST -----  Regarding: CARE GAP REQUEST  01/30/24 10:18 AM    Hello, our patient No patient name on file. has had Diabetic Eye Exam completed/performed. Please assist in updating the patient chart by pulling the document from encounter Tab within Chart Review. The date of service is 1/24/2024.     Thank you,  Darlene Askew PG [facility] PRIMARY [facility]ARE

## 2024-02-02 DIAGNOSIS — L03.115 CELLULITIS OF RIGHT LOWER EXTREMITY: ICD-10-CM

## 2024-02-02 RX ORDER — AMMONIUM LACTATE 12 G/100G
LOTION TOPICAL 2 TIMES DAILY
Qty: 400 G | Refills: 2 | Status: SHIPPED | OUTPATIENT
Start: 2024-02-02

## 2024-02-06 ENCOUNTER — VBI (OUTPATIENT)
Dept: ADMINISTRATIVE | Facility: OTHER | Age: 69
End: 2024-02-06

## 2024-02-06 DIAGNOSIS — R05.3 CHRONIC COUGH: ICD-10-CM

## 2024-02-06 DIAGNOSIS — I48.20 CHRONIC ATRIAL FIBRILLATION (HCC): ICD-10-CM

## 2024-02-06 PROBLEM — J01.90 ACUTE NON-RECURRENT SINUSITIS: Status: RESOLVED | Noted: 2023-12-08 | Resolved: 2024-02-06

## 2024-02-06 PROBLEM — Z00.00 MEDICARE ANNUAL WELLNESS VISIT, SUBSEQUENT: Status: RESOLVED | Noted: 2023-12-08 | Resolved: 2024-02-06

## 2024-02-06 RX ORDER — APIXABAN 5 MG/1
TABLET, FILM COATED ORAL
Qty: 180 TABLET | Refills: 2 | Status: SHIPPED | OUTPATIENT
Start: 2024-02-06

## 2024-02-06 RX ORDER — METOPROLOL SUCCINATE 25 MG/1
25 TABLET, EXTENDED RELEASE ORAL DAILY
Qty: 90 TABLET | Refills: 2 | Status: SHIPPED | OUTPATIENT
Start: 2024-02-06

## 2024-02-06 RX ORDER — AZELASTINE HYDROCHLORIDE 137 UG/1
SPRAY, METERED NASAL
Qty: 30 ML | Refills: 0 | OUTPATIENT
Start: 2024-02-06

## 2024-02-07 ENCOUNTER — OFFICE VISIT (OUTPATIENT)
Dept: PULMONOLOGY | Facility: CLINIC | Age: 69
End: 2024-02-07

## 2024-02-07 VITALS
BODY MASS INDEX: 40.43 KG/M2 | SYSTOLIC BLOOD PRESSURE: 158 MMHG | OXYGEN SATURATION: 99 % | HEIGHT: 74 IN | WEIGHT: 315 LBS | TEMPERATURE: 98.7 F | HEART RATE: 88 BPM | DIASTOLIC BLOOD PRESSURE: 87 MMHG

## 2024-02-07 DIAGNOSIS — R05.3 CHRONIC COUGH: Primary | ICD-10-CM

## 2024-02-07 RX ORDER — AZELASTINE 1 MG/ML
1 SPRAY, METERED NASAL 2 TIMES DAILY
Qty: 20 ML | Refills: 6 | Status: SHIPPED | OUTPATIENT
Start: 2024-02-07

## 2024-02-07 NOTE — ASSESSMENT & PLAN NOTE
-Likely secondary to UACS/PND, and allergic rhinitis  -Symptoms improved since starting Astelin nasal spray  -Will continue patient on Astelin nasal spray 1 spray twice daily, refill sent to pharmacy  -Patient may return to office as needed

## 2024-02-07 NOTE — PROGRESS NOTES
Pulmonary Follow Up Note  Lester Hall 68 y.o. male MRN: [id number]  2/7/2024    Assessment:    Chronic cough  -Likely secondary to UACS/PND, and allergic rhinitis  -Symptoms improved since starting Astelin nasal spray  -Will continue patient on Astelin nasal spray 1 spray twice daily, refill sent to pharmacy  -Patient may return to office as needed      Plan:    Diagnoses and all orders for this visit:    Chronic cough  -     azelastine (ASTELIN) 0.1 % nasal spray; 1 spray into each nostril 2 (two) times a day Use in each nostril as directed      Return if symptoms worsen or fail to improve.      History of Present Illness     Chief Complaint:   Chief Complaint   Patient presents with    Follow-up     Patient states that he has been congested, but his breathing has not been bad       Patient ID: Lester is a 68 y.o. y.o. male has a past medical history of Bilateral cellulitis of lower leg, Bilateral edema of lower extremity, Chronic pain, Diabetes mellitus (HCC), ED (erectile dysfunction), Gout, Hemiplegia and hemiparesis following cerebral infarction affecting right dominant side (HCC), Hypertension, Lymphedema, DIAMOND (obstructive sleep apnea), Stroke (HCC), Type 2 diabetes mellitus with foot ulcer, without long-term current use of insulin (HCC) (11/30/2018), and Venous insufficiency (chronic) (peripheral).      2/7/2024  HPI: Lester Hall is a 68 y.o. male with a past medical history of morbid obesity, DIAMOND, CVA, lymphedema, hypertension, diabetes, CAD, A-fib, and gout.  He is returning today for refills of his Astelin nasal spray. He was previously seen in the office in September 2022 for an initial consultation for his chronic cough.  He has no prior history of lung disorders such as asthma or COPD.  He is a lifelong non-smoker/nonalcoholic.  His cough started approximately 3 years ago now.  At his initial consult visit, suspected his cough was due to upper airway cough syndrome and postnasal drainage as well as  allergic rhinitis and silent reflux.  He was trialed on Astelin nasal spray, Zyrtec as needed, counseled on antireflux measures, and recommended to seek ENT evaluation if not improving.      Patient reports his symptoms did improve with Astelin nasal spray and he did not need to seek ENT evaluation.  He states his cough is better.  Occurs randomly during the day.  Is not worse at night.  Denies any heartburn or reflux symptoms.  He denies shortness of breath, wheezing, chest pain or chest tightness.  He is here today only to refill his Astelin nasal spray. He has no other concerns or complaints today.    Review of Systems   Constitutional:  Negative for activity change, chills, fever and unexpected weight change.   HENT:  Negative for congestion, postnasal drip, rhinorrhea, sore throat and trouble swallowing.    Respiratory:  Negative for cough, chest tightness, shortness of breath and wheezing.    Cardiovascular:  Negative for chest pain, palpitations and leg swelling.   Allergic/Immunologic: Negative.        Historical Information   Past Medical History:   Diagnosis Date    Bilateral cellulitis of lower leg     Bilateral edema of lower extremity     Chronic pain     Diabetes mellitus (HCC)     ED (erectile dysfunction)     Gout     last assessed: 4/10/2019    Hemiplegia and hemiparesis following cerebral infarction affecting right dominant side ([facility])     last assessed: 1/10/2019    Hypertension     Lymphedema     DIAMOND (obstructive sleep apnea)     does not treat this    Stroke ([facility])     Type 2 diabetes mellitus with foot ulcer, without long-term current use of insulin ([facility]) 11/30/2018    Venous insufficiency (chronic) (peripheral)     last assessed: 7/5/2018     Past Surgical History:   Procedure Laterality Date    COLONOSCOPY      HERNIA REPAIR       Family History   Problem Relation Age of Onset    Heart failure Mother     Heart failure Father        Smoking history: He reports that he has never smoked. He has  never used smokeless tobacco.    Occupational History:     Immunization History   Administered Date(s) Administered    COVID-19 MODERNA VACC 0.5 ML IM 03/19/2021, 04/16/2021, 11/26/2021    Influenza, high dose seasonal 0.7 mL 09/30/2019, 09/16/2020, 11/02/2021, 12/08/2023    Pneumococcal Conjugate 13-Valent 09/30/2019    Pneumococcal Polysaccharide PPV23 11/02/2021       Meds/Allergies     Current Outpatient Medications:     acetaminophen (TYLENOL) 500 mg tablet, Take 500 mg by mouth every 6 (six) hours as needed for mild pain, Disp: , Rfl:     allopurinol (ZYLOPRIM) 300 mg tablet, Take 1 tablet by mouth once daily, Disp: 90 tablet, Rfl: 0    ammonium lactate (LAC-HYDRIN) 12 % lotion, Apply topically 2 (two) times a day, Disp: 400 g, Rfl: 2    apixaban (Eliquis) 5 mg, Take 1 tablet by mouth twice daily, Disp: 180 tablet, Rfl: 2    atorvastatin (LIPITOR) 80 mg tablet, Take 1 tablet (80 mg total) by mouth daily, Disp: 90 tablet, Rfl: 3    azelastine (ASTELIN) 0.1 % nasal spray, 1 spray into each nostril 2 (two) times a day Use in each nostril as directed, Disp: 20 mL, Rfl: 6    furosemide (LASIX) 80 mg tablet, take 1 tablet by mouth once daily, Disp: 90 tablet, Rfl: 3    glipiZIDE (GLUCOTROL) 5 mg tablet, Take 1 tablet by mouth once daily, Disp: 90 tablet, Rfl: 0    losartan (COZAAR) 100 MG tablet, Take 1 tablet (100 mg total) by mouth daily, Disp: 90 tablet, Rfl: 3    metFORMIN (GLUCOPHAGE) 1000 MG tablet, TAKE 1 TABLET BY MOUTH TWICE DAILY WITH MEALS, Disp: 180 tablet, Rfl: 0    metoprolol succinate (TOPROL-XL) 25 mg 24 hr tablet, Take 1 tablet by mouth once daily, Disp: 90 tablet, Rfl: 2    tadalafil (CIALIS) 20 MG tablet, Take 1 tablet (20 mg total) by mouth daily as needed for erectile dysfunction, Disp: 5 tablet, Rfl: 2    cloNIDine (CATAPRES) 0.2 mg tablet, Take 1 tablet (0.2 mg total) by mouth 2 (two) times a day (Patient not taking: Reported on 2/7/2024), Disp: 180 tablet, Rfl: 3  Allergies: No Known  "Allergies      Vitals:  Vitals:    02/07/24 0821   BP: 158/87   BP Location: Left arm   Patient Position: Sitting   Cuff Size: Large   Pulse: 88   Temp: 98.7 °F (37.1 °C)   TempSrc: Temporal   SpO2: 99%   Weight: (!) 160 kg (352 lb 12.8 oz)   Height: 6' 2\" (1.88 m)   Oxygen Therapy  SpO2: 99 %  .  Wt Readings from Last 3 Encounters:   02/07/24 (!) 160 kg (352 lb 12.8 oz)   12/08/23 (!) 158 kg (347 lb 6.4 oz)   10/11/23 (!) 155 kg (342 lb 8 oz)     Body mass index is 45.3 kg/m².    Physical Exam  Vitals and nursing note reviewed.   Constitutional:       General: He is not in acute distress.     Appearance: Normal appearance. He is well-developed. He is obese.   Cardiovascular:      Rate and Rhythm: Normal rate and regular rhythm.      Heart sounds: Normal heart sounds. No murmur heard.  Pulmonary:      Effort: Pulmonary effort is normal. No respiratory distress.      Breath sounds: Normal breath sounds. No decreased breath sounds, wheezing, rhonchi or rales.   Musculoskeletal:         General: No swelling.      Right lower leg: No edema.      Left lower leg: No edema.   Psychiatric:         Mood and Affect: Mood and affect normal.         Behavior: Behavior normal. Behavior is cooperative.           Labs: I have personally reviewed pertinent lab results.  Lab Results   Component Value Date    WBC 9.21 01/03/2023    HGB 13.6 01/03/2023    HCT 40.7 01/03/2023    MCV 98 01/03/2023     01/03/2023     Lab Results   Component Value Date    CALCIUM 9.0 12/05/2023    K 4.7 12/05/2023    CO2 27 12/05/2023     12/05/2023    BUN 23 12/05/2023    CREATININE 0.88 12/05/2023     No results found for: \"IGE\"  Lab Results   Component Value Date    ALT 18 12/05/2023    AST 15 12/05/2023    ALKPHOS 76 12/05/2023     "

## 2024-02-08 ENCOUNTER — HOSPITAL ENCOUNTER (EMERGENCY)
Facility: HOSPITAL | Age: 69
Discharge: HOME | End: 2024-02-08
Attending: EMERGENCY MEDICINE
Payer: COMMERCIAL

## 2024-02-08 ENCOUNTER — APPOINTMENT (EMERGENCY)
Dept: RADIOLOGY | Facility: HOSPITAL | Age: 69
End: 2024-02-08
Payer: COMMERCIAL

## 2024-02-08 VITALS
DIASTOLIC BLOOD PRESSURE: 86 MMHG | RESPIRATION RATE: 18 BRPM | OXYGEN SATURATION: 93 % | WEIGHT: 315 LBS | HEART RATE: 82 BPM | TEMPERATURE: 96.8 F | HEIGHT: 74 IN | BODY MASS INDEX: 40.43 KG/M2 | SYSTOLIC BLOOD PRESSURE: 183 MMHG

## 2024-02-08 DIAGNOSIS — J44.1 COPD EXACERBATION (HCC): Primary | ICD-10-CM

## 2024-02-08 LAB
ANION GAP SERPL CALCULATED.3IONS-SCNC: 6 MMOL/L
ATRIAL RATE: 78 BPM
BASOPHILS # BLD AUTO: 0.04 THOUSANDS/ÂΜL (ref 0–0.1)
BASOPHILS NFR BLD AUTO: 1 % (ref 0–1)
BNP SERPL-MCNC: 89 PG/ML (ref 0–100)
BUN SERPL-MCNC: 20 MG/DL (ref 5–25)
CALCIUM SERPL-MCNC: 9.2 MG/DL (ref 8.4–10.2)
CHLORIDE SERPL-SCNC: 98 MMOL/L (ref 96–108)
CO2 SERPL-SCNC: 29 MMOL/L (ref 21–32)
CREAT SERPL-MCNC: 0.91 MG/DL (ref 0.6–1.3)
EOSINOPHIL # BLD AUTO: 0.24 THOUSAND/ÂΜL (ref 0–0.61)
EOSINOPHIL NFR BLD AUTO: 3 % (ref 0–6)
ERYTHROCYTE [DISTWIDTH] IN BLOOD BY AUTOMATED COUNT: 13.4 % (ref 11.6–15.1)
FLUAV RNA RESP QL NAA+PROBE: NEGATIVE
FLUBV RNA RESP QL NAA+PROBE: NEGATIVE
GFR SERPL CREATININE-BSD FRML MDRD: 85 ML/MIN/1.73SQ M
GLUCOSE SERPL-MCNC: 194 MG/DL (ref 65–140)
GLUCOSE SERPL-MCNC: 220 MG/DL (ref 65–140)
HCT VFR BLD AUTO: 43.5 % (ref 36.5–49.3)
HGB BLD-MCNC: 14.5 G/DL (ref 12–17)
IMM GRANULOCYTES # BLD AUTO: 0.05 THOUSAND/UL (ref 0–0.2)
IMM GRANULOCYTES NFR BLD AUTO: 1 % (ref 0–2)
LYMPHOCYTES # BLD AUTO: 2.25 THOUSANDS/ÂΜL (ref 0.6–4.47)
LYMPHOCYTES NFR BLD AUTO: 29 % (ref 14–44)
MAGNESIUM SERPL-MCNC: 1.7 MG/DL (ref 1.9–2.7)
MCH RBC QN AUTO: 34 PG (ref 26.8–34.3)
MCHC RBC AUTO-ENTMCNC: 33.3 G/DL (ref 31.4–37.4)
MCV RBC AUTO: 102 FL (ref 82–98)
MONOCYTES # BLD AUTO: 0.8 THOUSAND/ÂΜL (ref 0.17–1.22)
MONOCYTES NFR BLD AUTO: 10 % (ref 4–12)
NEUTROPHILS # BLD AUTO: 4.52 THOUSANDS/ÂΜL (ref 1.85–7.62)
NEUTS SEG NFR BLD AUTO: 56 % (ref 43–75)
NRBC BLD AUTO-RTO: 0 /100 WBCS
PLATELET # BLD AUTO: 176 THOUSANDS/UL (ref 149–390)
PMV BLD AUTO: 10.1 FL (ref 8.9–12.7)
POTASSIUM SERPL-SCNC: 4.1 MMOL/L (ref 3.5–5.3)
QRS AXIS: -24 DEGREES
QRSD INTERVAL: 98 MS
QT INTERVAL: 426 MS
QTC INTERVAL: 494 MS
RBC # BLD AUTO: 4.27 MILLION/UL (ref 3.88–5.62)
RSV RNA RESP QL NAA+PROBE: NEGATIVE
SARS-COV-2 RNA RESP QL NAA+PROBE: NEGATIVE
SODIUM SERPL-SCNC: 133 MMOL/L (ref 135–147)
T WAVE AXIS: 59 DEGREES
VENTRICULAR RATE: 81 BPM
WBC # BLD AUTO: 7.9 THOUSAND/UL (ref 4.31–10.16)

## 2024-02-08 PROCEDURE — 96375 TX/PRO/DX INJ NEW DRUG ADDON: CPT

## 2024-02-08 PROCEDURE — 94640 AIRWAY INHALATION TREATMENT: CPT

## 2024-02-08 PROCEDURE — 93010 ELECTROCARDIOGRAM REPORT: CPT | Performed by: INTERNAL MEDICINE

## 2024-02-08 PROCEDURE — 85025 COMPLETE CBC W/AUTO DIFF WBC: CPT | Performed by: EMERGENCY MEDICINE

## 2024-02-08 PROCEDURE — 93005 ELECTROCARDIOGRAM TRACING: CPT

## 2024-02-08 PROCEDURE — 0241U HB NFCT DS VIR RESP RNA 4 TRGT: CPT | Performed by: EMERGENCY MEDICINE

## 2024-02-08 PROCEDURE — 96365 THER/PROPH/DIAG IV INF INIT: CPT

## 2024-02-08 PROCEDURE — 82948 REAGENT STRIP/BLOOD GLUCOSE: CPT

## 2024-02-08 PROCEDURE — 83735 ASSAY OF MAGNESIUM: CPT | Performed by: EMERGENCY MEDICINE

## 2024-02-08 PROCEDURE — 71045 X-RAY EXAM CHEST 1 VIEW: CPT

## 2024-02-08 PROCEDURE — 99285 EMERGENCY DEPT VISIT HI MDM: CPT | Performed by: EMERGENCY MEDICINE

## 2024-02-08 PROCEDURE — 80048 BASIC METABOLIC PNL TOTAL CA: CPT | Performed by: EMERGENCY MEDICINE

## 2024-02-08 PROCEDURE — 36415 COLL VENOUS BLD VENIPUNCTURE: CPT | Performed by: EMERGENCY MEDICINE

## 2024-02-08 PROCEDURE — 83880 ASSAY OF NATRIURETIC PEPTIDE: CPT | Performed by: EMERGENCY MEDICINE

## 2024-02-08 PROCEDURE — 99285 EMERGENCY DEPT VISIT HI MDM: CPT

## 2024-02-08 RX ORDER — PREDNISONE 50 MG/1
50 TABLET ORAL DAILY
Qty: 5 TABLET | Refills: 0 | Status: SHIPPED | OUTPATIENT
Start: 2024-02-08 | End: 2024-02-13

## 2024-02-08 RX ORDER — MAGNESIUM SULFATE HEPTAHYDRATE 40 MG/ML
2 INJECTION, SOLUTION INTRAVENOUS ONCE
Status: COMPLETED | OUTPATIENT
Start: 2024-02-08 | End: 2024-02-08

## 2024-02-08 RX ORDER — IPRATROPIUM BROMIDE AND ALBUTEROL SULFATE 2.5; .5 MG/3ML; MG/3ML
3 SOLUTION RESPIRATORY (INHALATION) ONCE
Status: COMPLETED | OUTPATIENT
Start: 2024-02-08 | End: 2024-02-08

## 2024-02-08 RX ORDER — ALBUTEROL SULFATE 90 UG/1
2 AEROSOL, METERED RESPIRATORY (INHALATION) ONCE
Status: COMPLETED | OUTPATIENT
Start: 2024-02-08 | End: 2024-02-08

## 2024-02-08 RX ORDER — METHYLPREDNISOLONE SODIUM SUCCINATE 125 MG/2ML
125 INJECTION, POWDER, LYOPHILIZED, FOR SOLUTION INTRAMUSCULAR; INTRAVENOUS ONCE
Status: COMPLETED | OUTPATIENT
Start: 2024-02-08 | End: 2024-02-08

## 2024-02-08 RX ADMIN — IPRATROPIUM BROMIDE AND ALBUTEROL SULFATE 3 ML: 2.5; .5 SOLUTION RESPIRATORY (INHALATION) at 07:26

## 2024-02-08 RX ADMIN — ALBUTEROL SULFATE 2 PUFF: 90 AEROSOL, METERED RESPIRATORY (INHALATION) at 09:25

## 2024-02-08 RX ADMIN — METHYLPREDNISOLONE SODIUM SUCCINATE 125 MG: 125 INJECTION, POWDER, FOR SOLUTION INTRAMUSCULAR; INTRAVENOUS at 07:27

## 2024-02-08 RX ADMIN — MAGNESIUM SULFATE HEPTAHYDRATE 2 G: 40 INJECTION, SOLUTION INTRAVENOUS at 08:51

## 2024-02-08 NOTE — DISCHARGE INSTRUCTIONS
Return if symptoms worsen or new symptoms develop.  Use the inhaler every 4-6 hours as instructed for wheezing.  If symptoms worsen or if new symptoms develop return to the emergency department.

## 2024-02-08 NOTE — ED PROVIDER NOTES
History  Chief Complaint   Patient presents with    Shortness of Breath     Woke up feeling wheezing and shortness of breath.     68M history allergies, CHF (EF 50% Stress echo 2023), afib on eliquis, presents with cc sob which started today.  Patient says that he had wheezing as well.  Denies fevers or chills.      Shortness of Breath      Prior to Admission Medications   Prescriptions Last Dose Informant Patient Reported? Taking?   acetaminophen (TYLENOL) 500 mg tablet  Self Yes No   Sig: Take 500 mg by mouth every 6 (six) hours as needed for mild pain   allopurinol (ZYLOPRIM) 300 mg tablet   No No   Sig: Take 1 tablet by mouth once daily   ammonium lactate (LAC-HYDRIN) 12 % lotion   No No   Sig: Apply topically 2 (two) times a day   apixaban (Eliquis) 5 mg   No No   Sig: Take 1 tablet by mouth twice daily   atorvastatin (LIPITOR) 80 mg tablet  Self No No   Sig: Take 1 tablet (80 mg total) by mouth daily   azelastine (ASTELIN) 0.1 % nasal spray   No No   Si spray into each nostril 2 (two) times a day Use in each nostril as directed   cloNIDine (CATAPRES) 0.2 mg tablet  Self No No   Sig: Take 1 tablet (0.2 mg total) by mouth 2 (two) times a day   Patient not taking: Reported on 2024   furosemide (LASIX) 80 mg tablet   No No   Sig: take 1 tablet by mouth once daily   glipiZIDE (GLUCOTROL) 5 mg tablet   No No   Sig: Take 1 tablet by mouth once daily   losartan (COZAAR) 100 MG tablet  Self No No   Sig: Take 1 tablet (100 mg total) by mouth daily   metFORMIN (GLUCOPHAGE) 1000 MG tablet  Self No No   Sig: TAKE 1 TABLET BY MOUTH TWICE DAILY WITH MEALS   metoprolol succinate (TOPROL-XL) 25 mg 24 hr tablet   No No   Sig: Take 1 tablet by mouth once daily   tadalafil (CIALIS) 20 MG tablet  Self No No   Sig: Take 1 tablet (20 mg total) by mouth daily as needed for erectile dysfunction      Facility-Administered Medications: None       Past Medical History:   Diagnosis Date    Bilateral cellulitis of lower leg      Bilateral edema of lower extremity     Chronic pain     Diabetes mellitus (HCC)     ED (erectile dysfunction)     Gout     last assessed: 4/10/2019    Hemiplegia and hemiparesis following cerebral infarction affecting right dominant side (HCC)     last assessed: 1/10/2019    Hypertension     Lymphedema     DIAMOND (obstructive sleep apnea)     does not treat this    Stroke (HCC)     Type 2 diabetes mellitus with foot ulcer, without long-term current use of insulin (HCC) 11/30/2018    Venous insufficiency (chronic) (peripheral)     last assessed: 7/5/2018       Past Surgical History:   Procedure Laterality Date    COLONOSCOPY      HERNIA REPAIR         Family History   Problem Relation Age of Onset    Heart failure Mother     Heart failure Father      I have reviewed and agree with the history as documented.    E-Cigarette/Vaping    E-Cigarette Use Never User      E-Cigarette/Vaping Substances    Nicotine No     THC No     CBD No     Flavoring No     Other No     Unknown No      Social History     Tobacco Use    Smoking status: Never    Smokeless tobacco: Never   Vaping Use    Vaping status: Never Used   Substance Use Topics    Alcohol use: Yes     Alcohol/week: 4.0 standard drinks of alcohol     Types: 4 Standard drinks or equivalent per week     Comment: occ    Drug use: No       Review of Systems   Respiratory:  Positive for shortness of breath.        Physical Exam  Physical Exam  Vitals and nursing note reviewed.   Constitutional:       Appearance: Normal appearance. He is well-developed.   HENT:      Head: Normocephalic and atraumatic.   Eyes:      Conjunctiva/sclera: Conjunctivae normal.      Pupils: Pupils are equal, round, and reactive to light.   Neck:      Trachea: No tracheal deviation.   Cardiovascular:      Rate and Rhythm: Normal rate and regular rhythm.      Heart sounds: Normal heart sounds. No murmur heard.  Pulmonary:      Effort: Pulmonary effort is normal. No respiratory distress.      Breath sounds:  Wheezing present. No rales.   Abdominal:      General: Bowel sounds are normal. There is no distension.      Palpations: Abdomen is soft.      Tenderness: There is no abdominal tenderness.   Musculoskeletal:         General: No deformity.      Cervical back: Normal range of motion and neck supple.      Right lower leg: Edema present.      Left lower leg: Edema present.   Skin:     General: Skin is warm and dry.      Capillary Refill: Capillary refill takes less than 2 seconds.   Neurological:      General: No focal deficit present.      Mental Status: He is alert and oriented to person, place, and time.      Sensory: No sensory deficit.   Psychiatric:         Mood and Affect: Mood normal.         Judgment: Judgment normal.         Vital Signs  ED Triage Vitals   Temperature Pulse Respirations Blood Pressure SpO2   02/08/24 0725 02/08/24 0718 02/08/24 0718 02/08/24 0718 02/08/24 0722   (!) 96.8 °F (36 °C) 87 22 (!) 197/109 100 %      Temp Source Heart Rate Source Patient Position - Orthostatic VS BP Location FiO2 (%)   02/08/24 0725 02/08/24 0718 02/08/24 0718 02/08/24 0718 --   Tympanic Monitor Lying Left arm       Pain Score       02/08/24 0718       No Pain           Vitals:    02/08/24 0718 02/08/24 0730   BP: (!) 197/109 (!) 183/86   Pulse: 87 82   Patient Position - Orthostatic VS: Lying          Visual Acuity      ED Medications  Medications   ipratropium-albuterol (DUO-NEB) 0.5-2.5 mg/3 mL inhalation solution 3 mL (3 mL Nebulization Given 2/8/24 0726)   methylPREDNISolone sodium succinate (Solu-MEDROL) injection 125 mg (125 mg Intravenous Given 2/8/24 0727)   magnesium sulfate 2 g/50 mL IVPB (premix) 2 g (0 g Intravenous Stopped 2/8/24 0917)   albuterol (PROVENTIL HFA,VENTOLIN HFA) inhaler 2 puff (2 puffs Inhalation Given 2/8/24 0925)       Diagnostic Studies  Results Reviewed       Procedure Component Value Units Date/Time    B-Type Natriuretic Peptide(BNP) [[id number]]  (Normal) Collected: 02/08/24 0728     Lab Status: Final result Specimen: Blood from Arm, Right Updated: 02/08/24 0821     BNP 89 pg/mL     FLU/RSV/COVID - if FLU/RSV clinically relevant [[id number]]  (Normal) Collected: 02/08/24 0728    Lab Status: Final result Specimen: Nares from Nose Updated: 02/08/24 0815     SARS-CoV-2 Negative     INFLUENZA A PCR Negative     INFLUENZA B PCR Negative     RSV PCR Negative    Narrative:      FOR PEDIATRIC PATIENTS - copy/paste COVID Guidelines URL to browser: [email or web address]    SARS-CoV-2 assay is a Nucleic Acid Amplification assay intended for the  qualitative detection of nucleic acid from SARS-CoV-2 in nasopharyngeal  swabs. Results are for the presumptive identification of SARS-CoV-2 RNA.    Positive results are indicative of infection with SARS-CoV-2, the virus  causing COVID-19, but do not rule out bacterial infection or co-infection  with other viruses. Laboratories within the [place] and its  territories are required to report all positive results to the appropriate  public health authorities. Negative results do not preclude SARS-CoV-2  infection and should not be used as the sole basis for treatment or other  patient management decisions. Negative results must be combined with  clinical observations, patient history, and epidemiological information.  This test has not been FDA cleared or approved.    This test has been authorized by FDA under an Emergency Use Authorization  (EUA). This test is only authorized for the duration of time the  declaration that circumstances exist justifying the authorization of the  emergency use of an in vitro diagnostic tests for detection of SARS-CoV-2  virus and/or diagnosis of COVID-19 infection under section 564(b)(1) of  the Act, 21 U.S.C. 360bbb-3(b)(1), unless the authorization is terminated  or revoked sooner. The test has been validated but independent review by FDA  and CLIA is pending.    Test  performed using [employer] GeneXpert: This RT-PCR assay targets N2,  a region unique to SARS-CoV-2. A conserved region in the E-gene was chosen  for pan-Sarbecovirus detection which includes SARS-CoV-2.    According to CMS-2020-01-R, this platform meets the definition of high-throughput technology.    Basic metabolic panel [[id number]]  (Abnormal) Collected: 02/08/24 0728    Lab Status: Final result Specimen: Blood from Arm, Right Updated: 02/08/24 0757     Sodium 133 mmol/L      Potassium 4.1 mmol/L      Chloride 98 mmol/L      CO2 29 mmol/L      ANION GAP 6 mmol/L      BUN 20 mg/dL      Creatinine 0.91 mg/dL      Glucose 194 mg/dL      Calcium 9.2 mg/dL      eGFR 85 ml/min/1.73sq m     Narrative:      National Kidney Disease Foundation guidelines for Chronic Kidney Disease (CKD):     Stage 1 with normal or high GFR (GFR > 90 mL/min/1.73 square meters)    Stage 2 Mild CKD (GFR = 60-89 mL/min/1.73 square meters)    Stage 3A Moderate CKD (GFR = 45-59 mL/min/1.73 square meters)    Stage 3B Moderate CKD (GFR = 30-44 mL/min/1.73 square meters)    Stage 4 Severe CKD (GFR = 15-29 mL/min/1.73 square meters)    Stage 5 End Stage CKD (GFR <15 mL/min/1.73 square meters)  Note: GFR calculation is accurate only with a steady state creatinine    Magnesium [[id number]]  (Abnormal) Collected: 02/08/24 0728    Lab Status: Final result Specimen: Blood from Arm, Right Updated: 02/08/24 0757     Magnesium 1.7 mg/dL     CBC and differential [[id number]]  (Abnormal) Collected: 02/08/24 0728    Lab Status: Final result Specimen: Blood from Arm, Right Updated: 02/08/24 0742     WBC 7.90 Thousand/uL      RBC 4.27 Million/uL      Hemoglobin 14.5 g/dL      Hematocrit 43.5 %       fL      MCH 34.0 pg      MCHC 33.3 g/dL      RDW 13.4 %      MPV 10.1 fL      Platelets 176 Thousands/uL      nRBC 0 /100 WBCs      Neutrophils Relative 56 %      Immat GRANS % 1 %      Lymphocytes Relative 29 %      Monocytes Relative 10 %      Eosinophils  Relative 3 %      Basophils Relative 1 %      Neutrophils Absolute 4.52 Thousands/µL      Immature Grans Absolute 0.05 Thousand/uL      Lymphocytes Absolute 2.25 Thousands/µL      Monocytes Absolute 0.80 Thousand/µL      Eosinophils Absolute 0.24 Thousand/µL      Basophils Absolute 0.04 Thousands/µL     Fingerstick Glucose (POCT) [[id number]]  (Abnormal) Collected: 02/08/24 0722    Lab Status: Final result Updated: 02/08/24 0722     POC Glucose 220 mg/dl                    XR chest 1 view portable   Final Result by Katharina Nguyen MD (02/08 1050)      No acute cardiopulmonary disease.            Workstation performed: [id number]                    Procedures  ECG 12 Lead Documentation Only    Date/Time: 2/8/2024 7:19 AM    Performed by: Jairo Baptiste DO  Authorized by: Jairo Baptiste DO    Indications / Diagnosis:  SOB  Interpretation:     Interpretation: abnormal    Rate:     ECG rate:  81    ECG rate assessment: normal    Rhythm:     Rhythm: atrial fibrillation    ST segments:     ST segments:  Normal  T waves:     T waves: normal             ED Course  ED Course as of 02/10/24 0220   u Feb 08, 2024   0741 Xrays viewed and interpreted independently by me: No acute disease.     0743 Echo from January 2023 reviewed, EF of 50%   0854 Patient feels better after neb treatment in the emergency department.  Will get an ambulatory pulse ox.  His respiratory rate appears to improve dramatically on reevaluation will reassess after ambulation.  Blood work reassuring.   0914 MAGNESIUM(!): 1.7  Will replenish   0919 Patient felt fine ambulating.                               SBIRT 22yo+      Flowsheet Row Most Recent Value   Initial Alcohol Screen: US AUDIT-C     1. How often do you have a drink containing alcohol? 0 Filed at: 02/08/2024 0719   2. How many drinks containing alcohol do you have on a typical day you are drinking?  0 Filed at: 02/08/2024 0719   3b. FEMALE Any Age, or MALE 65+: How often do you  have 4 or more drinks on one occassion? 0 Filed at: 02/08/2024 0719   Audit-C Score 0 Filed at: 02/08/2024 0719   CHAN: How many times in the past year have you...    Used an illegal drug or used a prescription medication for non-medical reasons? Never Filed at: 02/08/2024 0719                      Medical Decision Making  68M SOB with end expiratory wheezing.  Clinically looks comfortable.  No fevers or chills.    Differential includes bronchospasm, bronchitis, viral infection such as flu, COVID, RSV.  Clinically do not suspect CHF exacerbation.  He is anticoagulated, PE very unlikely especially in the setting of abnormal lung sounds, patient still symptomatic after neb treatment may have to reevaluate this possibility.    Will get a x-ray to look for occult pneumonia, will get labs look for electrolyte abnormality, anemia.  Will give nebs, steroids.    Problems Addressed:  COPD exacerbation (HCC): acute illness or injury    Amount and/or Complexity of Data Reviewed  External Data Reviewed: notes.  Labs: ordered. Decision-making details documented in ED Course.  Radiology: ordered and independent interpretation performed. Decision-making details documented in ED Course.    Risk  Prescription drug management.  Decision regarding hospitalization.             Disposition  Final diagnoses:   COPD exacerbation (HCC)     Time reflects when diagnosis was documented in both MDM as applicable and the Disposition within this note       Time User Action Codes Description Comment    2/8/2024  9:20 AM Jairo Baptiste Add [J44.1] COPD exacerbation (HCC)           ED Disposition       ED Disposition   Discharge    Condition   Stable    Date/Time   Thu Feb 8, 2024 0920    Comment   Lester Hall discharge to home/self care.                   Follow-up Information       Follow up With Specialties Details Why Contact Info    Aurelio Ramírez,  Family Medicine Schedule an appointment as soon as possible for a visit   [address] [place] [address]  [place]  [address]  [place] [address]  [phone]              Discharge Medication List as of 2/8/2024  9:22 AM        START taking these medications    Details   predniSONE 50 mg tablet Take 1 tablet (50 mg total) by mouth daily for 5 days, Starting Thu 2/8/2024, Until Tue 2/13/2024, Normal           CONTINUE these medications which have NOT CHANGED    Details   acetaminophen (TYLENOL) 500 mg tablet Take 500 mg by mouth every 6 (six) hours as needed for mild pain, Historical Med      allopurinol (ZYLOPRIM) 300 mg tablet Take 1 tablet by mouth once daily, Normal      ammonium lactate (LAC-HYDRIN) 12 % lotion Apply topically 2 (two) times a day, Starting Fri 2/2/2024, Normal      apixaban (Eliquis) 5 mg Take 1 tablet by mouth twice daily, Normal      atorvastatin (LIPITOR) 80 mg tablet Take 1 tablet (80 mg total) by mouth daily, Starting Mon 3/6/2023, Normal      azelastine (ASTELIN) 0.1 % nasal spray 1 spray into each nostril 2 (two) times a day Use in each nostril as directed, Starting Wed 2/7/2024, Normal      cloNIDine (CATAPRES) 0.2 mg tablet Take 1 tablet (0.2 mg total) by mouth 2 (two) times a day, Starting Mon 3/6/2023, Normal      furosemide (LASIX) 80 mg tablet take 1 tablet by mouth once daily, Normal      glipiZIDE (GLUCOTROL) 5 mg tablet Take 1 tablet by mouth once daily, Normal      losartan (COZAAR) 100 MG tablet Take 1 tablet (100 mg total) by mouth daily, Starting Tue 7/11/2023, Normal      metFORMIN (GLUCOPHAGE) 1000 MG tablet TAKE 1 TABLET BY MOUTH TWICE DAILY WITH MEALS, Starting Mon 12/4/2023, Normal      metoprolol succinate (TOPROL-XL) 25 mg 24 hr tablet Take 1 tablet by mouth once daily, Starting Tue 2/6/2024, Normal      tadalafil (CIALIS) 20 MG tablet Take 1 tablet (20 mg total) by mouth daily as needed for erectile dysfunction, Starting Mon 2/14/2022, Normal             No discharge procedures on file.    PDMP Review         Value Time User    PDMP Reviewed  Yes 10/11/2023  1:58 PM Aureloi  DO Darrell            ED Provider  Electronically Signed by             Jairo Baptiste DO  02/10/24 0224

## 2024-02-09 ENCOUNTER — VBI (OUTPATIENT)
Dept: FAMILY MEDICINE CLINIC | Facility: CLINIC | Age: 69
End: 2024-02-09

## 2024-02-09 NOTE — TELEPHONE ENCOUNTER
02/09/24 2:50 PM    Patient contacted post ED visit, [facility] department spoke with patient/caregiver and outreach was successful.    Thank you.  Natividad Parmar MA  [facility]

## 2024-02-15 ENCOUNTER — TELEPHONE (OUTPATIENT)
Dept: FAMILY MEDICINE CLINIC | Facility: CLINIC | Age: 69
End: 2024-02-15

## 2024-02-15 NOTE — TELEPHONE ENCOUNTER
Patient called asking if you could give him another round of Prednisone. States he still feels a little tightness in his chest. If possible please send to patients pharmacy [facility] [address]

## 2024-02-16 DIAGNOSIS — L57.0 ACTINIC KERATOSES: Primary | ICD-10-CM

## 2024-02-16 DIAGNOSIS — J44.1 ACUTE EXACERBATION OF CHRONIC OBSTRUCTIVE PULMONARY DISEASE (COPD) (HCC): ICD-10-CM

## 2024-02-16 RX ORDER — METHYLPREDNISOLONE 4 MG/1
TABLET ORAL
Qty: 21 EACH | Refills: 0 | Status: SHIPPED | OUTPATIENT
Start: 2024-02-16

## 2024-02-16 NOTE — TELEPHONE ENCOUNTER
He just got off prednisone from the ER. This is not something I usually call in. I want to see people who think they need it. Schedule him an appointment. I'll send in a one time RX for a medrol dose pack.

## 2024-02-21 PROBLEM — Z12.11 COLON CANCER SCREENING: Status: RESOLVED | Noted: 2020-03-30 | Resolved: 2024-02-21

## 2024-02-21 PROBLEM — Z00.00 ENCOUNTER FOR MEDICARE ANNUAL WELLNESS EXAM: Status: RESOLVED | Noted: 2020-03-30 | Resolved: 2024-02-21

## 2024-02-21 PROBLEM — Z12.5 PROSTATE CANCER SCREENING: Status: RESOLVED | Noted: 2020-03-30 | Resolved: 2024-02-21

## 2024-03-04 ENCOUNTER — OFFICE VISIT (OUTPATIENT)
Dept: CARDIOLOGY CLINIC | Facility: CLINIC | Age: 69
End: 2024-03-04
Payer: COMMERCIAL

## 2024-03-04 VITALS
HEART RATE: 76 BPM | HEIGHT: 74 IN | SYSTOLIC BLOOD PRESSURE: 140 MMHG | BODY MASS INDEX: 40.43 KG/M2 | WEIGHT: 315 LBS | DIASTOLIC BLOOD PRESSURE: 84 MMHG

## 2024-03-04 DIAGNOSIS — I25.10 CORONARY ARTERY DISEASE INVOLVING NATIVE CORONARY ARTERY OF NATIVE HEART WITHOUT ANGINA PECTORIS: Primary | ICD-10-CM

## 2024-03-04 DIAGNOSIS — I48.21 PERMANENT ATRIAL FIBRILLATION (HCC): ICD-10-CM

## 2024-03-04 DIAGNOSIS — R60.0 BILATERAL EDEMA OF LOWER EXTREMITY: Chronic | ICD-10-CM

## 2024-03-04 PROCEDURE — 99214 OFFICE O/P EST MOD 30 MIN: CPT | Performed by: INTERNAL MEDICINE

## 2024-03-04 NOTE — PROGRESS NOTES
" Patient ID: Lester Hall is a 69 y.o. male.        Plan:      Coronary artery disease involving native coronary artery of native heart without angina pectoris  No recent chest pain.    Permanent atrial fibrillation (HCC)  Rate controlled.  On Eliquis for stroke prevention.    Mixed hyperlipidemia  Tolerating statin therapy.    Bilateral edema of lower extremity  Likely venous insufficiency.       Follow up Plan/Other summary comments:  We talked about trying to lose some weight by avoiding snacking.  I offered as needed follow-up but he would like to see me again in 1 year.      HPI: Patient seen in f/u regarding the above issues.  Recent hospitalization for dyspnea and cough was reviewed.  BNP was low and troponin were negative.    Stress test last year because of chest pain was normal.  There has been no recurrence.          Most recent or relevant cardiac/vascular testing:    TTE 1/12/19: EF 60%. Mild LVH. Mild MAC with mild MR. Mild TR    Myoview 1/31/2023: Normal.    Past Surgical History:   Procedure Laterality Date    COLONOSCOPY      HERNIA REPAIR         Lipid Profile: Reviewed      Review of Systems   10  point ROS  was otherwise non pertinent or negative except as per HPI or as below.   Gait: Normal.        Objective:     /84   Pulse 76   Ht 6' 2\" (1.88 m)   Wt (!) 162 kg (358 lb)   BMI 45.96 kg/m²     PHYSICAL EXAM:    General:  Normal appearance in no distress.  Eyes:  Anicteric.  Oral mucosa:  Moist.  Neck:  No JVD. Carotid upstrokes are brisk without bruits.  No masses.  Chest:  Clear to auscultation.  Cardiac:  No palpable PMI.  Normal S1 and S2.  No murmur gallop or rub.  Abdomen:  Soft and nontender. No palpable organomegaly or aortic enlargement.  Extremities: Venous stasis changes with 1- 2+ chronic peripheral edema.  Musculoskeletal:  Symmetric.   Vascular:  Femoral pulses are brisk without bruits.  Popliteal pulses are intact bilaterally.   Pedal pulses are intact.  Neuro:  Grossly " symmetric.  Psych:  Alert and oriented x3.      Meds reviewed.    Past Medical History:   Diagnosis Date    Bilateral cellulitis of lower leg     Bilateral edema of lower extremity     Chronic pain     Diabetes mellitus (HCC)     ED (erectile dysfunction)     Gout     last assessed: 4/10/2019    Hemiplegia and hemiparesis following cerebral infarction affecting right dominant side ([facility])     last assessed: 1/10/2019    Hypertension     Lymphedema     DIAMOND (obstructive sleep apnea)     does not treat this    Stroke ([facility])     Type 2 diabetes mellitus with foot ulcer, without long-term current use of insulin ([facility]) 11/30/2018    Venous insufficiency (chronic) (peripheral)     last assessed: 7/5/2018           Social History     Tobacco Use   Smoking Status Never   Smokeless Tobacco Never

## 2024-03-07 ENCOUNTER — RA CDI HCC (OUTPATIENT)
Dept: OTHER | Facility: HOSPITAL | Age: 69
End: 2024-03-07

## 2024-03-07 PROBLEM — B34.9 VIRAL INFECTION, UNSPECIFIED: Status: RESOLVED | Noted: 2022-11-22 | Resolved: 2024-03-07

## 2024-03-07 PROBLEM — R11.10 VOMITING: Chronic | Status: RESOLVED | Noted: 2018-11-30 | Resolved: 2024-03-07

## 2024-03-07 PROBLEM — R53.83 OTHER FATIGUE: Status: RESOLVED | Noted: 2021-11-02 | Resolved: 2024-03-07

## 2024-03-08 ENCOUNTER — TELEPHONE (OUTPATIENT)
Dept: FAMILY MEDICINE CLINIC | Facility: CLINIC | Age: 69
End: 2024-03-08

## 2024-03-08 DIAGNOSIS — E11.42 TYPE 2 DIABETES MELLITUS WITH DIABETIC POLYNEUROPATHY, WITHOUT LONG-TERM CURRENT USE OF INSULIN (HCC): Primary | ICD-10-CM

## 2024-03-08 RX ORDER — LANCETS 28 GAUGE
1 EACH MISCELLANEOUS 2 TIMES DAILY
COMMUNITY
End: 2024-03-11 | Stop reason: CLARIF

## 2024-03-08 RX ORDER — BLOOD SUGAR DIAGNOSTIC
1 STRIP MISCELLANEOUS 2 TIMES DAILY
COMMUNITY
End: 2024-03-08 | Stop reason: SDUPTHER

## 2024-03-08 RX ORDER — BLOOD SUGAR DIAGNOSTIC
1 STRIP MISCELLANEOUS 2 TIMES DAILY
Qty: 100 STRIP | Refills: 1 | Status: SHIPPED | OUTPATIENT
Start: 2024-03-08 | End: 2024-03-11 | Stop reason: CLARIF

## 2024-03-08 NOTE — TELEPHONE ENCOUNTER
Patient reports that freestyle is no longer covered by his insurance if you can please transmit a new machine with supplies to the pharmacy

## 2024-03-11 ENCOUNTER — APPOINTMENT (OUTPATIENT)
Dept: LAB | Facility: CLINIC | Age: 69
End: 2024-03-11
Payer: COMMERCIAL

## 2024-03-11 DIAGNOSIS — E11.42 TYPE 2 DIABETES MELLITUS WITH DIABETIC POLYNEUROPATHY, WITHOUT LONG-TERM CURRENT USE OF INSULIN (HCC): ICD-10-CM

## 2024-03-11 DIAGNOSIS — E78.5 DYSLIPIDEMIA: ICD-10-CM

## 2024-03-11 DIAGNOSIS — E11.42 TYPE 2 DIABETES MELLITUS WITH DIABETIC POLYNEUROPATHY, WITHOUT LONG-TERM CURRENT USE OF INSULIN (HCC): Primary | ICD-10-CM

## 2024-03-11 DIAGNOSIS — Z12.5 PROSTATE CANCER SCREENING: ICD-10-CM

## 2024-03-11 LAB
ALBUMIN SERPL BCP-MCNC: 3.8 G/DL (ref 3.5–5)
ALP SERPL-CCNC: 67 U/L (ref 34–104)
ALT SERPL W P-5'-P-CCNC: 17 U/L (ref 7–52)
ANION GAP SERPL CALCULATED.3IONS-SCNC: 5 MMOL/L
AST SERPL W P-5'-P-CCNC: 15 U/L (ref 13–39)
BILIRUB SERPL-MCNC: 0.86 MG/DL (ref 0.2–1)
BUN SERPL-MCNC: 17 MG/DL (ref 5–25)
CALCIUM SERPL-MCNC: 9.1 MG/DL (ref 8.4–10.2)
CHLORIDE SERPL-SCNC: 103 MMOL/L (ref 96–108)
CHOLEST SERPL-MCNC: 145 MG/DL
CO2 SERPL-SCNC: 29 MMOL/L (ref 21–32)
CREAT SERPL-MCNC: 0.85 MG/DL (ref 0.6–1.3)
EST. AVERAGE GLUCOSE BLD GHB EST-MCNC: 197 MG/DL
GFR SERPL CREATININE-BSD FRML MDRD: 87 ML/MIN/1.73SQ M
GLUCOSE P FAST SERPL-MCNC: 150 MG/DL (ref 65–99)
HBA1C MFR BLD: 8.5 %
HDLC SERPL-MCNC: 38 MG/DL
LDLC SERPL CALC-MCNC: 60 MG/DL (ref 0–100)
POTASSIUM SERPL-SCNC: 4.4 MMOL/L (ref 3.5–5.3)
PROT SERPL-MCNC: 7.6 G/DL (ref 6.4–8.4)
PSA SERPL-MCNC: 1.73 NG/ML (ref 0–4)
SODIUM SERPL-SCNC: 137 MMOL/L (ref 135–147)
TRIGL SERPL-MCNC: 235 MG/DL

## 2024-03-11 PROCEDURE — 80061 LIPID PANEL: CPT

## 2024-03-11 PROCEDURE — 83036 HEMOGLOBIN GLYCOSYLATED A1C: CPT

## 2024-03-11 PROCEDURE — 80053 COMPREHEN METABOLIC PANEL: CPT

## 2024-03-11 PROCEDURE — G0103 PSA SCREENING: HCPCS

## 2024-03-11 PROCEDURE — 36415 COLL VENOUS BLD VENIPUNCTURE: CPT

## 2024-03-11 RX ORDER — LANCETS 33 GAUGE
EACH MISCELLANEOUS
Qty: 200 EACH | Refills: 3 | Status: SHIPPED | OUTPATIENT
Start: 2024-03-11

## 2024-03-11 RX ORDER — BLOOD SUGAR DIAGNOSTIC
STRIP MISCELLANEOUS
Qty: 200 EACH | Refills: 3 | Status: SHIPPED | OUTPATIENT
Start: 2024-03-11

## 2024-03-11 RX ORDER — BLOOD-GLUCOSE METER
KIT MISCELLANEOUS
Qty: 1 KIT | Refills: 0 | Status: SHIPPED | OUTPATIENT
Start: 2024-03-11

## 2024-03-15 ENCOUNTER — OFFICE VISIT (OUTPATIENT)
Dept: FAMILY MEDICINE CLINIC | Facility: CLINIC | Age: 69
End: 2024-03-15
Payer: COMMERCIAL

## 2024-03-15 DIAGNOSIS — I25.119 CORONARY ARTERY DISEASE INVOLVING NATIVE CORONARY ARTERY OF NATIVE HEART WITH ANGINA PECTORIS (HCC): ICD-10-CM

## 2024-03-15 DIAGNOSIS — E87.6 HYPOKALEMIA: ICD-10-CM

## 2024-03-15 DIAGNOSIS — Z79.899 ENCOUNTER FOR LONG-TERM (CURRENT) USE OF MEDICATIONS: ICD-10-CM

## 2024-03-15 DIAGNOSIS — E78.5 DYSLIPIDEMIA: ICD-10-CM

## 2024-03-15 DIAGNOSIS — J31.0 CHRONIC RHINITIS: ICD-10-CM

## 2024-03-15 DIAGNOSIS — J20.9 ACUTE BRONCHITIS, UNSPECIFIED ORGANISM: ICD-10-CM

## 2024-03-15 DIAGNOSIS — I10 ESSENTIAL HYPERTENSION: Chronic | ICD-10-CM

## 2024-03-15 DIAGNOSIS — E11.42 TYPE 2 DIABETES MELLITUS WITH DIABETIC POLYNEUROPATHY, WITHOUT LONG-TERM CURRENT USE OF INSULIN (HCC): Primary | ICD-10-CM

## 2024-03-15 DIAGNOSIS — R60.0 BILATERAL EDEMA OF LOWER EXTREMITY: Chronic | ICD-10-CM

## 2024-03-15 DIAGNOSIS — I48.21 PERMANENT ATRIAL FIBRILLATION (HCC): ICD-10-CM

## 2024-03-15 DIAGNOSIS — E66.01 MORBID OBESITY WITH BMI OF 45.0-49.9, ADULT (HCC): ICD-10-CM

## 2024-03-15 PROCEDURE — G2211 COMPLEX E/M VISIT ADD ON: HCPCS | Performed by: FAMILY MEDICINE

## 2024-03-15 PROCEDURE — 99214 OFFICE O/P EST MOD 30 MIN: CPT | Performed by: FAMILY MEDICINE

## 2024-03-15 RX ORDER — POTASSIUM CHLORIDE 20 MEQ/1
20 TABLET, EXTENDED RELEASE ORAL DAILY
Qty: 5 TABLET | Refills: 0 | Status: SHIPPED | OUTPATIENT
Start: 2024-03-15 | End: 2024-03-20

## 2024-03-15 RX ORDER — CEFPODOXIME PROXETIL 200 MG/1
200 TABLET, FILM COATED ORAL 2 TIMES DAILY
Qty: 20 TABLET | Refills: 0 | Status: SHIPPED | OUTPATIENT
Start: 2024-03-15 | End: 2024-03-25

## 2024-03-15 NOTE — PROGRESS NOTES
Name: Lester Hall      : 1955      MRN: [id number]  Encounter Provider: Aurelio Ramírez DO  Encounter Date: 3/15/2024   Encounter department: [facility]    Assessment & Plan     1. Type 2 diabetes mellitus with diabetic polyneuropathy, without long-term current use of insulin ([facility])  Assessment & Plan:    Lab Results   Component Value Date    HGBA1C 8.5 (H) 2024   Patient has type 2 diabetes mellitus.  His fasting blood glucose was 150.  Hemoglobin A1c is 8.5.  Definitely think that the 2 courses of oral corticosteroids may be responsible for putting his hemoglobin A1c up.  Patient currently taking metformin 1000 mg twice daily and glipizide 5 mg daily.  I did not make any change with his medicine.  Sugars have been well-controlled.  I am going to recheck his blood sugars again in 3 months.  If his hemoglobin A1c remains elevated, then I am going to need to increase the dose of his glipizide or consider adding another agent.  Asked the patient to try to do a better job of watching his diet.  I also asked him to try to walk regularly for exercise.  Discussed routine diabetic footcare.    Orders:  -     Albumin / creatinine urine ratio; Future; Expected date: 06/15/2024  -     Comprehensive metabolic panel; Future; Expected date: 06/15/2024  -     Hemoglobin A1C; Future; Expected date: 06/15/2024    2. Acute bronchitis, unspecified organism  Assessment & Plan:  Patient has been on several courses of oral corticosteroids but he has not been on an antibiotic.  I started him on Vantin 200 mg twice daily for 10 days.  I am also going to obtain a chest x-ray.    Orders:  -     XR chest pa & lateral; Future; Expected date: 03/15/2024  -     cefpodoxime (VANTIN) 200 mg tablet; Take 1 tablet (200 mg total) by mouth 2 (two) times a day for 10 days    3. Hypokalemia  -     potassium chloride (Klor-Con M20) 20 mEq tablet; Take 1 tablet (20 mEq total) by mouth daily for 5 days    4. Chronic  rhinitis  Assessment & Plan:  X-rays of the paranasal sinus was also ordered.      5. Morbid obesity with BMI of 45.0-49.9, adult ([facility])    6. Coronary artery disease involving native coronary artery of native heart with angina pectoris ([facility])    7. Encounter for long-term (current) use of medications  -     CBC and differential; Future; Expected date: 06/15/2024    8. Dyslipidemia  Assessment & Plan:  Lipid panel was reviewed.  Total cholesterol 145.  Triglycerides 235.  HDL is 38.  LDL is 60.  His goal is less than 70.  The patient will continue intensive lipid-lowering therapy with atorvastatin 80 mg daily.      9. Permanent atrial fibrillation (HCC)  Assessment & Plan:  Heart rate is controlled on metoprolol.  Patient is adequately anticoagulated with Eliquis 5 mg twice a day.      10. Bilateral edema of lower extremity  Assessment & Plan:  Patient has severe lower extremity edema at this time.  I increased his Lasix to 80 mg twice a day for 5 days.  I also put him on potassium chloride to 20 mEq daily for 5 days.  After 5 days, the patient should reduce his Lasix back to 80 mg once a day.      11. Essential hypertension  Assessment & Plan:  As noted, patient's blood pressure is currently satisfactory.  After reviewing his medications with him he has been taking his clonidine as prescribed.  I did not make any changes with his medicine.  I asked him to follow a low-sodium diet and try to lose weight.             Subjective      This is a 69-year-old male who presents to the office today for his routine checkup.  The patient still complains of chest congestion and a slight cough.  He tells me he did a nebulizer treatment this morning.  He denies any fever or shaking chills.  His sputum is reportedly white.  He has taken several courses of steroids.  He did have blood work done for his checkup today.  He did not have a logbook of his blood sugars with him.  Of note, my medical assistant has flagged that he is not  taking his clonidine.  After discussing his medications with him, he absolutely is taking clonidine.      Review of Systems   Constitutional:  Negative for activity change and appetite change.   HENT:  Positive for postnasal drip. Negative for congestion, sinus pressure and sinus pain.    Respiratory:  Positive for cough and wheezing. Negative for shortness of breath.         Positive for chest congestion and white sputum production.   Cardiovascular:  Positive for leg swelling. Negative for chest pain and palpitations.   Gastrointestinal:  Negative for abdominal distention, abdominal pain, blood in stool, constipation, diarrhea and nausea.       Current Outpatient Medications on File Prior to Visit   Medication Sig    acetaminophen (TYLENOL) 500 mg tablet Take 500 mg by mouth every 6 (six) hours as needed for mild pain    allopurinol (ZYLOPRIM) 300 mg tablet Take 1 tablet by mouth once daily    ammonium lactate (LAC-HYDRIN) 12 % lotion Apply topically 2 (two) times a day    apixaban (Eliquis) 5 mg Take 1 tablet by mouth twice daily    azelastine (ASTELIN) 0.1 % nasal spray 1 spray into each nostril 2 (two) times a day Use in each nostril as directed    Blood Glucose Monitoring Suppl (OneTouch Verio Reflect) w/Device KIT Check blood sugars twice daily. Please substitute with appropriate alternative as covered by patient's insurance. Dx: E11.65    furosemide (LASIX) 80 mg tablet take 1 tablet by mouth once daily    glipiZIDE (GLUCOTROL) 5 mg tablet Take 1 tablet by mouth once daily    glucose blood (OneTouch Verio) test strip Check blood sugars twice daily. Please substitute with appropriate alternative as covered by patient's insurance. Dx: E11.65    losartan (COZAAR) 100 MG tablet Take 1 tablet (100 mg total) by mouth daily    metoprolol succinate (TOPROL-XL) 25 mg 24 hr tablet Take 1 tablet by mouth once daily    OneTouch Delica Lancets 33G MISC Check blood sugars twice daily. Please substitute with appropriate  "alternative as covered by patient's insurance. Dx: E11.65    tadalafil (CIALIS) 20 MG tablet Take 1 tablet (20 mg total) by mouth daily as needed for erectile dysfunction    cloNIDine (CATAPRES) 0.2 mg tablet Take 1 tablet (0.2 mg total) by mouth 2 (two) times a day (Patient not taking: Reported on 2/7/2024)       Objective     /86 (BP Location: Left arm, Patient Position: Sitting)   Pulse 63   Temp (!) 96.9 °F (36.1 °C) (Tympanic)   Ht 6' 2\" (1.88 m)   Wt (!) 164 kg (360 lb 12.8 oz)   SpO2 97%   BMI 46.32 kg/m²     Physical Exam  Vitals reviewed.   Constitutional:       Comments: This is a 69-year-old white male who appears his stated age.  The patient is nonseptic in appearance and in no apparent distress.   HENT:      Head: Normocephalic and atraumatic.      Right Ear: Tympanic membrane, ear canal and external ear normal. There is no impacted cerumen.      Left Ear: Tympanic membrane, ear canal and external ear normal. There is no impacted cerumen.      Nose: Nose normal. No congestion or rhinorrhea.      Mouth/Throat:      Mouth: Mucous membranes are moist.      Pharynx: Oropharynx is clear. No oropharyngeal exudate or posterior oropharyngeal erythema.   Eyes:      General: No scleral icterus.        Right eye: No discharge.         Left eye: No discharge.      Conjunctiva/sclera: Conjunctivae normal.      Pupils: Pupils are equal, round, and reactive to light.   Cardiovascular:      Rate and Rhythm: Normal rate. Rhythm irregular.      Heart sounds: Normal heart sounds. No murmur heard.     No friction rub. No gallop.      Comments: Heart is irregularly irregular.  Ventricular rate is well-controlled.  Pulmonary:      Effort: Pulmonary effort is normal. No respiratory distress.      Breath sounds: Normal breath sounds. No stridor. No wheezing, rhonchi or rales.   Musculoskeletal:      Cervical back: Neck supple.   Lymphadenopathy:      Cervical: No cervical adenopathy.   Psychiatric:         Mood and " Affect: Mood normal.         Behavior: Behavior normal.         Thought Content: Thought content normal.         Judgment: Judgment normal.     Extremities: Without cyanosis or clubbing.  There is edema noted present bilaterally.  In fact, his legs were so swollen that I was really unable to even pull up his pant legs to examine his lower legs.  Ankles and feet were extremely swollen.    Aurelio Ramírez, DO

## 2024-03-18 ENCOUNTER — APPOINTMENT (OUTPATIENT)
Dept: RADIOLOGY | Facility: CLINIC | Age: 69
End: 2024-03-18
Payer: COMMERCIAL

## 2024-03-18 DIAGNOSIS — J20.9 ACUTE BRONCHITIS, UNSPECIFIED ORGANISM: ICD-10-CM

## 2024-03-18 DIAGNOSIS — J31.0 CHRONIC RHINITIS: ICD-10-CM

## 2024-03-18 PROCEDURE — 70220 X-RAY EXAM OF SINUSES: CPT

## 2024-03-18 PROCEDURE — 71046 X-RAY EXAM CHEST 2 VIEWS: CPT

## 2024-03-19 DIAGNOSIS — E78.2 MIXED HYPERLIPIDEMIA: ICD-10-CM

## 2024-03-19 DIAGNOSIS — E11.42 TYPE 2 DIABETES MELLITUS WITH DIABETIC POLYNEUROPATHY, WITHOUT LONG-TERM CURRENT USE OF INSULIN (HCC): ICD-10-CM

## 2024-03-19 RX ORDER — ATORVASTATIN CALCIUM 80 MG/1
80 TABLET, FILM COATED ORAL DAILY
Qty: 90 TABLET | Refills: 0 | Status: SHIPPED | OUTPATIENT
Start: 2024-03-19

## 2024-03-27 VITALS
HEART RATE: 63 BPM | SYSTOLIC BLOOD PRESSURE: 138 MMHG | WEIGHT: 315 LBS | BODY MASS INDEX: 40.43 KG/M2 | OXYGEN SATURATION: 97 % | DIASTOLIC BLOOD PRESSURE: 86 MMHG | TEMPERATURE: 96.9 F | HEIGHT: 74 IN

## 2024-03-27 PROBLEM — J20.9 ACUTE BRONCHITIS: Status: ACTIVE | Noted: 2024-03-27

## 2024-03-27 PROBLEM — E87.6 HYPOKALEMIA: Status: ACTIVE | Noted: 2024-03-27

## 2024-03-27 PROBLEM — Z79.899 ENCOUNTER FOR LONG-TERM (CURRENT) USE OF MEDICATIONS: Status: ACTIVE | Noted: 2024-03-27

## 2024-03-27 PROBLEM — J31.0 CHRONIC RHINITIS: Status: ACTIVE | Noted: 2024-03-27

## 2024-03-27 NOTE — ASSESSMENT & PLAN NOTE
Patient has been on several courses of oral corticosteroids but he has not been on an antibiotic.  I started him on Vantin 200 mg twice daily for 10 days.  I am also going to obtain a chest x-ray.

## 2024-03-27 NOTE — ASSESSMENT & PLAN NOTE
Patient has severe lower extremity edema at this time.  I increased his Lasix to 80 mg twice a day for 5 days.  I also put him on potassium chloride to 20 mEq daily for 5 days.  After 5 days, the patient should reduce his Lasix back to 80 mg once a day.

## 2024-03-27 NOTE — ASSESSMENT & PLAN NOTE
Lipid panel was reviewed.  Total cholesterol 145.  Triglycerides 235.  HDL is 38.  LDL is 60.  His goal is less than 70.  The patient will continue intensive lipid-lowering therapy with atorvastatin 80 mg daily.

## 2024-03-27 NOTE — ASSESSMENT & PLAN NOTE
As noted, patient's blood pressure is currently satisfactory.  After reviewing his medications with him he has been taking his clonidine as prescribed.  I did not make any changes with his medicine.  I asked him to follow a low-sodium diet and try to lose weight.

## 2024-03-27 NOTE — ASSESSMENT & PLAN NOTE
Heart rate is controlled on metoprolol.  Patient is adequately anticoagulated with Eliquis 5 mg twice a day.

## 2024-03-27 NOTE — ASSESSMENT & PLAN NOTE
Lab Results   Component Value Date    HGBA1C 8.5 (H) 03/11/2024   Patient has type 2 diabetes mellitus.  His fasting blood glucose was 150.  Hemoglobin A1c is 8.5.  Definitely think that the 2 courses of oral corticosteroids may be responsible for putting his hemoglobin A1c up.  Patient currently taking metformin 1000 mg twice daily and glipizide 5 mg daily.  I did not make any change with his medicine.  Sugars have been well-controlled.  I am going to recheck his blood sugars again in 3 months.  If his hemoglobin A1c remains elevated, then I am going to need to increase the dose of his glipizide or consider adding another agent.  Asked the patient to try to do a better job of watching his diet.  I also asked him to try to walk regularly for exercise.  Discussed routine diabetic footcare.

## 2024-04-03 ENCOUNTER — OFFICE VISIT (OUTPATIENT)
Dept: URGENT CARE | Facility: CLINIC | Age: 69
End: 2024-04-03
Payer: COMMERCIAL

## 2024-04-03 VITALS
DIASTOLIC BLOOD PRESSURE: 94 MMHG | SYSTOLIC BLOOD PRESSURE: 171 MMHG | HEART RATE: 82 BPM | OXYGEN SATURATION: 98 % | RESPIRATION RATE: 20 BRPM | TEMPERATURE: 98 F

## 2024-04-03 DIAGNOSIS — J01.91 ACUTE RECURRENT SINUSITIS, UNSPECIFIED LOCATION: Primary | ICD-10-CM

## 2024-04-03 PROCEDURE — S9083 URGENT CARE CENTER GLOBAL: HCPCS | Performed by: PHYSICIAN ASSISTANT

## 2024-04-03 PROCEDURE — 99213 OFFICE O/P EST LOW 20 MIN: CPT | Performed by: PHYSICIAN ASSISTANT

## 2024-04-03 RX ORDER — ALBUTEROL SULFATE 1.25 MG/3ML
1.25 SOLUTION RESPIRATORY (INHALATION) EVERY 6 HOURS PRN
COMMUNITY

## 2024-04-03 RX ORDER — AMOXICILLIN AND CLAVULANATE POTASSIUM 875; 125 MG/1; MG/1
1 TABLET, FILM COATED ORAL EVERY 12 HOURS SCHEDULED
Qty: 20 TABLET | Refills: 0 | Status: SHIPPED | OUTPATIENT
Start: 2024-04-03 | End: 2024-04-13

## 2024-04-03 RX ORDER — PREDNISONE 10 MG/1
TABLET ORAL
Qty: 26 TABLET | Refills: 0 | Status: SHIPPED | OUTPATIENT
Start: 2024-04-03

## 2024-04-03 NOTE — PROGRESS NOTES
[facility]    NAME: Lester Hall is a 69 y.o. male  : 1955    MRN: [id number]  DATE: April 3, 2024  TIME: 7:20 PM    Assessment and Plan   Acute recurrent sinusitis, unspecified location [J01.91]  1. Acute recurrent sinusitis, unspecified location  amoxicillin-clavulanate (AUGMENTIN) 875-125 mg per tablet    predniSONE 10 mg tablet          Patient Instructions     Patient Instructions   I have prescribed an antibiotic for the infection.  Please take the antibiotic as prescribed and finish the entire prescription.  Take an over the counter probiotic or eat yogurt with live cultures in it (activia) to keep good bacteria in the gut and help prevent diarrhea.  Wash hands frequently to prevent the spread of infection.  Can use over the counter cough and cold medications to help with symptoms.  Ibuprofen and/or tylenol as needed for pain or fever.  If not improving over the next 7-10 days, follow up with PCP.      Follow up with ENT if not improving.    Chief Complaint     Chief Complaint   Patient presents with    Cough     Congestion 2 weeks       History of Present Illness   69-year-old male here with complaint of ongoing cough, nasal congestion and sinus pressure with fatigue.  Has been getting progressively worse.  Occasionally feels short of breath.  Occasional wheezing.  Finished Vantin prescribed by pcp with minimal relief.        Review of Systems   Review of Systems   Constitutional:  Positive for fatigue. Negative for chills and fever.   HENT:  Positive for congestion, postnasal drip and sinus pressure. Negative for ear pain and sore throat.    Respiratory:  Positive for cough and wheezing. Negative for shortness of breath.    Neurological:  Negative for headaches.   All other systems reviewed and are negative.      Current Medications     Current Outpatient Medications:     acetaminophen (TYLENOL) 500 mg tablet, Take 500 mg by mouth every 6 (six) hours as needed for mild pain, Disp: , Rfl:      albuterol (ACCUNEB) 1.25 MG/3ML nebulizer solution, Take 1.25 mg by nebulization every 6 (six) hours as needed for wheezing, Disp: , Rfl:     allopurinol (ZYLOPRIM) 300 mg tablet, Take 1 tablet by mouth once daily, Disp: 90 tablet, Rfl: 0    ammonium lactate (LAC-HYDRIN) 12 % lotion, Apply topically 2 (two) times a day, Disp: 400 g, Rfl: 2    amoxicillin-clavulanate (AUGMENTIN) 875-125 mg per tablet, Take 1 tablet by mouth every 12 (twelve) hours for 10 days, Disp: 20 tablet, Rfl: 0    apixaban (Eliquis) 5 mg, Take 1 tablet by mouth twice daily, Disp: 180 tablet, Rfl: 2    atorvastatin (LIPITOR) 80 mg tablet, Take 1 tablet by mouth once daily, Disp: 90 tablet, Rfl: 0    azelastine (ASTELIN) 0.1 % nasal spray, 1 spray into each nostril 2 (two) times a day Use in each nostril as directed, Disp: 20 mL, Rfl: 6    Blood Glucose Monitoring Suppl (OneTouch Verio Reflect) w/Device KIT, Check blood sugars twice daily. Please substitute with appropriate alternative as covered by patient's insurance. Dx: E11.65, Disp: 1 kit, Rfl: 0    furosemide (LASIX) 80 mg tablet, take 1 tablet by mouth once daily, Disp: 90 tablet, Rfl: 3    glipiZIDE (GLUCOTROL) 5 mg tablet, Take 1 tablet by mouth once daily, Disp: 90 tablet, Rfl: 0    glucose blood (OneTouch Verio) test strip, Check blood sugars twice daily. Please substitute with appropriate alternative as covered by patient's insurance. Dx: E11.65, Disp: 200 each, Rfl: 3    losartan (COZAAR) 100 MG tablet, Take 1 tablet (100 mg total) by mouth daily, Disp: 90 tablet, Rfl: 3    metFORMIN (GLUCOPHAGE) 1000 MG tablet, TAKE 1 TABLET BY MOUTH TWICE DAILY WITH MEALS, Disp: 180 tablet, Rfl: 0    metoprolol succinate (TOPROL-XL) 25 mg 24 hr tablet, Take 1 tablet by mouth once daily, Disp: 90 tablet, Rfl: 2    OneTouch Delica Lancets 33G MISC, Check blood sugars twice daily. Please substitute with appropriate alternative as covered by patient's insurance. Dx: E11.65, Disp: 200 each, Rfl: 3     predniSONE 10 mg tablet, Take 3 tabs BID X 2 days, 2 tabs BID X 2 days, 1 tab BID X 2 days, 1 tab daily X 2 days, Disp: 26 tablet, Rfl: 0    cloNIDine (CATAPRES) 0.2 mg tablet, Take 1 tablet (0.2 mg total) by mouth 2 (two) times a day (Patient not taking: Reported on 2/7/2024), Disp: 180 tablet, Rfl: 3    potassium chloride (Klor-Con M20) 20 mEq tablet, Take 1 tablet (20 mEq total) by mouth daily for 5 days, Disp: 5 tablet, Rfl: 0    tadalafil (CIALIS) 20 MG tablet, Take 1 tablet (20 mg total) by mouth daily as needed for erectile dysfunction (Patient not taking: Reported on 4/3/2024), Disp: 5 tablet, Rfl: 2    Current Allergies     Allergies as of 04/03/2024    (No Known Allergies)          The following portions of the patient's history were reviewed and updated as appropriate: allergies, current medications, past family history, past medical history, past social history, past surgical history and problem list.   Past Medical History:   Diagnosis Date    Bilateral cellulitis of lower leg     Bilateral edema of lower extremity     Chronic pain     COPD (chronic obstructive pulmonary disease) (HCC)     Diabetes mellitus (HCC)     ED (erectile dysfunction)     Gout     last assessed: 4/10/2019    Hemiplegia and hemiparesis following cerebral infarction affecting right dominant side ([facility])     last assessed: 1/10/2019    Hypertension     Lymphedema     DIAMOND (obstructive sleep apnea)     does not treat this    Stroke ([facility])     Type 2 diabetes mellitus with foot ulcer, without long-term current use of insulin ([facility]) 11/30/2018    Venous insufficiency (chronic) (peripheral)     last assessed: 7/5/2018     Past Surgical History:   Procedure Laterality Date    COLONOSCOPY      HERNIA REPAIR       Family History   Problem Relation Age of Onset    Heart failure Mother     Heart failure Father      Social History     Socioeconomic History    Marital status:      Spouse name: Not on file    Number of children: Not on file     Years of education: Not on file    Highest education level: Not on file   Occupational History    Not on file   Tobacco Use    Smoking status: Never     Passive exposure: Never    Smokeless tobacco: Never   Vaping Use    Vaping status: Never Used   Substance and Sexual Activity    Alcohol use: Yes     Alcohol/week: 4.0 standard drinks of alcohol     Types: 4 Standard drinks or equivalent per week     Comment: occ    Drug use: No    Sexual activity: Not Currently   Other Topics Concern    Not on file   Social History Narrative    Caffeine use- iced tea on occasion     Social Determinants of Health     Financial Resource Strain: Low Risk  (12/8/2023)    Overall Financial Resource Strain (CARDIA)     Difficulty of Paying Living Expenses: Not very hard   Food Insecurity: Not on file   Transportation Needs: No Transportation Needs (12/8/2023)    PRAPARE - Transportation     Lack of Transportation (Medical): No     Lack of Transportation (Non-Medical): No   Physical Activity: Not on file   Stress: Not on file   Social Connections: Not on file   Intimate Partner Violence: Not on file   Housing Stability: Not on file     Medications have been verified.    Objective   BP (!) 171/94   Pulse 82   Temp 98 °F (36.7 °C)   Resp 20   SpO2 98%      Physical Exam   Physical Exam  Vitals reviewed.   Constitutional:       General: He is not in acute distress.     Appearance: He is well-developed.   HENT:      Head: Normocephalic and atraumatic.      Right Ear: Tympanic membrane normal.      Left Ear: Tympanic membrane normal.      Nose: Congestion present. No mucosal edema.      Mouth/Throat:      Mouth: Mucous membranes are moist.      Pharynx: Posterior oropharyngeal erythema present.   Cardiovascular:      Rate and Rhythm: Normal rate and regular rhythm.      Heart sounds: Normal heart sounds.   Pulmonary:      Effort: Pulmonary effort is normal. No respiratory distress.      Breath sounds: Normal breath sounds.

## 2024-04-08 DIAGNOSIS — I10 ESSENTIAL HYPERTENSION: Chronic | ICD-10-CM

## 2024-04-08 DIAGNOSIS — M1A.00X0 IDIOPATHIC CHRONIC GOUT WITHOUT TOPHUS, UNSPECIFIED SITE: ICD-10-CM

## 2024-04-08 DIAGNOSIS — L97.509 TYPE 2 DIABETES MELLITUS WITH FOOT ULCER, WITHOUT LONG-TERM CURRENT USE OF INSULIN (HCC): Chronic | ICD-10-CM

## 2024-04-08 DIAGNOSIS — E11.621 TYPE 2 DIABETES MELLITUS WITH FOOT ULCER, WITHOUT LONG-TERM CURRENT USE OF INSULIN (HCC): Chronic | ICD-10-CM

## 2024-04-08 RX ORDER — GLIPIZIDE 5 MG/1
TABLET ORAL
Qty: 90 TABLET | Refills: 1 | Status: SHIPPED | OUTPATIENT
Start: 2024-04-08

## 2024-04-08 RX ORDER — ALLOPURINOL 300 MG/1
TABLET ORAL
Qty: 90 TABLET | Refills: 1 | Status: SHIPPED | OUTPATIENT
Start: 2024-04-08

## 2024-04-08 RX ORDER — CLONIDINE HYDROCHLORIDE 0.2 MG/1
0.2 TABLET ORAL 2 TIMES DAILY
Qty: 180 TABLET | Refills: 1 | Status: SHIPPED | OUTPATIENT
Start: 2024-04-08

## 2024-05-26 DIAGNOSIS — E78.2 MIXED HYPERLIPIDEMIA: ICD-10-CM

## 2024-05-26 RX ORDER — ATORVASTATIN CALCIUM 80 MG/1
80 TABLET, FILM COATED ORAL DAILY
Qty: 90 TABLET | Refills: 1 | Status: SHIPPED | OUTPATIENT
Start: 2024-05-26

## 2024-06-14 ENCOUNTER — RA CDI HCC (OUTPATIENT)
Dept: OTHER | Facility: HOSPITAL | Age: 69
End: 2024-06-14

## 2024-06-18 ENCOUNTER — APPOINTMENT (OUTPATIENT)
Dept: LAB | Facility: CLINIC | Age: 69
End: 2024-06-18
Payer: COMMERCIAL

## 2024-06-18 DIAGNOSIS — E11.42 TYPE 2 DIABETES MELLITUS WITH DIABETIC POLYNEUROPATHY, WITHOUT LONG-TERM CURRENT USE OF INSULIN (HCC): ICD-10-CM

## 2024-06-18 DIAGNOSIS — Z79.899 ENCOUNTER FOR LONG-TERM (CURRENT) USE OF MEDICATIONS: ICD-10-CM

## 2024-06-18 LAB
ALBUMIN SERPL BCP-MCNC: 3.7 G/DL (ref 3.5–5)
ALP SERPL-CCNC: 80 U/L (ref 34–104)
ALT SERPL W P-5'-P-CCNC: 16 U/L (ref 7–52)
ANION GAP SERPL CALCULATED.3IONS-SCNC: 9 MMOL/L (ref 4–13)
AST SERPL W P-5'-P-CCNC: 16 U/L (ref 13–39)
BASOPHILS # BLD AUTO: 0.05 THOUSANDS/ÂΜL (ref 0–0.1)
BASOPHILS NFR BLD AUTO: 1 % (ref 0–1)
BILIRUB SERPL-MCNC: 0.89 MG/DL (ref 0.2–1)
BUN SERPL-MCNC: 19 MG/DL (ref 5–25)
CALCIUM SERPL-MCNC: 8.9 MG/DL (ref 8.4–10.2)
CHLORIDE SERPL-SCNC: 101 MMOL/L (ref 96–108)
CO2 SERPL-SCNC: 25 MMOL/L (ref 21–32)
CREAT SERPL-MCNC: 0.94 MG/DL (ref 0.6–1.3)
CREAT UR-MCNC: 165.8 MG/DL
EOSINOPHIL # BLD AUTO: 0.18 THOUSAND/ÂΜL (ref 0–0.61)
EOSINOPHIL NFR BLD AUTO: 3 % (ref 0–6)
ERYTHROCYTE [DISTWIDTH] IN BLOOD BY AUTOMATED COUNT: 13.9 % (ref 11.6–15.1)
EST. AVERAGE GLUCOSE BLD GHB EST-MCNC: 154 MG/DL
GFR SERPL CREATININE-BSD FRML MDRD: 81 ML/MIN/1.73SQ M
GLUCOSE P FAST SERPL-MCNC: 133 MG/DL (ref 65–99)
HBA1C MFR BLD: 7 %
HCT VFR BLD AUTO: 39 % (ref 36.5–49.3)
HGB BLD-MCNC: 13.3 G/DL (ref 12–17)
IMM GRANULOCYTES # BLD AUTO: 0.04 THOUSAND/UL (ref 0–0.2)
IMM GRANULOCYTES NFR BLD AUTO: 1 % (ref 0–2)
LYMPHOCYTES # BLD AUTO: 2.51 THOUSANDS/ÂΜL (ref 0.6–4.47)
LYMPHOCYTES NFR BLD AUTO: 39 % (ref 14–44)
MCH RBC QN AUTO: 34 PG (ref 26.8–34.3)
MCHC RBC AUTO-ENTMCNC: 34.1 G/DL (ref 31.4–37.4)
MCV RBC AUTO: 100 FL (ref 82–98)
MICROALBUMIN UR-MCNC: 140.3 MG/L
MICROALBUMIN/CREAT 24H UR: 85 MG/G CREATININE (ref 0–30)
MONOCYTES # BLD AUTO: 0.73 THOUSAND/ÂΜL (ref 0.17–1.22)
MONOCYTES NFR BLD AUTO: 11 % (ref 4–12)
NEUTROPHILS # BLD AUTO: 2.99 THOUSANDS/ÂΜL (ref 1.85–7.62)
NEUTS SEG NFR BLD AUTO: 45 % (ref 43–75)
NRBC BLD AUTO-RTO: 0 /100 WBCS
PLATELET # BLD AUTO: 184 THOUSANDS/UL (ref 149–390)
PMV BLD AUTO: 10.4 FL (ref 8.9–12.7)
POTASSIUM SERPL-SCNC: 4.4 MMOL/L (ref 3.5–5.3)
PROT SERPL-MCNC: 8 G/DL (ref 6.4–8.4)
RBC # BLD AUTO: 3.91 MILLION/UL (ref 3.88–5.62)
SODIUM SERPL-SCNC: 135 MMOL/L (ref 135–147)
WBC # BLD AUTO: 6.5 THOUSAND/UL (ref 4.31–10.16)

## 2024-06-18 PROCEDURE — 82570 ASSAY OF URINE CREATININE: CPT

## 2024-06-18 PROCEDURE — 80053 COMPREHEN METABOLIC PANEL: CPT

## 2024-06-18 PROCEDURE — 85025 COMPLETE CBC W/AUTO DIFF WBC: CPT

## 2024-06-18 PROCEDURE — 82043 UR ALBUMIN QUANTITATIVE: CPT

## 2024-06-18 PROCEDURE — 83036 HEMOGLOBIN GLYCOSYLATED A1C: CPT

## 2024-06-18 PROCEDURE — 36415 COLL VENOUS BLD VENIPUNCTURE: CPT

## 2024-06-21 ENCOUNTER — OFFICE VISIT (OUTPATIENT)
Dept: FAMILY MEDICINE CLINIC | Facility: CLINIC | Age: 69
End: 2024-06-21
Payer: COMMERCIAL

## 2024-06-21 VITALS
BODY MASS INDEX: 40.43 KG/M2 | HEIGHT: 74 IN | DIASTOLIC BLOOD PRESSURE: 77 MMHG | SYSTOLIC BLOOD PRESSURE: 139 MMHG | HEART RATE: 77 BPM | TEMPERATURE: 97.1 F | OXYGEN SATURATION: 98 % | WEIGHT: 315 LBS

## 2024-06-21 DIAGNOSIS — I10 ESSENTIAL HYPERTENSION: Chronic | ICD-10-CM

## 2024-06-21 DIAGNOSIS — E11.42 TYPE 2 DIABETES MELLITUS WITH DIABETIC POLYNEUROPATHY, WITHOUT LONG-TERM CURRENT USE OF INSULIN (HCC): ICD-10-CM

## 2024-06-21 DIAGNOSIS — E78.2 MIXED HYPERLIPIDEMIA: Primary | Chronic | ICD-10-CM

## 2024-06-21 DIAGNOSIS — I48.21 PERMANENT ATRIAL FIBRILLATION (HCC): ICD-10-CM

## 2024-06-21 PROCEDURE — G2211 COMPLEX E/M VISIT ADD ON: HCPCS | Performed by: FAMILY MEDICINE

## 2024-06-21 PROCEDURE — 99214 OFFICE O/P EST MOD 30 MIN: CPT | Performed by: FAMILY MEDICINE

## 2024-06-21 NOTE — PROGRESS NOTES
Ambulatory Visit  Name: Lester Hall      : 1955      MRN: [id number]  Encounter Provider: Aurelio Ramírez DO  Encounter Date: 2024   Encounter department: [facility]    Assessment & Plan   1. Mixed hyperlipidemia  Assessment & Plan:  I ordered a direct LDL cholesterol for the patient's next office visit.  His goal LDL cholesterol is less than 70.  Patient will continue intensive lipid-lowering therapy with atorvastatin 80 mg daily.  Orders:  -     LDL cholesterol, direct; Future  2. Type 2 diabetes mellitus with diabetic polyneuropathy, without long-term current use of insulin (HCC)  Assessment & Plan:    Lab Results   Component Value Date    HGBA1C 7.0 (H) 2024   Patient has type 2 diabetes mellitus.  As noted, patient lost 21 pounds since I last saw him.  I congratulated him and asked him to continue to watch his diet and try to lose weight.  I reviewed his labs.  His hemoglobin A1c is now 7.0.  He was previously 8.5.  Urine albumin to creatinine ratio was 85.  Fasting blood glucose was 133.  I am going to have the patient continue metformin 1000 mg daily in addition to glipizide 5 mg daily.  I ordered repeat labs for his next office visit  Orders:  -     metFORMIN (GLUCOPHAGE) 1000 MG tablet; Take 1 tablet (1,000 mg total) by mouth daily with breakfast  -     Hemoglobin A1C; Future; Expected date: 2024  -     Comprehensive metabolic panel; Future; Expected date: 2024  3. Essential hypertension  Assessment & Plan:  Patient has hypertension.  Blood pressure today was acceptable.  I recommended no change with his current medication.  4. Permanent atrial fibrillation (HCC)  Assessment & Plan:  Patient has chronic atrial fibrillation.  Rate is controlled and patient is adequately anticoagulated on Eliquis.  No change with current regimen.       History of Present Illness     This is a 69-year-old white male who presents to the office today for his routine checkup.   "The patient is doing well.  Unfortunately, since I last saw him, his lady friend passed away.  He tells me she passed away in May.  He tells me he is watching his diet and he is more active now.  He was able to lose 21 pounds since his last visit.  He tells me he is only able to tolerate taking metformin once a day secondary to diarrhea.  Patient did have blood work done for his visit today and wanted to go over the results.        Review of Systems   Respiratory:  Negative for cough, shortness of breath and wheezing.    Cardiovascular:  Positive for leg swelling (Reports less leg edema). Negative for chest pain and palpitations.   Gastrointestinal:  Negative for abdominal distention, abdominal pain, blood in stool, constipation, diarrhea and nausea.   Psychiatric/Behavioral:  Negative for dysphoric mood. The patient is not nervous/anxious.        Objective     /77   Pulse 77   Temp (!) 97.1 °F (36.2 °C) (Tympanic)   Ht 6' 2\" (1.88 m)   Wt (!) 154 kg (339 lb 12.8 oz)   SpO2 98%   BMI 43.63 kg/m²     Physical Exam  Vitals reviewed.   Constitutional:       Comments: Patient is a 69-year-old white male who appears his stated age.  He is pleasant, cooperative, and in no distress.   HENT:      Head: Normocephalic and atraumatic.   Neck:      Vascular: No carotid bruit.      Comments: There is no thyromegaly  Cardiovascular:      Rate and Rhythm: Normal rate. Rhythm irregular.      Pulses: Pulses are weak.           Dorsalis pedis pulses are 0 on the right side and 0 on the left side.        Posterior tibial pulses are 0 on the right side and 0 on the left side.      Heart sounds: Normal heart sounds. No murmur heard.     No friction rub. No gallop.      Comments: Heart is irregularly irregular.  Ventricular rate was well-controlled.  Pulmonary:      Effort: Pulmonary effort is normal. No respiratory distress.      Breath sounds: Normal breath sounds. No stridor. No wheezing, rhonchi or rales. "   Musculoskeletal:      Cervical back: Neck supple.      Right lower leg: Edema (There is +1/4 lower extremity edema noted bilaterally.) present.      Left lower leg: Edema present.   Feet:      Right foot:      Skin integrity: No ulcer, skin breakdown, erythema, warmth, callus or dry skin.      Left foot:      Skin integrity: No ulcer, skin breakdown, erythema, warmth, callus or dry skin.   Lymphadenopathy:      Cervical: No cervical adenopathy.       Patient's shoes and socks removed.    Right Foot/Ankle   Right Foot Inspection  Skin Exam: skin normal and skin intact. No dry skin, no warmth, no callus, no erythema, no maceration, no abnormal color, no pre-ulcer, no ulcer and no callus.     Toe Exam: right toe deformity. No swelling, no tenderness and erythema    Sensory   Vibration: diminished  Proprioception: intact  Monofilament testing: diminished    Vascular  Capillary refills: < 3 seconds  The right DP pulse is 0. The right PT pulse is 0.     Left Foot/Ankle  Left Foot Inspection  Skin Exam: skin normal and skin intact. No dry skin, no warmth, no erythema, no maceration, normal color, no pre-ulcer, no ulcer and no callus.     Toe Exam: No swelling, no tenderness, no erythema and no left toe deformity.     Sensory   Vibration: diminished  Monofilament testing: diminished    Vascular  Capillary refills: < 3 seconds  The left DP pulse is 0. The left PT pulse is 0.     Assign Risk Category  Deformity present  Loss of protective sensation  Weak pulses  Risk: 2      Administrative Statements

## 2024-06-26 NOTE — ASSESSMENT & PLAN NOTE
I ordered a direct LDL cholesterol for the patient's next office visit.  His goal LDL cholesterol is less than 70.  Patient will continue intensive lipid-lowering therapy with atorvastatin 80 mg daily.

## 2024-06-26 NOTE — ASSESSMENT & PLAN NOTE
Patient has hypertension.  Blood pressure today was acceptable.  I recommended no change with his current medication.

## 2024-06-26 NOTE — ASSESSMENT & PLAN NOTE
Patient has chronic atrial fibrillation.  Rate is controlled and patient is adequately anticoagulated on Eliquis.  No change with current regimen.

## 2024-06-26 NOTE — ASSESSMENT & PLAN NOTE
Lab Results   Component Value Date    HGBA1C 7.0 (H) 06/18/2024   Patient has type 2 diabetes mellitus.  As noted, patient lost 21 pounds since I last saw him.  I congratulated him and asked him to continue to watch his diet and try to lose weight.  I reviewed his labs.  His hemoglobin A1c is now 7.0.  He was previously 8.5.  Urine albumin to creatinine ratio was 85.  Fasting blood glucose was 133.  I am going to have the patient continue metformin 1000 mg daily in addition to glipizide 5 mg daily.  I ordered repeat labs for his next office visit

## 2024-07-22 DIAGNOSIS — E11.42 TYPE 2 DIABETES MELLITUS WITH DIABETIC POLYNEUROPATHY, WITHOUT LONG-TERM CURRENT USE OF INSULIN (HCC): ICD-10-CM

## 2024-08-07 DIAGNOSIS — I10 ESSENTIAL HYPERTENSION: Chronic | ICD-10-CM

## 2024-08-07 RX ORDER — LOSARTAN POTASSIUM 100 MG/1
100 TABLET ORAL DAILY
Qty: 100 TABLET | Refills: 1 | Status: SHIPPED | OUTPATIENT
Start: 2024-08-07

## 2024-10-17 ENCOUNTER — APPOINTMENT (OUTPATIENT)
Dept: LAB | Facility: CLINIC | Age: 69
End: 2024-10-17
Payer: COMMERCIAL

## 2024-10-17 DIAGNOSIS — E11.621 TYPE 2 DIABETES MELLITUS WITH FOOT ULCER, WITHOUT LONG-TERM CURRENT USE OF INSULIN (HCC): Chronic | ICD-10-CM

## 2024-10-17 DIAGNOSIS — E78.2 MIXED HYPERLIPIDEMIA: Chronic | ICD-10-CM

## 2024-10-17 DIAGNOSIS — M1A.00X0 IDIOPATHIC CHRONIC GOUT WITHOUT TOPHUS, UNSPECIFIED SITE: ICD-10-CM

## 2024-10-17 DIAGNOSIS — L97.509 TYPE 2 DIABETES MELLITUS WITH FOOT ULCER, WITHOUT LONG-TERM CURRENT USE OF INSULIN (HCC): Chronic | ICD-10-CM

## 2024-10-17 DIAGNOSIS — E11.42 TYPE 2 DIABETES MELLITUS WITH DIABETIC POLYNEUROPATHY, WITHOUT LONG-TERM CURRENT USE OF INSULIN (HCC): ICD-10-CM

## 2024-10-17 LAB
ALBUMIN SERPL BCG-MCNC: 3.8 G/DL (ref 3.5–5)
ALP SERPL-CCNC: 92 U/L (ref 34–104)
ALT SERPL W P-5'-P-CCNC: 17 U/L (ref 7–52)
ANION GAP SERPL CALCULATED.3IONS-SCNC: 8 MMOL/L (ref 4–13)
AST SERPL W P-5'-P-CCNC: 14 U/L (ref 13–39)
BILIRUB SERPL-MCNC: 0.97 MG/DL (ref 0.2–1)
BUN SERPL-MCNC: 19 MG/DL (ref 5–25)
CALCIUM SERPL-MCNC: 9 MG/DL (ref 8.4–10.2)
CHLORIDE SERPL-SCNC: 99 MMOL/L (ref 96–108)
CO2 SERPL-SCNC: 28 MMOL/L (ref 21–32)
CREAT SERPL-MCNC: 0.83 MG/DL (ref 0.6–1.3)
EST. AVERAGE GLUCOSE BLD GHB EST-MCNC: 174 MG/DL
GFR SERPL CREATININE-BSD FRML MDRD: 88 ML/MIN/1.73SQ M
GLUCOSE P FAST SERPL-MCNC: 151 MG/DL (ref 65–99)
HBA1C MFR BLD: 7.7 %
LDLC SERPL DIRECT ASSAY-MCNC: 73 MG/DL (ref 0–100)
POTASSIUM SERPL-SCNC: 4.5 MMOL/L (ref 3.5–5.3)
PROT SERPL-MCNC: 8.3 G/DL (ref 6.4–8.4)
SODIUM SERPL-SCNC: 135 MMOL/L (ref 135–147)

## 2024-10-17 PROCEDURE — 83036 HEMOGLOBIN GLYCOSYLATED A1C: CPT

## 2024-10-17 PROCEDURE — 83721 ASSAY OF BLOOD LIPOPROTEIN: CPT

## 2024-10-17 PROCEDURE — 80053 COMPREHEN METABOLIC PANEL: CPT

## 2024-10-17 PROCEDURE — 36415 COLL VENOUS BLD VENIPUNCTURE: CPT

## 2024-10-17 RX ORDER — ALLOPURINOL 300 MG/1
TABLET ORAL
Qty: 90 TABLET | Refills: 1 | Status: SHIPPED | OUTPATIENT
Start: 2024-10-17

## 2024-10-17 RX ORDER — GLIPIZIDE 5 MG/1
TABLET ORAL
Qty: 90 TABLET | Refills: 1 | Status: SHIPPED | OUTPATIENT
Start: 2024-10-17

## 2024-10-22 ENCOUNTER — OFFICE VISIT (OUTPATIENT)
Dept: FAMILY MEDICINE CLINIC | Facility: CLINIC | Age: 69
End: 2024-10-22
Payer: COMMERCIAL

## 2024-10-22 VITALS
DIASTOLIC BLOOD PRESSURE: 86 MMHG | HEART RATE: 85 BPM | SYSTOLIC BLOOD PRESSURE: 124 MMHG | OXYGEN SATURATION: 97 % | BODY MASS INDEX: 40.43 KG/M2 | WEIGHT: 315 LBS | HEIGHT: 74 IN | TEMPERATURE: 96.8 F

## 2024-10-22 DIAGNOSIS — E11.42 TYPE 2 DIABETES MELLITUS WITH DIABETIC POLYNEUROPATHY, WITHOUT LONG-TERM CURRENT USE OF INSULIN (HCC): Primary | ICD-10-CM

## 2024-10-22 DIAGNOSIS — I10 ESSENTIAL HYPERTENSION: Chronic | ICD-10-CM

## 2024-10-22 DIAGNOSIS — Z79.899 ENCOUNTER FOR LONG-TERM (CURRENT) USE OF MEDICATIONS: ICD-10-CM

## 2024-10-22 DIAGNOSIS — R09.82 POST-NASAL DRIP: ICD-10-CM

## 2024-10-22 DIAGNOSIS — E78.5 DYSLIPIDEMIA: ICD-10-CM

## 2024-10-22 DIAGNOSIS — Z23 ENCOUNTER FOR IMMUNIZATION: ICD-10-CM

## 2024-10-22 DIAGNOSIS — M1A.09X0 IDIOPATHIC CHRONIC GOUT OF MULTIPLE SITES WITHOUT TOPHUS: ICD-10-CM

## 2024-10-22 PROCEDURE — 99214 OFFICE O/P EST MOD 30 MIN: CPT | Performed by: FAMILY MEDICINE

## 2024-10-22 PROCEDURE — G0008 ADMIN INFLUENZA VIRUS VAC: HCPCS

## 2024-10-22 PROCEDURE — 90662 IIV NO PRSV INCREASED AG IM: CPT

## 2024-10-22 RX ORDER — LORATADINE 10 MG/1
10 TABLET ORAL DAILY
Qty: 30 TABLET | Refills: 5 | Status: SHIPPED | OUTPATIENT
Start: 2024-10-22

## 2024-10-22 NOTE — ASSESSMENT & PLAN NOTE
Patient has gout which remains stable.  He has not had any acute episodes.  Continue allopurinol 300 mg daily.

## 2024-10-22 NOTE — PROGRESS NOTES
Ambulatory Visit  Name: Lester Hall      : 1955      MRN: [id number]  Encounter Provider: Aurelio Ramírez DO  Encounter Date: 10/22/2024   Encounter department: [facility]    Assessment & Plan  Encounter for immunization    Orders:    influenza vaccine, high-dose, PF 0.5 mL (Fluzone High Dose)    Post-nasal drip  Patient has postnasal drip and chronic rhinitis.  I advised the patient is not bronchospastic and using his nebulizer to treat his postnasal drip is not going to help.  I am going to try him on loratadine 10 mg daily.  If ineffective, then I will consider adding Atrovent nasal spray to his regiment.  Orders:    loratadine (CLARITIN) 10 mg tablet; Take 1 tablet (10 mg total) by mouth daily    Type 2 diabetes mellitus with diabetic polyneuropathy, without long-term current use of insulin (HCC)  Patient has type 2 diabetes mellitus.  I reviewed his labs.  His fasting blood glucose was 151.  His hemoglobin A1c went up to 7.7.  He was previously 7.0.  At this time, I did not make any change with his medication.  We discussed diet at length.  Patient tells me he is going to become stricter with his diet and hopefully we can get his A1c back down through diet alone.  I also discussed trying to become more active.  Since his significant other passed away, he has become very sedentary.  We discussed routine diabetic footcare.  I ordered labs for his next visit.  If his A1c remains above goal I will need to consider increasing the dose of his glipizide.  Lab Results   Component Value Date    HGBA1C 7.7 (H) 10/17/2024       Orders:    Comprehensive metabolic panel; Future    Hemoglobin A1C; Future    Encounter for long-term (current) use of medications    Orders:    CBC and differential; Future    Essential hypertension  Patient has hypertension was satisfactory blood pressure control.  The patient will continue losartan, metoprolol, and Catapres.  Patient is creatinine was noted to be  "0.83.  Estimated GFR was 81.  Potassium was 4.5.         Dyslipidemia  The patient's direct LDL cholesterol is 73.  His goal is less than 70.  He is currently on intensive lipid-lowering therapy with atorvastatin 80 mg daily.  I recommended no change.         Idiopathic chronic gout of multiple sites without tophus  Patient has gout which remains stable.  He has not had any acute episodes.  Continue allopurinol 300 mg daily.            History of Present Illness     Patient is a 69-year-old white male who presents to the office today for his routine checkup.  The patient complains of cough and postnasal drip.  He has not been using his nebulizer.  He tells me he does not needs once in a while.  He did gain weight since his last visit here but he attributes this to dietary indiscretion.  He tells me he is not retaining fluid.  He tells me he does not like wearing compression stockings.  He tells me he prefers to allow his legs to get some air.          Review of Systems   Constitutional:  Positive for activity change. Negative for unexpected weight change.   HENT:  Positive for postnasal drip and rhinorrhea.    Respiratory:  Positive for cough. Negative for shortness of breath and wheezing.    Cardiovascular:  Negative for chest pain, palpitations and leg swelling.   Gastrointestinal:  Negative for abdominal distention, abdominal pain, blood in stool, constipation, diarrhea and nausea.           Objective     /86   Pulse 85   Temp (!) 96.8 °F (36 °C) (Tympanic)   Ht 6' 2\" (1.88 m)   Wt (!) 157 kg (347 lb 3.2 oz)   SpO2 97%   BMI 44.58 kg/m²     Physical Exam  Vitals reviewed.   Constitutional:       Comments: This is a 69-year-old white male who appears his stated age.  The patient is pleasant, cooperative, and in no distress.   HENT:      Head: Normocephalic and atraumatic.      Right Ear: Tympanic membrane, ear canal and external ear normal. There is no impacted cerumen.      Left Ear: Tympanic " membrane, ear canal and external ear normal. There is no impacted cerumen.      Nose: Nose normal. No congestion or rhinorrhea.      Mouth/Throat:      Mouth: Mucous membranes are moist.      Pharynx: Oropharynx is clear. No oropharyngeal exudate or posterior oropharyngeal erythema.   Eyes:      General: No scleral icterus.        Right eye: No discharge.         Left eye: No discharge.      Conjunctiva/sclera: Conjunctivae normal.      Pupils: Pupils are equal, round, and reactive to light.   Cardiovascular:      Rate and Rhythm: Normal rate. Rhythm irregular.      Heart sounds: Normal heart sounds. No murmur heard.     No friction rub. No gallop.      Comments: Heart was irregularly irregular.  The ventricular rate is well-controlled.  Pulmonary:      Effort: Pulmonary effort is normal. No respiratory distress.      Breath sounds: Normal breath sounds. No stridor. No wheezing, rhonchi or rales.   Musculoskeletal:      Cervical back: Neck supple.   Lymphadenopathy:      Cervical: No cervical adenopathy.   Psychiatric:         Mood and Affect: Mood normal.         Behavior: Behavior normal.         Thought Content: Thought content normal.         Judgment: Judgment normal.     Extremities: Without cyanosis, clubbing, or edema

## 2024-10-22 NOTE — PATIENT INSTRUCTIONS
"Patient Education     Foot care for people with diabetes   The Basics   Written by the doctors and editors at [facility]   Why is foot care important if I have diabetes? -- Diabetes can cause nerve damage if your blood sugar is high for a long time. The medical term for this is \"diabetic neuropathy.\"  If you have problems with the nerves in your feet, you might not be able to feel pain in your foot. Normally, people feel pain when they get a cut or a blister on their foot. The pain tells them that they need to treat their cut so it can heal. But people with nerve damage might not feel any pain when their feet get hurt. They might not even know that they have a cut, so they might not treat it. Problems that aren't treated right away can get much worse. For example, an untreated cut can get infected and turn into an open sore.  High blood sugar can also damage blood vessels and decrease blood flow to your feet. This can weaken your skin and make wounds take longer to heal. You are also more likely to get an infection if you have high blood sugar.  How do I take care of my feet? -- Taking good care of your feet can help prevent foot problems. You should:   Wash your feet every day with soap and warm water. Pat your feet dry, and be sure to dry the skin between your toes.   Keep your feet moisturized. Put lotion on the tops and bottoms of your feet, but not between your toes.   Check your feet every day (figure 1). Look for cuts, blisters, redness, or swelling. Use a mirror, or ask someone to help you check the bottoms of your feet. Check all parts of the foot, especially between the toes. Look for broken skin, ulcers, blisters, or redness.   Trim your toenails straight across when needed (figure 2). Do not cut the corners of your toenails. File rough edges. Do not cut your cuticles. Ask for help if you cannot see well or have problems reaching your feet.   Ask your doctor or nurse to check your feet at each visit. Take " your shoes and socks off for these checks.   See a foot care provider (such as a podiatrist) if you have an ingrown toenail, corn, or callus. Do not try to remove corns and calluses yourself.  How do I protect my feet from injury? -- There are several ways to protect your feet. You can:   Wear shoes and socks at all times, even at home. Do not walk barefoot. Wear swim shoes if you go to the beach or a swimming pool.   Choose shoes that fit well. They should not be not too tight or too loose. Your shoes should have plenty of room for your toes (figure 3). Your doctor might give you a prescription for special shoes. Check to see if they are covered by your insurance.   Check your shoes each time before you put them on to make sure that the lining is smooth. Also check to make sure that there is nothing inside the shoes before putting them on.   Do not wear shoes that expose any part of the foot, like sandals, thongs, or clogs.   Wear cotton socks that fit loosely. Do not wear shoes without socks.   Protect your feet from heat and cold. Test bath water before putting your feet in it to make sure that it is not too hot. Do not walk barefoot on hot ground. Take extra care when going outside in the cold and wear warm socks.  What else should I know? -- You can lower your risk for foot problems by keeping your blood sugar levels as close to your goal as possible. Other things you can do include:   Move your ankles and toes often to help with blood flow. You can wear a support stocking to help with swelling.   Walk often. Regular walking helps blood flow.   If you smoke, try to quit. Your doctor or nurse can help. Smoking causes poor blood flow to your feet and can damage your nerves.  When should I call the doctor? -- Call your doctor or nurse for advice if you have:   A fever of 100.4°F (38°C) or higher, chills, or a wound that will not heal   Swelling, redness, warmth around a wound, a foul smell coming from a wound, or  yellowish, greenish, or bloody discharge   Sores or blisters on your feet that hurt more or less than you would expect   Numbness or tingling in your foot or leg   Corns, calluses, blisters, or new sores on your foot   Very dry, scaly, or cracked skin on your feet   Changes in the way your foot joints or arch look  All topics are updated as new evidence becomes available and our peer review process is complete.  This topic retrieved from [employer] on: Mar 13, 2024.  Topic 976390 Version 2.0  Release: 32.2.4 - C32.71  © 2024 UpToDate, Inc. and/or its affiliates. All rights reserved.  figure 1: Foot check for people with diabetes     People with diabetes should check both of their feet every day. It is important to check your feet all over, including in between your toes. If you can't see the bottom of your foot, use a mirror or ask another person to check for you. Let your doctor or nurse know if you find any:  Redness   Cuts or cracks in the skin   Blisters   Swelling   Graphic 41796 Version 3.0  figure 2: Trim your toenails     Trim your toenails straight across and smooth them with a nail file.  Graphic 76336 Version 2.0  figure 3: Correct shoe shape     Choose shoes that fit the right way and are not too tight or too loose. Your shoes should have plenty of room for your toes.  Graphic 64318 Version 2.0  Consumer Information Use and Disclaimer   Disclaimer: This generalized information is a limited summary of diagnosis, treatment, and/or medication information. It is not meant to be comprehensive and should be used as a tool to help the user understand and/or assess potential diagnostic and treatment options. It does NOT include all information about conditions, treatments, medications, side effects, or risks that may apply to a specific patient. It is not intended to be medical advice or a substitute for the medical advice, diagnosis, or treatment of a health care provider based on the health care provider's  examination and assessment of a patient's specific and unique circumstances. Patients must speak with a health care provider for complete information about their health, medical questions, and treatment options, including any risks or benefits regarding use of medications. This information does not endorse any treatments or medications as safe, effective, or approved for treating a specific patient. UpToDate, Inc. and its affiliates disclaim any warranty or liability relating to this information or the use thereof.The use of this information is governed by the Terms of Use, available at https://www.woltersPROFICIOuwer.com/en/know/clinical-effectiveness-terms. 2024© UpToDate, Inc. and its affiliates and/or licensors. All rights reserved.  Copyright   © 2024 UpToDate, Inc. and/or its affiliates. All rights reserved.

## 2024-10-22 NOTE — ASSESSMENT & PLAN NOTE
The patient's direct LDL cholesterol is 73.  His goal is less than 70.  He is currently on intensive lipid-lowering therapy with atorvastatin 80 mg daily.  I recommended no change.

## 2024-10-22 NOTE — ASSESSMENT & PLAN NOTE
Patient has postnasal drip and chronic rhinitis.  I advised the patient is not bronchospastic and using his nebulizer to treat his postnasal drip is not going to help.  I am going to try him on loratadine 10 mg daily.  If ineffective, then I will consider adding Atrovent nasal spray to his regiment.  Orders:    loratadine (CLARITIN) 10 mg tablet; Take 1 tablet (10 mg total) by mouth daily     1. Weakness in patient with mild L3/4 spinal stenosis and multi level neuroforaminal narrowing.  Patient also has signs of peripheral neuropathy and this can be also contributing to the weakness as well.   Neuropathy can be due to the patient's Hx of DM and Sarcoid.  Weakness is non focal on neurological exam and is likely multifactorial.  Cymbalta 30mg for tingling sensation in the legs  PT  outpatient ncs/emg  please keep in mind that steroids can cause myopathy thus worsen weakness    2. Encephalopathy due to adrenal insufficiency improving on steroids 1. Weakness in patient with mild L3/4 spinal stenosis and multi level neuroforaminal narrowing.  Patient also has signs of peripheral neuropathy and this can be also contributing to the weakness as well.   Neuropathy can be due to the patient's Hx of DM and Sarcoid.  Weakness is non focal on neurological exam and is likely multifactorial.  Cymbalta 30mg for tingling sensation in the legs for now, may consider changing regiment as outpatient  PT  outpatient ncs/emg  please keep in mind that steroids can cause myopathy thus worsen weakness    2. Encephalopathy due to adrenal insufficiency improving on steroids    Please call back with questions.

## 2024-10-22 NOTE — ASSESSMENT & PLAN NOTE
Patient has type 2 diabetes mellitus.  I reviewed his labs.  His fasting blood glucose was 151.  His hemoglobin A1c went up to 7.7.  He was previously 7.0.  At this time, I did not make any change with his medication.  We discussed diet at length.  Patient tells me he is going to become stricter with his diet and hopefully we can get his A1c back down through diet alone.  I also discussed trying to become more active.  Since his significant other passed away, he has become very sedentary.  We discussed routine diabetic footcare.  I ordered labs for his next visit.  If his A1c remains above goal I will need to consider increasing the dose of his glipizide.  Lab Results   Component Value Date    HGBA1C 7.7 (H) 10/17/2024       Orders:    Comprehensive metabolic panel; Future    Hemoglobin A1C; Future

## 2024-10-22 NOTE — ASSESSMENT & PLAN NOTE
Patient has hypertension was satisfactory blood pressure control.  The patient will continue losartan, metoprolol, and Catapres.  Patient is creatinine was noted to be 0.83.  Estimated GFR was 81.  Potassium was 4.5.

## 2024-10-31 DIAGNOSIS — I10 ESSENTIAL HYPERTENSION: Chronic | ICD-10-CM

## 2024-10-31 DIAGNOSIS — I48.20 CHRONIC ATRIAL FIBRILLATION (HCC): ICD-10-CM

## 2024-10-31 RX ORDER — CLONIDINE HYDROCHLORIDE 0.2 MG/1
0.2 TABLET ORAL 2 TIMES DAILY
Qty: 180 TABLET | Refills: 1 | Status: SHIPPED | OUTPATIENT
Start: 2024-10-31

## 2024-10-31 RX ORDER — METOPROLOL SUCCINATE 25 MG/1
25 TABLET, EXTENDED RELEASE ORAL DAILY
Qty: 90 TABLET | Refills: 1 | Status: SHIPPED | OUTPATIENT
Start: 2024-10-31

## 2024-12-05 DIAGNOSIS — E78.2 MIXED HYPERLIPIDEMIA: ICD-10-CM

## 2024-12-05 RX ORDER — ATORVASTATIN CALCIUM 80 MG/1
80 TABLET, FILM COATED ORAL DAILY
Qty: 90 TABLET | Refills: 1 | Status: SHIPPED | OUTPATIENT
Start: 2024-12-05

## 2024-12-09 ENCOUNTER — TELEPHONE (OUTPATIENT)
Age: 69
End: 2024-12-09

## 2024-12-09 NOTE — TELEPHONE ENCOUNTER
Patient called in to schedule an appointment for an ear cleaning as the over the counter medications have not worked for him. Attempted to schedule an appointment but there was nothing available until May. Provided patient with [facility] phone number to call and see if there is a sooner available appointment.

## 2024-12-26 PROCEDURE — 87205 SMEAR GRAM STAIN: CPT | Performed by: PHYSICIAN ASSISTANT

## 2024-12-26 PROCEDURE — 87186 SC STD MICRODIL/AGAR DIL: CPT | Performed by: PHYSICIAN ASSISTANT

## 2024-12-26 PROCEDURE — 87070 CULTURE OTHR SPECIMN AEROBIC: CPT | Performed by: PHYSICIAN ASSISTANT

## 2024-12-30 DIAGNOSIS — I48.20 CHRONIC ATRIAL FIBRILLATION (HCC): ICD-10-CM

## 2024-12-31 RX ORDER — APIXABAN 5 MG/1
5 TABLET, FILM COATED ORAL 2 TIMES DAILY
Qty: 180 TABLET | Refills: 1 | Status: SHIPPED | OUTPATIENT
Start: 2024-12-31

## 2025-01-03 ENCOUNTER — VBI (OUTPATIENT)
Dept: ADMINISTRATIVE | Facility: OTHER | Age: 70
End: 2025-01-03

## 2025-01-03 NOTE — TELEPHONE ENCOUNTER
01/03/25 1:39 PM     Chart reviewed for Hemoglobin A1c was/were submitted to the patient's insurance.     Eloina Delaney   [facility]

## 2025-01-07 DIAGNOSIS — I48.20 CHRONIC ATRIAL FIBRILLATION (HCC): Chronic | ICD-10-CM

## 2025-01-08 RX ORDER — FUROSEMIDE 80 MG/1
80 TABLET ORAL DAILY
Qty: 90 TABLET | Refills: 1 | Status: SHIPPED | OUTPATIENT
Start: 2025-01-08

## 2025-01-14 ENCOUNTER — HOSPITAL ENCOUNTER (OUTPATIENT)
Dept: CT IMAGING | Facility: HOSPITAL | Age: 70
Discharge: HOME | End: 2025-01-14
Attending: OTOLARYNGOLOGY
Payer: COMMERCIAL

## 2025-01-14 DIAGNOSIS — H90.A31 MIXED CONDUCTIVE AND SENSORINEURAL HEARING LOSS OF RIGHT EAR WITH RESTRICTED HEARING OF LEFT EAR: ICD-10-CM

## 2025-01-14 PROCEDURE — 70480 CT ORBIT/EAR/FOSSA W/O DYE: CPT

## 2025-01-30 DIAGNOSIS — I10 ESSENTIAL HYPERTENSION: Chronic | ICD-10-CM

## 2025-01-30 RX ORDER — LOSARTAN POTASSIUM 100 MG/1
100 TABLET ORAL DAILY
Qty: 100 TABLET | Refills: 1 | Status: SHIPPED | OUTPATIENT
Start: 2025-01-30

## 2025-02-20 ENCOUNTER — APPOINTMENT (OUTPATIENT)
Dept: LAB | Facility: CLINIC | Age: 70
End: 2025-02-20
Payer: COMMERCIAL

## 2025-02-20 DIAGNOSIS — Z79.899 ENCOUNTER FOR LONG-TERM (CURRENT) USE OF MEDICATIONS: ICD-10-CM

## 2025-02-20 DIAGNOSIS — E11.42 TYPE 2 DIABETES MELLITUS WITH DIABETIC POLYNEUROPATHY, WITHOUT LONG-TERM CURRENT USE OF INSULIN (HCC): ICD-10-CM

## 2025-02-20 LAB
ALBUMIN SERPL BCG-MCNC: 3.8 G/DL (ref 3.5–5)
ALP SERPL-CCNC: 74 U/L (ref 34–104)
ALT SERPL W P-5'-P-CCNC: 18 U/L (ref 7–52)
ANION GAP SERPL CALCULATED.3IONS-SCNC: 4 MMOL/L (ref 4–13)
AST SERPL W P-5'-P-CCNC: 18 U/L (ref 13–39)
BASOPHILS # BLD AUTO: 0.02 THOUSANDS/ΜL (ref 0–0.1)
BASOPHILS NFR BLD AUTO: 0 % (ref 0–1)
BILIRUB SERPL-MCNC: 0.73 MG/DL (ref 0.2–1)
BUN SERPL-MCNC: 14 MG/DL (ref 5–25)
CALCIUM SERPL-MCNC: 9 MG/DL (ref 8.4–10.2)
CHLORIDE SERPL-SCNC: 99 MMOL/L (ref 96–108)
CO2 SERPL-SCNC: 32 MMOL/L (ref 21–32)
CREAT SERPL-MCNC: 0.85 MG/DL (ref 0.6–1.3)
EOSINOPHIL # BLD AUTO: 0.21 THOUSAND/ΜL (ref 0–0.61)
EOSINOPHIL NFR BLD AUTO: 4 % (ref 0–6)
ERYTHROCYTE [DISTWIDTH] IN BLOOD BY AUTOMATED COUNT: 13.6 % (ref 11.6–15.1)
EST. AVERAGE GLUCOSE BLD GHB EST-MCNC: 183 MG/DL
GFR SERPL CREATININE-BSD FRML MDRD: 87 ML/MIN/1.73SQ M
GLUCOSE P FAST SERPL-MCNC: 149 MG/DL (ref 65–99)
HBA1C MFR BLD: 8 %
HCT VFR BLD AUTO: 38.9 % (ref 36.5–49.3)
HGB BLD-MCNC: 12.7 G/DL (ref 12–17)
IMM GRANULOCYTES # BLD AUTO: 0.01 THOUSAND/UL (ref 0–0.2)
IMM GRANULOCYTES NFR BLD AUTO: 0 % (ref 0–2)
LYMPHOCYTES # BLD AUTO: 2.09 THOUSANDS/ΜL (ref 0.6–4.47)
LYMPHOCYTES NFR BLD AUTO: 42 % (ref 14–44)
MCH RBC QN AUTO: 34 PG (ref 26.8–34.3)
MCHC RBC AUTO-ENTMCNC: 32.6 G/DL (ref 31.4–37.4)
MCV RBC AUTO: 104 FL (ref 82–98)
MONOCYTES # BLD AUTO: 0.46 THOUSAND/ΜL (ref 0.17–1.22)
MONOCYTES NFR BLD AUTO: 9 % (ref 4–12)
NEUTROPHILS # BLD AUTO: 2.14 THOUSANDS/ΜL (ref 1.85–7.62)
NEUTS SEG NFR BLD AUTO: 45 % (ref 43–75)
NRBC BLD AUTO-RTO: 0 /100 WBCS
PLATELET # BLD AUTO: 133 THOUSANDS/UL (ref 149–390)
PMV BLD AUTO: 10.7 FL (ref 8.9–12.7)
POTASSIUM SERPL-SCNC: 4.2 MMOL/L (ref 3.5–5.3)
PROT SERPL-MCNC: 8.1 G/DL (ref 6.4–8.4)
RBC # BLD AUTO: 3.73 MILLION/UL (ref 3.88–5.62)
SODIUM SERPL-SCNC: 135 MMOL/L (ref 135–147)
WBC # BLD AUTO: 4.93 THOUSAND/UL (ref 4.31–10.16)

## 2025-02-20 PROCEDURE — 80053 COMPREHEN METABOLIC PANEL: CPT

## 2025-02-20 PROCEDURE — 85025 COMPLETE CBC W/AUTO DIFF WBC: CPT

## 2025-02-20 PROCEDURE — 36415 COLL VENOUS BLD VENIPUNCTURE: CPT

## 2025-02-20 PROCEDURE — 83036 HEMOGLOBIN GLYCOSYLATED A1C: CPT

## 2025-02-24 ENCOUNTER — OFFICE VISIT (OUTPATIENT)
Dept: FAMILY MEDICINE CLINIC | Facility: CLINIC | Age: 70
End: 2025-02-24
Payer: COMMERCIAL

## 2025-02-24 VITALS
DIASTOLIC BLOOD PRESSURE: 80 MMHG | WEIGHT: 315 LBS | BODY MASS INDEX: 40.43 KG/M2 | TEMPERATURE: 97.1 F | OXYGEN SATURATION: 97 % | HEIGHT: 74 IN | HEART RATE: 75 BPM | SYSTOLIC BLOOD PRESSURE: 139 MMHG

## 2025-02-24 DIAGNOSIS — Z12.5 SCREENING FOR PROSTATE CANCER: ICD-10-CM

## 2025-02-24 DIAGNOSIS — E78.2 MIXED HYPERLIPIDEMIA: Chronic | ICD-10-CM

## 2025-02-24 DIAGNOSIS — Z00.00 MEDICARE ANNUAL WELLNESS VISIT, SUBSEQUENT: ICD-10-CM

## 2025-02-24 DIAGNOSIS — E11.42 TYPE 2 DIABETES MELLITUS WITH DIABETIC POLYNEUROPATHY, WITHOUT LONG-TERM CURRENT USE OF INSULIN (HCC): Primary | ICD-10-CM

## 2025-02-24 DIAGNOSIS — D69.6 THROMBOCYTOPENIA (HCC): ICD-10-CM

## 2025-02-24 DIAGNOSIS — I48.21 PERMANENT ATRIAL FIBRILLATION (HCC): ICD-10-CM

## 2025-02-24 DIAGNOSIS — E66.01 MORBID OBESITY WITH BMI OF 45.0-49.9, ADULT (HCC): ICD-10-CM

## 2025-02-24 DIAGNOSIS — I10 ESSENTIAL HYPERTENSION: Chronic | ICD-10-CM

## 2025-02-24 PROCEDURE — G0439 PPPS, SUBSEQ VISIT: HCPCS | Performed by: FAMILY MEDICINE

## 2025-02-24 PROCEDURE — G2211 COMPLEX E/M VISIT ADD ON: HCPCS | Performed by: FAMILY MEDICINE

## 2025-02-24 PROCEDURE — 99214 OFFICE O/P EST MOD 30 MIN: CPT | Performed by: FAMILY MEDICINE

## 2025-02-24 RX ORDER — GLIPIZIDE 5 MG/1
5 TABLET ORAL 2 TIMES DAILY WITH MEALS
Qty: 180 TABLET | Refills: 1 | Status: SHIPPED | OUTPATIENT
Start: 2025-02-24

## 2025-02-24 NOTE — PATIENT INSTRUCTIONS
"Patient Education     Foot care for people with diabetes   The Basics   Written by the doctors and editors at [facility]   Why is foot care important if I have diabetes? -- Diabetes can cause nerve damage if your blood sugar is high for a long time. The medical term for this is \"diabetic neuropathy.\"  If you have problems with the nerves in your feet, you might not be able to feel pain in your foot. Normally, people feel pain when they get a cut or a blister on their foot. The pain tells them that they need to treat their cut so it can heal. But people with nerve damage might not feel any pain when their feet get hurt. They might not even know that they have a cut, so they might not treat it. Problems that aren't treated right away can get much worse. For example, an untreated cut can get infected and turn into an open sore.  High blood sugar can also damage blood vessels and decrease blood flow to your feet. This can weaken your skin and make wounds take longer to heal. You are also more likely to get an infection if you have high blood sugar.  How do I take care of my feet? -- Taking good care of your feet can help prevent foot problems. You should:   Wash your feet every day with soap and warm water. Pat your feet dry, and be sure to dry the skin between your toes.   Keep your feet moisturized. Put lotion on the tops and bottoms of your feet, but not between your toes.   Check your feet every day (figure 1). Look for cuts, blisters, redness, or swelling. Use a mirror, or ask someone to help you check the bottoms of your feet. Check all parts of the foot, especially between the toes. Look for broken skin, ulcers, blisters, or redness.   Trim your toenails straight across when needed (figure 2). Do not cut the corners of your toenails. File rough edges. Do not cut your cuticles. Ask for help if you cannot see well or have problems reaching your feet.   Ask your doctor or nurse to check your feet at each visit. Take " your shoes and socks off for these checks.   See a foot care provider (such as a podiatrist) if you have an ingrown toenail, corn, or callus. Do not try to remove corns and calluses yourself.  How do I protect my feet from injury? -- There are several ways to protect your feet. You can:   Wear shoes and socks at all times, even at home. Do not walk barefoot. Wear swim shoes if you go to the beach or a swimming pool.   Choose shoes that fit well. They should not be not too tight or too loose. Your shoes should have plenty of room for your toes (figure 3). Your doctor might give you a prescription for special shoes. Check to see if they are covered by your insurance.   Check your shoes each time before you put them on to make sure that the lining is smooth. Also check to make sure that there is nothing inside the shoes before putting them on.   Do not wear shoes that expose any part of the foot, like sandals, thongs, or clogs.   Wear cotton socks that fit loosely. Do not wear shoes without socks.   Protect your feet from heat and cold. Test bath water before putting your feet in it to make sure that it is not too hot. Do not walk barefoot on hot ground. Take extra care when going outside in the cold and wear warm socks.  What else should I know? -- You can lower your risk for foot problems by keeping your blood sugar levels as close to your goal as possible. Other things you can do include:   Move your ankles and toes often to help with blood flow. You can wear a support stocking to help with swelling.   Walk often. Regular walking helps blood flow.   If you smoke, try to quit. Your doctor or nurse can help. Smoking causes poor blood flow to your feet and can damage your nerves.  When should I call the doctor? -- Call your doctor or nurse for advice if you have:   A fever of 100.4°F (38°C) or higher, chills, or a wound that will not heal   Swelling, redness, warmth around a wound, a foul smell coming from a wound, or  yellowish, greenish, or bloody discharge   Sores or blisters on your feet that hurt more or less than you would expect   Numbness or tingling in your foot or leg   Corns, calluses, blisters, or new sores on your foot   Very dry, scaly, or cracked skin on your feet   Changes in the way your foot joints or arch look  All topics are updated as new evidence becomes available and our peer review process is complete.  This topic retrieved from [employer] on: Mar 13, 2024.  Topic 597559 Version 2.0  Release: 32.2.4 - C32.71  © 2024 UpToDate, Inc. and/or its affiliates. All rights reserved.  figure 1: Foot check for people with diabetes     People with diabetes should check both of their feet every day. It is important to check your feet all over, including in between your toes. If you can't see the bottom of your foot, use a mirror or ask another person to check for you. Let your doctor or nurse know if you find any:  Redness   Cuts or cracks in the skin   Blisters   Swelling   Graphic 27485 Version 3.0  figure 2: Trim your toenails     Trim your toenails straight across and smooth them with a nail file.  Graphic 48413 Version 2.0  figure 3: Correct shoe shape     Choose shoes that fit the right way and are not too tight or too loose. Your shoes should have plenty of room for your toes.  Graphic 07674 Version 2.0  Consumer Information Use and Disclaimer   Disclaimer: This generalized information is a limited summary of diagnosis, treatment, and/or medication information. It is not meant to be comprehensive and should be used as a tool to help the user understand and/or assess potential diagnostic and treatment options. It does NOT include all information about conditions, treatments, medications, side effects, or risks that may apply to a specific patient. It is not intended to be medical advice or a substitute for the medical advice, diagnosis, or treatment of a health care provider based on the health care provider's  examination and assessment of a patient's specific and unique circumstances. Patients must speak with a health care provider for complete information about their health, medical questions, and treatment options, including any risks or benefits regarding use of medications. This information does not endorse any treatments or medications as safe, effective, or approved for treating a specific patient. Up[employer], Inc[employer] and its affiliates disclaim any warranty or liability relating to this information or the use thereof.The use of this information is governed by the Terms of Use, available at https://www.Stayzilla.com/en/know/clinical-effectiveness-terms. 2024© UpToDate, Inc. and its affiliates and/or licensors. All rights reserved.  Copyright   © 2024 [employer], Inc. and/or its affiliates. All rights reserved.    Medicare Preventive Visit Patient Instructions  Thank you for completing your Welcome to Medicare Visit or Medicare Annual Wellness Visit today. Your next wellness visit will be due in one year (2/25/2026).  The screening/preventive services that you may require over the next 5-10 years are detailed below. Some tests may not apply to you based off risk factors and/or age. Screening tests ordered at today's visit but not completed yet may show as past due. Also, please note that scanned in results may not display below.  Preventive Screenings:  Service Recommendations Previous Testing/Comments   Colorectal Cancer Screening  Colonoscopy    Fecal Occult Blood Test (FOBT)/Fecal Immunochemical Test (FIT)  Fecal DNA/Cologuard Test  Flexible Sigmoidoscopy Age: 45-75 years old   Colonoscopy: every 10 years (May be performed more frequently if at higher risk)  OR  FOBT/FIT: every 1 year  OR  Cologuard: every 3 years  OR  Sigmoidoscopy: every 5 years  Screening may be recommended earlier than age 45 if at higher risk for colorectal cancer. Also, an individualized decision between you and your healthcare provider will  decide whether screening between the ages of 76-85 would be appropriate. Colonoscopy: 04/07/2021  FOBT/FIT: Not on file  Cologuard: Not on file  Sigmoidoscopy: Not on file    Screening Current     Prostate Cancer Screening Individualized decision between patient and health care provider in men between ages of 55-69   Medicare will cover every 12 months beginning on the day after your 50th birthday PSA: 1.73 ng/mL     Screening Current     Hepatitis C Screening Once for adults born between 1945 and 1965  More frequently in patients at high risk for Hepatitis C Hep C Antibody: 10/01/2019    Screening Current   Diabetes Screening 1-2 times per year if you're at risk for diabetes or have pre-diabetes Fasting glucose: 149 mg/dL (2/20/2025)  A1C: 8.0 % (2/20/2025)  Screening Not Indicated  History Diabetes   Cholesterol Screening Once every 5 years if you don't have a lipid disorder. May order more often based on risk factors. Lipid panel: 03/11/2024  Screening Not Indicated  History Lipid Disorder      Other Preventive Screenings Covered by Medicare:  Abdominal Aortic Aneurysm (AAA) Screening: covered once if your at risk. You're considered to be at risk if you have a family history of AAA or a male between the age of 65-75 who smoking at least 100 cigarettes in your lifetime.  Lung Cancer Screening: covers low dose CT scan once per year if you meet all of the following conditions: (1) Age 55-77; (2) No signs or symptoms of lung cancer; (3) Current smoker or have quit smoking within the last 15 years; (4) You have a tobacco smoking history of at least 20 pack years (packs per day x number of years you smoked); (5) You get a written order from a healthcare provider.  Glaucoma Screening: covered annually if you're considered high risk: (1) You have diabetes OR (2) Family history of glaucoma OR (3)  aged 50 and older OR (4)  American aged 65 and older  Osteoporosis Screening: covered every 2 years if  you meet one of the following conditions: (1) Have a vertebral abnormality; (2) On glucocorticoid therapy for more than 3 months; (3) Have primary hyperparathyroidism; (4) On osteoporosis medications and need to assess response to drug therapy.  HIV Screening: covered annually if you're between the age of 15-65. Also covered annually if you are younger than 15 and older than 65 with risk factors for HIV infection. For pregnant patients, it is covered up to 3 times per pregnancy.    Immunizations:  Immunization Recommendations   Influenza Vaccine Annual influenza vaccination during flu season is recommended for all persons aged >= 6 months who do not have contraindications   Pneumococcal Vaccine   * Pneumococcal conjugate vaccine = PCV13 (Prevnar 13), PCV15 (Vaxneuvance), PCV20 (Prevnar 20)  * Pneumococcal polysaccharide vaccine = PPSV23 (Pneumovax) Adults 19-65 yo with certain risk factors or if 65+ yo  If never received any pneumonia vaccine: recommend Prevnar 20 (PCV20)  Give PCV20 if previously received 1 dose of PCV13 or PPSV23   Hepatitis B Vaccine 3 dose series if at intermediate or high risk (ex: diabetes, end stage renal disease, liver disease)   Respiratory syncytial virus (RSV) Vaccine - COVERED BY MEDICARE PART D  * RSVPreF3 (Arexvy) CDC recommends that adults 60 years of age and older may receive a single dose of RSV vaccine using shared clinical decision-making (SCDM)   Tetanus (Td) Vaccine - COST NOT COVERED BY MEDICARE PART B Following completion of primary series, a booster dose should be given every 10 years to maintain immunity against tetanus. Td may also be given as tetanus wound prophylaxis.   Tdap Vaccine - COST NOT COVERED BY MEDICARE PART B Recommended at least once for all adults. For pregnant patients, recommended with each pregnancy.   Shingles Vaccine (Shingrix) - COST NOT COVERED BY MEDICARE PART B  2 shot series recommended in those 19 years and older who have or will have weakened  immune systems or those 50 years and older     Health Maintenance Due:      Topic Date Due   • Colorectal Cancer Screening  04/07/2031   • Hepatitis C Screening  Completed     Immunizations Due:      Topic Date Due   • COVID-19 Vaccine (4 - 2024-25 season) 09/01/2024     Advance Directives   What are advance directives?  Advance directives are legal documents that state your wishes and plans for medical care. These plans are made ahead of time in case you lose your ability to make decisions for yourself. Advance directives can apply to any medical decision, such as the treatments you want, and if you want to donate organs.   What are the types of advance directives?  There are many types of advance directives, and each state has rules about how to use them. You may choose a combination of any of the following:  Living will:  This is a written record of the treatment you want. You can also choose which treatments you do not want, which to limit, and which to stop at a certain time. This includes surgery, medicine, IV fluid, and tube feedings.   Durable power of  for healthcare (DPAHC):  This is a written record that states who you want to make healthcare choices for you when you are unable to make them for yourself. This person, called a proxy, is usually a family member or a friend. You may choose more than 1 proxy.  Do not resuscitate (DNR) order:  A DNR order is used in case your heart stops beating or you stop breathing. It is a request not to have certain forms of treatment, such as CPR. A DNR order may be included in other types of advance directives.  Medical directive:  This covers the care that you want if you are in a coma, near death, or unable to make decisions for yourself. You can list the treatments you want for each condition. Treatment may include pain medicine, surgery, blood transfusions, dialysis, IV or tube feedings, and a ventilator (breathing machine).  Values history:  This document  has questions about your views, beliefs, and how you feel and think about life. This information can help others choose the care that you would choose.  Why are advance directives important?  An advance directive helps you control your care. Although spoken wishes may be used, it is better to have your wishes written down. Spoken wishes can be misunderstood, or not followed. Treatments may be given even if you do not want them. An advance directive may make it easier for your family to make difficult choices about your care.   Weight Management   Why it is important to manage your weight:  Being overweight increases your risk of health conditions such as heart disease, high blood pressure, type 2 diabetes, and certain types of cancer. It can also increase your risk for osteoarthritis, sleep apnea, and other respiratory problems. Aim for a slow, steady weight loss. Even a small amount of weight loss can lower your risk of health problems.  How to lose weight safely:  A safe and healthy way to lose weight is to eat fewer calories and get regular exercise. You can lose up about 1 pound a week by decreasing the number of calories you eat by 500 calories each day.   Healthy meal plan for weight management:  A healthy meal plan includes a variety of foods, contains fewer calories, and helps you stay healthy. A healthy meal plan includes the following:  Eat whole-grain foods more often.  A healthy meal plan should contain fiber. Fiber is the part of grains, fruits, and vegetables that is not broken down by your body. Whole-grain foods are healthy and provide extra fiber in your diet. Some examples of whole-grain foods are whole-wheat breads and pastas, oatmeal, brown rice, and bulgur.  Eat a variety of vegetables every day.  Include dark, leafy greens such as spinach, kale, heena greens, and mustard greens. Eat yellow and orange vegetables such as carrots, sweet potatoes, and winter squash.   Eat a variety of fruits  every day.  Choose fresh or canned fruit (canned in its own juice or light syrup) instead of juice. Fruit juice has very little or no fiber.  Eat low-fat dairy foods.  Drink fat-free (skim) milk or 1% milk. Eat fat-free yogurt and low-fat cottage cheese. Try low-fat cheeses such as mozzarella and other reduced-fat cheeses.  Choose meat and other protein foods that are low in fat.  Choose beans or other legumes such as split peas or lentils. Choose fish, skinless poultry (chicken or turkey), or lean cuts of red meat (beef or pork). Before you cook meat or poultry, cut off any visible fat.   Use less fat and oil.  Try baking foods instead of frying them. Add less fat, such as margarine, sour cream, regular salad dressing and mayonnaise to foods. Eat fewer high-fat foods. Some examples of high-fat foods include french fries, doughnuts, ice cream, and cakes.  Eat fewer sweets.  Limit foods and drinks that are high in sugar. This includes candy, cookies, regular soda, and sweetened drinks.  Exercise:  Exercise at least 30 minutes per day on most days of the week. Some examples of exercise include walking, biking, dancing, and swimming. You can also fit in more physical activity by taking the stairs instead of the elevator or parking farther away from stores. Ask your healthcare provider about the best exercise plan for you.      © Copyright [employer] 2018 Information is for End User's use only and may not be sold, redistributed or otherwise used for commercial purposes. All illustrations and images included in CareNotes® are the copyrighted property of [employer] or [employer]

## 2025-02-24 NOTE — PROGRESS NOTES
Name: Lester Hall      : 1955      MRN: [id number]  Encounter Provider: Aurelio Ramírez DO  Encounter Date: 2025   Encounter department: [facility] PRIMARY CARE    Assessment & Plan  Type 2 diabetes mellitus with diabetic polyneuropathy, without long-term current use of insulin (HCC)  Patient has type 2 diabetes mellitus.  His fasting blood glucose was 149.  Hemoglobin A1c is 8.0.  His goal is less than 7.0.  Renal function was normal with a creatinine of 0.85 and estimated GFR of 87.  I do note that the patient gained 6 pounds since his last visit.  Patient will continue metformin 1000 mg twice daily.  I increased his glipizide to 5 mg twice daily.  A CMP, urine albumin to creatinine ratio, and hemoglobin A1c were ordered.  Lab Results   Component Value Date    HGBA1C 8.0 (H) 2025       Orders:    glipiZIDE (GLUCOTROL) 5 mg tablet; Take 1 tablet (5 mg total) by mouth 2 (two) times a day with meals    Albumin / creatinine urine ratio; Future    Comprehensive metabolic panel; Future    Hemoglobin A1C; Future    Permanent atrial fibrillation (HCC)  Patient has atrial fibrillation which is rate controlled.  Continue Eliquis 5 mg twice daily and metoprolol succinate ER 25 mg daily.       Essential hypertension  Patient's blood pressure is top normal.  I rechecked his blood pressure in the left arm and it was 139/80.  I did not make any change with his current medication.  I note that he is on multiple medications already for blood pressure, including clonidine, which is causing a dry mouth.  Weight loss would certainly help.       Mixed hyperlipidemia  I ordered a fasting lipid panel for the patient's next office visit.  The patient's goal LDL cholesterol is less than 70.  He will continue atorvastatin 80 mg daily.  Orders:    Lipid Panel with Direct LDL reflex; Future    Thrombocytopenia (HCC)  I reviewed the patient's most recent labs.  His platelet count was 133,000.  He was always normal  before.  I discussed this with the patient.  I am going to recommend to check a repeat CBC with platelet count for his next office visit.  Orders:    CBC and differential; Future    Screening for prostate cancer    Orders:    PSA, Total Screen; Future    Morbid obesity with BMI of 45.0-49.9, adult ([facility])           Medicare annual wellness visit, subsequent           Depression Screening and Follow-up Plan: Patient was screened for depression during today's encounter. They screened negative with a PHQ-2 score of 0.        Preventive health issues were discussed with patient, and age appropriate screening tests were ordered as noted in patient's After Visit Summary. Personalized health advice and appropriate referrals for health education or preventive services given if needed, as noted in patient's After Visit Summary.    History of Present Illness     This is a 70-year-old white male who presents to the office today for his routine checkup as well as for his annual Medicare wellness exam.  The patient is doing well and offers no complaints.  He has been relatively inactive this time of year.  He tells me he still works weekends at [employer].  He has been compliant with use of his medication.  Patient tells me he checks his blood pressures regularly at home.  He finds his systolic blood pressures generally run 130-140.       Patient Care Team:  Aurelio Ramírez DO as PCP - General (Family Medicine)  Charlie Yang MD (Pulmonary Disease)    Review of Systems   Constitutional:  Positive for fatigue.   Respiratory:  Negative for cough and shortness of breath.    Cardiovascular:  Negative for chest pain, palpitations and leg swelling.   Gastrointestinal:  Negative for abdominal distention, abdominal pain, blood in stool, constipation, diarrhea and nausea.   Musculoskeletal:  Positive for arthralgias.        Positive for foot pain     Medical History Reviewed by provider this encounter:       Annual  Wellness Visit Questionnaire   Lester is here for his Subsequent Wellness visit. Last Medicare Wellness visit information reviewed, patient interviewed and updates made to the record today.      Health Risk Assessment:   Patient rates overall health as good. Patient feels that their physical health rating is slightly worse. Patient is satisfied with their life. Eyesight was rated as same. Hearing was rated as same. Patient feels that their emotional and mental health rating is same. Patients states they are never, rarely angry. Patient states they are often unusually tired/fatigued. Pain experienced in the last 7 days has been some. Patient's pain rating has been 5/10. Patient states that he has experienced no weight loss or gain in last 6 months. Reports foot pain and arthralgias    Depression Screening:   PHQ-2 Score: 0      Fall Risk Screening:   In the past year, patient has experienced: no history of falling in past year      Home Safety:  Patient does not have trouble with stairs inside or outside of their home. Patient has working smoke alarms and has working carbon monoxide detector. Home safety hazards include: none.     Nutrition:   Current diet is Regular. Advised to follow a diabetic diet    Medications:   Patient is not currently taking any over-the-counter supplements. Patient is able to manage medications.     Activities of Daily Living (ADLs)/Instrumental Activities of Daily Living (IADLs):   Walk and transfer into and out of bed and chair?: Yes  Dress and groom yourself?: Yes    Bathe or shower yourself?: Yes    Feed yourself? Yes  Do your laundry/housekeeping?: Yes  Manage your money, pay your bills and track your expenses?: Yes  Make your own meals?: Yes    Do your own shopping?: Yes    Previous Hospitalizations:   Any hospitalizations or ED visits within the last 12 months?: No      Advance Care Planning:   Living will: No    Durable POA for healthcare: No      Cognitive Screening:   Provider or  family/friend/caregiver concerned regarding cognition?: No    PREVENTIVE SCREENINGS      Cardiovascular Screening:    General: Screening Not Indicated and History Lipid Disorder      Diabetes Screening:     General: Screening Not Indicated and History Diabetes      Colorectal Cancer Screening:     General: Screening Current      Prostate Cancer Screening:    General: Screening Current      Osteoporosis Screening:    General: Screening Not Indicated      Abdominal Aortic Aneurysm (AAA) Screening:    Risk factors include: age between 65-74 yo        General: Screening Not Indicated      Lung Cancer Screening:     General: Screening Not Indicated      Hepatitis C Screening:    General: Screening Current    Screening, Brief Intervention, and Referral to Treatment (SBIRT)     Screening  Typical number of drinks in a day: 0  Typical number of drinks in a week: 0  Interpretation: Low risk drinking behavior.    AUDIT-C Screenin) How often did you have a drink containing alcohol in the past year? never  2) How many drinks did you have on a typical day when you were drinking in the past year? 0  3) How often did you have 6 or more drinks on one occasion in the past year? never    AUDIT-C Score: 0  Interpretation: Score 0-3 (male): Negative screen for alcohol misuse    Single Item Drug Screening:  How often have you used an illegal drug (including marijuana) or a prescription medication for non-medical reasons in the past year? never    Single Item Drug Screen Score: 0  Interpretation: Negative screen for possible drug use disorder    Social Drivers of Health     Financial Resource Strain: Low Risk  (2023)    Overall Financial Resource Strain (CARDIA)     Difficulty of Paying Living Expenses: Not very hard   Food Insecurity: No Food Insecurity (2025)    Hunger Vital Sign     Worried About Running Out of Food in the Last Year: Never true     Ran Out of Food in the Last Year: Never true   Transportation Needs: No  "Transportation Needs (2/24/2025)    PRAPARE - Transportation     Lack of Transportation (Medical): No     Lack of Transportation (Non-Medical): No   Housing Stability: Low Risk  (2/24/2025)    Housing Stability Vital Sign     Unable to Pay for Housing in the Last Year: No     Number of Times Moved in the Last Year: 0     Homeless in the Last Year: No   Utilities: Not At Risk (2/24/2025)    [facility] Utilities     Threatened with loss of utilities: No     No results found.    Objective   /80 (BP Location: Left arm, Patient Position: Sitting, Cuff Size: Large)   Pulse 75   Temp (!) 97.1 °F (36.2 °C) (Tympanic)   Ht 6' 2\" (1.88 m)   Wt (!) 160 kg (353 lb 4.8 oz)   SpO2 97%   BMI 45.36 kg/m²     Physical Exam  Vitals reviewed.   Constitutional:       Comments: This is a morbidly obese 70-year-old white male who appears his stated age.  Patient was nonseptic in appearance and in no apparent distress   HENT:      Head: Normocephalic and atraumatic.      Right Ear: Tympanic membrane, ear canal and external ear normal. There is no impacted cerumen.      Left Ear: Tympanic membrane, ear canal and external ear normal. There is no impacted cerumen.      Mouth/Throat:      Mouth: Mucous membranes are moist.      Pharynx: Oropharynx is clear. No oropharyngeal exudate or posterior oropharyngeal erythema.   Eyes:      General: No scleral icterus.        Right eye: No discharge.         Left eye: No discharge.      Conjunctiva/sclera: Conjunctivae normal.      Pupils: Pupils are equal, round, and reactive to light.   Neck:      Vascular: No carotid bruit.      Comments: No thyromegaly  Cardiovascular:      Rate and Rhythm: Normal rate. Rhythm irregular.      Pulses: Pulses are weak.           Dorsalis pedis pulses are 0 on the right side and 2+ on the left side.        Posterior tibial pulses are 0 (Edematous ankle) on the right side and 0 (Edematous ankle) on the left side.      Heart sounds: Normal heart sounds. No murmur " heard.     No friction rub. No gallop.      Comments: Heart was irregularly irregular.  Ventricular rate is well-controlled  Pulmonary:      Effort: Pulmonary effort is normal. No respiratory distress.      Breath sounds: Normal breath sounds. No stridor. No wheezing, rhonchi or rales.   Abdominal:      General: Bowel sounds are normal. There is no distension.      Palpations: Abdomen is soft. There is no mass.      Tenderness: There is no abdominal tenderness. There is no guarding.   Musculoskeletal:      Cervical back: Neck supple.      Right lower leg: Edema (Pedal and ankle edema noted bilaterally) present.      Left lower leg: Edema present.   Feet:      Right foot:      Skin integrity: No ulcer, skin breakdown, erythema, warmth, callus or dry skin.      Left foot:      Skin integrity: No ulcer, skin breakdown, erythema, warmth, callus or dry skin.   Lymphadenopathy:      Cervical: No cervical adenopathy.   Psychiatric:         Mood and Affect: Mood normal.         Behavior: Behavior normal.         Thought Content: Thought content normal.         Judgment: Judgment normal.       Patient's shoes and socks removed.    Right Foot/Ankle   Right Foot Inspection  Skin Exam: skin normal and skin intact. No dry skin, no warmth, no callus, no erythema, no maceration, no abnormal color, no pre-ulcer, no ulcer and no callus.     Toe Exam: No swelling, no tenderness, erythema and  no right toe deformity    Sensory   Proprioception: intact  Monofilament testing: diminished    Vascular  Capillary refills: < 3 seconds  The right DP pulse is 0. The right PT pulse is 0 (Edematous ankle).     Left Foot/Ankle  Left Foot Inspection  Skin Exam: skin normal and skin intact. No dry skin, no warmth, no erythema, no maceration, normal color, no pre-ulcer, no ulcer and no callus.     Toe Exam: No swelling, no tenderness, no erythema and no left toe deformity.     Sensory   Proprioception: intact  Monofilament testing:  diminished    Vascular  Capillary refills: < 3 seconds  The left DP pulse is 2+. The left PT pulse is 0 (Edematous ankle).     Assign Risk Category  Deformity present  Loss of protective sensation  Weak pulses  Risk: 3

## 2025-02-24 NOTE — ASSESSMENT & PLAN NOTE
Patient has atrial fibrillation which is rate controlled.  Continue Eliquis 5 mg twice daily and metoprolol succinate ER 25 mg daily.

## 2025-02-24 NOTE — ASSESSMENT & PLAN NOTE
I ordered a fasting lipid panel for the patient's next office visit.  The patient's goal LDL cholesterol is less than 70.  He will continue atorvastatin 80 mg daily.  Orders:    Lipid Panel with Direct LDL reflex; Future

## 2025-02-24 NOTE — ASSESSMENT & PLAN NOTE
Patient has type 2 diabetes mellitus.  His fasting blood glucose was 149.  Hemoglobin A1c is 8.0.  His goal is less than 7.0.  Renal function was normal with a creatinine of 0.85 and estimated GFR of 87.  I do note that the patient gained 6 pounds since his last visit.  Patient will continue metformin 1000 mg twice daily.  I increased his glipizide to 5 mg twice daily.  A CMP, urine albumin to creatinine ratio, and hemoglobin A1c were ordered.  Lab Results   Component Value Date    HGBA1C 8.0 (H) 02/20/2025       Orders:    glipiZIDE (GLUCOTROL) 5 mg tablet; Take 1 tablet (5 mg total) by mouth 2 (two) times a day with meals    Albumin / creatinine urine ratio; Future    Comprehensive metabolic panel; Future    Hemoglobin A1C; Future

## 2025-02-24 NOTE — ASSESSMENT & PLAN NOTE
Patient's blood pressure is top normal.  I rechecked his blood pressure in the left arm and it was 139/80.  I did not make any change with his current medication.  I note that he is on multiple medications already for blood pressure, including clonidine, which is causing a dry mouth.  Weight loss would certainly help.

## 2025-02-25 NOTE — ASSESSMENT & PLAN NOTE
I reviewed the patient's most recent labs.  His platelet count was 133,000.  He was always normal before.  I discussed this with the patient.  I am going to recommend to check a repeat CBC with platelet count for his next office visit.  Orders:    CBC and differential; Future

## 2025-03-22 ENCOUNTER — OFFICE VISIT (OUTPATIENT)
Dept: URGENT CARE | Facility: CLINIC | Age: 70
End: 2025-03-22
Payer: COMMERCIAL

## 2025-03-22 VITALS
SYSTOLIC BLOOD PRESSURE: 142 MMHG | OXYGEN SATURATION: 99 % | TEMPERATURE: 97.8 F | RESPIRATION RATE: 18 BRPM | DIASTOLIC BLOOD PRESSURE: 98 MMHG

## 2025-03-22 DIAGNOSIS — R05.1 ACUTE COUGH: ICD-10-CM

## 2025-03-22 DIAGNOSIS — J01.90 ACUTE BACTERIAL SINUSITIS: Primary | ICD-10-CM

## 2025-03-22 DIAGNOSIS — B96.89 ACUTE BACTERIAL SINUSITIS: Primary | ICD-10-CM

## 2025-03-22 PROCEDURE — 99213 OFFICE O/P EST LOW 20 MIN: CPT

## 2025-03-22 RX ORDER — ALBUTEROL SULFATE 90 UG/1
2 INHALANT RESPIRATORY (INHALATION) EVERY 6 HOURS PRN
Qty: 8.5 G | Refills: 0 | Status: SHIPPED | OUTPATIENT
Start: 2025-03-22

## 2025-03-22 RX ORDER — PREDNISONE 10 MG/1
TABLET ORAL
Qty: 26 TABLET | Refills: 0 | Status: SHIPPED | OUTPATIENT
Start: 2025-03-22

## 2025-03-22 RX ORDER — BENZONATATE 200 MG/1
200 CAPSULE ORAL 3 TIMES DAILY PRN
Qty: 20 CAPSULE | Refills: 0 | Status: SHIPPED | OUTPATIENT
Start: 2025-03-22

## 2025-03-22 NOTE — PATIENT INSTRUCTIONS
Please take Augmentin twice daily for the next 7 days.     Please start a Prednisone taper daily as directed.  Please take steroids with food and do not take any Ibuprofen or other NSAID containing medications as this may increase the risk for GI bleeding. You may take Tylenol if needed.      You may take Tessalon three times daily as needed for cough.     Steroids can increase blood sugar levels, a condition called steroid-induced hyperglycemia.  This can happen in people with or without diabetes.  While taking steroids it is important to regularly check your blood sugar levels. You may need to check them more often than usual.  Please follow-up immediately for any uncontrolled blood sugar levels.      While sick, make sure you get lots of rest and increase your fluid intake.   You may use OTC nasal saline spray, Flonase, and Mucinex for mild congestion.   Take Tylenol for fever, mild pain, and/or body aches.  Perform salt water gargles, drink warm tea with honey, and use throat lozenges and Chloraseptic spray for sore throat.    You can also apply warm compresses over your sinuses for any sinus pressure.     While you are sick, taking vitamins may help boost your immune system and potentially aid in recovery by supporting your body's natural defense mechanisms: Vitamin D3 2000 IU daily, Vitamin C 1000mg daily , and Multivitamin daily.    If your symptoms do not improve with our current treatment plan or worsen, please schedule an appointment with your PCP. If you develop any high or persistent fevers, chest pain, palpitations, shortness of breath, difficulty swallowing, decreased urine output, abdominal pain, dizziness or any other concerning symptoms, please proceed to the ED.

## 2025-03-22 NOTE — PROGRESS NOTES
[facility]        NAME: Lester Hall is a 70 y.o. male  : 1955    MRN: [id number]  DATE: 2025  TIME: 9:03 AM    Assessment and Plan   Acute bacterial sinusitis [J01.90, B96.89]  1. Acute bacterial sinusitis  amoxicillin-clavulanate (AUGMENTIN) 875-125 mg per tablet    benzonatate (TESSALON) 200 MG capsule    predniSONE 10 mg tablet      2. Acute cough  albuterol (ProAir HFA) 90 mcg/act inhaler        Will treat with 7-day course of Augmentin.  Patient has a history of type 2 diabetes, hemoglobin A1c 7.7.  Will treat with prednisone, however discussed steroid-induced hyperglycemia with patient.  Patient understands.  Also sent Tessalon for patient to take as needed for cough.  Albuterol inhaler refilled. Instructed patient to follow-up with PCP for no improvement or worsening of symptoms.  Patient educated on red flag symptoms and when to proceed to the ED.  Patient agreeable and understands current treatment plan.     Patient Instructions     Patient Instructions   Please take Augmentin twice daily for the next 7 days.     Please start a Prednisone taper daily as directed.  Please take steroids with food and do not take any Ibuprofen or other NSAID containing medications as this may increase the risk for GI bleeding. You may take Tylenol if needed.      You may take Tessalon three times daily as needed for cough.     Steroids can increase blood sugar levels, a condition called steroid-induced hyperglycemia.  This can happen in people with or without diabetes.  While taking steroids it is important to regularly check your blood sugar levels. You may need to check them more often than usual.  Please follow-up immediately for any uncontrolled blood sugar levels.      While sick, make sure you get lots of rest and increase your fluid intake.   You may use OTC nasal saline spray, Flonase, and Mucinex for mild congestion.   Take Tylenol for fever, mild pain, and/or body aches.  Perform salt  water gargles, drink warm tea with honey, and use throat lozenges and Chloraseptic spray for sore throat.    You can also apply warm compresses over your sinuses for any sinus pressure.     While you are sick, taking vitamins may help boost your immune system and potentially aid in recovery by supporting your body's natural defense mechanisms: Vitamin D3 2000 IU daily, Vitamin C 1000mg daily , and Multivitamin daily.    If your symptoms do not improve with our current treatment plan or worsen, please schedule an appointment with your PCP. If you develop any high or persistent fevers, chest pain, palpitations, shortness of breath, difficulty swallowing, decreased urine output, abdominal pain, dizziness or any other concerning symptoms, please proceed to the ED.       Follow up with PCP in 3-5 days.  Proceed to  ER if symptoms worsen.    Chief Complaint     Chief Complaint   Patient presents with   • Cough     Sinus pressure congestion cough 1 month          History of Present Illness       70-year-old male presents to the clinic for evaluation of sinus congestion x 1 month.  Patient also reports postnasal drip, runny nose, sinus pressure, dry cough, and occasional wheezing.  Patient denies any fevers, chills, shortness of breath, chest tightness, chest pain, palpitations, body aches or dizziness.  Patient reports taking OTC Tylenol with mild relief of symptoms.  He does report his sinus congestion has been gradually worsening.        Cough  Associated symptoms include postnasal drip, rhinorrhea and wheezing (occasional). Pertinent negatives include no chest pain, chills, ear pain, fever, headaches, myalgias, sore throat or shortness of breath.       Review of Systems   Review of Systems   Constitutional:  Negative for appetite change, chills and fever.   HENT:  Positive for congestion, postnasal drip, rhinorrhea and sinus pressure. Negative for ear pain and sore throat.    Respiratory:  Positive for cough and  wheezing (occasional). Negative for chest tightness and shortness of breath.    Cardiovascular:  Negative for chest pain and palpitations.   Gastrointestinal:  Negative for diarrhea, nausea and vomiting.   Genitourinary:  Negative for decreased urine volume.   Musculoskeletal:  Negative for arthralgias and myalgias.   Neurological:  Negative for dizziness, light-headedness and headaches.         Current Medications       Current Outpatient Medications:   •  acetaminophen (TYLENOL) 500 mg tablet, Take 500 mg by mouth every 6 (six) hours as needed for mild pain, Disp: , Rfl:   •  albuterol (ACCUNEB) 1.25 MG/3ML nebulizer solution, Take 1.25 mg by nebulization every 6 (six) hours as needed for wheezing, Disp: , Rfl:   •  albuterol (ProAir HFA) 90 mcg/act inhaler, Inhale 2 puffs every 6 (six) hours as needed for wheezing, Disp: 8.5 g, Rfl: 0  •  allopurinol (ZYLOPRIM) 300 mg tablet, Take 1 tablet by mouth once daily, Disp: 90 tablet, Rfl: 1  •  ammonium lactate (LAC-HYDRIN) 12 % lotion, Apply topically 2 (two) times a day, Disp: 400 g, Rfl: 2  •  amoxicillin-clavulanate (AUGMENTIN) 875-125 mg per tablet, Take 1 tablet by mouth every 12 (twelve) hours for 7 days, Disp: 14 tablet, Rfl: 0  •  apixaban (Eliquis) 5 mg, Take 1 tablet by mouth twice daily, Disp: 180 tablet, Rfl: 1  •  atorvastatin (LIPITOR) 80 mg tablet, Take 1 tablet by mouth once daily, Disp: 90 tablet, Rfl: 1  •  azelastine (ASTELIN) 0.1 % nasal spray, 1 spray into each nostril 2 (two) times a day Use in each nostril as directed, Disp: 20 mL, Rfl: 6  •  benzonatate (TESSALON) 200 MG capsule, Take 1 capsule (200 mg total) by mouth 3 (three) times a day as needed for cough, Disp: 20 capsule, Rfl: 0  •  Blood Glucose Monitoring Suppl (OneTouch Verio Reflect) w/Device KIT, Check blood sugars twice daily. Please substitute with appropriate alternative as covered by patient's insurance. Dx: E11.65, Disp: 1 kit, Rfl: 0  •  cloNIDine (CATAPRES) 0.2 mg tablet, Take  1 tablet by mouth twice daily, Disp: 180 tablet, Rfl: 1  •  furosemide (LASIX) 80 mg tablet, take 1 tablet by mouth once daily, Disp: 90 tablet, Rfl: 1  •  glipiZIDE (GLUCOTROL) 5 mg tablet, Take 1 tablet (5 mg total) by mouth 2 (two) times a day with meals, Disp: 180 tablet, Rfl: 1  •  glucose blood (OneTouch Verio) test strip, Check blood sugars twice daily. Please substitute with appropriate alternative as covered by patient's insurance. Dx: E11.65, Disp: 200 each, Rfl: 3  •  loratadine (CLARITIN) 10 mg tablet, Take 1 tablet (10 mg total) by mouth daily, Disp: 30 tablet, Rfl: 5  •  losartan (COZAAR) 100 MG tablet, Take 1 tablet by mouth once daily, Disp: 100 tablet, Rfl: 1  •  metFORMIN (GLUCOPHAGE) 1000 MG tablet, TAKE 1 TABLET BY MOUTH TWICE DAILY WITH MEALS, Disp: 200 tablet, Rfl: 1  •  metoprolol succinate (TOPROL-XL) 25 mg 24 hr tablet, Take 1 tablet by mouth once daily, Disp: 90 tablet, Rfl: 1  •  OneTouch Delica Lancets 33G MISC, Check blood sugars twice daily. Please substitute with appropriate alternative as covered by patient's insurance. Dx: E11.65, Disp: 200 each, Rfl: 3  •  predniSONE 10 mg tablet, Take 3 tablets BID x 2 days, Take 2 tablets BID x 2 days, Take 1 tablet BID x 2 days, Take 1 tablet daily x 2 days, Disp: 26 tablet, Rfl: 0  •  potassium chloride (Klor-Con M20) 20 mEq tablet, Take 1 tablet (20 mEq total) by mouth daily for 5 days, Disp: 5 tablet, Rfl: 0  •  tadalafil (CIALIS) 20 MG tablet, Take 1 tablet (20 mg total) by mouth daily as needed for erectile dysfunction (Patient not taking: Reported on 3/22/2025), Disp: 5 tablet, Rfl: 2    Current Allergies     Allergies as of 03/22/2025   • (No Known Allergies)            The following portions of the patient's history were reviewed and updated as appropriate: allergies, current medications, past family history, past medical history, past social history, past surgical history and problem list.     Past Medical History:   Diagnosis Date   •  Bilateral cellulitis of lower leg    • Bilateral edema of lower extremity    • Chronic pain    • COPD (chronic obstructive pulmonary disease) ([facility])    • Diabetes mellitus ([facility])    • ED (erectile dysfunction)    • Gout     last assessed: 4/10/2019   • Hemiplegia and hemiparesis following cerebral infarction affecting right dominant side ([facility])     last assessed: 1/10/2019   • Hypertension    • Lymphedema    • DIAMOND (obstructive sleep apnea)     does not treat this   • Stroke ([facility])    • Type 2 diabetes mellitus with foot ulcer, without long-term current use of insulin ([facility]) 11/30/2018   • Venous insufficiency (chronic) (peripheral)     last assessed: 7/5/2018       Past Surgical History:   Procedure Laterality Date   • COLONOSCOPY     • HERNIA REPAIR         Family History   Problem Relation Age of Onset   • Heart failure Mother    • Heart failure Father          Medications have been verified.        Objective   /98   Temp 97.8 °F (36.6 °C)   Resp 18   SpO2 99%        Physical Exam     Physical Exam  Vitals and nursing note reviewed.   Constitutional:       Appearance: Normal appearance.   HENT:      Head: Normocephalic and atraumatic.      Right Ear: Tympanic membrane, ear canal and external ear normal.      Left Ear: Tympanic membrane, ear canal and external ear normal.      Nose: Congestion and rhinorrhea present.      Mouth/Throat:      Mouth: Mucous membranes are moist.      Pharynx: Oropharynx is clear. No oropharyngeal exudate or posterior oropharyngeal erythema.   Cardiovascular:      Rate and Rhythm: Normal rate and regular rhythm.      Pulses: Normal pulses.      Heart sounds: Normal heart sounds.   Pulmonary:      Effort: Pulmonary effort is normal.      Breath sounds: No stridor. No wheezing, rhonchi or rales.   Skin:     General: Skin is warm and dry.   Neurological:      General: No focal deficit present.      Mental Status: He is alert.   Psychiatric:         Mood and Affect: Mood normal.          Behavior: Behavior normal.

## 2025-03-24 DIAGNOSIS — E11.42 TYPE 2 DIABETES MELLITUS WITH DIABETIC POLYNEUROPATHY, WITHOUT LONG-TERM CURRENT USE OF INSULIN (HCC): ICD-10-CM

## 2025-03-25 RX ORDER — BLOOD SUGAR DIAGNOSTIC
STRIP MISCELLANEOUS
Qty: 200 EACH | Refills: 0 | Status: SHIPPED | OUTPATIENT
Start: 2025-03-25

## 2025-03-26 PROBLEM — Z00.00 MEDICARE ANNUAL WELLNESS VISIT, SUBSEQUENT: Status: RESOLVED | Noted: 2023-12-08 | Resolved: 2025-03-26

## 2025-03-26 PROBLEM — Z12.5 SCREENING FOR PROSTATE CANCER: Status: RESOLVED | Noted: 2025-02-24 | Resolved: 2025-03-26

## 2025-03-31 ENCOUNTER — APPOINTMENT (EMERGENCY)
Dept: RADIOLOGY | Facility: HOSPITAL | Age: 70
End: 2025-03-31
Payer: COMMERCIAL

## 2025-03-31 ENCOUNTER — HOSPITAL ENCOUNTER (EMERGENCY)
Facility: HOSPITAL | Age: 70
Discharge: HOME | End: 2025-03-31
Attending: EMERGENCY MEDICINE
Payer: COMMERCIAL

## 2025-03-31 VITALS
DIASTOLIC BLOOD PRESSURE: 72 MMHG | RESPIRATION RATE: 18 BRPM | HEART RATE: 86 BPM | TEMPERATURE: 98.8 F | WEIGHT: 315 LBS | SYSTOLIC BLOOD PRESSURE: 136 MMHG | BODY MASS INDEX: 45.32 KG/M2 | OXYGEN SATURATION: 96 %

## 2025-03-31 DIAGNOSIS — L02.12 BOIL, NECK: Primary | ICD-10-CM

## 2025-03-31 DIAGNOSIS — G47.33 OBSTRUCTIVE SLEEP APNEA: ICD-10-CM

## 2025-03-31 DIAGNOSIS — Z76.89 ENCOUNTER FOR INCISION AND DRAINAGE PROCEDURE: ICD-10-CM

## 2025-03-31 DIAGNOSIS — R53.83 FATIGUE: ICD-10-CM

## 2025-03-31 LAB
2HR DELTA HS TROPONIN: 0 NG/L
ALBUMIN SERPL BCG-MCNC: 3.6 G/DL (ref 3.5–5)
ALP SERPL-CCNC: 68 U/L (ref 34–104)
ALT SERPL W P-5'-P-CCNC: 16 U/L (ref 7–52)
ANION GAP SERPL CALCULATED.3IONS-SCNC: 5 MMOL/L (ref 4–13)
APTT PPP: 27 SECONDS (ref 23–34)
AST SERPL W P-5'-P-CCNC: 16 U/L (ref 13–39)
ATRIAL RATE: 87 BPM
BASOPHILS # BLD AUTO: 0.02 THOUSANDS/ÂΜL (ref 0–0.1)
BASOPHILS NFR BLD AUTO: 0 % (ref 0–1)
BILIRUB SERPL-MCNC: 1.01 MG/DL (ref 0.2–1)
BILIRUB UR QL STRIP: NEGATIVE
BNP SERPL-MCNC: 68 PG/ML (ref 0–100)
BUN SERPL-MCNC: 16 MG/DL (ref 5–25)
CALCIUM SERPL-MCNC: 8.9 MG/DL (ref 8.4–10.2)
CARDIAC TROPONIN I PNL SERPL HS: 28 NG/L (ref ?–50)
CARDIAC TROPONIN I PNL SERPL HS: 28 NG/L (ref ?–50)
CHLORIDE SERPL-SCNC: 100 MMOL/L (ref 96–108)
CLARITY UR: CLEAR
CO2 SERPL-SCNC: 31 MMOL/L (ref 21–32)
COLOR UR: YELLOW
CREAT SERPL-MCNC: 0.82 MG/DL (ref 0.6–1.3)
EOSINOPHIL # BLD AUTO: 0.12 THOUSAND/ÂΜL (ref 0–0.61)
EOSINOPHIL NFR BLD AUTO: 2 % (ref 0–6)
ERYTHROCYTE [DISTWIDTH] IN BLOOD BY AUTOMATED COUNT: 13.3 % (ref 11.6–15.1)
FLUAV RNA RESP QL NAA+PROBE: NEGATIVE
FLUBV RNA RESP QL NAA+PROBE: NEGATIVE
GFR SERPL CREATININE-BSD FRML MDRD: 88 ML/MIN/1.73SQ M
GLUCOSE SERPL-MCNC: 70 MG/DL (ref 65–140)
GLUCOSE UR STRIP-MCNC: NEGATIVE MG/DL
HCT VFR BLD AUTO: 39.5 % (ref 36.5–49.3)
HGB BLD-MCNC: 13.3 G/DL (ref 12–17)
HGB UR QL STRIP.AUTO: NEGATIVE
IMM GRANULOCYTES # BLD AUTO: 0.09 THOUSAND/UL (ref 0–0.2)
IMM GRANULOCYTES NFR BLD AUTO: 1 % (ref 0–2)
INR PPP: 1.23 (ref 0.85–1.19)
KETONES UR STRIP-MCNC: NEGATIVE MG/DL
LEUKOCYTE ESTERASE UR QL STRIP: NEGATIVE
LYMPHOCYTES # BLD AUTO: 1.77 THOUSANDS/ÂΜL (ref 0.6–4.47)
LYMPHOCYTES NFR BLD AUTO: 23 % (ref 14–44)
MAGNESIUM SERPL-MCNC: 1.7 MG/DL (ref 1.9–2.7)
MCH RBC QN AUTO: 34.8 PG (ref 26.8–34.3)
MCHC RBC AUTO-ENTMCNC: 33.7 G/DL (ref 31.4–37.4)
MCV RBC AUTO: 103 FL (ref 82–98)
MONOCYTES # BLD AUTO: 0.95 THOUSAND/ÂΜL (ref 0.17–1.22)
MONOCYTES NFR BLD AUTO: 13 % (ref 4–12)
NEUTROPHILS # BLD AUTO: 4.63 THOUSANDS/ÂΜL (ref 1.85–7.62)
NEUTS SEG NFR BLD AUTO: 61 % (ref 43–75)
NITRITE UR QL STRIP: NEGATIVE
NRBC BLD AUTO-RTO: 0 /100 WBCS
PH UR STRIP.AUTO: 6 [PH]
PLATELET # BLD AUTO: 187 THOUSANDS/UL (ref 149–390)
PMV BLD AUTO: 9.6 FL (ref 8.9–12.7)
POTASSIUM SERPL-SCNC: 4.3 MMOL/L (ref 3.5–5.3)
PROT SERPL-MCNC: 7.9 G/DL (ref 6.4–8.4)
PROT UR STRIP-MCNC: NEGATIVE MG/DL
PROTHROMBIN TIME: 16 SECONDS (ref 12.3–15)
QRS AXIS: -53 DEGREES
QRSD INTERVAL: 84 MS
QT INTERVAL: 382 MS
QTC INTERVAL: 464 MS
RBC # BLD AUTO: 3.82 MILLION/UL (ref 3.88–5.62)
RSV RNA RESP QL NAA+PROBE: NEGATIVE
SARS-COV-2 RNA RESP QL NAA+PROBE: NEGATIVE
SODIUM SERPL-SCNC: 136 MMOL/L (ref 135–147)
SP GR UR STRIP.AUTO: 1.02
T WAVE AXIS: 47 DEGREES
TSH SERPL DL<=0.05 MIU/L-ACNC: 2.99 UIU/ML (ref 0.45–4.5)
UROBILINOGEN UR QL STRIP.AUTO: 0.2 E.U./DL
VENTRICULAR RATE: 89 BPM
WBC # BLD AUTO: 7.58 THOUSAND/UL (ref 4.31–10.16)

## 2025-03-31 PROCEDURE — 81003 URINALYSIS AUTO W/O SCOPE: CPT | Performed by: EMERGENCY MEDICINE

## 2025-03-31 PROCEDURE — 84443 ASSAY THYROID STIM HORMONE: CPT | Performed by: EMERGENCY MEDICINE

## 2025-03-31 PROCEDURE — 0241U HB NFCT DS VIR RESP RNA 4 TRGT: CPT | Performed by: EMERGENCY MEDICINE

## 2025-03-31 PROCEDURE — 93005 ELECTROCARDIOGRAM TRACING: CPT

## 2025-03-31 PROCEDURE — 87070 CULTURE OTHR SPECIMN AEROBIC: CPT

## 2025-03-31 PROCEDURE — 87205 SMEAR GRAM STAIN: CPT

## 2025-03-31 PROCEDURE — 80053 COMPREHEN METABOLIC PANEL: CPT | Performed by: EMERGENCY MEDICINE

## 2025-03-31 PROCEDURE — 93010 ELECTROCARDIOGRAM REPORT: CPT | Performed by: INTERNAL MEDICINE

## 2025-03-31 PROCEDURE — 83735 ASSAY OF MAGNESIUM: CPT | Performed by: EMERGENCY MEDICINE

## 2025-03-31 PROCEDURE — 85610 PROTHROMBIN TIME: CPT | Performed by: EMERGENCY MEDICINE

## 2025-03-31 PROCEDURE — 99285 EMERGENCY DEPT VISIT HI MDM: CPT | Performed by: EMERGENCY MEDICINE

## 2025-03-31 PROCEDURE — 10061 I&D ABSCESS COMP/MULTIPLE: CPT | Performed by: EMERGENCY MEDICINE

## 2025-03-31 PROCEDURE — 85730 THROMBOPLASTIN TIME PARTIAL: CPT | Performed by: EMERGENCY MEDICINE

## 2025-03-31 PROCEDURE — 84484 ASSAY OF TROPONIN QUANT: CPT | Performed by: EMERGENCY MEDICINE

## 2025-03-31 PROCEDURE — 71045 X-RAY EXAM CHEST 1 VIEW: CPT

## 2025-03-31 PROCEDURE — 85025 COMPLETE CBC W/AUTO DIFF WBC: CPT | Performed by: EMERGENCY MEDICINE

## 2025-03-31 PROCEDURE — 83880 ASSAY OF NATRIURETIC PEPTIDE: CPT | Performed by: EMERGENCY MEDICINE

## 2025-03-31 PROCEDURE — 87077 CULTURE AEROBIC IDENTIFY: CPT

## 2025-03-31 PROCEDURE — 36415 COLL VENOUS BLD VENIPUNCTURE: CPT | Performed by: EMERGENCY MEDICINE

## 2025-03-31 PROCEDURE — 99284 EMERGENCY DEPT VISIT MOD MDM: CPT

## 2025-03-31 RX ORDER — SULFAMETHOXAZOLE AND TRIMETHOPRIM 800; 160 MG/1; MG/1
1 TABLET ORAL 2 TIMES DAILY
Qty: 14 TABLET | Refills: 0 | Status: SHIPPED | OUTPATIENT
Start: 2025-03-31 | End: 2025-04-07

## 2025-03-31 RX ORDER — LANOLIN ALCOHOL/MO/W.PET/CERES
800 CREAM (GRAM) TOPICAL ONCE
Status: COMPLETED | OUTPATIENT
Start: 2025-03-31 | End: 2025-03-31

## 2025-03-31 RX ORDER — LIDOCAINE HYDROCHLORIDE AND EPINEPHRINE 10; 10 MG/ML; UG/ML
5 INJECTION, SOLUTION INFILTRATION; PERINEURAL ONCE
Status: COMPLETED | OUTPATIENT
Start: 2025-03-31 | End: 2025-03-31

## 2025-03-31 RX ADMIN — Medication 800 MG: at 16:09

## 2025-03-31 RX ADMIN — LIDOCAINE HYDROCHLORIDE,EPINEPHRINE BITARTRATE 5 ML: 10; .01 INJECTION, SOLUTION INFILTRATION; PERINEURAL at 17:01

## 2025-03-31 NOTE — ED PROVIDER NOTES
Time reflects when diagnosis was documented in both MDM as applicable and the Disposition within this note       Time User Action Codes Description Comment    3/31/2025  1:37 PM Junior Sepulveda [L02.12] Boil, neck     3/31/2025  5:23 PM Junior Sepulveda [R53.83] Fatigue     3/31/2025  5:24 PM Junior Sepulveda [Z76.89] Encounter for incision and drainage procedure     3/31/2025  5:24 PM Junior Sepulveda [G47.33] Obstructive sleep apnea     3/31/2025  5:25 PM Junior Sepulveda Add [J42] Chronic bronchitis (HCC)     3/31/2025  5:49 PM Junior Sepulveda Remove [J42] Chronic bronchitis (HCC)           ED Disposition       ED Disposition   Discharge    Condition   Stable    Date/Time   Mon Mar 31, 2025  5:14 PM    Comment   Lester Hall discharge to home/self care.                   Assessment & Plan       Medical Decision Making  Amount and/or Complexity of Data Reviewed  Labs: ordered.  Radiology: ordered and independent interpretation performed.    Risk  OTC drugs.  Prescription drug management.    Focused differential diagnosis in this patient is as follows: Anemia versus thyroid dysfunction versus hyperglycemia patient is a diabetic and has recently finished a course of steroids for sinusitis vs. Effects of coughing at night w/ untreated DIAMOND due to noncompliance leading to fatigue vs. Atypical cardiac presentation.  Other consideration is persistent afib leading to fatigue or chronic URI: viral in nature.    Imaging unremarkable, cardiac enzymes x 2 unremarkable, EKG unchanged and reassuring, I&D performed at the bedside under my direction by the AP fellow, will start the patient on antibiotics for community-acquired MRSA, consulted pharmacy see ED course for specifics, follow-up with general surgery, if he cannot follow-up in the next 48 to 72 hours, to return to ED for wound packing.    Patient's presentation today is most likely multifactorial, recent viral illness with not use of his CPAP machine with a  diagnosis of your sleep apnea.      ED Course as of 03/31/25 1842   Mon Mar 31, 2025   1335 Patient seen, evaluated, examined, orders placed, patient presents with a myriad of complaints, see HPI and MDM for specifics.   1418 H/H stable at baseline and w/in normal limits.   1649 Second set of cardiac enzymes are unremarkable at 28 and are flat, magnesium already replaced, viral testing negative, boil is starting to open up drainable I&D the abscess refer patient to general surgery.   1741 Patient recently finished up antibiotics by the patient may have some cMRSA related risk factors prior , will start the patient on Bactrim.  Discussed with pharmacy, Alonzo Terrell,  regarding interaction with metformin, there is no significant interactions okay for a short course.       Medications   magnesium Oxide (MAG-OX) tablet 800 mg (800 mg Oral Given 3/31/25 1609)   lidocaine-epinephrine (XYLOCAINE/EPINEPHRINE) 1 %-1:100,000 injection 5 mL (5 mL Infiltration Given 3/31/25 1701)       ED Risk Strat Scores   HEART Risk Score      Flowsheet Row Most Recent Value   Heart Score Risk Calculator    History 0 Filed at: 03/31/2025 1645   ECG 0 Filed at: 03/31/2025 1645   Age 2 Filed at: 03/31/2025 1645   Risk Factors 1 Filed at: 03/31/2025 1645   Troponin 1 Filed at: 03/31/2025 1645   HEART Score 4 Filed at: 03/31/2025 1645          HEART Risk Score      Flowsheet Row Most Recent Value   Heart Score Risk Calculator    History 0 Filed at: 03/31/2025 1645   ECG 0 Filed at: 03/31/2025 1645   Age 2 Filed at: 03/31/2025 1645   Risk Factors 1 Filed at: 03/31/2025 1645   Troponin 1 Filed at: 03/31/2025 1645   HEART Score 4 Filed at: 03/31/2025 1645                              SBIRT 22yo+      Flowsheet Row Most Recent Value   Initial Alcohol Screen: US AUDIT-C     1. How often do you have a drink containing alcohol? 0 Filed at: 03/31/2025 1315   2. How many drinks containing alcohol do you have on a typical day you are  "drinking?  0 Filed at: 03/31/2025 1315   3a. Male UNDER 65: How often do you have five or more drinks on one occasion? 0 Filed at: 03/31/2025 1315   3b. FEMALE Any Age, or MALE 65+: How often do you have 4 or more drinks on one occassion? 0 Filed at: 03/31/2025 1315   Audit-C Score 0 Filed at: 03/31/2025 1315   CHAN: How many times in the past year have you...    Used an illegal drug or used a prescription medication for non-medical reasons? Never Filed at: 03/31/2025 1315                            History of Present Illness       Chief Complaint   Patient presents with    Flu Symptoms     Patient arrives with complaints of white productive cough and feeling \"run down\" with intermittent diarrhea.     Wound Check       Past Medical History:   Diagnosis Date    Bilateral cellulitis of lower leg     Bilateral edema of lower extremity     Chronic pain     COPD (chronic obstructive pulmonary disease) (HCC)     Diabetes mellitus (HCC)     ED (erectile dysfunction)     Gout     last assessed: 4/10/2019    Hemiplegia and hemiparesis following cerebral infarction affecting right dominant side (HCC)     last assessed: 1/10/2019    Hypertension     Lymphedema     DIAMOND (obstructive sleep apnea)     does not treat this    Stroke (HCC)     Type 2 diabetes mellitus with foot ulcer, without long-term current use of insulin (HCC) 11/30/2018    Venous insufficiency (chronic) (peripheral)     last assessed: 7/5/2018      Past Surgical History:   Procedure Laterality Date    COLONOSCOPY      HERNIA REPAIR        Family History   Problem Relation Age of Onset    Heart failure Mother     Heart failure Father       Social History     Tobacco Use    Smoking status: Never     Passive exposure: Never    Smokeless tobacco: Never   Vaping Use    Vaping status: Never Used   Substance Use Topics    Alcohol use: Yes     Alcohol/week: 4.0 standard drinks of alcohol     Types: 4 Standard drinks or equivalent per week     Comment: occ    Drug use: " "No      E-Cigarette/Vaping    E-Cigarette Use Never User       E-Cigarette/Vaping Substances    Nicotine No     THC No     CBD No     Flavoring No     Other No     Unknown No       I have reviewed and agree with the history as documented.     HPI      This is a very pleasant, nontoxic-appearing, 70-year-old gentleman presents emergency department with his son, via POV w/ multiple complaints: increasing fatigue x 1 month not related to exertion, seen by urgent care on 3/22/25, diagnosed with acute bacterial sinusitis placed on augumentin and steroids w/ albueterol, finsihed two days ago.  checked BS 2 days, patient states his blood sugar was \"normal but would not give me an actual number.\"     Intermittent loose stool x 3 days no history of melena but patient reports her being darker than normal,, history of obstructive sleep apnea which the patient is not using his CPAP machine at home.      Last colonscopy 03/26/21: Dr. Liu, according to his note has had polyps in the past, patient is overdue by 1 year for this procedure    Review of Systems   Constitutional: Negative.  Negative for chills, diaphoresis, fatigue and fever.   HENT:  Positive for congestion and rhinorrhea. Negative for ear pain, facial swelling, nosebleeds, sore throat, tinnitus and trouble swallowing.    Eyes: Negative.    Respiratory:  Positive for cough. Negative for apnea, choking, chest tightness, shortness of breath, wheezing and stridor.    Cardiovascular: Negative.  Negative for chest pain, palpitations and leg swelling.   Gastrointestinal: Negative.  Negative for abdominal pain, constipation, nausea and vomiting.   Endocrine: Negative.    Genitourinary: Negative.  Negative for dysuria.   Musculoskeletal: Negative.    Skin:  Positive for wound.   Allergic/Immunologic: Negative.    Neurological: Negative.    Hematological: Negative.    Psychiatric/Behavioral: Negative.             Objective       ED Triage Vitals [03/31/25 1314] "   Temperature Pulse Blood Pressure Respirations SpO2 Patient Position - Orthostatic VS   97.9 °F (36.6 °C) 88 149/80 18 98 % Sitting      Temp Source Heart Rate Source BP Location FiO2 (%) Pain Score    Temporal Monitor Left arm -- No Pain      Vitals      Date and Time Temp Pulse SpO2 Resp BP Pain Score FACES Pain Rating User   03/31/25 1745 98.8 °F (37.1 °C) 86 96 % 18 136/72 No Pain -- SG   03/31/25 1610 98.7 °F (37.1 °C) 80 96 % 18 128/78 No Pain -- SG   03/31/25 1314 97.9 °F (36.6 °C) 88 98 % 18 149/80 No Pain -- DK            Physical Exam  Vitals and nursing note reviewed.   Constitutional:       General: He is not in acute distress.     Appearance: Normal appearance. He is normal weight. He is not ill-appearing, toxic-appearing or diaphoretic.   HENT:      Head: Normocephalic and atraumatic.      Right Ear: External ear normal.      Left Ear: External ear normal.      Nose: Nose normal.      Mouth/Throat:      Mouth: Mucous membranes are moist.      Pharynx: Oropharynx is clear.   Eyes:      Extraocular Movements: Extraocular movements intact.      Conjunctiva/sclera: Conjunctivae normal.      Pupils: Pupils are equal, round, and reactive to light.   Cardiovascular:      Rate and Rhythm: Normal rate and regular rhythm.      Pulses: Normal pulses.      Heart sounds: Normal heart sounds.   Pulmonary:      Effort: Pulmonary effort is normal.      Breath sounds: Normal breath sounds.   Abdominal:      General: Abdomen is flat. Bowel sounds are normal.   Musculoskeletal:         General: Normal range of motion.   Skin:     General: Skin is warm.      Capillary Refill: Capillary refill takes less than 2 seconds.      Findings: Lesion present.             Comments: See photo inserted into chart.   Neurological:      General: No focal deficit present.      Mental Status: He is alert and oriented to person, place, and time. Mental status is at baseline.   Psychiatric:         Mood and Affect: Mood normal.          Behavior: Behavior normal.         Thought Content: Thought content normal.         Judgment: Judgment normal.             Results Reviewed       Procedure Component Value Units Date/Time    Wound culture and Gram stain [[id number]] Collected: 03/31/25 1716    Lab Status: In process Specimen: Wound from Neck Updated: 03/31/25 1719    UA w Reflex to Microscopic w Reflex to Culture [[id number]]  (Normal) Collected: 03/31/25 1701    Lab Status: Final result Specimen: Urine, Clean Catch Updated: 03/31/25 1713     Color, UA Yellow     Clarity, UA Clear     Specific Gravity, UA 1.020     pH, UA 6.0     Leukocytes, UA Negative     Nitrite, UA Negative     Protein, UA Negative mg/dl      Glucose, UA Negative mg/dl      Ketones, UA Negative mg/dl      Urobilinogen, UA 0.2 E.U./dl      Bilirubin, UA Negative     Occult Blood, UA Negative    HS Troponin I 2hr [[id number]]  (Normal) Collected: 03/31/25 1609    Lab Status: Final result Specimen: Blood from Arm, Left Updated: 03/31/25 1641     hs TnI 2hr 28 ng/L      Delta 2hr hsTnI 0 ng/L     HS Troponin I 4hr [[id number]]     Lab Status: No result Specimen: Blood     Magnesium [[id number]]  (Abnormal) Collected: 03/31/25 1411    Lab Status: Final result Specimen: Blood from Arm, Left Updated: 03/31/25 1551     Magnesium 1.7 mg/dL     FLU/RSV/COVID - if FLU/RSV clinically relevant (2hr TAT) [[id number]]  (Normal) Collected: 03/31/25 1411    Lab Status: Final result Specimen: Nares from Nose Updated: 03/31/25 1500     SARS-CoV-2 Negative     INFLUENZA A PCR Negative     INFLUENZA B PCR Negative     RSV PCR Negative    Narrative:      This test has been performed using the CoV-2/Flu/RSV plus assay on the [employer] GeneXpert platform. This test has been validated by the  and verified by the performing laboratory.     This test is designed to amplify and detect the following: nucleocapsid (N), envelope (E), and RNA-dependent RNA polymerase (RdRP) genes of the SARS-CoV-2 genome;  matrix (M), basic polymerase (PB2), and acidic protein (PA) segments of the influenza A genome; matrix (M) and non-structural protein (NS) segments of the influenza B genome, and the nucleocapsid genes of RSV A and RSV B.     Positive results are indicative of the presence of Flu A, Flu B, RSV, and/or SARS-CoV-2 RNA. Positive results for SARS-CoV-2 or suspected novel influenza should be reported to state, local, or federal health departments according to local reporting requirements.      All results should be assessed in conjunction with clinical presentation and other laboratory markers for clinical management.     FOR PEDIATRIC PATIENTS - copy/paste COVID Guidelines URL to browser: [email or web address]       TSH, 3rd generation with Free T4 reflex [[id number]]  (Normal) Collected: 03/31/25 1411    Lab Status: Final result Specimen: Blood from Arm, Left Updated: 03/31/25 1450     TSH 3RD GENERATON 2.986 uIU/mL     HS Troponin 0hr (reflex protocol) [[id number]]  (Normal) Collected: 03/31/25 1411    Lab Status: Final result Specimen: Blood from Arm, Left Updated: 03/31/25 1440     hs TnI 0hr 28 ng/L     B-Type Natriuretic Peptide(BNP) [[id number]]  (Normal) Collected: 03/31/25 1411    Lab Status: Final result Specimen: Blood from Arm, Left Updated: 03/31/25 1440     BNP 68 pg/mL     Protime-INR [[id number]]  (Abnormal) Collected: 03/31/25 1411    Lab Status: Final result Specimen: Blood from Arm, Left Updated: 03/31/25 1436     Protime 16.0 seconds      INR 1.23    Narrative:      INR Therapeutic Range    Indication                                             INR Range      Atrial Fibrillation                                               2.0-3.0  Hypercoagulable State                                    2.0.2.3  Left Ventricular Asist Device                            2.0-3.0  Mechanical Heart Valve                                  -    Aortic(with afib, MI,  embolism, HF, LA enlargement,    and/or coagulopathy)                                     2.0-3.0 (2.5-3.5)     Mitral                                                             2.5-3.5  Prosthetic/Bioprosthetic Heart Valve               2.0-3.0  Venous thromboembolism (VTE: VT, PE        2.0-3.0    APTT [[id number]]  (Normal) Collected: 03/31/25 1411    Lab Status: Final result Specimen: Blood from Arm, Left Updated: 03/31/25 1436     PTT 27 seconds     Comprehensive metabolic panel [[id number]]  (Abnormal) Collected: 03/31/25 1411    Lab Status: Final result Specimen: Blood from Arm, Left Updated: 03/31/25 1435     Sodium 136 mmol/L      Potassium 4.3 mmol/L      Chloride 100 mmol/L      CO2 31 mmol/L      ANION GAP 5 mmol/L      BUN 16 mg/dL      Creatinine 0.82 mg/dL      Glucose 70 mg/dL      Calcium 8.9 mg/dL      AST 16 U/L      ALT 16 U/L      Alkaline Phosphatase 68 U/L      Total Protein 7.9 g/dL      Albumin 3.6 g/dL      Total Bilirubin 1.01 mg/dL      eGFR 88 ml/min/1.73sq m     Narrative:      National Kidney Disease Foundation guidelines for Chronic Kidney Disease (CKD):     Stage 1 with normal or high GFR (GFR > 90 mL/min/1.73 square meters)    Stage 2 Mild CKD (GFR = 60-89 mL/min/1.73 square meters)    Stage 3A Moderate CKD (GFR = 45-59 mL/min/1.73 square meters)    Stage 3B Moderate CKD (GFR = 30-44 mL/min/1.73 square meters)    Stage 4 Severe CKD (GFR = 15-29 mL/min/1.73 square meters)    Stage 5 End Stage CKD (GFR <15 mL/min/1.73 square meters)  Note: GFR calculation is accurate only with a steady state creatinine    CBC and differential [[id number]]  (Abnormal) Collected: 03/31/25 1411    Lab Status: Final result Specimen: Blood from Arm, Left Updated: 03/31/25 1417     WBC 7.58 Thousand/uL      RBC 3.82 Million/uL      Hemoglobin 13.3 g/dL      Hematocrit 39.5 %       fL      MCH 34.8 pg      MCHC 33.7 g/dL      RDW 13.3 %      MPV 9.6 fL      Platelets 187 Thousands/uL      nRBC 0 /100  WBCs      Segmented % 61 %      Immature Grans % 1 %      Lymphocytes % 23 %      Monocytes % 13 %      Eosinophils Relative 2 %      Basophils Relative 0 %      Absolute Neutrophils 4.63 Thousands/µL      Absolute Immature Grans 0.09 Thousand/uL      Absolute Lymphocytes 1.77 Thousands/µL      Absolute Monocytes 0.95 Thousand/µL      Eosinophils Absolute 0.12 Thousand/µL      Basophils Absolute 0.02 Thousands/µL             XR chest 1 view portable   ED Interpretation by Junior Sepulveda III, DO (03/31 0957)   Stat portable chest x-ray shows no acute fractures osseous abnormalities or pneumothoraces or infiltrates.          ECG 12 Lead Documentation Only    Date/Time: 3/31/2025 1:46 PM    Performed by: Juniro Sepulveda III, DO  Authorized by: Junior Sepulveda III, DO    Indications / Diagnosis:  Faigue  ECG reviewed by me, the ED Provider: yes    Patient location:  ED  Comments:      I personally reviewed this EKG that was performed and the patient March 31, 2025, EKG was completed at 1:44 PM and interpreted by me the same time,, irregular heart rate 89 bpm, consistent with atrial fibrillation, no acute ST abnormalities noted.    No diffuse elevations to indicate pericarditis.  No coved ST elevations greater than 2mm with negative T waves in V1-3 to indicate concern for brugada.  No biphasic T waves in V2, V3 to indicate Wellens (critical stenosis of LAD).   No elevation in aVR or deviation when compared to V1 (can be associated with ST depression in I,II, V4-6 when left main occlusion is present).       ED Medication and Procedure Management   Prior to Admission Medications   Prescriptions Last Dose Informant Patient Reported? Taking?   Blood Glucose Monitoring Suppl (OneTouch Verio Reflect) w/Device KIT  Self No No   Sig: Check blood sugars twice daily. Please substitute with appropriate alternative as covered by patient's insurance. Dx: E11.65   OneTouch Delica Lancets 33G MISC  Self No No    Sig: Check blood sugars twice daily. Please substitute with appropriate alternative as covered by patient's insurance. Dx: E11.65   acetaminophen (TYLENOL) 500 mg tablet  Self Yes No   Sig: Take 500 mg by mouth every 6 (six) hours as needed for mild pain   albuterol (ACCUNEB) 1.25 MG/3ML nebulizer solution  Self Yes No   Sig: Take 1.25 mg by nebulization every 6 (six) hours as needed for wheezing   albuterol (ProAir HFA) 90 mcg/act inhaler   No No   Sig: Inhale 2 puffs every 6 (six) hours as needed for wheezing   allopurinol (ZYLOPRIM) 300 mg tablet  Self No No   Sig: Take 1 tablet by mouth once daily   ammonium lactate (LAC-HYDRIN) 12 % lotion  Self No No   Sig: Apply topically 2 (two) times a day   apixaban (Eliquis) 5 mg  Self No No   Sig: Take 1 tablet by mouth twice daily   atorvastatin (LIPITOR) 80 mg tablet  Self No No   Sig: Take 1 tablet by mouth once daily   azelastine (ASTELIN) 0.1 % nasal spray  Self No No   Si spray into each nostril 2 (two) times a day Use in each nostril as directed   benzonatate (TESSALON) 200 MG capsule   No No   Sig: Take 1 capsule (200 mg total) by mouth 3 (three) times a day as needed for cough   cloNIDine (CATAPRES) 0.2 mg tablet  Self No No   Sig: Take 1 tablet by mouth twice daily   furosemide (LASIX) 80 mg tablet  Self No No   Sig: take 1 tablet by mouth once daily   glipiZIDE (GLUCOTROL) 5 mg tablet   No No   Sig: Take 1 tablet (5 mg total) by mouth 2 (two) times a day with meals   glucose blood (OneTouch Verio) test strip   No No   Sig: Check blood sugars twice daily. Please substitute with appropriate alternative as covered by patient's insurance. Dx: E11.65   loratadine (CLARITIN) 10 mg tablet  Self No No   Sig: Take 1 tablet (10 mg total) by mouth daily   losartan (COZAAR) 100 MG tablet  Self No No   Sig: Take 1 tablet by mouth once daily   metFORMIN (GLUCOPHAGE) 1000 MG tablet  Self No No   Sig: TAKE 1 TABLET BY MOUTH TWICE DAILY WITH MEALS   metoprolol succinate  (TOPROL-XL) 25 mg 24 hr tablet  Self No No   Sig: Take 1 tablet by mouth once daily   potassium chloride (Klor-Con M20) 20 mEq tablet  Self No No   Sig: Take 1 tablet (20 mEq total) by mouth daily for 5 days   predniSONE 10 mg tablet   No No   Sig: Take 3 tablets BID x 2 days, Take 2 tablets BID x 2 days, Take 1 tablet BID x 2 days, Take 1 tablet daily x 2 days   tadalafil (CIALIS) 20 MG tablet  Self No No   Sig: Take 1 tablet (20 mg total) by mouth daily as needed for erectile dysfunction   Patient not taking: Reported on 3/22/2025      Facility-Administered Medications: None     Discharge Medication List as of 3/31/2025  5:50 PM        START taking these medications    Details   sulfamethoxazole-trimethoprim (BACTRIM DS) 800-160 mg per tablet Take 1 tablet by mouth 2 (two) times a day for 7 days smx-tmp DS (BACTRIM) 800-160 mg tabs (1tab q12 D10), Starting Mon 3/31/2025, Until Mon 4/7/2025, Normal           CONTINUE these medications which have NOT CHANGED    Details   acetaminophen (TYLENOL) 500 mg tablet Take 500 mg by mouth every 6 (six) hours as needed for mild pain, Historical Med      albuterol (ACCUNEB) 1.25 MG/3ML nebulizer solution Take 1.25 mg by nebulization every 6 (six) hours as needed for wheezing, Historical Med      albuterol (ProAir HFA) 90 mcg/act inhaler Inhale 2 puffs every 6 (six) hours as needed for wheezing, Starting Sat 3/22/2025, Normal      allopurinol (ZYLOPRIM) 300 mg tablet Take 1 tablet by mouth once daily, Normal      ammonium lactate (LAC-HYDRIN) 12 % lotion Apply topically 2 (two) times a day, Starting Fri 2/2/2024, Normal      apixaban (Eliquis) 5 mg Take 1 tablet by mouth twice daily, Starting Tue 12/31/2024, Normal      atorvastatin (LIPITOR) 80 mg tablet Take 1 tablet by mouth once daily, Starting Thu 12/5/2024, Normal      azelastine (ASTELIN) 0.1 % nasal spray 1 spray into each nostril 2 (two) times a day Use in each nostril as directed, Starting Wed 2/7/2024, Normal       benzonatate (TESSALON) 200 MG capsule Take 1 capsule (200 mg total) by mouth 3 (three) times a day as needed for cough, Starting Sat 3/22/2025, Normal      Blood Glucose Monitoring Suppl (OneTouch Verio Reflect) w/Device KIT Check blood sugars twice daily. Please substitute with appropriate alternative as covered by patient's insurance. Dx: E11.65, Normal      cloNIDine (CATAPRES) 0.2 mg tablet Take 1 tablet by mouth twice daily, Starting Thu 10/31/2024, Normal      furosemide (LASIX) 80 mg tablet take 1 tablet by mouth once daily, Starting Wed 1/8/2025, Normal      glipiZIDE (GLUCOTROL) 5 mg tablet Take 1 tablet (5 mg total) by mouth 2 (two) times a day with meals, Starting Mon 2/24/2025, Normal      glucose blood (OneTouch Verio) test strip Check blood sugars twice daily. Please substitute with appropriate alternative as covered by patient's insurance. Dx: E11.65, Normal      loratadine (CLARITIN) 10 mg tablet Take 1 tablet (10 mg total) by mouth daily, Starting Tue 10/22/2024, Normal      losartan (COZAAR) 100 MG tablet Take 1 tablet by mouth once daily, Starting Thu 1/30/2025, Normal      metFORMIN (GLUCOPHAGE) 1000 MG tablet TAKE 1 TABLET BY MOUTH TWICE DAILY WITH MEALS, Starting Mon 7/22/2024, Normal      metoprolol succinate (TOPROL-XL) 25 mg 24 hr tablet Take 1 tablet by mouth once daily, Starting Thu 10/31/2024, Normal      OneTouch Delica Lancets 33G MISC Check blood sugars twice daily. Please substitute with appropriate alternative as covered by patient's insurance. Dx: E11.65, Normal      potassium chloride (Klor-Con M20) 20 mEq tablet Take 1 tablet (20 mEq total) by mouth daily for 5 days, Starting Fri 3/15/2024, Until Mon 2/24/2025, Normal      predniSONE 10 mg tablet Take 3 tablets BID x 2 days, Take 2 tablets BID x 2 days, Take 1 tablet BID x 2 days, Take 1 tablet daily x 2 days, Normal      tadalafil (CIALIS) 20 MG tablet Take 1 tablet (20 mg total) by mouth daily as needed for erectile  dysfunction, Starting Mon 2/14/2022, Normal             ED SEPSIS DOCUMENTATION   Time reflects when diagnosis was documented in both MDM as applicable and the Disposition within this note       Time User Action Codes Description Comment    3/31/2025  1:37 PM Junior Sepulveda [L02.12] Boil, neck     3/31/2025  5:23 PM Junior Sepulveda [R53.83] Fatigue     3/31/2025  5:24 PM Junior Sepulveda [Z76.89] Encounter for incision and drainage procedure     3/31/2025  5:24 PM Junior Sepulveda [G47.33] Obstructive sleep apnea     3/31/2025  5:25 PM Junior Sepulveda [J42] Chronic bronchitis (HCC)     3/31/2025  5:49 PM Junior Sepulveda Remove [J42] Chronic bronchitis (HCC)                  Junior Sepulveda III, DO  03/31/25 1841       Junior Sepulveda III, DO  03/31/25 1842

## 2025-03-31 NOTE — ED PROCEDURE NOTE
"Procedure  Incision and drain    Date/Time: 3/31/2025 5:27 PM    Performed by: Jonatan Bruner PA-C  Authorized by: Jonatan Bruner PA-C  Universal Protocol:  Procedure performed by: (Jonatan Bruner PA-C; Antelmo Perry PA-C)  Consent: Verbal consent obtained.  Risks and benefits: risks, benefits and alternatives were discussed  Consent given by: patient  Timeout called at: 3/31/2025 5:10 PM.  Patient understanding: patient states understanding of the procedure being performed  Patient identity confirmed: verbally with patient and arm band    Patient location:  ED  Location:     Type:  Abscess    Size:  3 cm    Location:  Head/neck    Head/neck location:  Neck  Pre-procedure details:     Skin preparation:  Chloraprep  Anesthesia (see MAR for exact dosages):     Anesthesia method:  Local infiltration    Local anesthetic:  Lidocaine 1% WITH epi  Procedure details:     Complexity:  Simple    Incision types:  Stab incision    Scalpel blade:  11    Approach:  Puncture    Wound management:  Probed and deloculated    Drainage:  Purulent    Drainage amount:  Scant    Wound treatment:  Packing placed    Packing materials:  1/4 in gauze    Amount 1/4\":  3 cm  Post-procedure details:     Patient tolerance of procedure:  Tolerated well, no immediate complications                   Jonatan Bruner PA-C  03/31/25 1730    "

## 2025-03-31 NOTE — DISCHARGE INSTRUCTIONS
Please follow-up in the next 2 to 3 days for a wound to be repacked, general surgery cannot see you, you can return to the emergency department.

## 2025-04-01 ENCOUNTER — VBI (OUTPATIENT)
Dept: FAMILY MEDICINE CLINIC | Facility: CLINIC | Age: 70
End: 2025-04-01

## 2025-04-01 NOTE — TELEPHONE ENCOUNTER
04/01/25 11:33 AM    Patient contacted post ED visit, [facility] department spoke with patient/caregiver and outreach was successful.    Thank you.  Natividad Parmar MA  [facility]

## 2025-04-03 LAB
BACTERIA WND AEROBE CULT: ABNORMAL
BACTERIA WND AEROBE CULT: ABNORMAL
GRAM STN SPEC: ABNORMAL
GRAM STN SPEC: ABNORMAL

## 2025-04-04 ENCOUNTER — VBI (OUTPATIENT)
Dept: FAMILY MEDICINE CLINIC | Facility: CLINIC | Age: 70
End: 2025-04-04

## 2025-04-04 ENCOUNTER — RESULTS FOLLOW-UP (OUTPATIENT)
Dept: EMERGENCY DEPT | Facility: HOSPITAL | Age: 70
End: 2025-04-04

## 2025-04-04 NOTE — TELEPHONE ENCOUNTER
04/04/25 12:29 PM    Patient contacted post ED visit, [facility] department spoke with patient/caregiver and outreach was successful.    Thank you.  Natividad Parmar MA  [facility]

## 2025-04-09 DIAGNOSIS — M1A.00X0 IDIOPATHIC CHRONIC GOUT WITHOUT TOPHUS, UNSPECIFIED SITE: ICD-10-CM

## 2025-04-09 DIAGNOSIS — I48.20 CHRONIC ATRIAL FIBRILLATION (HCC): Chronic | ICD-10-CM

## 2025-04-10 ENCOUNTER — TELEPHONE (OUTPATIENT)
Age: 70
End: 2025-04-10

## 2025-04-10 RX ORDER — FUROSEMIDE 80 MG/1
80 TABLET ORAL DAILY
Qty: 90 TABLET | Refills: 1 | Status: SHIPPED | OUTPATIENT
Start: 2025-04-10

## 2025-04-10 RX ORDER — ALLOPURINOL 300 MG/1
300 TABLET ORAL DAILY
Qty: 90 TABLET | Refills: 1 | Status: SHIPPED | OUTPATIENT
Start: 2025-04-10

## 2025-04-10 NOTE — TELEPHONE ENCOUNTER
Patient called and canceled his appointment tomorrow 4/11/25 with Abisai because the boil has completely healed and he finished the antibiotics. He did not wish to reschedule at this time but will call if there is a problem in the future.

## 2025-05-01 DIAGNOSIS — E11.42 TYPE 2 DIABETES MELLITUS WITH DIABETIC POLYNEUROPATHY, WITHOUT LONG-TERM CURRENT USE OF INSULIN (HCC): ICD-10-CM

## 2025-05-01 DIAGNOSIS — I48.20 CHRONIC ATRIAL FIBRILLATION (HCC): ICD-10-CM

## 2025-05-01 RX ORDER — METOPROLOL SUCCINATE 25 MG/1
25 TABLET, EXTENDED RELEASE ORAL DAILY
Qty: 90 TABLET | Refills: 1 | Status: SHIPPED | OUTPATIENT
Start: 2025-05-01

## 2025-05-29 DIAGNOSIS — I10 ESSENTIAL HYPERTENSION: Chronic | ICD-10-CM

## 2025-05-29 RX ORDER — CLONIDINE HYDROCHLORIDE 0.2 MG/1
0.2 TABLET ORAL 2 TIMES DAILY
Qty: 180 TABLET | Refills: 1 | Status: SHIPPED | OUTPATIENT
Start: 2025-05-29

## 2025-06-04 ENCOUNTER — APPOINTMENT (OUTPATIENT)
Dept: LAB | Facility: CLINIC | Age: 70
End: 2025-06-04
Attending: FAMILY MEDICINE
Payer: COMMERCIAL

## 2025-06-04 DIAGNOSIS — D69.6 THROMBOCYTOPENIA (HCC): ICD-10-CM

## 2025-06-04 DIAGNOSIS — E78.2 MIXED HYPERLIPIDEMIA: Chronic | ICD-10-CM

## 2025-06-04 DIAGNOSIS — E11.42 TYPE 2 DIABETES MELLITUS WITH DIABETIC POLYNEUROPATHY, WITHOUT LONG-TERM CURRENT USE OF INSULIN (HCC): ICD-10-CM

## 2025-06-04 DIAGNOSIS — Z12.5 SCREENING FOR PROSTATE CANCER: ICD-10-CM

## 2025-06-04 LAB
ALBUMIN SERPL BCG-MCNC: 4 G/DL (ref 3.5–5)
ALP SERPL-CCNC: 84 U/L (ref 34–104)
ALT SERPL W P-5'-P-CCNC: 20 U/L (ref 7–52)
ANION GAP SERPL CALCULATED.3IONS-SCNC: 6 MMOL/L (ref 4–13)
AST SERPL W P-5'-P-CCNC: 18 U/L (ref 13–39)
BASOPHILS # BLD AUTO: 0.02 THOUSANDS/ÂΜL (ref 0–0.1)
BASOPHILS NFR BLD AUTO: 0 % (ref 0–1)
BILIRUB SERPL-MCNC: 0.67 MG/DL (ref 0.2–1)
BUN SERPL-MCNC: 16 MG/DL (ref 5–25)
CALCIUM SERPL-MCNC: 8.9 MG/DL (ref 8.4–10.2)
CHLORIDE SERPL-SCNC: 101 MMOL/L (ref 96–108)
CHOLEST SERPL-MCNC: 153 MG/DL (ref ?–200)
CO2 SERPL-SCNC: 30 MMOL/L (ref 21–32)
CREAT SERPL-MCNC: 0.71 MG/DL (ref 0.6–1.3)
CREAT UR-MCNC: 115.4 MG/DL
EOSINOPHIL # BLD AUTO: 0.17 THOUSAND/ÂΜL (ref 0–0.61)
EOSINOPHIL NFR BLD AUTO: 3 % (ref 0–6)
ERYTHROCYTE [DISTWIDTH] IN BLOOD BY AUTOMATED COUNT: 13.6 % (ref 11.6–15.1)
EST. AVERAGE GLUCOSE BLD GHB EST-MCNC: 154 MG/DL
GFR SERPL CREATININE-BSD FRML MDRD: 93 ML/MIN/1.73SQ M
GLUCOSE P FAST SERPL-MCNC: 124 MG/DL (ref 65–99)
HBA1C MFR BLD: 7 %
HCT VFR BLD AUTO: 39.8 % (ref 36.5–49.3)
HDLC SERPL-MCNC: 38 MG/DL
HGB BLD-MCNC: 13.4 G/DL (ref 12–17)
IMM GRANULOCYTES # BLD AUTO: 0.01 THOUSAND/UL (ref 0–0.2)
IMM GRANULOCYTES NFR BLD AUTO: 0 % (ref 0–2)
LDLC SERPL CALC-MCNC: 79 MG/DL (ref 0–100)
LYMPHOCYTES # BLD AUTO: 1.96 THOUSANDS/ÂΜL (ref 0.6–4.47)
LYMPHOCYTES NFR BLD AUTO: 38 % (ref 14–44)
MCH RBC QN AUTO: 34.9 PG (ref 26.8–34.3)
MCHC RBC AUTO-ENTMCNC: 33.7 G/DL (ref 31.4–37.4)
MCV RBC AUTO: 104 FL (ref 82–98)
MICROALBUMIN UR-MCNC: 542.6 MG/L
MICROALBUMIN/CREAT 24H UR: 470 MG/G CREATININE (ref 0–30)
MONOCYTES # BLD AUTO: 0.49 THOUSAND/ÂΜL (ref 0.17–1.22)
MONOCYTES NFR BLD AUTO: 10 % (ref 4–12)
NEUTROPHILS # BLD AUTO: 2.47 THOUSANDS/ÂΜL (ref 1.85–7.62)
NEUTS SEG NFR BLD AUTO: 49 % (ref 43–75)
NRBC BLD AUTO-RTO: 0 /100 WBCS
PLATELET # BLD AUTO: 165 THOUSANDS/UL (ref 149–390)
PMV BLD AUTO: 11 FL (ref 8.9–12.7)
POTASSIUM SERPL-SCNC: 4.2 MMOL/L (ref 3.5–5.3)
PROT SERPL-MCNC: 8.2 G/DL (ref 6.4–8.4)
PSA SERPL-MCNC: 2.21 NG/ML (ref 0–4)
RBC # BLD AUTO: 3.84 MILLION/UL (ref 3.88–5.62)
SODIUM SERPL-SCNC: 137 MMOL/L (ref 135–147)
TRIGL SERPL-MCNC: 182 MG/DL (ref ?–150)
WBC # BLD AUTO: 5.12 THOUSAND/UL (ref 4.31–10.16)

## 2025-06-04 PROCEDURE — 80053 COMPREHEN METABOLIC PANEL: CPT

## 2025-06-04 PROCEDURE — 83036 HEMOGLOBIN GLYCOSYLATED A1C: CPT

## 2025-06-04 PROCEDURE — 85025 COMPLETE CBC W/AUTO DIFF WBC: CPT

## 2025-06-04 PROCEDURE — 80061 LIPID PANEL: CPT

## 2025-06-04 PROCEDURE — 36415 COLL VENOUS BLD VENIPUNCTURE: CPT

## 2025-06-04 PROCEDURE — 82043 UR ALBUMIN QUANTITATIVE: CPT

## 2025-06-04 PROCEDURE — 82570 ASSAY OF URINE CREATININE: CPT

## 2025-06-04 PROCEDURE — G0103 PSA SCREENING: HCPCS

## 2025-06-10 ENCOUNTER — OFFICE VISIT (OUTPATIENT)
Dept: FAMILY MEDICINE CLINIC | Facility: CLINIC | Age: 70
End: 2025-06-10
Payer: COMMERCIAL

## 2025-06-10 VITALS
SYSTOLIC BLOOD PRESSURE: 140 MMHG | TEMPERATURE: 96.4 F | HEART RATE: 76 BPM | DIASTOLIC BLOOD PRESSURE: 80 MMHG | OXYGEN SATURATION: 95 % | WEIGHT: 315 LBS | BODY MASS INDEX: 40.43 KG/M2 | HEIGHT: 74 IN

## 2025-06-10 DIAGNOSIS — I10 ESSENTIAL HYPERTENSION: Chronic | ICD-10-CM

## 2025-06-10 DIAGNOSIS — E78.2 MIXED HYPERLIPIDEMIA: ICD-10-CM

## 2025-06-10 DIAGNOSIS — I48.21 PERMANENT ATRIAL FIBRILLATION (HCC): ICD-10-CM

## 2025-06-10 DIAGNOSIS — D53.9 NUTRITIONAL ANEMIA: ICD-10-CM

## 2025-06-10 DIAGNOSIS — E11.42 TYPE 2 DIABETES MELLITUS WITH DIABETIC POLYNEUROPATHY, WITHOUT LONG-TERM CURRENT USE OF INSULIN (HCC): Primary | ICD-10-CM

## 2025-06-10 PROCEDURE — 99214 OFFICE O/P EST MOD 30 MIN: CPT | Performed by: FAMILY MEDICINE

## 2025-06-10 PROCEDURE — G2211 COMPLEX E/M VISIT ADD ON: HCPCS | Performed by: FAMILY MEDICINE

## 2025-06-10 RX ORDER — ATORVASTATIN CALCIUM 80 MG/1
80 TABLET, FILM COATED ORAL DAILY
Qty: 100 TABLET | Refills: 2 | Status: SHIPPED | OUTPATIENT
Start: 2025-06-10

## 2025-06-10 RX ORDER — GLIPIZIDE 5 MG/1
5 TABLET ORAL 2 TIMES DAILY WITH MEALS
Qty: 180 TABLET | Refills: 2 | Status: SHIPPED | OUTPATIENT
Start: 2025-06-10

## 2025-06-10 NOTE — ASSESSMENT & PLAN NOTE
Blood pressure today was top normal at 140/80.  I advised the patient that his goal should be blood pressure 130/80 or less.  He is on multiple medications already for blood pressure control.  I did not make any changes.  Continue Catapres 0.2 mg twice daily, losartan 100 mg daily, metoprolol succinate ER 25 mg daily.  I asked the patient to continue to try to lose weight which should help with the blood pressure.  If his blood pressure should go up or fail to respond, we may need to consider increasing the dose of the metoprolol.

## 2025-06-10 NOTE — ASSESSMENT & PLAN NOTE
I do note on the patient's CBC he has a rather pronounced macrocytosis.  He does complain of numbness in his feet.  I believe this is likely due to diabetic neuropathy although we should rule out a B12 and folate deficiency.  As such, I did order B12 and folate levels for his next office visit.          Orders:    Vitamin B12; Future    Folate; Future

## 2025-06-10 NOTE — ASSESSMENT & PLAN NOTE
Patient has type 2 diabetes mellitus.  His diabetes is well-controlled.  His fasting blood glucose was 124.  Hemoglobin A1c was 7.0.  Urine albumin to creatinine ratio was elevated at 470.  I explained the significance of this to the patient.  I recommend we continue his current diabetic medications.  I explained to him that the losartan will help with his microalbumin.  I congratulated the patient on losing 9 pounds and encouraged him to continue to try to lose weight.  Lab Results   Component Value Date    HGBA1C 7.0 (H) 06/04/2025       Orders:    glipiZIDE (GLUCOTROL) 5 mg tablet; Take 1 tablet (5 mg total) by mouth 2 (two) times a day with meals    Comprehensive metabolic panel; Future    Hemoglobin A1C; Future

## 2025-06-10 NOTE — PATIENT INSTRUCTIONS
"Patient Education     Foot care for people with diabetes   The Basics   Written by the doctors and editors at [facility]   Why is foot care important if I have diabetes? -- Diabetes can cause nerve damage if your blood sugar is high for a long time. The medical term for this is \"diabetic neuropathy.\"  If you have problems with the nerves in your feet, you might not be able to feel pain in your foot. Normally, people feel pain when they get a cut or a blister on their foot. The pain tells them that they need to treat their cut so it can heal. But people with nerve damage might not feel any pain when their feet get hurt. They might not even know that they have a cut, so they might not treat it. Problems that aren't treated right away can get much worse. For example, an untreated cut can get infected and turn into an open sore.  High blood sugar can also damage blood vessels and decrease blood flow to your feet. This can weaken your skin and make wounds take longer to heal. You are also more likely to get an infection if you have high blood sugar.  How do I take care of my feet? -- Taking good care of your feet can help prevent foot problems. You should:   Wash your feet every day with soap and warm water. Pat your feet dry, and be sure to dry the skin between your toes.   Keep your feet moisturized. Put lotion on the tops and bottoms of your feet, but not between your toes.   Check your feet every day (figure 1). Look for cuts, blisters, redness, or swelling. Use a mirror, or ask someone to help you check the bottoms of your feet. Check all parts of the foot, especially between the toes. Look for broken skin, ulcers, blisters, or redness.   Trim your toenails straight across when needed (figure 2). Do not cut the corners of your toenails. File rough edges. Do not cut your cuticles. Ask for help if you cannot see well or have problems reaching your feet.   Ask your doctor or nurse to check your feet at each visit. Take " your shoes and socks off for these checks.   See a foot care provider (such as a podiatrist) if you have an ingrown toenail, corn, or callus. Do not try to remove corns and calluses yourself.  How do I protect my feet from injury? -- There are several ways to protect your feet. You can:   Wear shoes and socks at all times, even at home. Do not walk barefoot. Wear swim shoes if you go to the beach or a swimming pool.   Choose shoes that fit well. They should not be not too tight or too loose. Your shoes should have plenty of room for your toes (figure 3). Your doctor might give you a prescription for special shoes. Check to see if they are covered by your insurance.   Check your shoes each time before you put them on to make sure that the lining is smooth. Also check to make sure that there is nothing inside the shoes before putting them on.   Do not wear shoes that expose any part of the foot, like sandals, thongs, or clogs.   Wear cotton socks that fit loosely. Do not wear shoes without socks.   Protect your feet from heat and cold. Test bath water before putting your feet in it to make sure that it is not too hot. Do not walk barefoot on hot ground. Take extra care when going outside in the cold and wear warm socks.  What else should I know? -- You can lower your risk for foot problems by keeping your blood sugar levels as close to your goal as possible. Other things you can do include:   Move your ankles and toes often to help with blood flow. You can wear a support stocking to help with swelling.   Walk often. Regular walking helps blood flow.   If you smoke, try to quit. Your doctor or nurse can help. Smoking causes poor blood flow to your feet and can damage your nerves.  When should I call the doctor? -- Call your doctor or nurse for advice if you have:   A fever of 100.4°F (38°C) or higher, chills, or a wound that will not heal   Swelling, redness, warmth around a wound, a foul smell coming from a wound, or  yellowish, greenish, or bloody discharge   Sores or blisters on your feet that hurt more or less than you would expect   Numbness or tingling in your foot or leg   Corns, calluses, blisters, or new sores on your foot   Very dry, scaly, or cracked skin on your feet   Changes in the way your foot joints or arch look  All topics are updated as new evidence becomes available and our peer review process is complete.  This topic retrieved from [employer] on: Mar 13, 2024.  Topic 915922 Version 2.0  Release: 32.2.4 - C32.71  © 2024 UpToDate, Inc. and/or its affiliates. All rights reserved.  figure 1: Foot check for people with diabetes     People with diabetes should check both of their feet every day. It is important to check your feet all over, including in between your toes. If you can't see the bottom of your foot, use a mirror or ask another person to check for you. Let your doctor or nurse know if you find any:  Redness   Cuts or cracks in the skin   Blisters   Swelling   Graphic 51269 Version 3.0  figure 2: Trim your toenails     Trim your toenails straight across and smooth them with a nail file.  Graphic 56385 Version 2.0  figure 3: Correct shoe shape     Choose shoes that fit the right way and are not too tight or too loose. Your shoes should have plenty of room for your toes.  Graphic 82565 Version 2.0  Consumer Information Use and Disclaimer   Disclaimer: This generalized information is a limited summary of diagnosis, treatment, and/or medication information. It is not meant to be comprehensive and should be used as a tool to help the user understand and/or assess potential diagnostic and treatment options. It does NOT include all information about conditions, treatments, medications, side effects, or risks that may apply to a specific patient. It is not intended to be medical advice or a substitute for the medical advice, diagnosis, or treatment of a health care provider based on the health care provider's  examination and assessment of a patient's specific and unique circumstances. Patients must speak with a health care provider for complete information about their health, medical questions, and treatment options, including any risks or benefits regarding use of medications. This information does not endorse any treatments or medications as safe, effective, or approved for treating a specific patient. UpToDate, Inc. and its affiliates disclaim any warranty or liability relating to this information or the use thereof.The use of this information is governed by the Terms of Use, available at https://www.[employer].com/en/know/clinical-effectiveness-terms. 2024© UpToDate, Inc. and its affiliates and/or licensors. All rights reserved.  Copyright   © 2024 UpToDate, Inc. and/or its affiliates. All rights reserved.

## 2025-06-10 NOTE — ASSESSMENT & PLAN NOTE
Patient has chronic atrial fibrillation.  His rate is controlled on metoprolol.  Patient is anticoagulated on Eliquis.  I recommended no changes with these medications.

## 2025-06-10 NOTE — ASSESSMENT & PLAN NOTE
I refilled the patient's prescription for his atorvastatin 80 mg daily.  I did review his fasting lipid panel.  His total cholesterol is 153.  Triglycerides are 182.  HDL is 38.  LDL is 79.  His goal is less than 70.  He is on maximum dose of atorvastatin.  I considered adding Zetia to his regiment.  However, the patient tells me he takes too many medications already.  I could not argue with him.  Hopefully, weight loss will also help this.  Orders:    atorvastatin (LIPITOR) 80 mg tablet; Take 1 tablet (80 mg total) by mouth daily

## 2025-06-10 NOTE — PROGRESS NOTES
Name: Lester Hall      : 1955      MRN: [id number]  Encounter Provider: Aurelio Ramírez DO  Encounter Date: 6/10/2025   Encounter department: [facility]  :  Assessment & Plan  Type 2 diabetes mellitus with diabetic polyneuropathy, without long-term current use of insulin (HCC)  Patient has type 2 diabetes mellitus.  His diabetes is well-controlled.  His fasting blood glucose was 124.  Hemoglobin A1c was 7.0.  Urine albumin to creatinine ratio was elevated at 470.  I explained the significance of this to the patient.  I recommend we continue his current diabetic medications.  I explained to him that the losartan will help with his microalbumin.  I congratulated the patient on losing 9 pounds and encouraged him to continue to try to lose weight.  Lab Results   Component Value Date    HGBA1C 7.0 (H) 2025       Orders:    glipiZIDE (GLUCOTROL) 5 mg tablet; Take 1 tablet (5 mg total) by mouth 2 (two) times a day with meals    Comprehensive metabolic panel; Future    Hemoglobin A1C; Future    Essential hypertension  Blood pressure today was top normal at 140/80.  I advised the patient that his goal should be blood pressure 130/80 or less.  He is on multiple medications already for blood pressure control.  I did not make any changes.  Continue Catapres 0.2 mg twice daily, losartan 100 mg daily, metoprolol succinate ER 25 mg daily.  I asked the patient to continue to try to lose weight which should help with the blood pressure.  If his blood pressure should go up or fail to respond, we may need to consider increasing the dose of the metoprolol.         Mixed hyperlipidemia  I refilled the patient's prescription for his atorvastatin 80 mg daily.  I did review his fasting lipid panel.  His total cholesterol is 153.  Triglycerides are 182.  HDL is 38.  LDL is 79.  His goal is less than 70.  He is on maximum dose of atorvastatin.  I considered adding Zetia to his regiment.  However, the patient  "tells me he takes too many medications already.  I could not argue with him.  Hopefully, weight loss will also help this.  Orders:    atorvastatin (LIPITOR) 80 mg tablet; Take 1 tablet (80 mg total) by mouth daily    Permanent atrial fibrillation (HCC)  Patient has chronic atrial fibrillation.  His rate is controlled on metoprolol.  Patient is anticoagulated on Eliquis.  I recommended no changes with these medications.         Nutritional anemia  I do note on the patient's CBC he has a rather pronounced macrocytosis.  He does complain of numbness in his feet.  I believe this is likely due to diabetic neuropathy although we should rule out a B12 and folate deficiency.  As such, I did order B12 and folate levels for his next office visit.          Orders:    Vitamin B12; Future    Folate; Future           History of Present Illness   This is a 70-year-old white who presents to the office today for his routine checkup.  Patient is doing well.  He still works on the weekends.  He tells me he keeps himself busy.  He does note some increased lower extremity edema over the past day or so, despite weight loss.  He had blood work done for his visit today.  He feels well in general.      Review of Systems   Constitutional:  Negative for activity change and appetite change.   Respiratory:  Positive for cough. Negative for shortness of breath.    Cardiovascular:  Positive for leg swelling. Negative for chest pain and palpitations.        Denies orthopnea and paroxysmal nocturnal dyspnea   Gastrointestinal:  Negative for abdominal distention, abdominal pain, blood in stool, constipation, diarrhea and nausea.   Genitourinary:         Denies nocturia and weak stream   Neurological:  Positive for numbness (Reports numbness in feet).       Objective   /80 (BP Location: Left arm, Patient Position: Sitting, Cuff Size: Large)   Pulse 76   Temp (!) 96.4 °F (35.8 °C) (Tympanic)   Ht 6' 2\" (1.88 m)   Wt (!) 156 kg (344 lb 8 oz)  "  SpO2 95%   BMI 44.23 kg/m²      Physical Exam  Vitals reviewed.   Constitutional:       Comments: This is a 70-year-old white male who appears his stated age.  The patient is pleasant, cooperative, and in no distress.   HENT:      Head: Normocephalic and atraumatic.      Right Ear: Tympanic membrane, ear canal and external ear normal. There is no impacted cerumen.      Left Ear: Tympanic membrane, ear canal and external ear normal. There is no impacted cerumen.      Mouth/Throat:      Mouth: Mucous membranes are moist.      Pharynx: Oropharynx is clear. No oropharyngeal exudate or posterior oropharyngeal erythema.     Eyes:      General:         Right eye: No discharge.         Left eye: No discharge.      Conjunctiva/sclera: Conjunctivae normal.      Pupils: Pupils are equal, round, and reactive to light.       Cardiovascular:      Rate and Rhythm: Normal rate. Rhythm irregular.      Pulses: Pulses are weak.           Dorsalis pedis pulses are 0 on the right side and 0 on the left side.        Posterior tibial pulses are 0 on the right side and 0 on the left side.      Heart sounds: Normal heart sounds. No murmur heard.     No friction rub. No gallop.      Comments: Heart was irregularly irregular.  Ventricular rate is well-controlled  Pulmonary:      Effort: Pulmonary effort is normal. No respiratory distress.      Breath sounds: Normal breath sounds. No stridor. No wheezing, rhonchi or rales.   Abdominal:      General: There is no distension.      Palpations: Abdomen is soft. There is no mass.      Tenderness: There is no abdominal tenderness. There is no guarding.     Musculoskeletal:      Cervical back: Neck supple.   Feet:      Right foot:      Skin integrity: No ulcer, skin breakdown, erythema, warmth, callus or dry skin.      Left foot:      Skin integrity: No ulcer, skin breakdown, erythema, warmth, callus or dry skin.   Lymphadenopathy:      Cervical: No cervical adenopathy.     Skin:     Comments:  Positive for onychomycosis     Psychiatric:         Mood and Affect: Mood normal.         Behavior: Behavior normal.         Thought Content: Thought content normal.         Judgment: Judgment normal.     Extremities: Without cyanosis or clubbing.  There is bilateral lower extremity edema, approximately +1/4.  Edema was more pronounced in the feet and ankles  Patient's shoes and socks removed.    Right Foot/Ankle   Right Foot Inspection  Skin Exam: skin normal and skin intact. No dry skin, no warmth, no callus, no erythema, no maceration, no abnormal color, no pre-ulcer, no ulcer and no callus.     Toe Exam: swelling. No tenderness, erythema and  no right toe deformity    Sensory   Proprioception: intact  Monofilament testing: diminished    Vascular  Capillary refills: < 3 seconds  The right DP pulse is 0. The right PT pulse is 0.     Left Foot/Ankle  Left Foot Inspection  Skin Exam: skin normal and skin intact. No dry skin, no warmth, no erythema, no maceration, normal color, no pre-ulcer, no ulcer and no callus.     Toe Exam: swelling. No tenderness, no erythema and no left toe deformity.     Sensory   Proprioception: intact  Monofilament testing: diminished    Vascular  Capillary refills: < 3 seconds  The left DP pulse is 0. The left PT pulse is 0.     Assign Risk Category  Deformity present  Loss of protective sensation  Weak pulses  Risk: 2

## 2025-06-26 ENCOUNTER — HOSPITAL ENCOUNTER (EMERGENCY)
Facility: HOSPITAL | Age: 70
Discharge: HOME | End: 2025-06-26
Attending: FAMILY MEDICINE | Admitting: FAMILY MEDICINE
Payer: COMMERCIAL

## 2025-06-26 ENCOUNTER — APPOINTMENT (EMERGENCY)
Dept: RADIOLOGY | Facility: HOSPITAL | Age: 70
End: 2025-06-26
Payer: COMMERCIAL

## 2025-06-26 VITALS
OXYGEN SATURATION: 96 % | RESPIRATION RATE: 16 BRPM | SYSTOLIC BLOOD PRESSURE: 147 MMHG | DIASTOLIC BLOOD PRESSURE: 93 MMHG | BODY MASS INDEX: 40.43 KG/M2 | HEART RATE: 81 BPM | HEIGHT: 74 IN | TEMPERATURE: 97.6 F | WEIGHT: 315 LBS

## 2025-06-26 DIAGNOSIS — R60.0 PERIPHERAL EDEMA: Primary | ICD-10-CM

## 2025-06-26 DIAGNOSIS — R05.9 COUGH: ICD-10-CM

## 2025-06-26 DIAGNOSIS — R60.0 BILATERAL LEG EDEMA: ICD-10-CM

## 2025-06-26 LAB
ANION GAP SERPL CALCULATED.3IONS-SCNC: 6 MMOL/L (ref 4–13)
BASOPHILS # BLD AUTO: 0.04 THOUSANDS/ÂΜL (ref 0–0.1)
BASOPHILS NFR BLD AUTO: 1 % (ref 0–1)
BNP SERPL-MCNC: 117 PG/ML (ref 0–100)
BUN SERPL-MCNC: 16 MG/DL (ref 5–25)
CALCIUM SERPL-MCNC: 9.4 MG/DL (ref 8.4–10.2)
CHLORIDE SERPL-SCNC: 101 MMOL/L (ref 96–108)
CO2 SERPL-SCNC: 27 MMOL/L (ref 21–32)
CREAT SERPL-MCNC: 0.85 MG/DL (ref 0.6–1.3)
EOSINOPHIL # BLD AUTO: 0.18 THOUSAND/ÂΜL (ref 0–0.61)
EOSINOPHIL NFR BLD AUTO: 3 % (ref 0–6)
ERYTHROCYTE [DISTWIDTH] IN BLOOD BY AUTOMATED COUNT: 12.9 % (ref 11.6–15.1)
FLUAV RNA RESP QL NAA+PROBE: NEGATIVE
FLUBV RNA RESP QL NAA+PROBE: NEGATIVE
GFR SERPL CREATININE-BSD FRML MDRD: 87 ML/MIN/1.73SQ M
GLUCOSE SERPL-MCNC: 119 MG/DL (ref 65–140)
HCT VFR BLD AUTO: 40 % (ref 36.5–49.3)
HGB BLD-MCNC: 13.9 G/DL (ref 12–17)
IMM GRANULOCYTES # BLD AUTO: 0.03 THOUSAND/UL (ref 0–0.2)
IMM GRANULOCYTES NFR BLD AUTO: 0 % (ref 0–2)
LYMPHOCYTES # BLD AUTO: 1.99 THOUSANDS/ÂΜL (ref 0.6–4.47)
LYMPHOCYTES NFR BLD AUTO: 28 % (ref 14–44)
MCH RBC QN AUTO: 34.9 PG (ref 26.8–34.3)
MCHC RBC AUTO-ENTMCNC: 34.8 G/DL (ref 31.4–37.4)
MCV RBC AUTO: 101 FL (ref 82–98)
MONOCYTES # BLD AUTO: 0.72 THOUSAND/ÂΜL (ref 0.17–1.22)
MONOCYTES NFR BLD AUTO: 10 % (ref 4–12)
NEUTROPHILS # BLD AUTO: 4.24 THOUSANDS/ÂΜL (ref 1.85–7.62)
NEUTS SEG NFR BLD AUTO: 58 % (ref 43–75)
NRBC BLD AUTO-RTO: 0 /100 WBCS
PLATELET # BLD AUTO: 167 THOUSANDS/UL (ref 149–390)
PMV BLD AUTO: 10.1 FL (ref 8.9–12.7)
POTASSIUM SERPL-SCNC: 4.1 MMOL/L (ref 3.5–5.3)
RBC # BLD AUTO: 3.98 MILLION/UL (ref 3.88–5.62)
RSV RNA RESP QL NAA+PROBE: NEGATIVE
SARS-COV-2 RNA RESP QL NAA+PROBE: NEGATIVE
SODIUM SERPL-SCNC: 134 MMOL/L (ref 135–147)
WBC # BLD AUTO: 7.2 THOUSAND/UL (ref 4.31–10.16)

## 2025-06-26 PROCEDURE — 0241U HB NFCT DS VIR RESP RNA 4 TRGT: CPT | Performed by: FAMILY MEDICINE

## 2025-06-26 PROCEDURE — 99284 EMERGENCY DEPT VISIT MOD MDM: CPT | Performed by: FAMILY MEDICINE

## 2025-06-26 PROCEDURE — 85025 COMPLETE CBC W/AUTO DIFF WBC: CPT | Performed by: FAMILY MEDICINE

## 2025-06-26 PROCEDURE — 83880 ASSAY OF NATRIURETIC PEPTIDE: CPT | Performed by: FAMILY MEDICINE

## 2025-06-26 PROCEDURE — 80048 BASIC METABOLIC PNL TOTAL CA: CPT | Performed by: FAMILY MEDICINE

## 2025-06-26 PROCEDURE — 71045 X-RAY EXAM CHEST 1 VIEW: CPT

## 2025-06-26 PROCEDURE — 36415 COLL VENOUS BLD VENIPUNCTURE: CPT | Performed by: FAMILY MEDICINE

## 2025-06-26 RX ORDER — FUROSEMIDE 10 MG/ML
40 INJECTION INTRAMUSCULAR; INTRAVENOUS ONCE
Status: COMPLETED | OUTPATIENT
Start: 2025-06-26 | End: 2025-06-26

## 2025-06-26 RX ORDER — BENZONATATE 100 MG/1
100 CAPSULE ORAL EVERY 8 HOURS
Qty: 21 CAPSULE | Refills: 0 | Status: SHIPPED | OUTPATIENT
Start: 2025-06-26

## 2025-06-26 RX ADMIN — FUROSEMIDE 40 MG: 10 INJECTION, SOLUTION INTRAMUSCULAR; INTRAVENOUS at 11:23

## 2025-06-26 NOTE — ED PROVIDER NOTES
Time reflects when diagnosis was documented in both MDM as applicable and the Disposition within this note       Time User Action Codes Description Comment    6/26/2025  1:16 PM Ahmad, Humberto Add [R60.0] Peripheral edema     6/26/2025  1:16 PM Ahmad, Humberto Add [R05.9] Cough     6/26/2025  1:16 PM Ahmad, Humberto Add [R60.0] Bilateral leg edema           ED Disposition       ED Disposition   Discharge    Condition   Stable    Date/Time   Thu Jun 26, 2025  1:16 PM    Comment   Lester Hall discharge to home/self care.                   Assessment & Plan       Medical Decision Making  Amount and/or Complexity of Data Reviewed  Labs: ordered.  Radiology: ordered.    Risk  Prescription drug management.    70-year-old male with history of lower extremity swelling chronic on Lasix presented to ED with a complaint of increased welling of his leg.  Patient also complaining of intermittent cough this has been ongoing.  States that just wanted to get checked.  States he saw his PCP about a week ago and everything checked out.  Differential diagnoses include rule out CHF # electrolyte normality/pneumonia/COVID/flu/RSV/renal failure  Plan will obtain labs chest x-ray patient is given Lasix  Labs reviewed within normal limits chest x-ray within normal limits patient was assessed at bedside states that feeling better after receiving Lasix discussed with patient recommending to increase Lasix and follow-up with PCP.  Patient is requesting for cough medication will start him on Tessalon Perles  Disposition discharge home with strict question to return numbers noticing worsening symptoms patient verbalized understanding plan DC home         Medications   furosemide (LASIX) injection 40 mg (40 mg Intravenous Given 6/26/25 1123)       ED Risk Strat Scores                    (ISAR) Identification of Seniors at Risk  Before the illness or injury that brought you to the Emergency, did you need someone to help you on a regular basis?: 0  In the  last 24 hours, have you needed more help than usual?: 0  Have you been hospitalized for one or more nights during the past 6 months?: 1  In general, do you see well?: 0  In general, do you have serious problems with your memory?: 0  Do you take more than three different medications every day?: 1  ISAR Score: 2            SBIRT 20yo+      Flowsheet Row Most Recent Value   Initial Alcohol Screen: US AUDIT-C     1. How often do you have a drink containing alcohol? 2 Filed at: 06/26/2025 1037   2. How many drinks containing alcohol do you have on a typical day you are drinking?  0 Filed at: 06/26/2025 1037   3b. FEMALE Any Age, or MALE 65+: How often do you have 4 or more drinks on one occassion? 0 Filed at: 06/26/2025 1037   Audit-C Score 2 Filed at: 06/26/2025 [facility]   CHAN: How many times in the past year have you...    Used an illegal drug or used a prescription medication for non-medical reasons? Never Filed at: 06/26/2025 1037                            History of Present Illness       Chief Complaint   Patient presents with    Leg Swelling     Biateral leg swelling, pt reports feel lightheaded       Past Medical History[1]   Past Surgical History[2]   Family History[3]   Social History[4]   E-Cigarette/Vaping    E-Cigarette Use Never User       E-Cigarette/Vaping Substances    Nicotine No     THC No     CBD No     Flavoring No     Other No     Unknown No       I have reviewed and agree with the history as documented.     HPI    Review of Systems   Constitutional:  Negative for chills and fever.   HENT:  Negative for rhinorrhea and sore throat.    Eyes:  Negative for visual disturbance.   Respiratory:  Negative for cough and shortness of breath.    Cardiovascular:  Positive for leg swelling. Negative for chest pain.   Gastrointestinal:  Negative for abdominal pain, diarrhea, nausea and vomiting.   Genitourinary:  Negative for dysuria.   Musculoskeletal:  Negative for back pain and myalgias.   Skin:  Negative for  rash.   Neurological:  Negative for dizziness and headaches.   Psychiatric/Behavioral:  Negative for confusion.    All other systems reviewed and are negative.          Objective       ED Triage Vitals [06/26/25 1034]   Temperature Pulse Blood Pressure Respirations SpO2 Patient Position - Orthostatic VS   98.6 °F (37 °C) 81 (!) 197/99 18 98 % Sitting      Temp Source Heart Rate Source BP Location FiO2 (%) Pain Score    Temporal Monitor Left arm -- No Pain      Vitals      Date and Time Temp Pulse SpO2 Resp BP Pain Score FACES Pain Rating User   06/26/25 1300 97.6 °F (36.4 °C) 81 96 % -- 147/93 -- -- AM   06/26/25 1245 -- 76 96 % 16 136/78 -- -- AM   06/26/25 1230 -- 75 96 % 18 149/83 -- -- OP   06/26/25 1200 -- 79 97 % -- 171/115 -- -- AM   06/26/25 1034 98.6 °F (37 °C) 81 98 % 18 197/99 No Pain -- JW            Physical Exam  Vitals and nursing note reviewed.   Constitutional:       General: He is not in acute distress.     Appearance: He is well-developed. He is not diaphoretic.   HENT:      Head: Normocephalic and atraumatic.      Right Ear: External ear normal.      Left Ear: External ear normal.      Nose: Nose normal.      Mouth/Throat:      Mouth: Mucous membranes are moist.      Pharynx: Oropharynx is clear. No posterior oropharyngeal erythema.     Eyes:      Conjunctiva/sclera: Conjunctivae normal.      Pupils: Pupils are equal, round, and reactive to light.       Cardiovascular:      Rate and Rhythm: Normal rate and regular rhythm.      Pulses: Normal pulses.      Heart sounds: Normal heart sounds.   Pulmonary:      Effort: Pulmonary effort is normal. No respiratory distress.      Breath sounds: Normal breath sounds. No wheezing.   Abdominal:      General: Bowel sounds are normal. There is no distension.      Palpations: Abdomen is soft.      Tenderness: There is no abdominal tenderness.     Musculoskeletal:         General: Normal range of motion.      Cervical back: Normal range of motion and neck  supple.      Right lower leg: Edema present.      Left lower leg: Edema present.   Lymphadenopathy:      Cervical: No cervical adenopathy.     Skin:     General: Skin is warm and dry.      Capillary Refill: Capillary refill takes less than 2 seconds.     Neurological:      Mental Status: He is alert and oriented to person, place, and time.     Psychiatric:         Mood and Affect: Mood normal.         Behavior: Behavior normal.         Results Reviewed       Procedure Component Value Units Date/Time    FLU/RSV/COVID - if FLU/RSV clinically relevant (2hr TAT) [[id number]]  (Normal) Collected: 06/26/25 1119    Lab Status: Final result Specimen: Nares from Nose Updated: 06/26/25 1206     SARS-CoV-2 Negative     INFLUENZA A PCR Negative     INFLUENZA B PCR Negative     RSV PCR Negative    Narrative:      This test has been performed using the CoV-2/Flu/RSV plus assay on the [employer] platform. This test has been validated by the  and verified by the performing laboratory.     This test is designed to amplify and detect the following: nucleocapsid (N), envelope (E), and RNA-dependent RNA polymerase (RdRP) genes of the SARS-CoV-2 genome; matrix (M), basic polymerase (PB2), and acidic protein (PA) segments of the influenza A genome; matrix (M) and non-structural protein (NS) segments of the influenza B genome, and the nucleocapsid genes of RSV A and RSV B.     Positive results are indicative of the presence of Flu A, Flu B, RSV, and/or SARS-CoV-2 RNA. Positive results for SARS-CoV-2 or suspected novel influenza should be reported to state, local, or federal health departments according to local reporting requirements.      All results should be assessed in conjunction with clinical presentation and other laboratory markers for clinical management.     FOR PEDIATRIC PATIENTS - copy/paste COVID Guidelines URL to browser: [email or web address]        B-Type Natriuretic Peptide(BNP) [[id number]]  (Abnormal) Collected: 06/26/25 1119    Lab Status: Final result Specimen: Blood from Arm, Left Updated: 06/26/25 1151      pg/mL     Basic metabolic panel [[id number]]  (Abnormal) Collected: 06/26/25 1119    Lab Status: Final result Specimen: Blood from Arm, Left Updated: 06/26/25 1147     Sodium 134 mmol/L      Potassium 4.1 mmol/L      Chloride 101 mmol/L      CO2 27 mmol/L      ANION GAP 6 mmol/L      BUN 16 mg/dL      Creatinine 0.85 mg/dL      Glucose 119 mg/dL      Calcium 9.4 mg/dL      eGFR 87 ml/min/1.73sq m     Narrative:      National Kidney Disease Foundation guidelines for Chronic Kidney Disease (CKD):     Stage 1 with normal or high GFR (GFR > 90 mL/min/1.73 square meters)    Stage 2 Mild CKD (GFR = 60-89 mL/min/1.73 square meters)    Stage 3A Moderate CKD (GFR = 45-59 mL/min/1.73 square meters)    Stage 3B Moderate CKD (GFR = 30-44 mL/min/1.73 square meters)    Stage 4 Severe CKD (GFR = 15-29 mL/min/1.73 square meters)    Stage 5 End Stage CKD (GFR <15 mL/min/1.73 square meters)  Note: GFR calculation is accurate only with a steady state creatinine    CBC and differential [[id number]]  (Abnormal) Collected: 06/26/25 1119    Lab Status: Final result Specimen: Blood from Arm, Left Updated: 06/26/25 1131     WBC 7.20 Thousand/uL      RBC 3.98 Million/uL      Hemoglobin 13.9 g/dL      Hematocrit 40.0 %       fL      MCH 34.9 pg      MCHC 34.8 g/dL      RDW 12.9 %      MPV 10.1 fL      Platelets 167 Thousands/uL      nRBC 0 /100 WBCs      Segmented % 58 %      Immature Grans % 0 %      Lymphocytes % 28 %      Monocytes % 10 %      Eosinophils Relative 3 %      Basophils Relative 1 %      Absolute Neutrophils 4.24 Thousands/µL      Absolute Immature Grans 0.03 Thousand/uL      Absolute Lymphocytes 1.99 Thousands/µL      Absolute Monocytes 0.72 Thousand/µL      Eosinophils Absolute 0.18 Thousand/µL      Basophils Absolute 0.04 Thousands/µL              XR chest 1 view portable   Final Interpretation by Pedro Morris MD ( [id number])      No acute cardiopulmonary disease.            Workstation performed: [id number]             Procedures    ED Medication and Procedure Management   Prior to Admission Medications   Prescriptions Last Dose Informant Patient Reported? Taking?   Blood Glucose Monitoring Suppl (OneTouch Verio Reflect) w/Device KIT 2025 Self No Yes   Sig: Check blood sugars twice daily. Please substitute with appropriate alternative as covered by patient's insurance. Dx: E11.65   OneTouch Delica Lancets 33G MISC 2025 Self No Yes   Sig: Check blood sugars twice daily. Please substitute with appropriate alternative as covered by patient's insurance. Dx: E11.65   acetaminophen (TYLENOL) 500 mg tablet 2025 Self Yes Yes   Sig: Take 500 mg by mouth every 6 (six) hours as needed for mild pain   albuterol (ACCUNEB) 1.25 MG/3ML nebulizer solution 2025 Self Yes Yes   Sig: Take 1.25 mg by nebulization every 6 (six) hours as needed for wheezing   albuterol (ProAir HFA) 90 mcg/act inhaler 2025 Self No Yes   Sig: Inhale 2 puffs every 6 (six) hours as needed for wheezing   allopurinol (ZYLOPRIM) 300 mg tablet 2025 Self No Yes   Sig: Take 1 tablet by mouth once daily   ammonium lactate (LAC-HYDRIN) 12 % lotion 2025 Self No Yes   Sig: Apply topically 2 (two) times a day   apixaban (Eliquis) 5 mg 2025 Self No Yes   Sig: Take 1 tablet by mouth twice daily   atorvastatin (LIPITOR) 80 mg tablet 2025  No Yes   Sig: Take 1 tablet (80 mg total) by mouth daily   azelastine (ASTELIN) 0.1 % nasal spray 2025 Self No Yes   Si spray into each nostril 2 (two) times a day Use in each nostril as directed   benzonatate (TESSALON) 200 MG capsule 2025 Self No Yes   Sig: Take 1 capsule (200 mg total) by mouth 3 (three) times a day as needed for cough   cloNIDine (CATAPRES) 0.2 mg tablet 2025 Self No Yes   Sig: Take 1  tablet by mouth twice daily   furosemide (LASIX) 80 mg tablet 6/26/2025 Self No Yes   Sig: Take 1 tablet by mouth once daily   glipiZIDE (GLUCOTROL) 5 mg tablet 6/26/2025  No Yes   Sig: Take 1 tablet (5 mg total) by mouth 2 (two) times a day with meals   glucose blood (OneTouch Verio) test strip 6/26/2025 Self No Yes   Sig: Check blood sugars twice daily. Please substitute with appropriate alternative as covered by patient's insurance. Dx: E11.65   loratadine (CLARITIN) 10 mg tablet 6/26/2025 Self No Yes   Sig: Take 1 tablet (10 mg total) by mouth daily   losartan (COZAAR) 100 MG tablet 6/26/2025 Self No Yes   Sig: Take 1 tablet by mouth once daily   metFORMIN (GLUCOPHAGE) 1000 MG tablet 6/26/2025 Self No Yes   Sig: TAKE 1 TABLET BY MOUTH TWICE DAILY WITH MEALS   metoprolol succinate (TOPROL-XL) 25 mg 24 hr tablet 6/26/2025 Self No Yes   Sig: Take 1 tablet by mouth once daily   potassium chloride (Klor-Con M20) 20 mEq tablet  Self No No   Sig: Take 1 tablet (20 mEq total) by mouth daily for 5 days      Facility-Administered Medications: None     Discharge Medication List as of 6/26/2025  1:17 PM        START taking these medications    Details   !! benzonatate (TESSALON PERLES) 100 mg capsule Take 1 capsule (100 mg total) by mouth every 8 (eight) hours, Starting Thu 6/26/2025, Normal       !! - Potential duplicate medications found. Please discuss with provider.        CONTINUE these medications which have NOT CHANGED    Details   acetaminophen (TYLENOL) 500 mg tablet Take 500 mg by mouth every 6 (six) hours as needed for mild pain, Historical Med      albuterol (ACCUNEB) 1.25 MG/3ML nebulizer solution Take 1.25 mg by nebulization every 6 (six) hours as needed for wheezing, Historical Med      albuterol (ProAir HFA) 90 mcg/act inhaler Inhale 2 puffs every 6 (six) hours as needed for wheezing, Starting Sat 3/22/2025, Normal      allopurinol (ZYLOPRIM) 300 mg tablet Take 1 tablet by mouth once daily, Starting Thu  4/10/2025, Normal      ammonium lactate (LAC-HYDRIN) 12 % lotion Apply topically 2 (two) times a day, Starting Fri 2/2/2024, Normal      apixaban (Eliquis) 5 mg Take 1 tablet by mouth twice daily, Starting Tue 12/31/2024, Normal      atorvastatin (LIPITOR) 80 mg tablet Take 1 tablet (80 mg total) by mouth daily, Starting Tue 6/10/2025, Normal      azelastine (ASTELIN) 0.1 % nasal spray 1 spray into each nostril 2 (two) times a day Use in each nostril as directed, Starting Wed 2/7/2024, Normal      !! benzonatate (TESSALON) 200 MG capsule Take 1 capsule (200 mg total) by mouth 3 (three) times a day as needed for cough, Starting Sat 3/22/2025, Normal      Blood Glucose Monitoring Suppl (OneTouch Verio Reflect) w/Device KIT Check blood sugars twice daily. Please substitute with appropriate alternative as covered by patient's insurance. Dx: E11.65, Normal      cloNIDine (CATAPRES) 0.2 mg tablet Take 1 tablet by mouth twice daily, Starting Thu 5/29/2025, Normal      furosemide (LASIX) 80 mg tablet Take 1 tablet by mouth once daily, Starting Thu 4/10/2025, Normal      glipiZIDE (GLUCOTROL) 5 mg tablet Take 1 tablet (5 mg total) by mouth 2 (two) times a day with meals, Starting Tue 6/10/2025, Normal      glucose blood (OneTouch Verio) test strip Check blood sugars twice daily. Please substitute with appropriate alternative as covered by patient's insurance. Dx: E11.65, Normal      loratadine (CLARITIN) 10 mg tablet Take 1 tablet (10 mg total) by mouth daily, Starting Tue 10/22/2024, Normal      losartan (COZAAR) 100 MG tablet Take 1 tablet by mouth once daily, Starting Thu 1/30/2025, Normal      metFORMIN (GLUCOPHAGE) 1000 MG tablet TAKE 1 TABLET BY MOUTH TWICE DAILY WITH MEALS, Starting Thu 5/1/2025, Normal      metoprolol succinate (TOPROL-XL) 25 mg 24 hr tablet Take 1 tablet by mouth once daily, Starting Thu 5/1/2025, Normal      OneTouch Delica Lancets 33G MISC Check blood sugars twice daily. Please substitute with  appropriate alternative as covered by patient's insurance. Dx: E11.65, Normal      potassium chloride (Klor-Con M20) 20 mEq tablet Take 1 tablet (20 mEq total) by mouth daily for 5 days, Starting Fri 3/15/2024, Until Tue 6/10/2025, Normal       !! - Potential duplicate medications found. Please discuss with provider.        No discharge procedures on file.  ED SEPSIS DOCUMENTATION   Time reflects when diagnosis was documented in both MDM as applicable and the Disposition within this note       Time User Action Codes Description Comment    6/26/2025  1:16 PM Cassie Humberto Add [R60.0] Peripheral edema     6/26/2025  1:16 PM Ahmad, Humberto Add [R05.9] Cough     6/26/2025  1:16 PM Ahmad, Humberto Add [R60.0] Bilateral leg edema                      [1]   Past Medical History:  Diagnosis Date    Bilateral cellulitis of lower leg     Bilateral edema of lower extremity     Chronic pain     COPD (chronic obstructive pulmonary disease) (HCC)     Diabetes mellitus (HCC)     ED (erectile dysfunction)     Gout     last assessed: 4/10/2019    Hemiplegia and hemiparesis following cerebral infarction affecting right dominant side (HCC)     last assessed: 1/10/2019    Hypertension     Lymphedema     DIAMOND (obstructive sleep apnea)     does not treat this    Stroke (HCC)     Type 2 diabetes mellitus with foot ulcer, without long-term current use of insulin ([facility]) 11/30/2018    Venous insufficiency (chronic) (peripheral)     last assessed: 7/5/2018   [2]   Past Surgical History:  Procedure Laterality Date    COLONOSCOPY      HERNIA REPAIR     [3]   Family History  Problem Relation Name Age of Onset    Heart failure Mother      Heart failure Father     [4]   Social History  Tobacco Use    Smoking status: Never     Passive exposure: Never    Smokeless tobacco: Never   Vaping Use    Vaping status: Never Used   Substance Use Topics    Alcohol use: Yes     Alcohol/week: 4.0 standard drinks of alcohol     Types: 4 Standard drinks or equivalent per  week     Comment: occ    Drug use: No        Humberto Matthews MD  06/26/25 5340

## 2025-06-27 ENCOUNTER — VBI (OUTPATIENT)
Dept: FAMILY MEDICINE CLINIC | Facility: CLINIC | Age: 70
End: 2025-06-27

## 2025-06-27 NOTE — TELEPHONE ENCOUNTER
06/27/25 9:27 AM    Patient contacted post ED visit, outreach attempt made but message could not be left. Additional outreach attempt will be made.     Thank you.  Pablo Monae MA  [facility]

## 2025-06-27 NOTE — LETTER
[facility]  [address]  [place] [address] [phone]    Date: 07/01/25    Lester Hall  [address] [facility] [address]    Dear Lester:                                                                                                                                Thank you for choosing [facility] emergency department for care.  Your primary care provider wants to make sure that your ongoing medical care is being addressed. If you require follow up care as a result of your emergency department visit, there are a few things the practice would like you to know.                As part of the network's continuing commitment to caring for our patients, we have added more same day appointments and have extended office hours to meet your medical needs. After hours, on-call physicians are available via your primary care provider's main office line.               We encourage you to contact our office prior to seeking treatment to discuss your symptoms with the medical staff.  Together, we can determine the correct course of action.  A majority of non-emergent conditions such as: common cold, flu-like symptoms, fevers, strains/sprains, dislocations, minor burns, cuts and animal bites can be treated at [facility] facilities. Diagnostic testing is available at some sites.               Of course, if you are experiencing a life threatening medical emergency call 911 or proceed directly to the nearest emergency room.    Your nearest [facility] facility is conveniently located at:    [address] [address]  [phone]  SKIP THE WAIT  Conveniently offered at most [facility] locations  [place] your spot online at [email or web address] or on the [facility]    Sincerely,    [facility]  Dept: [phone]

## 2025-06-30 NOTE — TELEPHONE ENCOUNTER
06/30/25 9:08 AM    Patient contacted post ED visit, outreach attempt made but message could not be left. Additional outreach attempt will be made.     Thank you.  Pablo Monae MA  [facility]

## 2025-07-01 NOTE — TELEPHONE ENCOUNTER
07/01/25 8:35 AM    Patient contacted post ED visit, phone outreaches were unsuccessful and a MyChart letter has been sent to the patient as follow-up.    Thank you.  Pablo Monae MA  [facility]

## 2025-07-09 DIAGNOSIS — I48.20 CHRONIC ATRIAL FIBRILLATION (HCC): ICD-10-CM

## 2025-07-10 RX ORDER — APIXABAN 5 MG/1
5 TABLET, FILM COATED ORAL 2 TIMES DAILY
Qty: 180 TABLET | Refills: 1 | Status: SHIPPED | OUTPATIENT
Start: 2025-07-10

## 2025-07-29 DIAGNOSIS — I10 ESSENTIAL HYPERTENSION: Chronic | ICD-10-CM

## 2025-07-30 RX ORDER — LOSARTAN POTASSIUM 100 MG/1
100 TABLET ORAL DAILY
Qty: 100 TABLET | Refills: 1 | Status: SHIPPED | OUTPATIENT
Start: 2025-07-30